# Patient Record
Sex: FEMALE | Race: BLACK OR AFRICAN AMERICAN | NOT HISPANIC OR LATINO
[De-identification: names, ages, dates, MRNs, and addresses within clinical notes are randomized per-mention and may not be internally consistent; named-entity substitution may affect disease eponyms.]

---

## 2017-02-03 ENCOUNTER — MESSAGE (OUTPATIENT)
Age: 67
End: 2017-02-03

## 2017-05-25 ENCOUNTER — OUTPATIENT (OUTPATIENT)
Dept: OUTPATIENT SERVICES | Facility: HOSPITAL | Age: 67
LOS: 1 days | End: 2017-05-25
Payer: COMMERCIAL

## 2017-05-25 VITALS
HEART RATE: 75 BPM | TEMPERATURE: 97 F | HEIGHT: 64 IN | WEIGHT: 227.96 LBS | OXYGEN SATURATION: 97 % | RESPIRATION RATE: 18 BRPM | SYSTOLIC BLOOD PRESSURE: 153 MMHG | DIASTOLIC BLOOD PRESSURE: 85 MMHG

## 2017-05-25 DIAGNOSIS — Z01.810 ENCOUNTER FOR PREPROCEDURAL CARDIOVASCULAR EXAMINATION: ICD-10-CM

## 2017-05-25 DIAGNOSIS — H26.40 UNSPECIFIED SECONDARY CATARACT: Chronic | ICD-10-CM

## 2017-05-25 LAB
ANION GAP SERPL CALC-SCNC: 14 MMOL/L — SIGNIFICANT CHANGE UP (ref 5–17)
APTT BLD: 28.3 SEC — SIGNIFICANT CHANGE UP (ref 27.5–37.4)
BASOPHILS # BLD AUTO: 0 K/UL — SIGNIFICANT CHANGE UP (ref 0–0.2)
BASOPHILS NFR BLD AUTO: 0.2 % — SIGNIFICANT CHANGE UP (ref 0–2)
BUN SERPL-MCNC: 19 MG/DL — SIGNIFICANT CHANGE UP (ref 8–20)
CALCIUM SERPL-MCNC: 9.4 MG/DL — SIGNIFICANT CHANGE UP (ref 8.6–10.2)
CHLORIDE SERPL-SCNC: 102 MMOL/L — SIGNIFICANT CHANGE UP (ref 98–107)
CO2 SERPL-SCNC: 22 MMOL/L — SIGNIFICANT CHANGE UP (ref 22–29)
CREAT SERPL-MCNC: 1.06 MG/DL — SIGNIFICANT CHANGE UP (ref 0.5–1.3)
EOSINOPHIL # BLD AUTO: 0.3 K/UL — SIGNIFICANT CHANGE UP (ref 0–0.5)
EOSINOPHIL NFR BLD AUTO: 5.3 % — SIGNIFICANT CHANGE UP (ref 0–6)
GLUCOSE SERPL-MCNC: 103 MG/DL — SIGNIFICANT CHANGE UP (ref 70–115)
HCT VFR BLD CALC: 35.2 % — LOW (ref 37–47)
HCV AB S/CO SERPL IA: 0.08 S/CO — SIGNIFICANT CHANGE UP
HCV AB SERPL-IMP: SIGNIFICANT CHANGE UP
HGB BLD-MCNC: 11.1 G/DL — LOW (ref 12–16)
INR BLD: 0.92 RATIO — SIGNIFICANT CHANGE UP (ref 0.88–1.16)
LYMPHOCYTES # BLD AUTO: 1.7 K/UL — SIGNIFICANT CHANGE UP (ref 1–4.8)
LYMPHOCYTES # BLD AUTO: 31.4 % — SIGNIFICANT CHANGE UP (ref 20–55)
MCHC RBC-ENTMCNC: 26.6 PG — LOW (ref 27–31)
MCHC RBC-ENTMCNC: 31.5 G/DL — LOW (ref 32–36)
MCV RBC AUTO: 84.2 FL — SIGNIFICANT CHANGE UP (ref 81–99)
MONOCYTES # BLD AUTO: 0.3 K/UL — SIGNIFICANT CHANGE UP (ref 0–0.8)
MONOCYTES NFR BLD AUTO: 4.9 % — SIGNIFICANT CHANGE UP (ref 3–10)
NEUTROPHILS # BLD AUTO: 3.2 K/UL — SIGNIFICANT CHANGE UP (ref 1.8–8)
NEUTROPHILS NFR BLD AUTO: 58 % — SIGNIFICANT CHANGE UP (ref 37–73)
PLATELET # BLD AUTO: 196 K/UL — SIGNIFICANT CHANGE UP (ref 150–400)
POTASSIUM SERPL-MCNC: 4.3 MMOL/L — SIGNIFICANT CHANGE UP (ref 3.5–5.3)
POTASSIUM SERPL-SCNC: 4.3 MMOL/L — SIGNIFICANT CHANGE UP (ref 3.5–5.3)
PROTHROM AB SERPL-ACNC: 10.1 SEC — SIGNIFICANT CHANGE UP (ref 9.8–12.7)
RBC # BLD: 4.18 M/UL — LOW (ref 4.4–5.2)
RBC # FLD: 15.1 % — SIGNIFICANT CHANGE UP (ref 11–15.6)
SODIUM SERPL-SCNC: 138 MMOL/L — SIGNIFICANT CHANGE UP (ref 135–145)
WBC # BLD: 5.5 K/UL — SIGNIFICANT CHANGE UP (ref 4.8–10.8)
WBC # FLD AUTO: 5.5 K/UL — SIGNIFICANT CHANGE UP (ref 4.8–10.8)

## 2017-05-25 PROCEDURE — 80048 BASIC METABOLIC PNL TOTAL CA: CPT

## 2017-05-25 PROCEDURE — 85610 PROTHROMBIN TIME: CPT

## 2017-05-25 PROCEDURE — G0463: CPT

## 2017-05-25 PROCEDURE — 86803 HEPATITIS C AB TEST: CPT

## 2017-05-25 PROCEDURE — 93005 ELECTROCARDIOGRAM TRACING: CPT

## 2017-05-25 PROCEDURE — 85730 THROMBOPLASTIN TIME PARTIAL: CPT

## 2017-05-25 PROCEDURE — 85027 COMPLETE CBC AUTOMATED: CPT

## 2017-05-25 PROCEDURE — 93010 ELECTROCARDIOGRAM REPORT: CPT

## 2017-05-25 RX ORDER — FLUTICASONE PROPIONATE 220 MCG
1 AEROSOL WITH ADAPTER (GRAM) INHALATION
Qty: 0 | Refills: 0 | COMMUNITY

## 2017-05-25 RX ORDER — RANITIDINE HYDROCHLORIDE 150 MG/1
1 TABLET, FILM COATED ORAL
Qty: 0 | Refills: 0 | COMMUNITY

## 2017-05-25 NOTE — H&P PST ADULT - HISTORY OF PRESENT ILLNESS
65 y/o female with hx GERD (follows up with GI) with c/o indigestion for years with c/o intermittent midsternal chest discomfort that is "hard to describe," non-radiating, wakes her up from sleep, unrelated to activity, lasts a few seconds and relieves on own CCS 3. She denies any SOB, palpitations, dizziness or syncope. She had  a NST in 2009 and more recently 2015 that was borderline abnormal showing no ischemia on EKG, with small mild, reversible defect of the basal ant, ant-sep, mid ant wall c/w ischemia and breast attenuation infarct (change from previous study) - low risk. Pt had a LHC back in 2010 that showed no CAD. She is currently on minimal antianginal therapy with metoprolol.

## 2017-05-25 NOTE — H&P PST ADULT - PMH
Arthritis    Chest pain    DJD (degenerative joint disease), lumbar    DM (diabetes mellitus)    Fatty liver    GERD (gastroesophageal reflux disease)    Hiatal hernia    HTN (hypertension)    BONNY (obstructive sleep apnea) Anemia    Arthritis    Chest pain    DJD (degenerative joint disease), lumbar    DM (diabetes mellitus)    Fatty liver    GERD (gastroesophageal reflux disease)    Hiatal hernia    HTN (hypertension)    BONNY (obstructive sleep apnea)

## 2017-05-25 NOTE — H&P PST ADULT - FAMILY HISTORY
Sibling  Still living? Yes, Estimated age: 61-70  Family history of diabetes mellitus (DM), Age at diagnosis: Age Unknown  Family history of hypertension, Age at diagnosis: Age Unknown

## 2017-05-26 ENCOUNTER — TRANSCRIPTION ENCOUNTER (OUTPATIENT)
Age: 67
End: 2017-05-26

## 2017-05-26 ENCOUNTER — INPATIENT (INPATIENT)
Facility: HOSPITAL | Age: 67
LOS: 0 days | Discharge: HOME | DRG: 247 | End: 2017-05-27
Attending: INTERNAL MEDICINE | Admitting: INTERNAL MEDICINE
Payer: COMMERCIAL

## 2017-05-26 VITALS
OXYGEN SATURATION: 100 % | HEART RATE: 89 BPM | DIASTOLIC BLOOD PRESSURE: 76 MMHG | RESPIRATION RATE: 20 BRPM | SYSTOLIC BLOOD PRESSURE: 157 MMHG

## 2017-05-26 DIAGNOSIS — H26.40 UNSPECIFIED SECONDARY CATARACT: Chronic | ICD-10-CM

## 2017-05-26 DIAGNOSIS — Z01.810 ENCOUNTER FOR PREPROCEDURAL CARDIOVASCULAR EXAMINATION: ICD-10-CM

## 2017-05-26 DIAGNOSIS — R94.39 ABNORMAL RESULT OF OTHER CARDIOVASCULAR FUNCTION STUDY: ICD-10-CM

## 2017-05-26 PROCEDURE — 93010 ELECTROCARDIOGRAM REPORT: CPT

## 2017-05-26 PROCEDURE — 93458 L HRT ARTERY/VENTRICLE ANGIO: CPT | Mod: 26,59

## 2017-05-26 PROCEDURE — 92928 PRQ TCAT PLMT NTRAC ST 1 LES: CPT | Mod: LD

## 2017-05-26 RX ORDER — CLOPIDOGREL BISULFATE 75 MG/1
75 TABLET, FILM COATED ORAL DAILY
Qty: 0 | Refills: 0 | Status: DISCONTINUED | OUTPATIENT
Start: 2017-05-27 | End: 2017-05-27

## 2017-05-26 RX ORDER — DEXTROSE 50 % IN WATER 50 %
12.5 SYRINGE (ML) INTRAVENOUS ONCE
Qty: 0 | Refills: 0 | Status: DISCONTINUED | OUTPATIENT
Start: 2017-05-26 | End: 2017-05-27

## 2017-05-26 RX ORDER — DEXTROSE 50 % IN WATER 50 %
25 SYRINGE (ML) INTRAVENOUS ONCE
Qty: 0 | Refills: 0 | Status: DISCONTINUED | OUTPATIENT
Start: 2017-05-26 | End: 2017-05-27

## 2017-05-26 RX ORDER — ALPRAZOLAM 0.25 MG
0.25 TABLET ORAL THREE TIMES A DAY
Qty: 0 | Refills: 0 | Status: DISCONTINUED | OUTPATIENT
Start: 2017-05-26 | End: 2017-05-27

## 2017-05-26 RX ORDER — FLUTICASONE PROPIONATE 220 MCG
1 AEROSOL WITH ADAPTER (GRAM) INHALATION DAILY
Qty: 0 | Refills: 0 | Status: DISCONTINUED | OUTPATIENT
Start: 2017-05-26 | End: 2017-05-27

## 2017-05-26 RX ORDER — METFORMIN HYDROCHLORIDE 850 MG/1
1 TABLET ORAL
Qty: 0 | Refills: 0 | COMMUNITY

## 2017-05-26 RX ORDER — INSULIN GLARGINE 100 [IU]/ML
30 INJECTION, SOLUTION SUBCUTANEOUS AT BEDTIME
Qty: 0 | Refills: 0 | Status: DISCONTINUED | OUTPATIENT
Start: 2017-05-26 | End: 2017-05-27

## 2017-05-26 RX ORDER — DEXTROSE 50 % IN WATER 50 %
1 SYRINGE (ML) INTRAVENOUS ONCE
Qty: 0 | Refills: 0 | Status: DISCONTINUED | OUTPATIENT
Start: 2017-05-26 | End: 2017-05-27

## 2017-05-26 RX ORDER — GLUCAGON INJECTION, SOLUTION 0.5 MG/.1ML
1 INJECTION, SOLUTION SUBCUTANEOUS ONCE
Qty: 0 | Refills: 0 | Status: DISCONTINUED | OUTPATIENT
Start: 2017-05-26 | End: 2017-05-27

## 2017-05-26 RX ORDER — ASPIRIN/CALCIUM CARB/MAGNESIUM 324 MG
325 TABLET ORAL ONCE
Qty: 0 | Refills: 0 | Status: COMPLETED | OUTPATIENT
Start: 2017-05-26 | End: 2017-05-26

## 2017-05-26 RX ORDER — ASPIRIN/CALCIUM CARB/MAGNESIUM 324 MG
81 TABLET ORAL DAILY
Qty: 0 | Refills: 0 | Status: DISCONTINUED | OUTPATIENT
Start: 2017-05-26 | End: 2017-05-27

## 2017-05-26 RX ORDER — INSULIN LISPRO 100/ML
VIAL (ML) SUBCUTANEOUS
Qty: 0 | Refills: 0 | Status: DISCONTINUED | OUTPATIENT
Start: 2017-05-26 | End: 2017-05-27

## 2017-05-26 RX ORDER — FAMOTIDINE 10 MG/ML
20 INJECTION INTRAVENOUS
Qty: 0 | Refills: 0 | Status: DISCONTINUED | OUTPATIENT
Start: 2017-05-26 | End: 2017-05-27

## 2017-05-26 RX ORDER — SODIUM CHLORIDE 9 MG/ML
1000 INJECTION, SOLUTION INTRAVENOUS
Qty: 0 | Refills: 0 | Status: DISCONTINUED | OUTPATIENT
Start: 2017-05-26 | End: 2017-05-27

## 2017-05-26 RX ORDER — ATORVASTATIN CALCIUM 80 MG/1
20 TABLET, FILM COATED ORAL AT BEDTIME
Qty: 0 | Refills: 0 | Status: DISCONTINUED | OUTPATIENT
Start: 2017-05-26 | End: 2017-05-27

## 2017-05-26 RX ORDER — ZOLPIDEM TARTRATE 10 MG/1
5 TABLET ORAL AT BEDTIME
Qty: 0 | Refills: 0 | Status: DISCONTINUED | OUTPATIENT
Start: 2017-05-26 | End: 2017-05-27

## 2017-05-26 RX ORDER — LISINOPRIL 2.5 MG/1
20 TABLET ORAL DAILY
Qty: 0 | Refills: 0 | Status: DISCONTINUED | OUTPATIENT
Start: 2017-05-26 | End: 2017-05-27

## 2017-05-26 RX ORDER — CLOPIDOGREL BISULFATE 75 MG/1
600 TABLET, FILM COATED ORAL ONCE
Qty: 0 | Refills: 0 | Status: COMPLETED | OUTPATIENT
Start: 2017-05-26 | End: 2017-05-26

## 2017-05-26 RX ORDER — ACETAMINOPHEN 500 MG
650 TABLET ORAL EVERY 6 HOURS
Qty: 0 | Refills: 0 | Status: DISCONTINUED | OUTPATIENT
Start: 2017-05-26 | End: 2017-05-27

## 2017-05-26 RX ADMIN — Medication 650 MILLIGRAM(S): at 17:06

## 2017-05-26 RX ADMIN — INSULIN GLARGINE 30 UNIT(S): 100 INJECTION, SOLUTION SUBCUTANEOUS at 21:40

## 2017-05-26 RX ADMIN — CLOPIDOGREL BISULFATE 600 MILLIGRAM(S): 75 TABLET, FILM COATED ORAL at 21:40

## 2017-05-26 RX ADMIN — Medication 325 MILLIGRAM(S): at 10:35

## 2017-05-26 RX ADMIN — ATORVASTATIN CALCIUM 20 MILLIGRAM(S): 80 TABLET, FILM COATED ORAL at 21:40

## 2017-05-26 RX ADMIN — Medication 650 MILLIGRAM(S): at 17:05

## 2017-05-26 NOTE — DISCHARGE NOTE ADULT - INSTRUCTIONS
Restricted use with no heavy lifting of affected arm for 48 hours.  No submerging the arm in water for 48 hours.  You may start showering today.  Call your doctor for any bleeding, swelling, loss of sensation in the hand or fingers, or fingers turning blue.  If heavy bleeding or large lumps form, hold pressure at the spot and come to the Emergency Room.  Remove bandaid in 24 hours

## 2017-05-26 NOTE — DISCHARGE NOTE ADULT - CARE PLAN
Principal Discharge DX:	Coronary artery disease involving native coronary artery of native heart with unstable angina pector  Goal:	ADL without CP  Instructions for follow-up, activity and diet:	Restricted use with no heavy lifting of affected arm for 48 hours.  No submerging the arm in water for 48 hours.  You may start showering today.  Call your doctor for any bleeding, swelling, loss of sensation in the hand or fingers, or fingers turning blue.  If heavy bleeding or large lumps form, hold pressure at the spot and come to the Emergency Room. Principal Discharge DX:	Coronary artery disease involving native coronary artery of native heart with unstable angina pector  Goal:	ADL without CP  Instructions for follow-up, activity and diet:	Restricted use with no heavy lifting of affected arm for 48 hours.  No submerging the arm in water for 48 hours.  You may start showering today.  Call your doctor for any bleeding, swelling, loss of sensation in the hand or fingers, or fingers turning blue.  If heavy bleeding or large lumps form, hold pressure at the spot and come to the Emergency Room.  Instructions for follow-up, activity and diet:	Choose lean meats and poultry without skin and prepare them without added saturated and trans fat.  Eat fish at least twice a week. Recent research shows that eating oily fish containing omega-3 fatty acids (for example, salmon, trout and herring) may help lower your risk of death from coronary artery disease.  Select fat-free, 1 percent fat and low-fat dairy products.  Cut back on foods containing partially hydrogenated vegetable oils to reduce trans fat in your diet.   To lower cholesterol, reduce saturated fat to no more than 5 to 6 percent of total calories. For someone eating 2,000 calories a day, that’s about 13 grams of saturated fat.  Cut back on beverages and foods with added sugars.  Choose and prepare foods with little or no salt. To lower blood pressure, aim to eat no more than 2,400 milligrams of sodium per day. Reducing daily intake to 1,500 mg is desirable because it can lower blood pressure even further.  If you drink alcohol, drink in moderation. That means one drink per day if you’re a woman and two drinks  per day if you’re a man.  Follow the American Heart Association recommendations when you eat out, and keep an eye on your portion sizes.  Choose ADA foods low salt, low fat  Instructions for follow-up, activity and diet:	HgBA1C 6.7 today  Instructions for follow-up, activity and diet:	You are allergic to omeprazole. Continue on ranitidine

## 2017-05-26 NOTE — PROGRESS NOTE ADULT - SUBJECTIVE AND OBJECTIVE BOX
Subjective:  66y  Female who had a left heart catheterization which showed:       LM: Normal       LAD: 70% mid stenosis treated with a 3.0 X 15 Resolute FARRAH       LCX: Normal       RCA: Normal    PAST MEDICAL & SURGICAL HISTORY:  Anemia  Arthritis  Fatty liver  DJD (degenerative joint disease), lumbar  GERD (gastroesophageal reflux disease)  Hiatal hernia  BONNY (obstructive sleep apnea)  Chest pain  DM (diabetes mellitus)  HTN (hypertension)  After cataract, bilateral    FAMILY HISTORY:  Family history of hypertension (Sibling)  Family history of diabetes mellitus (DM) (Sibling)    MEDICATIONS  (STANDING):  clopidogrel Tablet 600milliGRAM(s) Oral once  aspirin  chewable 81milliGRAM(s) Oral daily  atorvastatin 20milliGRAM(s) Oral at bedtime  insulin lispro (HumaLOG) corrective regimen sliding scale  SubCutaneous three times a day before meals  dextrose 5%. 1000milliLiter(s) IV Continuous <Continuous>  dextrose 50% Injectable 12.5Gram(s) IV Push once  dextrose 50% Injectable 25Gram(s) IV Push once  dextrose 50% Injectable 25Gram(s) IV Push once  insulin glargine Injectable (LANTUS) 30Unit(s) SubCutaneous at bedtime  lisinopril 20milliGRAM(s) Oral daily  fluticasone    44 MICROgram(s) HFA Inhaler 1Puff(s) Inhalation daily  famotidine    Tablet 20milliGRAM(s) Oral two times a day    MEDICATIONS  (PRN):  dextrose Gel 1Dose(s) Oral once PRN Blood Glucose LESS THAN 70 milliGRAM(s)/deciliter  glucagon  Injectable 1milliGRAM(s) IntraMuscular once PRN Glucose LESS THAN 70 milligrams/deciliter      HPI: 65 y/o female with hx GERD (follows up with GI) with c/o indigestion for years with c/o intermittent midsternal chest discomfort that is "hard to describe," non-radiating, wakes her up from sleep, unrelated to activity, lasts a few seconds and relieves on own CCS 3. She denies any SOB, palpitations, dizziness or syncope. She had  a NST in  and more recently  that was borderline abnormal showing no ischemia on EKG, with small mild, reversible defect of the basal ant, ant-sep, mid ant wall c/w ischemia and breast attenuation infarct (change from previous study) - low risk. Pt had a LHC back in  that showed no CAD. She is currently on minimal antianginal therapy with metoprolol.       General: No fatigue, no fevers/chills  Respiratory: No dyspnea, no cough, no wheeze  CV: No chest pain, no palpitations  Abd: No nausea  Neuro: No headache, no dizziness  codeine (Nausea)  omeprazole (Nausea)  Vicodin (Nausea)      Objective:  Vital Signs  HR: 71  BP: 129/58  RR: 18  SpO2: 99%    CM: SR  Neuro: A&OX3, CN 2-12 intact  HEENT: NC, AT  Lungs: CTA B/L  CV: S1, S2, no murmur, RRR  Abd: Soft  Extremity: Right radial band: no bleeding, fingers warm with good cap refil  EK.1   5.5   )-----------( 196      ( 25 May 2017 10:25 )             35.2         138  |  102  |  19.0  ----------------------------<  103  4.3   |  22.0  |  1.06    Ca    9.4      25 May 2017 10:25      PT/INR - ( 25 May 2017 10:25 )   PT: 10.1 sec;   INR: 0.92 ratio         PTT - ( 25 May 2017 10:25 )  PTT:28.3 sec

## 2017-05-26 NOTE — DISCHARGE NOTE ADULT - HOSPITAL COURSE
S/P [facility] RRA with FARRAH x1mLAD  OOB ambulating w/o compliant        REVIEW OF SYSTEMS:  Denies SOB, CP, NV, HA, dizziness, palpitations, site pain    PHYSICAL EXAM: A&Ox3 NAD Skin warm and dry  NEURO: Speech intact +gag +swallow Tongue midline CROCKETT  NECK: No JVD, trachea midline. Eupneic  HEART: RRR S1S2 no g/m Tele SR/ST when ambulating (pt did not have BB x2days)No Venticular ectopy.  ECG NSR [id number]  PULMONARY:  CTA kobe  ABDOMEN: Soft nontender X4 +BS Vdg/eating  EXTREMITIES: Rt Radial site: Rt radial pulse + w/pulse ox on right index finger SaO2>95% RUE w/oneurovascular deficit. Capillary refill <3 sec  A-s/p [facility] RRA FARRAH x1mLAD (centricity pending)  Labs reviewed BETHANY Afebrile  P-RRA site care reviewed w/pt  Pt teaching re diet, exercise, compliance and stringent daily ASA/Plavix, BS goal, weight loss

## 2017-05-26 NOTE — DISCHARGE NOTE ADULT - PLAN OF CARE
ADL without CP Restricted use with no heavy lifting of affected arm for 48 hours.  No submerging the arm in water for 48 hours.  You may start showering today.  Call your doctor for any bleeding, swelling, loss of sensation in the hand or fingers, or fingers turning blue.  If heavy bleeding or large lumps form, hold pressure at the spot and come to the Emergency Room. Choose lean meats and poultry without skin and prepare them without added saturated and trans fat.  Eat fish at least twice a week. Recent research shows that eating oily fish containing omega-3 fatty acids (for example, salmon, trout and herring) may help lower your risk of death from coronary artery disease.  Select fat-free, 1 percent fat and low-fat dairy products.  Cut back on foods containing partially hydrogenated vegetable oils to reduce trans fat in your diet.   To lower cholesterol, reduce saturated fat to no more than 5 to 6 percent of total calories. For someone eating 2,000 calories a day, that’s about 13 grams of saturated fat.  Cut back on beverages and foods with added sugars.  Choose and prepare foods with little or no salt. To lower blood pressure, aim to eat no more than 2,400 milligrams of sodium per day. Reducing daily intake to 1,500 mg is desirable because it can lower blood pressure even further.  If you drink alcohol, drink in moderation. That means one drink per day if you’re a woman and two drinks  per day if you’re a man.  Follow the American Heart Association recommendations when you eat out, and keep an eye on your portion sizes.  Choose ADA foods low salt, low fat HgBA1C 6.7 today You are allergic to omeprazole. Continue on ranitidine

## 2017-05-26 NOTE — DISCHARGE NOTE ADULT - PATIENT PORTAL LINK FT
“You can access the Follow[facility] Patient Portal, offered by [facility], by registering with the following website: [email or web address]”

## 2017-05-26 NOTE — PROGRESS NOTE ADULT - ASSESSMENT
66y Female  Procedure: Left heart catheterization and PCI with FARRAH of the mLAD  Pre-op diagnosis: Stable angina with abnormal stress test  Post-op diagnosis: CAD of native LAD of native heart with stable angina    1. Remove right band at: 2:00PM    2. No heavy lifting  with right arm    3. Admit to: [address] [place]    4. Follow up as an outpatient with Dr. Roa    5. DAPT: Patient was loaded with Brilinta in the CCL, will load with Plavix 600mg tonight at 10:00PM and start Plavix 75mg daily and Aspirin 81mg daily in the AM.    6. Statin: Lipitor 20mg Q HS    7. CBC, BMP, Mg, EKG in AM    8. Continue current medications

## 2017-05-26 NOTE — DISCHARGE NOTE ADULT - REASON FOR ADMISSION
................................................................................................................................................................................................................................    Abnormal stress test

## 2017-05-26 NOTE — DISCHARGE NOTE ADULT - MEDICATION SUMMARY - MEDICATIONS TO TAKE
I will START or STAY ON the medications listed below when I get home from the hospital:    aspirin 81 mg oral tablet, chewable  -- 1 tab(s) by mouth once a day  -- Indication: For Cad    Tylenol 8 HR Arthritis Pain 650 mg oral tablet, extended release  -- 2 tab(s) by mouth every 8 hours, As Needed  -- Indication: For arthritis    benazepril 20 mg oral tablet  -- 1 tab(s) by mouth once a day  -- Indication: For HTN    Lantus 100 units/mL subcutaneous solution  -- 30 unit(s) subcutaneous once a day (at bedtime)  -- Indication: For DM    Byetta Prefilled Pen 5 mcg/0.02 mL subcutaneous solution  -- 10 microgram(s) subcutaneous 2 times a day  -- Indication: For DM    metFORMIN 1000 mg oral tablet  -- 1 tab(s) by mouth once a day. RESTART ON MAY 29, 2017  -- resume 5/30/2017  -- Indication: For DM    atorvastatin 20 mg oral tablet  -- 1 tab(s) by mouth once a day (at bedtime)  -- Indication: For HLD    clopidogrel 75 mg oral tablet  -- 1 tab(s) by mouth once a day  -- Indication: For CAD    metoprolol succinate 50 mg oral tablet, extended release  -- 1 tab(s) by mouth once a day  -- Indication: For HTN    raNITIdine 150 mg oral tablet  -- 1 tab(s) by mouth once a day (at bedtime)  -- Indication: For GERD    Combigan 0.2%-0.5% ophthalmic solution  -- 1 drop(s) to each affected eye every 12 hours  -- Indication: For eye    fluticasone 50 mcg inhalation powder  -- 1 puff(s) inhaled once a day  -- Indication: For asthma    folic acid 1 mg oral tablet  -- 1 tab(s) by mouth once a day  -- Indication: For ada    Vitamin D3 1000 intl units oral tablet  -- 1 tab(s) by mouth once a day  -- Indication: For ada

## 2017-05-27 VITALS
RESPIRATION RATE: 15 BRPM | TEMPERATURE: 98 F | OXYGEN SATURATION: 100 % | HEART RATE: 95 BPM | SYSTOLIC BLOOD PRESSURE: 146 MMHG | DIASTOLIC BLOOD PRESSURE: 66 MMHG

## 2017-05-27 LAB
ANION GAP SERPL CALC-SCNC: 13 MMOL/L — SIGNIFICANT CHANGE UP (ref 5–17)
BASOPHILS # BLD AUTO: 0 K/UL — SIGNIFICANT CHANGE UP (ref 0–0.2)
BASOPHILS NFR BLD AUTO: 0.3 % — SIGNIFICANT CHANGE UP (ref 0–2)
BUN SERPL-MCNC: 19 MG/DL — SIGNIFICANT CHANGE UP (ref 8–20)
CALCIUM SERPL-MCNC: 8.8 MG/DL — SIGNIFICANT CHANGE UP (ref 8.6–10.2)
CHLORIDE SERPL-SCNC: 104 MMOL/L — SIGNIFICANT CHANGE UP (ref 98–107)
CHOLEST SERPL-MCNC: 133 MG/DL — SIGNIFICANT CHANGE UP (ref 110–199)
CO2 SERPL-SCNC: 23 MMOL/L — SIGNIFICANT CHANGE UP (ref 22–29)
CREAT SERPL-MCNC: 1.05 MG/DL — SIGNIFICANT CHANGE UP (ref 0.5–1.3)
EOSINOPHIL # BLD AUTO: 0.4 K/UL — SIGNIFICANT CHANGE UP (ref 0–0.5)
EOSINOPHIL NFR BLD AUTO: 6.8 % — HIGH (ref 0–6)
GLUCOSE SERPL-MCNC: 150 MG/DL — HIGH (ref 70–115)
HBA1C BLD-MCNC: 6.7 % — HIGH (ref 4–5.6)
HCT VFR BLD CALC: 32.2 % — LOW (ref 37–47)
HDLC SERPL-MCNC: 55 MG/DL — LOW
HGB BLD-MCNC: 10.2 G/DL — LOW (ref 12–16)
LIPID PNL WITH DIRECT LDL SERPL: 57 MG/DL — SIGNIFICANT CHANGE UP
LYMPHOCYTES # BLD AUTO: 1.4 K/UL — SIGNIFICANT CHANGE UP (ref 1–4.8)
LYMPHOCYTES # BLD AUTO: 23 % — SIGNIFICANT CHANGE UP (ref 20–55)
MAGNESIUM SERPL-MCNC: 2.2 MG/DL — SIGNIFICANT CHANGE UP (ref 1.6–2.6)
MCHC RBC-ENTMCNC: 26.4 PG — LOW (ref 27–31)
MCHC RBC-ENTMCNC: 31.7 G/DL — LOW (ref 32–36)
MCV RBC AUTO: 83.2 FL — SIGNIFICANT CHANGE UP (ref 81–99)
MONOCYTES # BLD AUTO: 0.5 K/UL — SIGNIFICANT CHANGE UP (ref 0–0.8)
MONOCYTES NFR BLD AUTO: 7.7 % — SIGNIFICANT CHANGE UP (ref 3–10)
NEUTROPHILS # BLD AUTO: 3.8 K/UL — SIGNIFICANT CHANGE UP (ref 1.8–8)
NEUTROPHILS NFR BLD AUTO: 61.9 % — SIGNIFICANT CHANGE UP (ref 37–73)
PLATELET # BLD AUTO: 200 K/UL — SIGNIFICANT CHANGE UP (ref 150–400)
POTASSIUM SERPL-MCNC: 4 MMOL/L — SIGNIFICANT CHANGE UP (ref 3.5–5.3)
POTASSIUM SERPL-SCNC: 4 MMOL/L — SIGNIFICANT CHANGE UP (ref 3.5–5.3)
RBC # BLD: 3.87 M/UL — LOW (ref 4.4–5.2)
RBC # FLD: 15.1 % — SIGNIFICANT CHANGE UP (ref 11–15.6)
SODIUM SERPL-SCNC: 140 MMOL/L — SIGNIFICANT CHANGE UP (ref 135–145)
TOTAL CHOLESTEROL/HDL RATIO MEASUREMENT: 2 RATIO — LOW (ref 3.3–7.1)
TRIGL SERPL-MCNC: 106 MG/DL — SIGNIFICANT CHANGE UP (ref 10–200)
WBC # BLD: 6.2 K/UL — SIGNIFICANT CHANGE UP (ref 4.8–10.8)
WBC # FLD AUTO: 6.2 K/UL — SIGNIFICANT CHANGE UP (ref 4.8–10.8)

## 2017-05-27 PROCEDURE — C1725: CPT

## 2017-05-27 PROCEDURE — 93005 ELECTROCARDIOGRAM TRACING: CPT

## 2017-05-27 PROCEDURE — C1769: CPT

## 2017-05-27 PROCEDURE — C1874: CPT

## 2017-05-27 PROCEDURE — 83735 ASSAY OF MAGNESIUM: CPT

## 2017-05-27 PROCEDURE — C1887: CPT

## 2017-05-27 PROCEDURE — 85027 COMPLETE CBC AUTOMATED: CPT

## 2017-05-27 PROCEDURE — 93458 L HRT ARTERY/VENTRICLE ANGIO: CPT | Mod: 59

## 2017-05-27 PROCEDURE — C1894: CPT

## 2017-05-27 PROCEDURE — C9600: CPT | Mod: LD

## 2017-05-27 PROCEDURE — 80048 BASIC METABOLIC PNL TOTAL CA: CPT

## 2017-05-27 PROCEDURE — 93010 ELECTROCARDIOGRAM REPORT: CPT

## 2017-05-27 PROCEDURE — 80061 LIPID PANEL: CPT

## 2017-05-27 PROCEDURE — 83036 HEMOGLOBIN GLYCOSYLATED A1C: CPT

## 2017-05-27 RX ORDER — IBUPROFEN 200 MG
1 TABLET ORAL
Qty: 0 | Refills: 0 | COMMUNITY

## 2017-05-27 RX ORDER — METOPROLOL TARTRATE 50 MG
1 TABLET ORAL
Qty: 90 | Refills: 0
Start: 2017-05-27 | End: 2017-08-25

## 2017-05-27 RX ORDER — METOPROLOL TARTRATE 50 MG
50 TABLET ORAL DAILY
Qty: 0 | Refills: 0 | Status: DISCONTINUED | OUTPATIENT
Start: 2017-05-27 | End: 2017-05-27

## 2017-05-27 RX ORDER — CLOPIDOGREL BISULFATE 75 MG/1
1 TABLET, FILM COATED ORAL
Qty: 90 | Refills: 3
Start: 2017-05-27 | End: 2018-05-21

## 2017-05-27 RX ORDER — ATORVASTATIN CALCIUM 80 MG/1
1 TABLET, FILM COATED ORAL
Qty: 90 | Refills: 0
Start: 2017-05-27 | End: 2017-08-25

## 2017-05-27 RX ORDER — CELECOXIB 200 MG/1
1 CAPSULE ORAL
Qty: 0 | Refills: 0 | COMMUNITY

## 2017-05-27 RX ORDER — ASPIRIN/CALCIUM CARB/MAGNESIUM 324 MG
1 TABLET ORAL
Qty: 90 | Refills: 3
Start: 2017-05-27 | End: 2018-05-21

## 2017-05-27 RX ADMIN — Medication 81 MILLIGRAM(S): at 09:59

## 2017-05-27 RX ADMIN — FAMOTIDINE 20 MILLIGRAM(S): 10 INJECTION INTRAVENOUS at 06:23

## 2017-05-27 RX ADMIN — CLOPIDOGREL BISULFATE 75 MILLIGRAM(S): 75 TABLET, FILM COATED ORAL at 09:59

## 2017-05-27 RX ADMIN — LISINOPRIL 20 MILLIGRAM(S): 2.5 TABLET ORAL at 06:23

## 2017-05-27 RX ADMIN — Medication 50 MILLIGRAM(S): at 09:59

## 2017-05-29 RX ORDER — METFORMIN HYDROCHLORIDE 850 MG/1
1 TABLET ORAL
Qty: 0 | Refills: 0 | DISCHARGE
Start: 2017-05-29

## 2017-05-29 RX ORDER — METFORMIN HYDROCHLORIDE 850 MG/1
1 TABLET ORAL
Qty: 0 | Refills: 0 | COMMUNITY
Start: 2017-05-29

## 2017-12-22 ENCOUNTER — APPOINTMENT (OUTPATIENT)
Dept: CARDIOLOGY | Facility: CLINIC | Age: 67
End: 2017-12-22
Payer: MEDICARE

## 2017-12-22 PROCEDURE — 93798 PHYS/QHP OP CAR RHAB W/ECG: CPT

## 2018-01-18 ENCOUNTER — APPOINTMENT (OUTPATIENT)
Dept: CARDIOLOGY | Facility: CLINIC | Age: 68
End: 2018-01-18

## 2018-01-22 ENCOUNTER — APPOINTMENT (OUTPATIENT)
Dept: CARDIOLOGY | Facility: CLINIC | Age: 68
End: 2018-01-22
Payer: MEDICARE

## 2018-01-22 PROCEDURE — ZZZZZ: CPT

## 2018-01-22 PROCEDURE — 93798 PHYS/QHP OP CAR RHAB W/ECG: CPT

## 2018-01-24 ENCOUNTER — APPOINTMENT (OUTPATIENT)
Dept: CARDIOLOGY | Facility: CLINIC | Age: 68
End: 2018-01-24
Payer: MEDICARE

## 2018-01-24 PROCEDURE — 93798 PHYS/QHP OP CAR RHAB W/ECG: CPT

## 2018-01-29 ENCOUNTER — APPOINTMENT (OUTPATIENT)
Dept: CARDIOLOGY | Facility: CLINIC | Age: 68
End: 2018-01-29

## 2018-01-31 ENCOUNTER — APPOINTMENT (OUTPATIENT)
Dept: CARDIOLOGY | Facility: CLINIC | Age: 68
End: 2018-01-31

## 2018-02-26 ENCOUNTER — RECORD ABSTRACTING (OUTPATIENT)
Age: 68
End: 2018-02-26

## 2018-02-26 DIAGNOSIS — K44.9 DIAPHRAGMATIC HERNIA W/OUT OBSTRUCTION OR GANGRENE: ICD-10-CM

## 2018-02-26 RX ORDER — ASPIRIN 81 MG
81 TABLET, DELAYED RELEASE (ENTERIC COATED) ORAL DAILY
Refills: 0 | Status: ACTIVE | COMMUNITY

## 2018-02-26 RX ORDER — ATORVASTATIN CALCIUM 10 MG/1
10 TABLET, FILM COATED ORAL
Qty: 90 | Refills: 3 | Status: ACTIVE | COMMUNITY

## 2018-03-09 ENCOUNTER — RECORD ABSTRACTING (OUTPATIENT)
Age: 68
End: 2018-03-09

## 2018-03-09 DIAGNOSIS — R07.9 CHEST PAIN, UNSPECIFIED: ICD-10-CM

## 2018-03-17 ENCOUNTER — APPOINTMENT (OUTPATIENT)
Dept: CARDIOLOGY | Facility: CLINIC | Age: 68
End: 2018-03-17

## 2018-05-17 ENCOUNTER — FORM ENCOUNTER (OUTPATIENT)
Age: 68
End: 2018-05-17

## 2018-05-29 ENCOUNTER — RECORD ABSTRACTING (OUTPATIENT)
Age: 68
End: 2018-05-29

## 2018-05-29 RX ORDER — CHROMIUM 200 MCG
TABLET ORAL DAILY
Refills: 0 | Status: ACTIVE | COMMUNITY

## 2018-05-29 RX ORDER — METRONIDAZOLE 500 MG/1
500 TABLET ORAL
Qty: 30 | Refills: 0 | Status: COMPLETED | COMMUNITY
Start: 2018-02-18

## 2018-05-29 RX ORDER — CEFUROXIME AXETIL 250 MG/1
250 TABLET ORAL
Qty: 20 | Refills: 0 | Status: COMPLETED | COMMUNITY
Start: 2018-02-18

## 2018-05-31 ENCOUNTER — APPOINTMENT (OUTPATIENT)
Dept: CARDIOLOGY | Facility: CLINIC | Age: 68
End: 2018-05-31
Payer: MEDICARE

## 2018-05-31 VITALS
SYSTOLIC BLOOD PRESSURE: 139 MMHG | BODY MASS INDEX: 41.66 KG/M2 | RESPIRATION RATE: 16 BRPM | HEIGHT: 64 IN | WEIGHT: 244 LBS | DIASTOLIC BLOOD PRESSURE: 90 MMHG | HEART RATE: 93 BPM

## 2018-05-31 PROCEDURE — 93000 ELECTROCARDIOGRAM COMPLETE: CPT

## 2018-05-31 PROCEDURE — 99214 OFFICE O/P EST MOD 30 MIN: CPT

## 2018-06-12 PROBLEM — M54.9 BACK PAIN: Status: ACTIVE | Noted: 2018-06-12

## 2018-06-14 ENCOUNTER — APPOINTMENT (OUTPATIENT)
Dept: ORTHOPEDIC SURGERY | Facility: CLINIC | Age: 68
End: 2018-06-14
Payer: MEDICARE

## 2018-06-14 VITALS
DIASTOLIC BLOOD PRESSURE: 84 MMHG | BODY MASS INDEX: 41.66 KG/M2 | WEIGHT: 244 LBS | SYSTOLIC BLOOD PRESSURE: 152 MMHG | HEIGHT: 64 IN | HEART RATE: 80 BPM

## 2018-06-14 DIAGNOSIS — M25.551 PAIN IN RIGHT HIP: ICD-10-CM

## 2018-06-14 DIAGNOSIS — M54.9 DORSALGIA, UNSPECIFIED: ICD-10-CM

## 2018-06-14 DIAGNOSIS — M51.36 OTHER INTERVERTEBRAL DISC DEGENERATION, LUMBAR REGION: ICD-10-CM

## 2018-06-14 PROCEDURE — 72100 X-RAY EXAM L-S SPINE 2/3 VWS: CPT

## 2018-06-14 PROCEDURE — 73502 X-RAY EXAM HIP UNI 2-3 VIEWS: CPT | Mod: RT

## 2018-06-14 PROCEDURE — 99205 OFFICE O/P NEW HI 60 MIN: CPT

## 2018-07-09 ENCOUNTER — APPOINTMENT (OUTPATIENT)
Dept: CARDIOLOGY | Facility: CLINIC | Age: 68
End: 2018-07-09
Payer: MEDICARE

## 2018-07-09 VITALS
RESPIRATION RATE: 16 BRPM | HEART RATE: 73 BPM | HEIGHT: 64 IN | BODY MASS INDEX: 41.48 KG/M2 | DIASTOLIC BLOOD PRESSURE: 78 MMHG | WEIGHT: 243 LBS | SYSTOLIC BLOOD PRESSURE: 120 MMHG

## 2018-07-09 PROCEDURE — 99215 OFFICE O/P EST HI 40 MIN: CPT

## 2018-07-09 PROCEDURE — 93000 ELECTROCARDIOGRAM COMPLETE: CPT

## 2018-07-09 RX ORDER — CLOPIDOGREL BISULFATE 75 MG/1
75 TABLET, FILM COATED ORAL DAILY
Qty: 90 | Refills: 3 | Status: ACTIVE | COMMUNITY

## 2018-07-09 RX ORDER — CYCLOBENZAPRINE HYDROCHLORIDE 10 MG/1
10 TABLET, FILM COATED ORAL
Qty: 30 | Refills: 0 | Status: DISCONTINUED | COMMUNITY
Start: 2018-05-08 | End: 2018-07-09

## 2018-08-01 ENCOUNTER — OUTPATIENT (OUTPATIENT)
Dept: OUTPATIENT SERVICES | Facility: HOSPITAL | Age: 68
LOS: 1 days | End: 2018-08-01
Payer: MEDICARE

## 2018-08-01 DIAGNOSIS — H26.40 UNSPECIFIED SECONDARY CATARACT: Chronic | ICD-10-CM

## 2018-08-01 PROCEDURE — G9001: CPT

## 2018-08-02 ENCOUNTER — EMERGENCY (EMERGENCY)
Facility: HOSPITAL | Age: 68
LOS: 1 days | Discharge: HOME | End: 2018-08-02
Attending: EMERGENCY MEDICINE
Payer: COMMERCIAL

## 2018-08-02 VITALS
TEMPERATURE: 98 F | HEART RATE: 72 BPM | WEIGHT: 229.94 LBS | DIASTOLIC BLOOD PRESSURE: 85 MMHG | HEIGHT: 64 IN | SYSTOLIC BLOOD PRESSURE: 157 MMHG | RESPIRATION RATE: 18 BRPM | OXYGEN SATURATION: 98 %

## 2018-08-02 VITALS
HEART RATE: 65 BPM | DIASTOLIC BLOOD PRESSURE: 85 MMHG | RESPIRATION RATE: 16 BRPM | TEMPERATURE: 98 F | OXYGEN SATURATION: 97 % | SYSTOLIC BLOOD PRESSURE: 143 MMHG

## 2018-08-02 DIAGNOSIS — H26.40 UNSPECIFIED SECONDARY CATARACT: Chronic | ICD-10-CM

## 2018-08-02 PROBLEM — M47.816 SPONDYLOSIS WITHOUT MYELOPATHY OR RADICULOPATHY, LUMBAR REGION: Chronic | Status: ACTIVE | Noted: 2017-05-25

## 2018-08-02 PROBLEM — R07.9 CHEST PAIN, UNSPECIFIED: Chronic | Status: ACTIVE | Noted: 2017-05-25

## 2018-08-02 PROBLEM — M19.90 UNSPECIFIED OSTEOARTHRITIS, UNSPECIFIED SITE: Chronic | Status: ACTIVE | Noted: 2017-05-25

## 2018-08-02 PROBLEM — K44.9 DIAPHRAGMATIC HERNIA WITHOUT OBSTRUCTION OR GANGRENE: Chronic | Status: ACTIVE | Noted: 2017-05-25

## 2018-08-02 PROBLEM — I10 ESSENTIAL (PRIMARY) HYPERTENSION: Chronic | Status: ACTIVE | Noted: 2017-05-25

## 2018-08-02 PROBLEM — D64.9 ANEMIA, UNSPECIFIED: Chronic | Status: ACTIVE | Noted: 2017-05-25

## 2018-08-02 PROBLEM — K21.9 GASTRO-ESOPHAGEAL REFLUX DISEASE WITHOUT ESOPHAGITIS: Chronic | Status: ACTIVE | Noted: 2017-05-25

## 2018-08-02 PROBLEM — K76.0 FATTY (CHANGE OF) LIVER, NOT ELSEWHERE CLASSIFIED: Chronic | Status: ACTIVE | Noted: 2017-05-25

## 2018-08-02 PROBLEM — E11.9 TYPE 2 DIABETES MELLITUS WITHOUT COMPLICATIONS: Chronic | Status: ACTIVE | Noted: 2017-05-25

## 2018-08-02 PROBLEM — G47.33 OBSTRUCTIVE SLEEP APNEA (ADULT) (PEDIATRIC): Chronic | Status: ACTIVE | Noted: 2017-05-25

## 2018-08-02 LAB
ALBUMIN SERPL ELPH-MCNC: 3.8 G/DL — SIGNIFICANT CHANGE UP (ref 3.3–5.2)
ALP SERPL-CCNC: 87 U/L — SIGNIFICANT CHANGE UP (ref 40–120)
ALT FLD-CCNC: 8 U/L — SIGNIFICANT CHANGE UP
ANION GAP SERPL CALC-SCNC: 10 MMOL/L — SIGNIFICANT CHANGE UP (ref 5–17)
APTT BLD: 27.8 SEC — SIGNIFICANT CHANGE UP (ref 27.5–37.4)
AST SERPL-CCNC: 13 U/L — SIGNIFICANT CHANGE UP
BILIRUB SERPL-MCNC: 0.4 MG/DL — SIGNIFICANT CHANGE UP (ref 0.4–2)
BUN SERPL-MCNC: 17 MG/DL — SIGNIFICANT CHANGE UP (ref 8–20)
CALCIUM SERPL-MCNC: 9 MG/DL — SIGNIFICANT CHANGE UP (ref 8.6–10.2)
CHLORIDE SERPL-SCNC: 109 MMOL/L — HIGH (ref 98–107)
CO2 SERPL-SCNC: 22 MMOL/L — SIGNIFICANT CHANGE UP (ref 22–29)
CREAT SERPL-MCNC: 1.3 MG/DL — SIGNIFICANT CHANGE UP (ref 0.5–1.3)
GLUCOSE SERPL-MCNC: 129 MG/DL — HIGH (ref 70–115)
HCT VFR BLD CALC: 33.8 % — LOW (ref 37–47)
HGB BLD-MCNC: 10.4 G/DL — LOW (ref 12–16)
INR BLD: 1 RATIO — SIGNIFICANT CHANGE UP (ref 0.88–1.16)
LIDOCAIN IGE QN: 39 U/L — SIGNIFICANT CHANGE UP (ref 22–51)
MCHC RBC-ENTMCNC: 25.6 PG — LOW (ref 27–31)
MCHC RBC-ENTMCNC: 30.8 G/DL — LOW (ref 32–36)
MCV RBC AUTO: 83 FL — SIGNIFICANT CHANGE UP (ref 81–99)
PLATELET # BLD AUTO: 207 K/UL — SIGNIFICANT CHANGE UP (ref 150–400)
POTASSIUM SERPL-MCNC: 4.7 MMOL/L — SIGNIFICANT CHANGE UP (ref 3.5–5.3)
POTASSIUM SERPL-SCNC: 4.7 MMOL/L — SIGNIFICANT CHANGE UP (ref 3.5–5.3)
PROT SERPL-MCNC: 7.2 G/DL — SIGNIFICANT CHANGE UP (ref 6.6–8.7)
PROTHROM AB SERPL-ACNC: 11 SEC — SIGNIFICANT CHANGE UP (ref 9.8–12.7)
RBC # BLD: 4.07 M/UL — LOW (ref 4.4–5.2)
RBC # FLD: 16.1 % — HIGH (ref 11–15.6)
SODIUM SERPL-SCNC: 141 MMOL/L — SIGNIFICANT CHANGE UP (ref 135–145)
TROPONIN T SERPL-MCNC: <0.01 NG/ML — SIGNIFICANT CHANGE UP (ref 0–0.06)
WBC # BLD: 5.3 K/UL — SIGNIFICANT CHANGE UP (ref 4.8–10.8)
WBC # FLD AUTO: 5.3 K/UL — SIGNIFICANT CHANGE UP (ref 4.8–10.8)

## 2018-08-02 PROCEDURE — 85610 PROTHROMBIN TIME: CPT

## 2018-08-02 PROCEDURE — 85730 THROMBOPLASTIN TIME PARTIAL: CPT

## 2018-08-02 PROCEDURE — 80053 COMPREHEN METABOLIC PANEL: CPT

## 2018-08-02 PROCEDURE — 83690 ASSAY OF LIPASE: CPT

## 2018-08-02 PROCEDURE — 36415 COLL VENOUS BLD VENIPUNCTURE: CPT

## 2018-08-02 PROCEDURE — 99284 EMERGENCY DEPT VISIT MOD MDM: CPT | Mod: 25

## 2018-08-02 PROCEDURE — 71045 X-RAY EXAM CHEST 1 VIEW: CPT | Mod: 26

## 2018-08-02 PROCEDURE — 85027 COMPLETE CBC AUTOMATED: CPT

## 2018-08-02 PROCEDURE — 71045 X-RAY EXAM CHEST 1 VIEW: CPT

## 2018-08-02 PROCEDURE — 74176 CT ABD & PELVIS W/O CONTRAST: CPT

## 2018-08-02 PROCEDURE — 74176 CT ABD & PELVIS W/O CONTRAST: CPT | Mod: 26

## 2018-08-02 PROCEDURE — 84484 ASSAY OF TROPONIN QUANT: CPT

## 2018-08-02 RX ORDER — CHOLECALCIFEROL (VITAMIN D3) 125 MCG
1 CAPSULE ORAL
Qty: 0 | Refills: 0 | COMMUNITY

## 2018-08-02 RX ORDER — SODIUM CHLORIDE 9 MG/ML
1000 INJECTION INTRAMUSCULAR; INTRAVENOUS; SUBCUTANEOUS ONCE
Qty: 0 | Refills: 0 | Status: COMPLETED | OUTPATIENT
Start: 2018-08-02 | End: 2018-08-02

## 2018-08-02 RX ORDER — EXENATIDE 250 UG/ML
10 INJECTION SUBCUTANEOUS
Qty: 0 | Refills: 0 | COMMUNITY

## 2018-08-02 RX ORDER — RANITIDINE HYDROCHLORIDE 150 MG/1
1 TABLET, FILM COATED ORAL
Qty: 0 | Refills: 0 | COMMUNITY

## 2018-08-02 RX ADMIN — SODIUM CHLORIDE 1000 MILLILITER(S): 9 INJECTION INTRAMUSCULAR; INTRAVENOUS; SUBCUTANEOUS at 10:50

## 2018-08-02 NOTE — ED ADULT TRIAGE NOTE - CHIEF COMPLAINT QUOTE
patient complaining of abdomen pain x 3 days on the left side - denies nausea vomitting or diahrrea  patient denied allergies upon arrival

## 2018-08-02 NOTE — ED ADULT NURSE NOTE - NSIMPLEMENTINTERV_GEN_ALL_ED
Implemented All Universal Safety Interventions:  [place] to call system. Call bell, personal items and telephone within reach. Instruct patient to call for assistance. Room bathroom lighting operational. Non-slip footwear when patient is off stretcher. Physically safe environment: no spills, clutter or unnecessary equipment. Stretcher in lowest position, wheels locked, appropriate side rails in place.

## 2018-08-02 NOTE — ED PROVIDER NOTE - OBJECTIVE STATEMENT
69 y/o F pt with hx of DM, BONNY, DJD, GERD, hiatal hernia, fatty liver, HTN, anemia presents to ED c/o LLQ abdominal pain x 3 days. Pain is describes as shooting pain radiating to left side. When pain started she was sitting. She took Kaopectate during onset of symptoms with no relief. Pt saw GI last week for endoscopy, she is awaiting results. Pt was at [facility] a few months ago for similar pain but was undiagnosed. Last BM was this morning, normal. Pt denies nausea, vomiting, diarrhea, changes in BM, fever, chills.

## 2018-08-02 NOTE — ED PROVIDER NOTE - CARE PLAN
Principal Discharge DX:	Abdominal pain  Secondary Diagnosis:	Anemia  Secondary Diagnosis:	Pulmonary nodule

## 2018-08-02 NOTE — ED ADULT NURSE REASSESSMENT NOTE - NS ED NURSE REASSESS COMMENT FT1
initially refused oral and iv contrast for ct, spoke with dr beckman, now agreeable to drink oral contast in prep for ct, given to patient to drink

## 2018-08-02 NOTE — ED PROVIDER NOTE - PROGRESS NOTE DETAILS
all pts results reviewed and aware pulm nodule needs follow up, repeat CT in 6 months to exclude cancer and she will bring CT results to her PMD.  Also pt is anemic, she will bring results to her PMD and GI doctor for further evaluation of anemia. Abdominal pain, patient has appt with GI later in one week for results of upper endoscopy that she had last week. Pt will seek further evaluation of abdominal pain with her PMD and GI doctors.

## 2018-08-02 NOTE — ED PROVIDER NOTE - PMH
Anemia    Arthritis    Chest pain    DJD (degenerative joint disease), lumbar    DM (diabetes mellitus)    Fatty liver    GERD (gastroesophageal reflux disease)    Hiatal hernia    HTN (hypertension)    BONNY (obstructive sleep apnea)

## 2018-08-03 DIAGNOSIS — Z71.89 OTHER SPECIFIED COUNSELING: ICD-10-CM

## 2018-09-04 ENCOUNTER — APPOINTMENT (OUTPATIENT)
Dept: CARDIOLOGY | Facility: CLINIC | Age: 68
End: 2018-09-04

## 2018-09-26 ENCOUNTER — APPOINTMENT (OUTPATIENT)
Dept: PULMONOLOGY | Facility: CLINIC | Age: 68
End: 2018-09-26
Payer: MEDICARE

## 2018-09-26 VITALS
RESPIRATION RATE: 16 BRPM | SYSTOLIC BLOOD PRESSURE: 118 MMHG | BODY MASS INDEX: 41.54 KG/M2 | DIASTOLIC BLOOD PRESSURE: 80 MMHG | HEART RATE: 79 BPM | OXYGEN SATURATION: 96 % | WEIGHT: 242 LBS

## 2018-09-26 DIAGNOSIS — R91.1 SOLITARY PULMONARY NODULE: ICD-10-CM

## 2018-09-26 DIAGNOSIS — R93.8 ABNORMAL FINDINGS ON DIAGNOSTIC IMAGING OF OTHER SPECIFIED BODY STRUCTURES: ICD-10-CM

## 2018-09-26 PROCEDURE — 99214 OFFICE O/P EST MOD 30 MIN: CPT

## 2018-10-30 ENCOUNTER — APPOINTMENT (OUTPATIENT)
Dept: CARDIOLOGY | Facility: CLINIC | Age: 68
End: 2018-10-30
Payer: MEDICARE

## 2018-10-30 VITALS
RESPIRATION RATE: 16 BRPM | SYSTOLIC BLOOD PRESSURE: 124 MMHG | HEART RATE: 73 BPM | BODY MASS INDEX: 40.63 KG/M2 | DIASTOLIC BLOOD PRESSURE: 76 MMHG | WEIGHT: 238 LBS | HEIGHT: 64 IN

## 2018-10-30 PROCEDURE — 93000 ELECTROCARDIOGRAM COMPLETE: CPT

## 2018-10-30 PROCEDURE — 99214 OFFICE O/P EST MOD 30 MIN: CPT

## 2018-12-12 ENCOUNTER — APPOINTMENT (OUTPATIENT)
Dept: CARDIOLOGY | Facility: CLINIC | Age: 68
End: 2018-12-12

## 2018-12-13 ENCOUNTER — APPOINTMENT (OUTPATIENT)
Dept: CARDIOLOGY | Facility: CLINIC | Age: 68
End: 2018-12-13

## 2019-02-14 ENCOUNTER — APPOINTMENT (OUTPATIENT)
Dept: CARDIOLOGY | Facility: CLINIC | Age: 69
End: 2019-02-14

## 2019-04-15 ENCOUNTER — FORM ENCOUNTER (OUTPATIENT)
Age: 69
End: 2019-04-15

## 2019-04-22 ENCOUNTER — EMERGENCY (EMERGENCY)
Facility: HOSPITAL | Age: 69
LOS: 1 days | Discharge: HOME | End: 2019-04-22
Attending: EMERGENCY MEDICINE
Payer: COMMERCIAL

## 2019-04-22 VITALS — HEIGHT: 65 IN | WEIGHT: 259.93 LBS

## 2019-04-22 DIAGNOSIS — H26.40 UNSPECIFIED SECONDARY CATARACT: Chronic | ICD-10-CM

## 2019-04-22 LAB
ALBUMIN SERPL ELPH-MCNC: 3.3 G/DL — SIGNIFICANT CHANGE UP (ref 3.3–5.2)
ALBUMIN SERPL ELPH-MCNC: 3.4 G/DL — SIGNIFICANT CHANGE UP (ref 3.3–5.2)
ALP SERPL-CCNC: 79 U/L — SIGNIFICANT CHANGE UP (ref 40–120)
ALP SERPL-CCNC: 87 U/L — SIGNIFICANT CHANGE UP (ref 40–120)
ALT FLD-CCNC: 10 U/L — SIGNIFICANT CHANGE UP
ALT FLD-CCNC: 7 U/L — SIGNIFICANT CHANGE UP
ANION GAP SERPL CALC-SCNC: 12 MMOL/L — SIGNIFICANT CHANGE UP (ref 5–17)
ANION GAP SERPL CALC-SCNC: 15 MMOL/L — SIGNIFICANT CHANGE UP (ref 5–17)
APPEARANCE UR: CLEAR — SIGNIFICANT CHANGE UP
AST SERPL-CCNC: 16 U/L — SIGNIFICANT CHANGE UP
AST SERPL-CCNC: 17 U/L — SIGNIFICANT CHANGE UP
BACTERIA # UR AUTO: ABNORMAL
BASE EXCESS BLDV CALC-SCNC: -8.4 MMOL/L — LOW (ref -2–2)
BASOPHILS # BLD AUTO: 0 K/UL — SIGNIFICANT CHANGE UP (ref 0–0.2)
BASOPHILS NFR BLD AUTO: 0.2 % — SIGNIFICANT CHANGE UP (ref 0–2)
BILIRUB SERPL-MCNC: 0.4 MG/DL — SIGNIFICANT CHANGE UP (ref 0.4–2)
BILIRUB SERPL-MCNC: 0.5 MG/DL — SIGNIFICANT CHANGE UP (ref 0.4–2)
BILIRUB UR-MCNC: NEGATIVE — SIGNIFICANT CHANGE UP
BUN SERPL-MCNC: 24 MG/DL — HIGH (ref 8–20)
BUN SERPL-MCNC: 25 MG/DL — HIGH (ref 8–20)
CA-I SERPL-SCNC: 1 MMOL/L — LOW (ref 1.15–1.33)
CALCIUM SERPL-MCNC: 8.3 MG/DL — LOW (ref 8.6–10.2)
CALCIUM SERPL-MCNC: 8.4 MG/DL — LOW (ref 8.6–10.2)
CHLORIDE BLDV-SCNC: 114 MMOL/L — HIGH (ref 98–107)
CHLORIDE SERPL-SCNC: 112 MMOL/L — HIGH (ref 98–107)
CHLORIDE SERPL-SCNC: 114 MMOL/L — HIGH (ref 98–107)
CO2 SERPL-SCNC: 16 MMOL/L — LOW (ref 22–29)
CO2 SERPL-SCNC: 16 MMOL/L — LOW (ref 22–29)
COLOR SPEC: YELLOW — SIGNIFICANT CHANGE UP
CREAT SERPL-MCNC: 1.87 MG/DL — HIGH (ref 0.5–1.3)
CREAT SERPL-MCNC: 1.9 MG/DL — HIGH (ref 0.5–1.3)
DIFF PNL FLD: ABNORMAL
EOSINOPHIL # BLD AUTO: 0.2 K/UL — SIGNIFICANT CHANGE UP (ref 0–0.5)
EOSINOPHIL NFR BLD AUTO: 4.6 % — SIGNIFICANT CHANGE UP (ref 0–6)
EPI CELLS # UR: SIGNIFICANT CHANGE UP
GAS PNL BLDV: 140 MMOL/L — SIGNIFICANT CHANGE UP (ref 135–145)
GAS PNL BLDV: SIGNIFICANT CHANGE UP
GAS PNL BLDV: SIGNIFICANT CHANGE UP
GLUCOSE BLDV-MCNC: 265 MG/DL — HIGH (ref 70–99)
GLUCOSE SERPL-MCNC: 134 MG/DL — HIGH (ref 70–115)
GLUCOSE SERPL-MCNC: 171 MG/DL — HIGH (ref 70–115)
GLUCOSE UR QL: NEGATIVE MG/DL — SIGNIFICANT CHANGE UP
HCO3 BLDV-SCNC: 18 MMOL/L — LOW (ref 20–26)
HCT VFR BLD CALC: 42.9 % — SIGNIFICANT CHANGE UP (ref 37–47)
HCT VFR BLDA CALC: 43 — SIGNIFICANT CHANGE UP (ref 39–50)
HGB BLD CALC-MCNC: 14 G/DL — SIGNIFICANT CHANGE UP (ref 11.5–15.5)
HGB BLD-MCNC: 13.4 G/DL — SIGNIFICANT CHANGE UP (ref 12–16)
KETONES UR-MCNC: NEGATIVE — SIGNIFICANT CHANGE UP
LACTATE BLDV-MCNC: 2 MMOL/L — SIGNIFICANT CHANGE UP (ref 0.5–2)
LACTATE BLDV-MCNC: 3.8 MMOL/L — HIGH (ref 0.5–2)
LEUKOCYTE ESTERASE UR-ACNC: NEGATIVE — SIGNIFICANT CHANGE UP
LIDOCAIN IGE QN: 59 U/L — HIGH (ref 22–51)
LYMPHOCYTES # BLD AUTO: 2 K/UL — SIGNIFICANT CHANGE UP (ref 1–4.8)
LYMPHOCYTES # BLD AUTO: 48.5 % — SIGNIFICANT CHANGE UP (ref 20–55)
MCHC RBC-ENTMCNC: 26 PG — LOW (ref 27–31)
MCHC RBC-ENTMCNC: 31.2 G/DL — LOW (ref 32–36)
MCV RBC AUTO: 83.3 FL — SIGNIFICANT CHANGE UP (ref 81–99)
MONOCYTES # BLD AUTO: 0.1 K/UL — SIGNIFICANT CHANGE UP (ref 0–0.8)
MONOCYTES NFR BLD AUTO: 1.5 % — LOW (ref 3–10)
NEUTROPHILS # BLD AUTO: 1.8 K/UL — SIGNIFICANT CHANGE UP (ref 1.8–8)
NEUTROPHILS NFR BLD AUTO: 45 % — SIGNIFICANT CHANGE UP (ref 37–73)
NITRITE UR-MCNC: NEGATIVE — SIGNIFICANT CHANGE UP
OTHER CELLS CSF MANUAL: 19 ML/DL — SIGNIFICANT CHANGE UP (ref 18–22)
PCO2 BLDV: 28 MMHG — LOW (ref 35–50)
PH BLDV: 7.35 — SIGNIFICANT CHANGE UP (ref 7.32–7.43)
PH UR: 6 — SIGNIFICANT CHANGE UP (ref 5–8)
PLATELET # BLD AUTO: 215 K/UL — SIGNIFICANT CHANGE UP (ref 150–400)
PO2 BLDV: 103 MMHG — HIGH (ref 25–45)
POTASSIUM BLDV-SCNC: 8 MMOL/L — CRITICAL HIGH (ref 3.4–4.5)
POTASSIUM SERPL-MCNC: 4.7 MMOL/L — SIGNIFICANT CHANGE UP (ref 3.5–5.3)
POTASSIUM SERPL-MCNC: 5.4 MMOL/L — HIGH (ref 3.5–5.3)
POTASSIUM SERPL-SCNC: 4.7 MMOL/L — SIGNIFICANT CHANGE UP (ref 3.5–5.3)
POTASSIUM SERPL-SCNC: 5.4 MMOL/L — HIGH (ref 3.5–5.3)
PROT SERPL-MCNC: 6.5 G/DL — LOW (ref 6.6–8.7)
PROT SERPL-MCNC: 6.7 G/DL — SIGNIFICANT CHANGE UP (ref 6.6–8.7)
PROT UR-MCNC: 30 MG/DL
RBC # BLD: 5.15 M/UL — SIGNIFICANT CHANGE UP (ref 4.4–5.2)
RBC # FLD: 16.1 % — HIGH (ref 11–15.6)
RBC CASTS # UR COMP ASSIST: SIGNIFICANT CHANGE UP /HPF (ref 0–4)
SAO2 % BLDV: 98 % — SIGNIFICANT CHANGE UP
SODIUM SERPL-SCNC: 142 MMOL/L — SIGNIFICANT CHANGE UP (ref 135–145)
SODIUM SERPL-SCNC: 143 MMOL/L — SIGNIFICANT CHANGE UP (ref 135–145)
SP GR SPEC: 1.01 — SIGNIFICANT CHANGE UP (ref 1.01–1.02)
TROPONIN T SERPL-MCNC: <0.01 NG/ML — SIGNIFICANT CHANGE UP (ref 0–0.06)
UROBILINOGEN FLD QL: NEGATIVE MG/DL — SIGNIFICANT CHANGE UP
WBC # BLD: 4.1 K/UL — LOW (ref 4.8–10.8)
WBC # FLD AUTO: 4.1 K/UL — LOW (ref 4.8–10.8)
WBC UR QL: SIGNIFICANT CHANGE UP

## 2019-04-22 PROCEDURE — 82435 ASSAY OF BLOOD CHLORIDE: CPT

## 2019-04-22 PROCEDURE — 71045 X-RAY EXAM CHEST 1 VIEW: CPT | Mod: 26

## 2019-04-22 PROCEDURE — 71045 X-RAY EXAM CHEST 1 VIEW: CPT

## 2019-04-22 PROCEDURE — 96365 THER/PROPH/DIAG IV INF INIT: CPT

## 2019-04-22 PROCEDURE — 36415 COLL VENOUS BLD VENIPUNCTURE: CPT

## 2019-04-22 PROCEDURE — 82947 ASSAY GLUCOSE BLOOD QUANT: CPT

## 2019-04-22 PROCEDURE — 93005 ELECTROCARDIOGRAM TRACING: CPT

## 2019-04-22 PROCEDURE — 74176 CT ABD & PELVIS W/O CONTRAST: CPT

## 2019-04-22 PROCEDURE — 83605 ASSAY OF LACTIC ACID: CPT

## 2019-04-22 PROCEDURE — 82330 ASSAY OF CALCIUM: CPT

## 2019-04-22 PROCEDURE — 85730 THROMBOPLASTIN TIME PARTIAL: CPT

## 2019-04-22 PROCEDURE — 99284 EMERGENCY DEPT VISIT MOD MDM: CPT | Mod: 25

## 2019-04-22 PROCEDURE — 70450 CT HEAD/BRAIN W/O DYE: CPT | Mod: 26

## 2019-04-22 PROCEDURE — 85610 PROTHROMBIN TIME: CPT

## 2019-04-22 PROCEDURE — 84484 ASSAY OF TROPONIN QUANT: CPT

## 2019-04-22 PROCEDURE — 82803 BLOOD GASES ANY COMBINATION: CPT

## 2019-04-22 PROCEDURE — 84132 ASSAY OF SERUM POTASSIUM: CPT

## 2019-04-22 PROCEDURE — 96375 TX/PRO/DX INJ NEW DRUG ADDON: CPT

## 2019-04-22 PROCEDURE — 93010 ELECTROCARDIOGRAM REPORT: CPT

## 2019-04-22 PROCEDURE — 85027 COMPLETE CBC AUTOMATED: CPT

## 2019-04-22 PROCEDURE — 85014 HEMATOCRIT: CPT

## 2019-04-22 PROCEDURE — 81001 URINALYSIS AUTO W/SCOPE: CPT

## 2019-04-22 PROCEDURE — 99285 EMERGENCY DEPT VISIT HI MDM: CPT

## 2019-04-22 PROCEDURE — 83690 ASSAY OF LIPASE: CPT

## 2019-04-22 PROCEDURE — 80053 COMPREHEN METABOLIC PANEL: CPT

## 2019-04-22 PROCEDURE — 70450 CT HEAD/BRAIN W/O DYE: CPT

## 2019-04-22 PROCEDURE — 82962 GLUCOSE BLOOD TEST: CPT

## 2019-04-22 PROCEDURE — 87040 BLOOD CULTURE FOR BACTERIA: CPT

## 2019-04-22 PROCEDURE — 84295 ASSAY OF SERUM SODIUM: CPT

## 2019-04-22 PROCEDURE — 74176 CT ABD & PELVIS W/O CONTRAST: CPT | Mod: 26

## 2019-04-22 RX ORDER — SODIUM CHLORIDE 9 MG/ML
1000 INJECTION INTRAMUSCULAR; INTRAVENOUS; SUBCUTANEOUS ONCE
Qty: 0 | Refills: 0 | Status: COMPLETED | OUTPATIENT
Start: 2019-04-22 | End: 2019-04-22

## 2019-04-22 RX ORDER — SODIUM CHLORIDE 9 MG/ML
3 INJECTION INTRAMUSCULAR; INTRAVENOUS; SUBCUTANEOUS ONCE
Qty: 0 | Refills: 0 | Status: COMPLETED | OUTPATIENT
Start: 2019-04-22 | End: 2019-04-22

## 2019-04-22 RX ORDER — ONDANSETRON 8 MG/1
4 TABLET, FILM COATED ORAL ONCE
Qty: 0 | Refills: 0 | Status: COMPLETED | OUTPATIENT
Start: 2019-04-22 | End: 2019-04-22

## 2019-04-22 RX ORDER — SODIUM CHLORIDE 9 MG/ML
2000 INJECTION INTRAMUSCULAR; INTRAVENOUS; SUBCUTANEOUS ONCE
Qty: 0 | Refills: 0 | Status: COMPLETED | OUTPATIENT
Start: 2019-04-22 | End: 2019-04-22

## 2019-04-22 RX ORDER — PIPERACILLIN AND TAZOBACTAM 4; .5 G/20ML; G/20ML
3.38 INJECTION, POWDER, LYOPHILIZED, FOR SOLUTION INTRAVENOUS ONCE
Qty: 0 | Refills: 0 | Status: COMPLETED | OUTPATIENT
Start: 2019-04-22 | End: 2019-04-22

## 2019-04-22 RX ADMIN — SODIUM CHLORIDE 2000 MILLILITER(S): 9 INJECTION INTRAMUSCULAR; INTRAVENOUS; SUBCUTANEOUS at 15:34

## 2019-04-22 RX ADMIN — SODIUM CHLORIDE 2000 MILLILITER(S): 9 INJECTION INTRAMUSCULAR; INTRAVENOUS; SUBCUTANEOUS at 14:34

## 2019-04-22 RX ADMIN — ONDANSETRON 4 MILLIGRAM(S): 8 TABLET, FILM COATED ORAL at 14:44

## 2019-04-22 RX ADMIN — PIPERACILLIN AND TAZOBACTAM 3.38 GRAM(S): 4; .5 INJECTION, POWDER, LYOPHILIZED, FOR SOLUTION INTRAVENOUS at 15:14

## 2019-04-22 RX ADMIN — SODIUM CHLORIDE 1000 MILLILITER(S): 9 INJECTION INTRAMUSCULAR; INTRAVENOUS; SUBCUTANEOUS at 15:33

## 2019-04-22 RX ADMIN — PIPERACILLIN AND TAZOBACTAM 200 GRAM(S): 4; .5 INJECTION, POWDER, LYOPHILIZED, FOR SOLUTION INTRAVENOUS at 14:44

## 2019-04-22 RX ADMIN — SODIUM CHLORIDE 3 MILLILITER(S): 9 INJECTION INTRAMUSCULAR; INTRAVENOUS; SUBCUTANEOUS at 14:44

## 2019-04-22 RX ADMIN — SODIUM CHLORIDE 1000 MILLILITER(S): 9 INJECTION INTRAMUSCULAR; INTRAVENOUS; SUBCUTANEOUS at 14:33

## 2019-04-22 NOTE — ED PROVIDER NOTE - OBJECTIVE STATEMENT
69 yo F hx of HTN and DM p/w epigastric abdominal pain, diaphoresis, weak started today. No chest pain. no sob, +nausea, no vomiting. 67 yo F hx of HTN and DM p/w epigastric abdominal pain, diaphoresis, feeling weak started today. No chest pain. no sob, +nausea, + vomiting + diarrhea that started today. no fever.

## 2019-04-22 NOTE — ED PROVIDER NOTE - PHYSICAL EXAMINATION
VITAL SIGNS: I have reviewed nursing notes and confirm.  CONSTITUTIONAL: Well-developed; well-nourished; (+) mild painful distress.   SKIN: (+) diaphoresis  no acute rash.  HEAD: Normocephalic; atraumatic.  EYES: PERRL, EOM intact; conjunctiva and sclera clear.  ENT: No nasal discharge; airway clear. Throat clear.  NECK: Supple; non tender.    CARD: S1, S2 normal; no murmurs, gallops, or rubs. Regular rate and rhythm.  RESP: No wheezes,  no rales or rhonchi.   ABD:  soft; non-distended; (+)  epigastic tenderness   EXT: Normal ROM. No clubbing, cyanosis or edema.  NEURO: Alert, oriented. Grossly unremarkable. No focal deficits. no facial droop, moves all extremities,    PSYCH: Cooperative, appropriate.

## 2019-04-22 NOTE — ED PROVIDER NOTE - CLINICAL SUMMARY MEDICAL DECISION MAKING FREE TEXT BOX
69 yo F p/w epigastic pain, nausea, vomiting, diarrhea started today. patient was hypotensive that improved after 3 L IVF. Lactate resolving. creatine elevated with low bicarb, no prior creatine level for comparison, symptoms likely secondary to dehydration. ekg wnl. cxr clear.

## 2019-04-22 NOTE — ED ADULT NURSE NOTE - CHIEF COMPLAINT QUOTE
Pt started with abdominal pain after waking up this morning. Pt then states that her eyes were getting "fuzzy" and she became dizzy. As per EMS, patient had multiple syncopal episodes, became diaphoretic and vomited multiple times.

## 2019-04-22 NOTE — ED PROVIDER NOTE - NS ED ROS FT
Review of Systems  •	CONSTITUTIONAL - no  fever, (+)  diaphoresis, no weight change  •	SKIN - no rash  •	HEMATOLOGIC - no bleeding, no bruising  •	EYES - no eye pain, no blurred vision  •	ENT - no change in hearing, no pain  •	RESPIRATORY - no shortness of breath, no cough  •	CARDIAC - no chest pain, no palpitations  •	GI -(+)  abd pain, (+) nausea, no vomiting, (+)  diarrhea, no constipation, no bleeding  •	GENITO-URINARY - no discharge, no dysuria; no hematuria,   •	ENDO - no polydypsia, no polyurea, no heat/no cold intolerance  •	MUSCULOSKELETAL - no joint pain, no swelling, no redness  •	NEUROLOGIC - no weakness, no headache, no anesthesia, no paresthesias  •	PSYCH - no anxiety, non suicidal, non homicidal, no hallucination, no depression

## 2019-04-22 NOTE — ED ADULT NURSE NOTE - OBJECTIVE STATEMENT
Patient found laying in stretcher, awake alert, and oriented times 3, breathing unlabored.  Patient states abdominal pain, and diarrhea started this morning which increases on palpation mostly to left lower quadrant.  Patient had 5 witnessed syncopal episodes in ambulance in route to Ed lasting approx. 5 seconds.  Patient vomiting x2 in route as well.  Patient currently complaining of being lightheaded.  Patient states abdominal pain has decreased

## 2019-04-22 NOTE — ED ADULT NURSE NOTE - NSIMPLEMENTINTERV_GEN_ALL_ED
Implemented All Universal Safety Interventions:  [place] to call system. Call bell, personal items and telephone within reach. Instruct patient to call for assistance. Room bathroom lighting operational. Non-slip footwear when patient is off stretcher. Physically safe environment: no spills, clutter or unnecessary equipment. Stretcher in lowest position, wheels locked, appropriate side rails in place.

## 2019-04-22 NOTE — ED PROVIDER NOTE - PROGRESS NOTE DETAILS
patient feels better, Vital stable. /74, Hr 77. Patient tolerated PO. waiting for CT abdomen result and repeat BMP. elevated creatine likely secondary to dehydration. Pt feeling better. I went to discuss elevated creatinine with pt;  she states she has been told " along time ago" that she should never get iv contrast because of "something with her kidneys'.  I advised pt that she should f/u with her PCP and with a kidney doctor . She would like to go home but has no ride until the morning. Pt awake now;  no further symptoms. Feels well for discharge.

## 2019-04-23 VITALS
RESPIRATION RATE: 14 BRPM | TEMPERATURE: 98 F | SYSTOLIC BLOOD PRESSURE: 127 MMHG | DIASTOLIC BLOOD PRESSURE: 68 MMHG | HEART RATE: 82 BPM | OXYGEN SATURATION: 98 %

## 2019-04-23 RX ORDER — INSULIN GLARGINE 100 [IU]/ML
35 INJECTION, SOLUTION SUBCUTANEOUS
Qty: 0 | Refills: 0 | COMMUNITY

## 2019-04-23 NOTE — ED ADULT NURSE REASSESSMENT NOTE - NS ED NURSE REASSESS COMMENT FT1
pt resting in bed moving all extremities no acute distress noted.  offers no complaints pt remains on monitor. and

## 2019-04-23 NOTE — ED ADULT NURSE REASSESSMENT NOTE - NS ED NURSE REASSESS COMMENT FT1
pt resting comfortably in bed no acute distress noted moves all extremities equal bilaterally offers no complaints aox4 remains on monitor vitals wnl

## 2019-04-27 LAB
CULTURE RESULTS: SIGNIFICANT CHANGE UP
CULTURE RESULTS: SIGNIFICANT CHANGE UP
SPECIMEN SOURCE: SIGNIFICANT CHANGE UP
SPECIMEN SOURCE: SIGNIFICANT CHANGE UP

## 2019-05-13 ENCOUNTER — APPOINTMENT (OUTPATIENT)
Dept: CARDIOLOGY | Facility: CLINIC | Age: 69
End: 2019-05-13

## 2019-05-15 ENCOUNTER — FORM ENCOUNTER (OUTPATIENT)
Age: 69
End: 2019-05-15

## 2019-05-17 ENCOUNTER — FORM ENCOUNTER (OUTPATIENT)
Age: 69
End: 2019-05-17

## 2019-05-29 ENCOUNTER — APPOINTMENT (OUTPATIENT)
Dept: CARDIOLOGY | Facility: CLINIC | Age: 69
End: 2019-05-29
Payer: MEDICAID

## 2019-05-29 ENCOUNTER — NON-APPOINTMENT (OUTPATIENT)
Age: 69
End: 2019-05-29

## 2019-05-29 VITALS
HEIGHT: 64 IN | SYSTOLIC BLOOD PRESSURE: 130 MMHG | HEART RATE: 64 BPM | WEIGHT: 233 LBS | DIASTOLIC BLOOD PRESSURE: 87 MMHG | RESPIRATION RATE: 16 BRPM | BODY MASS INDEX: 39.78 KG/M2

## 2019-05-29 PROCEDURE — 93000 ELECTROCARDIOGRAM COMPLETE: CPT

## 2019-05-29 PROCEDURE — 99215 OFFICE O/P EST HI 40 MIN: CPT

## 2019-06-06 NOTE — ASSESSMENT
[FreeTextEntry1] : EKG 5/29/2019:  The EKG illustrates sinus rhythm, rate of 64, left atrial enlargement, nonspecific T wave abnormality, early R wave transition V1 to V2.  \par \par

## 2019-06-06 NOTE — HISTORY OF PRESENT ILLNESS
[FreeTextEntry1] : She continues to report having diffuse arthritis as well as severe left shoulder pain that limits her lateral abduction.  The patient reports non-compliance with previously advised Nuclear Stress testing due to her limitation of LUE movement and pain.\par \par Ms. Frances is currently following an Orthopedist regarding this chest/shoulder pain and arthritis history, she completed a chest xray and still needs to follow up with a soft tissue MRI.\par \par Patient is tolerating cardiac medications without negative side effects including Plavix 75 mg QD, ASA 81 mg QD, Lipitor 10 mg QHS, Benazepril 20 mg QD, Folic Acid, Metoprolol Succinate 100 mg QD.

## 2019-06-06 NOTE — REASON FOR VISIT
[FreeTextEntry1] : The patient presents back to the office today for cardiac reevaluation with known history of coronary artery disease (PCI/stent to proximal LAD in May 2017), (Repeat cardiac catheter September 2017 for chest pain showed "patent coronary stent"), ischemic heart disease, cardiomegaly, hypertension, hyperlipidemia, Insulin dependent diabetes, and GERD.  Ms. Frances continues to report occasional "twinges" of chest discomfort behind the left breast and left lateral chest that comes and goes but not particularly exertional related.  She denies  SOB, TY, PND, orthopnea, palpitations, presyncope, syncope, diaphoresis.

## 2019-06-06 NOTE — DISCUSSION/SUMMARY
[FreeTextEntry1] : 1).  Will discuss completing a Nuclear Stress test this next fall s/p the patients Orthopedist is able to fully evaluate and manage her left shoulder discomfort, it is necessary that she be able to fully abduct her LUE to complete the Nuclear Imaging.\par \par 2).  Patient will complete Transthoracic Echocardiogram and Carotid Doppler to assess cardiac function / cardiomegaly and carotid plaquing respectively.\par \par 3).  Patient is tolerating cardiac medications without negative side effects, continue with current cardiac medication regimen (refer above).\par \par 4).  Diet and lifestyle modification discussed including low fat and low carbohydrate weight reducing diet, she is to implement aerobic exercise 4 to 5 days per week. \par \par 5).  Follow up with PCP (Dr. Mayorga) regarding routine checkups and blood work, have copy faxed to our office. \par \par 6).  Recommend patient report any untoward symptoms. \par \par 7).  Follow up with our office in 4 to 5 months or PRN.

## 2019-06-06 NOTE — PHYSICAL EXAM
[General Appearance - In No Acute Distress] : no acute distress [Normal Appearance] : normal appearance [Normal Conjunctiva] : the conjunctiva exhibited no abnormalities [Normal Jugular Venous A Waves Present] : normal jugular venous A waves present [Normal Oral Mucosa] : normal oral mucosa [Normal Jugular Venous V Waves Present] : normal jugular venous V waves present [No Jugular Venous Lamb A Waves] : no jugular venous lamb A waves [Respiration, Rhythm And Depth] : normal respiratory rhythm and effort [] : no respiratory distress [Heart Rate And Rhythm] : heart rate and rhythm were normal [Auscultation Breath Sounds / Voice Sounds] : lungs were clear to auscultation bilaterally [Heart Sounds] : normal S1 and S2 [Arterial Pulses Normal] : the arterial pulses were normal [Bowel Sounds] : normal bowel sounds [Abdomen Soft] : soft [Abnormal Walk] : normal gait [Cyanosis, Localized] : no localized cyanosis [Nail Clubbing] : no clubbing of the fingernails [Skin Color & Pigmentation] : normal skin color and pigmentation [Skin Turgor] : normal skin turgor [Oriented To Time, Place, And Person] : oriented to person, place, and time [Impaired Insight] : insight and judgment were intact [Affect] : the affect was normal [FreeTextEntry1] : Grade I/VI systolic murmur

## 2019-06-21 ENCOUNTER — CLINICAL ADVICE (OUTPATIENT)
Age: 69
End: 2019-06-21

## 2019-06-21 DIAGNOSIS — R60.0 LOCALIZED EDEMA: ICD-10-CM

## 2019-07-12 ENCOUNTER — APPOINTMENT (OUTPATIENT)
Dept: CARDIOLOGY | Facility: CLINIC | Age: 69
End: 2019-07-12
Payer: MEDICARE

## 2019-07-12 PROCEDURE — 93925 LOWER EXTREMITY STUDY: CPT

## 2019-07-16 ENCOUNTER — APPOINTMENT (OUTPATIENT)
Dept: CARDIOLOGY | Facility: CLINIC | Age: 69
End: 2019-07-16
Payer: MEDICARE

## 2019-07-16 PROCEDURE — 93970 EXTREMITY STUDY: CPT

## 2019-07-30 ENCOUNTER — APPOINTMENT (OUTPATIENT)
Dept: CARDIOLOGY | Facility: CLINIC | Age: 69
End: 2019-07-30
Payer: MEDICARE

## 2019-07-30 ENCOUNTER — MED ADMIN CHARGE (OUTPATIENT)
Age: 69
End: 2019-07-30

## 2019-07-30 PROCEDURE — 93306 TTE W/DOPPLER COMPLETE: CPT

## 2019-07-30 RX ADMIN — PERFLUTREN MG/ML: 6.52 INJECTION, SUSPENSION INTRAVENOUS at 00:00

## 2019-08-05 RX ORDER — PERFLUTREN 6.52 MG/ML
6.52 INJECTION, SUSPENSION INTRAVENOUS
Qty: 1 | Refills: 0 | Status: COMPLETED | OUTPATIENT
Start: 2019-07-30

## 2019-08-07 ENCOUNTER — APPOINTMENT (OUTPATIENT)
Dept: CARDIOLOGY | Facility: CLINIC | Age: 69
End: 2019-08-07
Payer: MEDICARE

## 2019-08-07 PROCEDURE — 93880 EXTRACRANIAL BILAT STUDY: CPT

## 2019-08-12 ENCOUNTER — NON-APPOINTMENT (OUTPATIENT)
Age: 69
End: 2019-08-12

## 2019-08-12 ENCOUNTER — APPOINTMENT (OUTPATIENT)
Dept: CARDIOLOGY | Facility: CLINIC | Age: 69
End: 2019-08-12
Payer: MEDICARE

## 2019-08-12 VITALS
WEIGHT: 233 LBS | RESPIRATION RATE: 16 BRPM | BODY MASS INDEX: 39.78 KG/M2 | DIASTOLIC BLOOD PRESSURE: 73 MMHG | HEART RATE: 71 BPM | HEIGHT: 64 IN | SYSTOLIC BLOOD PRESSURE: 130 MMHG

## 2019-08-12 PROCEDURE — 99214 OFFICE O/P EST MOD 30 MIN: CPT

## 2019-08-12 PROCEDURE — 93000 ELECTROCARDIOGRAM COMPLETE: CPT

## 2019-08-12 NOTE — REASON FOR VISIT
[Follow-Up - Clinic] : a clinic follow-up of [FreeTextEntry1] : The patient is a 69-year-old woman who has a history of coronary artery disease and prior remote history for PCI/stent last dating back to May 2017 to the proximal LAD;\par \par She had a repeat cardiac catheterization in September 2017 for atypical chest pain and the stents were found to be patent;\par \par Unfortunately, she's been feeling well without chest pain, shortness of breath, palpitations or dizziness;

## 2019-08-12 NOTE — ASSESSMENT
[FreeTextEntry1] : EKG shows normal sinus rhythm at a rate of 71 with slight left axis deviation and otherwise no acute changes;\par \par Recent carotid duplex study demonstrating minimal to mild carotid plaquing stenosis bilaterally without any significant obstructive disease;\par \par Recent transthoracic echo study demonstrates mild concentric hypertrophy of the left ventricle with normal systolic function and a normal ejection fraction in the range of 60-65%. There was trace mitral insufficiency and tricuspid insufficiency;\par \par \par In summary the patient is a 69-year-old woman with a history of coronary disease and prior stent with otherwise stable cardiac pattern;\par \par Plan:\par \par There is no absolute cardiac contraindication for patient to undergo GI procedure;\par \par She may stop the Plavix 5-7 days prior to the procedure and restart when deemed safe\par \par If necessary may stop enteric-coated aspirin as well;\par \par Followup to this office within 3-4 months or p.r.n.;;

## 2019-08-12 NOTE — HISTORY OF PRESENT ILLNESS
[FreeTextEntry1] : She has tentatively scheduled for August 21, 2019 a colonoscopy with Dr. Schuler;\par \par Otherwise, she states she's been taking her medications regularly.;\par \par She recently had a carotid duplex study and echocardiogram and is here to discuss those results;

## 2019-08-12 NOTE — REVIEW OF SYSTEMS
[Joint Pain] : joint pain [Joint Stiffness] : joint stiffness [Shoulder Pain] : shoulder pain [Negative] : Heme/Lymph

## 2019-08-12 NOTE — PHYSICAL EXAM
[Eyelids - No Xanthelasma] : the eyelids demonstrated no xanthelasmas [Normal Conjunctiva] : the conjunctiva exhibited no abnormalities [Normal Oral Mucosa] : normal oral mucosa [No Oral Pallor] : no oral pallor [No Oral Cyanosis] : no oral cyanosis [Normal Jugular Venous A Waves Present] : normal jugular venous A waves present [Normal Jugular Venous V Waves Present] : normal jugular venous V waves present [No Jugular Venous Lamb A Waves] : no jugular venous lamb A waves [Respiration, Rhythm And Depth] : normal respiratory rhythm and effort [Exaggerated Use Of Accessory Muscles For Inspiration] : no accessory muscle use [Auscultation Breath Sounds / Voice Sounds] : lungs were clear to auscultation bilaterally [Abnormal Walk] : normal gait [Gait - Sufficient For Exercise Testing] : the gait was sufficient for exercise testing [FreeTextEntry1] : Obese, zero to trace ankle edema [Skin Color & Pigmentation] : normal skin color and pigmentation [] : no rash [No Skin Ulcers] : no skin ulcer [No Venous Stasis] : no venous stasis [Skin Lesions] : no skin lesions [Oriented To Time, Place, And Person] : oriented to person, place, and time [No Xanthoma] : no  xanthoma was observed [Mood] : the mood was normal [Affect] : the affect was normal [No Anxiety] : not feeling anxious

## 2019-09-01 ENCOUNTER — OUTPATIENT (OUTPATIENT)
Dept: OUTPATIENT SERVICES | Facility: HOSPITAL | Age: 69
LOS: 1 days | End: 2019-09-01

## 2019-09-01 DIAGNOSIS — H26.40 UNSPECIFIED SECONDARY CATARACT: Chronic | ICD-10-CM

## 2019-09-12 ENCOUNTER — INPATIENT (INPATIENT)
Facility: HOSPITAL | Age: 69
LOS: 0 days | Discharge: HOME | DRG: 872 | End: 2019-09-13
Attending: INTERNAL MEDICINE | Admitting: INTERNAL MEDICINE
Payer: COMMERCIAL

## 2019-09-12 VITALS
HEIGHT: 69 IN | SYSTOLIC BLOOD PRESSURE: 77 MMHG | OXYGEN SATURATION: 92 % | HEART RATE: 88 BPM | RESPIRATION RATE: 20 BRPM | WEIGHT: 293 LBS | DIASTOLIC BLOOD PRESSURE: 49 MMHG

## 2019-09-12 DIAGNOSIS — I95.9 HYPOTENSION, UNSPECIFIED: ICD-10-CM

## 2019-09-12 DIAGNOSIS — H26.40 UNSPECIFIED SECONDARY CATARACT: Chronic | ICD-10-CM

## 2019-09-12 LAB
ALBUMIN SERPL ELPH-MCNC: 4 G/DL — SIGNIFICANT CHANGE UP (ref 3.3–5.2)
ALP SERPL-CCNC: 106 U/L — SIGNIFICANT CHANGE UP (ref 40–120)
ALT FLD-CCNC: 14 U/L — SIGNIFICANT CHANGE UP
ANION GAP SERPL CALC-SCNC: 17 MMOL/L — SIGNIFICANT CHANGE UP (ref 5–17)
ANISOCYTOSIS BLD QL: SLIGHT — SIGNIFICANT CHANGE UP
APPEARANCE UR: CLEAR — SIGNIFICANT CHANGE UP
AST SERPL-CCNC: 17 U/L — SIGNIFICANT CHANGE UP
BACTERIA # UR AUTO: NEGATIVE — SIGNIFICANT CHANGE UP
BASOPHILS # BLD AUTO: 0 K/UL — SIGNIFICANT CHANGE UP (ref 0–0.2)
BASOPHILS NFR BLD AUTO: 0 % — SIGNIFICANT CHANGE UP (ref 0–2)
BILIRUB SERPL-MCNC: 0.4 MG/DL — SIGNIFICANT CHANGE UP (ref 0.4–2)
BILIRUB UR-MCNC: NEGATIVE — SIGNIFICANT CHANGE UP
BLD GP AB SCN SERPL QL: SIGNIFICANT CHANGE UP
BUN SERPL-MCNC: 16 MG/DL — SIGNIFICANT CHANGE UP (ref 8–20)
CALCIUM SERPL-MCNC: 8.8 MG/DL — SIGNIFICANT CHANGE UP (ref 8.6–10.2)
CHLORIDE SERPL-SCNC: 107 MMOL/L — SIGNIFICANT CHANGE UP (ref 98–107)
CO2 SERPL-SCNC: 16 MMOL/L — LOW (ref 22–29)
COLOR SPEC: YELLOW — SIGNIFICANT CHANGE UP
CREAT SERPL-MCNC: 1.71 MG/DL — HIGH (ref 0.5–1.3)
DACRYOCYTES BLD QL SMEAR: SLIGHT — SIGNIFICANT CHANGE UP
DIFF PNL FLD: ABNORMAL
ELLIPTOCYTES BLD QL SMEAR: SLIGHT — SIGNIFICANT CHANGE UP
EOSINOPHIL # BLD AUTO: 0 K/UL — SIGNIFICANT CHANGE UP (ref 0–0.5)
EOSINOPHIL NFR BLD AUTO: 0 % — SIGNIFICANT CHANGE UP (ref 0–6)
EPI CELLS # UR: SIGNIFICANT CHANGE UP
GIANT PLATELETS BLD QL SMEAR: PRESENT — SIGNIFICANT CHANGE UP
GLUCOSE BLDC GLUCOMTR-MCNC: 157 MG/DL — HIGH (ref 70–99)
GLUCOSE SERPL-MCNC: 211 MG/DL — HIGH (ref 70–115)
GLUCOSE UR QL: NEGATIVE MG/DL — SIGNIFICANT CHANGE UP
HCT VFR BLD CALC: 41.9 % — SIGNIFICANT CHANGE UP (ref 34.5–45)
HGB BLD-MCNC: 12.5 G/DL — SIGNIFICANT CHANGE UP (ref 11.5–15.5)
KETONES UR-MCNC: NEGATIVE — SIGNIFICANT CHANGE UP
LACTATE BLDV-MCNC: 2.7 MMOL/L — HIGH (ref 0.5–2)
LACTATE BLDV-MCNC: 4.4 MMOL/L — CRITICAL HIGH (ref 0.5–2)
LEUKOCYTE ESTERASE UR-ACNC: ABNORMAL
LIDOCAIN IGE QN: 54 U/L — HIGH (ref 22–51)
LYMPHOCYTES # BLD AUTO: 1.84 K/UL — SIGNIFICANT CHANGE UP (ref 1–3.3)
LYMPHOCYTES # BLD AUTO: 54.8 % — HIGH (ref 13–44)
MACROCYTES BLD QL: SLIGHT — SIGNIFICANT CHANGE UP
MANUAL SMEAR VERIFICATION: SIGNIFICANT CHANGE UP
MCHC RBC-ENTMCNC: 25.6 PG — LOW (ref 27–34)
MCHC RBC-ENTMCNC: 29.8 GM/DL — LOW (ref 32–36)
MCV RBC AUTO: 85.7 FL — SIGNIFICANT CHANGE UP (ref 80–100)
MONOCYTES # BLD AUTO: 0.15 K/UL — SIGNIFICANT CHANGE UP (ref 0–0.9)
MONOCYTES NFR BLD AUTO: 4.4 % — SIGNIFICANT CHANGE UP (ref 2–14)
NEUTROPHILS # BLD AUTO: 1.28 K/UL — LOW (ref 1.8–7.4)
NEUTROPHILS NFR BLD AUTO: 36.5 % — LOW (ref 43–77)
NEUTS BAND # BLD: 1.7 % — SIGNIFICANT CHANGE UP (ref 0–8)
NITRITE UR-MCNC: NEGATIVE — SIGNIFICANT CHANGE UP
NRBC # BLD: 1 /100 — HIGH (ref 0–0)
OVALOCYTES BLD QL SMEAR: SLIGHT — SIGNIFICANT CHANGE UP
PH UR: 7 — SIGNIFICANT CHANGE UP (ref 5–8)
PLAT MORPH BLD: NORMAL — SIGNIFICANT CHANGE UP
PLATELET # BLD AUTO: 250 K/UL — SIGNIFICANT CHANGE UP (ref 150–400)
POIKILOCYTOSIS BLD QL AUTO: SLIGHT — SIGNIFICANT CHANGE UP
POTASSIUM SERPL-MCNC: 4.2 MMOL/L — SIGNIFICANT CHANGE UP (ref 3.5–5.3)
POTASSIUM SERPL-SCNC: 4.2 MMOL/L — SIGNIFICANT CHANGE UP (ref 3.5–5.3)
PROT SERPL-MCNC: 7.4 G/DL — SIGNIFICANT CHANGE UP (ref 6.6–8.7)
PROT UR-MCNC: 30 MG/DL
RBC # BLD: 4.89 M/UL — SIGNIFICANT CHANGE UP (ref 3.8–5.2)
RBC # FLD: 15.7 % — HIGH (ref 10.3–14.5)
RBC BLD AUTO: ABNORMAL
RBC CASTS # UR COMP ASSIST: ABNORMAL /HPF (ref 0–4)
SCHISTOCYTES BLD QL AUTO: SLIGHT — SIGNIFICANT CHANGE UP
SODIUM SERPL-SCNC: 140 MMOL/L — SIGNIFICANT CHANGE UP (ref 135–145)
SP GR SPEC: 1 — LOW (ref 1.01–1.02)
TROPONIN T SERPL-MCNC: <0.01 NG/ML — SIGNIFICANT CHANGE UP (ref 0–0.06)
UROBILINOGEN FLD QL: NEGATIVE MG/DL — SIGNIFICANT CHANGE UP
VARIANT LYMPHS # BLD: 2.6 % — SIGNIFICANT CHANGE UP (ref 0–6)
WBC # BLD: 3.36 K/UL — LOW (ref 3.8–10.5)
WBC # FLD AUTO: 3.36 K/UL — LOW (ref 3.8–10.5)
WBC UR QL: SIGNIFICANT CHANGE UP

## 2019-09-12 PROCEDURE — 74174 CTA ABD&PLVS W/CONTRAST: CPT | Mod: 26

## 2019-09-12 PROCEDURE — 99291 CRITICAL CARE FIRST HOUR: CPT

## 2019-09-12 PROCEDURE — 99223 1ST HOSP IP/OBS HIGH 75: CPT

## 2019-09-12 PROCEDURE — 71275 CT ANGIOGRAPHY CHEST: CPT | Mod: 26

## 2019-09-12 PROCEDURE — 93010 ELECTROCARDIOGRAM REPORT: CPT

## 2019-09-12 RX ORDER — ONDANSETRON 8 MG/1
4 TABLET, FILM COATED ORAL ONCE
Refills: 0 | Status: COMPLETED | OUTPATIENT
Start: 2019-09-12 | End: 2019-09-12

## 2019-09-12 RX ORDER — FOLIC ACID 0.8 MG
1 TABLET ORAL
Qty: 0 | Refills: 0 | DISCHARGE

## 2019-09-12 RX ORDER — SODIUM CHLORIDE 9 MG/ML
1000 INJECTION, SOLUTION INTRAVENOUS
Refills: 0 | Status: DISCONTINUED | OUTPATIENT
Start: 2019-09-12 | End: 2019-09-13

## 2019-09-12 RX ORDER — DEXTROSE 50 % IN WATER 50 %
15 SYRINGE (ML) INTRAVENOUS ONCE
Refills: 0 | Status: DISCONTINUED | OUTPATIENT
Start: 2019-09-12 | End: 2019-09-13

## 2019-09-12 RX ORDER — ONDANSETRON 8 MG/1
4 TABLET, FILM COATED ORAL EVERY 6 HOURS
Refills: 0 | Status: DISCONTINUED | OUTPATIENT
Start: 2019-09-12 | End: 2019-09-13

## 2019-09-12 RX ORDER — BRIMONIDINE TARTRATE, TIMOLOL MALEATE 2; 5 MG/ML; MG/ML
1 SOLUTION/ DROPS OPHTHALMIC
Qty: 0 | Refills: 0 | DISCHARGE

## 2019-09-12 RX ORDER — PANTOPRAZOLE SODIUM 20 MG/1
40 TABLET, DELAYED RELEASE ORAL
Refills: 0 | Status: DISCONTINUED | OUTPATIENT
Start: 2019-09-12 | End: 2019-09-13

## 2019-09-12 RX ORDER — ACETAMINOPHEN 500 MG
650 TABLET ORAL ONCE
Refills: 0 | Status: COMPLETED | OUTPATIENT
Start: 2019-09-12 | End: 2019-09-12

## 2019-09-12 RX ORDER — ACETAMINOPHEN 500 MG
2 TABLET ORAL
Qty: 0 | Refills: 0 | DISCHARGE

## 2019-09-12 RX ORDER — METOPROLOL TARTRATE 50 MG
100 TABLET ORAL DAILY
Refills: 0 | Status: DISCONTINUED | OUTPATIENT
Start: 2019-09-12 | End: 2019-09-13

## 2019-09-12 RX ORDER — DEXTROSE 50 % IN WATER 50 %
25 SYRINGE (ML) INTRAVENOUS ONCE
Refills: 0 | Status: DISCONTINUED | OUTPATIENT
Start: 2019-09-12 | End: 2019-09-13

## 2019-09-12 RX ORDER — CLOPIDOGREL BISULFATE 75 MG/1
75 TABLET, FILM COATED ORAL DAILY
Refills: 0 | Status: DISCONTINUED | OUTPATIENT
Start: 2019-09-13 | End: 2019-09-13

## 2019-09-12 RX ORDER — INSULIN GLARGINE 100 [IU]/ML
10 INJECTION, SOLUTION SUBCUTANEOUS AT BEDTIME
Refills: 0 | Status: DISCONTINUED | OUTPATIENT
Start: 2019-09-12 | End: 2019-09-13

## 2019-09-12 RX ORDER — DORZOLAMIDE HYDROCHLORIDE 20 MG/ML
1 SOLUTION/ DROPS OPHTHALMIC THREE TIMES A DAY
Refills: 0 | Status: DISCONTINUED | OUTPATIENT
Start: 2019-09-12 | End: 2019-09-13

## 2019-09-12 RX ORDER — SODIUM CHLORIDE 9 MG/ML
2000 INJECTION INTRAMUSCULAR; INTRAVENOUS; SUBCUTANEOUS ONCE
Refills: 0 | Status: COMPLETED | OUTPATIENT
Start: 2019-09-12 | End: 2019-09-12

## 2019-09-12 RX ORDER — INSULIN LISPRO 100/ML
VIAL (ML) SUBCUTANEOUS
Refills: 0 | Status: DISCONTINUED | OUTPATIENT
Start: 2019-09-12 | End: 2019-09-13

## 2019-09-12 RX ORDER — GLUCAGON INJECTION, SOLUTION 0.5 MG/.1ML
1 INJECTION, SOLUTION SUBCUTANEOUS ONCE
Refills: 0 | Status: DISCONTINUED | OUTPATIENT
Start: 2019-09-12 | End: 2019-09-13

## 2019-09-12 RX ORDER — INSULIN GLARGINE 100 [IU]/ML
30 INJECTION, SOLUTION SUBCUTANEOUS
Qty: 0 | Refills: 0 | DISCHARGE

## 2019-09-12 RX ORDER — BENAZEPRIL HYDROCHLORIDE 40 MG/1
1 TABLET ORAL
Qty: 0 | Refills: 0 | DISCHARGE

## 2019-09-12 RX ORDER — ACETAMINOPHEN 500 MG
650 TABLET ORAL EVERY 6 HOURS
Refills: 0 | Status: DISCONTINUED | OUTPATIENT
Start: 2019-09-12 | End: 2019-09-13

## 2019-09-12 RX ORDER — ENOXAPARIN SODIUM 100 MG/ML
40 INJECTION SUBCUTANEOUS DAILY
Refills: 0 | Status: DISCONTINUED | OUTPATIENT
Start: 2019-09-12 | End: 2019-09-13

## 2019-09-12 RX ORDER — DEXTROSE 50 % IN WATER 50 %
12.5 SYRINGE (ML) INTRAVENOUS ONCE
Refills: 0 | Status: DISCONTINUED | OUTPATIENT
Start: 2019-09-12 | End: 2019-09-13

## 2019-09-12 RX ADMIN — INSULIN GLARGINE 10 UNIT(S): 100 INJECTION, SOLUTION SUBCUTANEOUS at 21:13

## 2019-09-12 RX ADMIN — SODIUM CHLORIDE 100 MILLILITER(S): 9 INJECTION, SOLUTION INTRAVENOUS at 20:32

## 2019-09-12 RX ADMIN — Medication 650 MILLIGRAM(S): at 16:52

## 2019-09-12 RX ADMIN — DORZOLAMIDE HYDROCHLORIDE 1 DROP(S): 20 SOLUTION/ DROPS OPHTHALMIC at 21:13

## 2019-09-12 RX ADMIN — SODIUM CHLORIDE 2000 MILLILITER(S): 9 INJECTION INTRAMUSCULAR; INTRAVENOUS; SUBCUTANEOUS at 16:53

## 2019-09-12 RX ADMIN — Medication 650 MILLIGRAM(S): at 17:38

## 2019-09-12 RX ADMIN — SODIUM CHLORIDE 2000 MILLILITER(S): 9 INJECTION INTRAMUSCULAR; INTRAVENOUS; SUBCUTANEOUS at 14:50

## 2019-09-12 NOTE — ED ADULT NURSE REASSESSMENT NOTE - NS ED NURSE REASSESS COMMENT FT1
Assumed patient care at 1454, report received from previous RN, charting as noted. Patient A&Ox4 complaining of abdominal pain, stated is pain she came in with, no change. Stated "It just hurts." Placed on cardiac monitor. Denies any chest pain, shortness of breath, nausea or dizziness. Saline lock in place, patent, negative s/s phlebitis or infiltration. Plan of care discussed, all questions answered. Placed in position of comfort, oriented to call bell system. Will monitor.

## 2019-09-12 NOTE — ED PROVIDER NOTE - CARE PLAN
Principal Discharge DX:	Hypotension  Secondary Diagnosis:	Dehydration  Secondary Diagnosis:	Enteritis

## 2019-09-12 NOTE — H&P ADULT - NSICDXFAMILYHX_GEN_ALL_CORE_FT
FAMILY HISTORY:  Sibling  Still living? Yes, Estimated age: 61-70  Family history of diabetes mellitus (DM), Age at diagnosis: Age Unknown  Family history of hypertension, Age at diagnosis: Age Unknown

## 2019-09-12 NOTE — ED ADULT TRIAGE NOTE - CHIEF COMPLAINT QUOTE
"I cant stop throwing up" c/o nausea, vomiting and low blood pressure. Per EMS pt BP was 80/40 PTA. Pt arrives vomiting light brown emesis, +burning CP, diaphoretic skin cool and clammy. Takes Rx metoprolol ASA and plavix daily. Taken to critical care with MD bedside

## 2019-09-12 NOTE — ED PROVIDER NOTE - CLINICAL SUMMARY MEDICAL DECISION MAKING FREE TEXT BOX
pt arrived hypotense, mentating, had very mild abd discomfort that improved shortly after arriving. ekg nsr without ischemic change. rapid fluids, labs, cta eval for dissection/aneurysm. reassess.

## 2019-09-12 NOTE — H&P ADULT - HISTORY OF PRESENT ILLNESS
68 yo F PMH of CAD (1 stent 2 years ago follows with Dr. Roa), HTN, BONNY and DM c/o 2 episodes of diarrhea today starting several hours ago. Pt vomited en route and was hypotensive ~70/40, had some mild dizziness. +burning CP, diaphoretic skin cool and clammy.   Denies f/c, cp or sob. Has some mild abd discomfort with diarrhea. Was usual state of health earlier in the day. Denies brbpr/ melena. Denies anything out of the norm    SH- denies habits  FH- no cardiac, Gi issues within family

## 2019-09-12 NOTE — ED PROVIDER NOTE - PHYSICAL EXAMINATION
Gen: No acute distress, non toxic, emesis on mouth/chest  HEENT: Mucous membranes moist, pink conjunctivae, EOMI  CV: RRR, nl s1/s2.  Resp: CTAB, normal rate and effort  GI: Abdomen soft, NT, ND. No rebound, no guarding, no pulsatile mass.   : No CVAT  Neuro: A&O x 3, moving all 4 extremities  MSK: No spine or joint tenderness to palpation  Skin: No rashes. intact and perfused.

## 2019-09-12 NOTE — H&P ADULT - ASSESSMENT
70 yo F PMH of CAD (1 stent 2 years ago follows with Dr. Roa), HTN, BONNY and DM    # diarrhea/ vomits/ imaging with enteritis= Gastroenteritis  IVF hydration  NPO overnight. reassess in AM  will not initiate antibiotic Rx yet  stool testing  PPI    # MUNA with met acidosis  sec to dehydration  IVF hydration    # Hypotension  as above    # Sepsis  Sec to all of above    # DM2  insulin in house    # HTN  hold antihypertensives as she was hypotensive on arrival    # CAD  cont DAPT agents  no evidence of bleed    # BONNY  no devices use at home  monitor     Lovenox

## 2019-09-12 NOTE — ED ADULT NURSE REASSESSMENT NOTE - NS ED NURSE REASSESS COMMENT FT1
Patient A&Ox4 complaining of headache, MD made aware. Pain medications administered as ordered. Patient denies any abdominal pain or nausea at this time. Will continue to monitor.

## 2019-09-12 NOTE — ED PROVIDER NOTE - PROGRESS NOTE DETAILS
Given the significant and immediate threats to this patient based on initial presentation, the benefits of emergency contrast-enhanced CT imaging without obtaining GFR/creatinine serum level results greatly outweigh the potential risk of harm due to contrast-induced nephropathy. Hirsch: pt with mesenteric edema and enteritis, bp has improved,lactate still 2.7, abd pain improved, vomiting improved. given severity of symtoms and hypotension and lactate 2.7 after 3L, admitted

## 2019-09-12 NOTE — H&P ADULT - NSICDXPASTMEDICALHX_GEN_ALL_CORE_FT
PAST MEDICAL HISTORY:  Anemia     Arthritis     Chest pain     DJD (degenerative joint disease), lumbar     DM (diabetes mellitus)     Fatty liver     GERD (gastroesophageal reflux disease)     Hiatal hernia     HTN (hypertension)     BONNY (obstructive sleep apnea)

## 2019-09-12 NOTE — ED PROVIDER NOTE - OBJECTIVE STATEMENT
68 y/o female hx cad (1 stent 2 years ago follows with Dr. Roa), dm, htn c/o 2 episodes of diarrhea today starting several hours ago with ?syncope. Pt vomited in route and was hypotense ~70/40, had some mild dizziness. Denies f/c, cp or sob. Has some mild abd discomfort with diarrhea. Was usual state of health earlier in the day. Denies brbpr/melena.     ROS: No fever/chills. No eye pain/changes in vision, No ear pain/sore throat/dysphagia, No chest pain/palpitations. No SOB/cough/. , no black/bloody bm. No dysuria/frequency/discharge, No headache. No Dizziness.    No rashes or breaks in skin. No numbness/tingling/weakness.

## 2019-09-12 NOTE — ED ADULT NURSE NOTE - OBJECTIVE STATEMENT
69 year old female a&ox3 brought to  for evaluation. pt. states her stomach started hurting at home, unsure of what time. pt. states she had an episode of diarrhea x1 at home. pt. states she vomited in the ambulance. pt. denies chest pain, sob. pt. now c/o lower abdominal pain. pt. denies fevers, chills at home.

## 2019-09-12 NOTE — H&P ADULT - NSHPPHYSICALEXAM_GEN_ALL_CORE
Vital Signs Last 24 Hrs  T(C): 37 (09-12-19 @ 13:15), Max: 37 (09-12-19 @ 13:15)  T(F): 98.6 (09-12-19 @ 13:15), Max: 98.6 (09-12-19 @ 13:15)  HR: 84 (09-12-19 @ 16:32) (82 - 91)  BP: 147/67 (09-12-19 @ 16:32) (77/49 - 147/67)  BP(mean): --  RR: 18 (09-12-19 @ 16:32) (18 - 20)  SpO2: 97% (09-12-19 @ 16:32) (92% - 97%)  Gen: No acute distress, non toxic, emesis on mouth/chest  HEENT: Mucous membranes moist, pink conjunctivae, EOMI  CV: RRR, nl s1/s2.  Resp: CTAB, normal rate and effort  GI: Abdomen soft, NT, ND. No rebound, no guarding, no pulsatile mass.   : No CVAT  Neuro: A&O x 3, moving all 4 extremities  MSK: No spine or joint tenderness to palpation  Skin: No rashes. intact and perfused.

## 2019-09-13 ENCOUNTER — TRANSCRIPTION ENCOUNTER (OUTPATIENT)
Age: 69
End: 2019-09-13

## 2019-09-13 ENCOUNTER — INBOUND DOCUMENT (OUTPATIENT)
Age: 69
End: 2019-09-13

## 2019-09-13 VITALS
HEART RATE: 97 BPM | RESPIRATION RATE: 20 BRPM | DIASTOLIC BLOOD PRESSURE: 77 MMHG | OXYGEN SATURATION: 96 % | SYSTOLIC BLOOD PRESSURE: 128 MMHG | TEMPERATURE: 99 F

## 2019-09-13 LAB
ABO RH CONFIRMATION: SIGNIFICANT CHANGE UP
ANION GAP SERPL CALC-SCNC: 11 MMOL/L — SIGNIFICANT CHANGE UP (ref 5–17)
BUN SERPL-MCNC: 17 MG/DL — SIGNIFICANT CHANGE UP (ref 8–20)
CALCIUM SERPL-MCNC: 8.3 MG/DL — LOW (ref 8.6–10.2)
CHLORIDE SERPL-SCNC: 112 MMOL/L — HIGH (ref 98–107)
CO2 SERPL-SCNC: 20 MMOL/L — LOW (ref 22–29)
CREAT SERPL-MCNC: 1.6 MG/DL — HIGH (ref 0.5–1.3)
GLUCOSE BLDC GLUCOMTR-MCNC: 124 MG/DL — HIGH (ref 70–99)
GLUCOSE BLDC GLUCOMTR-MCNC: 166 MG/DL — HIGH (ref 70–99)
GLUCOSE SERPL-MCNC: 146 MG/DL — HIGH (ref 70–115)
HBA1C BLD-MCNC: 7.4 % — HIGH (ref 4–5.6)
HCT VFR BLD CALC: 35.9 % — SIGNIFICANT CHANGE UP (ref 34.5–45)
HGB BLD-MCNC: 10.7 G/DL — LOW (ref 11.5–15.5)
LACTATE SERPL-SCNC: 1.4 MMOL/L — SIGNIFICANT CHANGE UP (ref 0.5–2)
MCHC RBC-ENTMCNC: 25.9 PG — LOW (ref 27–34)
MCHC RBC-ENTMCNC: 29.8 GM/DL — LOW (ref 32–36)
MCV RBC AUTO: 86.9 FL — SIGNIFICANT CHANGE UP (ref 80–100)
PLATELET # BLD AUTO: 213 K/UL — SIGNIFICANT CHANGE UP (ref 150–400)
POTASSIUM SERPL-MCNC: 5 MMOL/L — SIGNIFICANT CHANGE UP (ref 3.5–5.3)
POTASSIUM SERPL-SCNC: 5 MMOL/L — SIGNIFICANT CHANGE UP (ref 3.5–5.3)
RBC # BLD: 4.13 M/UL — SIGNIFICANT CHANGE UP (ref 3.8–5.2)
RBC # FLD: 15.8 % — HIGH (ref 10.3–14.5)
SODIUM SERPL-SCNC: 143 MMOL/L — SIGNIFICANT CHANGE UP (ref 135–145)
WBC # BLD: 7.63 K/UL — SIGNIFICANT CHANGE UP (ref 3.8–10.5)
WBC # FLD AUTO: 7.63 K/UL — SIGNIFICANT CHANGE UP (ref 3.8–10.5)

## 2019-09-13 PROCEDURE — 84484 ASSAY OF TROPONIN QUANT: CPT

## 2019-09-13 PROCEDURE — 85027 COMPLETE CBC AUTOMATED: CPT

## 2019-09-13 PROCEDURE — 81001 URINALYSIS AUTO W/SCOPE: CPT

## 2019-09-13 PROCEDURE — 86850 RBC ANTIBODY SCREEN: CPT

## 2019-09-13 PROCEDURE — G0378: CPT

## 2019-09-13 PROCEDURE — 74174 CTA ABD&PLVS W/CONTRAST: CPT

## 2019-09-13 PROCEDURE — 83690 ASSAY OF LIPASE: CPT

## 2019-09-13 PROCEDURE — 83036 HEMOGLOBIN GLYCOSYLATED A1C: CPT

## 2019-09-13 PROCEDURE — 93005 ELECTROCARDIOGRAM TRACING: CPT

## 2019-09-13 PROCEDURE — 99239 HOSP IP/OBS DSCHRG MGMT >30: CPT

## 2019-09-13 PROCEDURE — 82962 GLUCOSE BLOOD TEST: CPT

## 2019-09-13 PROCEDURE — 87040 BLOOD CULTURE FOR BACTERIA: CPT

## 2019-09-13 PROCEDURE — 36415 COLL VENOUS BLD VENIPUNCTURE: CPT

## 2019-09-13 PROCEDURE — 71275 CT ANGIOGRAPHY CHEST: CPT

## 2019-09-13 PROCEDURE — 86901 BLOOD TYPING SEROLOGIC RH(D): CPT

## 2019-09-13 PROCEDURE — 83605 ASSAY OF LACTIC ACID: CPT

## 2019-09-13 PROCEDURE — 99285 EMERGENCY DEPT VISIT HI MDM: CPT | Mod: 25

## 2019-09-13 PROCEDURE — 80053 COMPREHEN METABOLIC PANEL: CPT

## 2019-09-13 PROCEDURE — 80048 BASIC METABOLIC PNL TOTAL CA: CPT

## 2019-09-13 PROCEDURE — 96361 HYDRATE IV INFUSION ADD-ON: CPT

## 2019-09-13 PROCEDURE — 86900 BLOOD TYPING SEROLOGIC ABO: CPT

## 2019-09-13 PROCEDURE — 96360 HYDRATION IV INFUSION INIT: CPT | Mod: XU

## 2019-09-13 RX ORDER — RANITIDINE HYDROCHLORIDE 150 MG/1
1 TABLET, FILM COATED ORAL
Qty: 0 | Refills: 0 | DISCHARGE

## 2019-09-13 RX ORDER — SODIUM CHLORIDE 9 MG/ML
1000 INJECTION, SOLUTION INTRAVENOUS
Refills: 0 | Status: DISCONTINUED | OUTPATIENT
Start: 2019-09-13 | End: 2019-09-13

## 2019-09-13 RX ORDER — PANTOPRAZOLE SODIUM 20 MG/1
1 TABLET, DELAYED RELEASE ORAL
Qty: 30 | Refills: 0
Start: 2019-09-13 | End: 2019-10-12

## 2019-09-13 RX ADMIN — DORZOLAMIDE HYDROCHLORIDE 1 DROP(S): 20 SOLUTION/ DROPS OPHTHALMIC at 12:20

## 2019-09-13 RX ADMIN — CLOPIDOGREL BISULFATE 75 MILLIGRAM(S): 75 TABLET, FILM COATED ORAL at 12:20

## 2019-09-13 RX ADMIN — DORZOLAMIDE HYDROCHLORIDE 1 DROP(S): 20 SOLUTION/ DROPS OPHTHALMIC at 05:09

## 2019-09-13 NOTE — DISCHARGE NOTE PROVIDER - NSDCCPCAREPLAN_GEN_ALL_CORE_FT
PRINCIPAL DISCHARGE DIAGNOSIS  Diagnosis: Hypotension  Assessment and Plan of Treatment:       SECONDARY DISCHARGE DIAGNOSES  Diagnosis: Chronic kidney disease (CKD), stage III (moderate)  Assessment and Plan of Treatment:     Diagnosis: Enteritis  Assessment and Plan of Treatment:     Diagnosis: Dehydration  Assessment and Plan of Treatment:

## 2019-09-13 NOTE — DISCHARGE NOTE PROVIDER - CARE PROVIDER_API CALL
patsy BLANK,   Phone: (   )    -  Fax: (   )    -  Follow Up Time:     Glynn Roa)  Internal Medicine  [address] [place] [address]  Phone: [phone]  Fax: [phone]  Follow Up Time:     Jasvir Nunez)  Internal Medicine; Pulmonary Disease  [facility], [address] [place] [address]  Phone: [phone]  Fax: [phone]  Follow Up Time:

## 2019-09-13 NOTE — DISCHARGE NOTE NURSING/CASE MANAGEMENT/SOCIAL WORK - PATIENT PORTAL LINK FT
You can access the FollowMyHealth Patient Portal offered by [facility] by registering at the following website: [email or web address]. By joining [facility]’s FollowMyHealth portal, you will also be able to view your health information using other applications (apps) compatible with our system.

## 2019-09-13 NOTE — DISCHARGE NOTE PROVIDER - NSDCPNSUBOBJ_GEN_ALL_CORE
HEALTH ISSUES - PROBLEM Dx:        gastroenteritis        INTERVAL HPI/ OVERNIGHT EVENTS:    no BMs here so nos tool testing done    no n/v    tolerating clears this morning    explained plans to advance to full liquid today    and if tolerating discharge home today        REVIEW OF SYSTEMS:        fever- hypotension - n/v -        Vital Signs Last 24 Hrs    T(C): 36.4 (13 Sep 2019 09:00), Max: 37 (12 Sep 2019 13:15)    T(F): 97.5 (13 Sep 2019 09:00), Max: 98.6 (12 Sep 2019 13:15)    HR: 88 (13 Sep 2019 09:00) (80 - 93)    BP: 153/73 (13 Sep 2019 09:00) (77/49 - 153/73)    BP(mean): --    RR: 18 (13 Sep 2019 09:00) (18 - 20)    SpO2: 96% (13 Sep 2019 09:00) (92% - 97%)        PHYSICAL EXAM-    Gen: No acute distress, non toxic, emesis on mouth/chest    HEENT: Mucous membranes moist, pink conjunctivae, EOMI    CV: RRR, nl s1/s2.    Resp: CTAB, normal rate and effort    GI: Abdomen soft, NT, ND. No rebound, no guarding, no pulsatile mass.     : No CVAT    Neuro: A&O x 3, moving all 4 extremities    MSK: No spine or joint tenderness to palpation    Skin: No rashes. intact and perfused        MEDICATIONS  (STANDING):    clopidogrel Tablet 75 milliGRAM(s) Oral daily    dextrose 5%. 1000 milliLiter(s) (50 mL/Hr) IV Continuous <Continuous>    dextrose 50% Injectable 12.5 Gram(s) IV Push once    dextrose 50% Injectable 25 Gram(s) IV Push once    dextrose 50% Injectable 25 Gram(s) IV Push once    dorzolamide 2% Ophthalmic Solution 1 Drop(s) Both EYES three times a day    enoxaparin Injectable 40 milliGRAM(s) SubCutaneous daily    insulin glargine Injectable (LANTUS) 10 Unit(s) SubCutaneous at bedtime    insulin lispro (HumaLOG) corrective regimen sliding scale   SubCutaneous three times a day before meals    metoprolol succinate  milliGRAM(s) Oral daily    pantoprazole    Tablet 40 milliGRAM(s) Oral before breakfast    sodium chloride 0.9% 1000 milliLiter(s) (100 mL/Hr) IV Continuous <Continuous>        MEDICATIONS  (PRN):    acetaminophen   Tablet .. 650 milliGRAM(s) Oral every 6 hours PRN Temp greater or equal to 38C (100.4F), Mild Pain (1 - 3)    dextrose 40% Gel 15 Gram(s) Oral once PRN Blood Glucose LESS THAN 70 milliGRAM(s)/deciliter    glucagon  Injectable 1 milliGRAM(s) IntraMuscular once PRN Glucose LESS THAN 70 milligrams/deciliter    ondansetron Injectable 4 milliGRAM(s) IV Push every 6 hours PRN Nausea and/or Vomiting            LABS:                            10.7     7.63  )-----------( 213      ( 13 Sep 2019 07:31 )               35.9         09-13        143  |  112<H>  |  17.0    ----------------------------<  146<H>    5.0   |  20.0<L>  |  1.60<H>        Ca    8.3<L>      13 Sep 2019 07:31        TPro  7.4  /  Alb  4.0  /  TBili  0.4  /  DBili  x   /  AST  17  /  ALT  14  /  AlkPhos  106  09-12            Urinalysis Basic - ( 12 Sep 2019 14:53 )        Color: Yellow / Appearance: Clear / S.005 / pH: x    Gluc: x / Ketone: Negative  / Bili: Negative / Urobili: Negative mg/dL     Blood: x / Protein: 30 mg/dL / Nitrite: Negative     Leuk Esterase: Trace / RBC: 3-5 /HPF / WBC 3-5     Sq Epi: x / Non Sq Epi: Occasional / Bacteria: Negative                    Assessment and Plan    70 yo F PMH of CAD (1 stent 2 years ago follows with Dr. Roa), HTN, BONNY and DM        # diarrhea/ vomits/ imaging with enteritis= Gastroenteritis    IVF hydration    NPO overnight. PO started in AM    did not initiate antibiotic Rx . clinically not needed    stool testing not done as pt did not have     any BMs     PPI        # MUNA with met acidosis initially suspected. However she is at her baseline creatinine. so MUNA ruled out. it is CKD stg 3    IVF hydration        # Hypotension    as above        # Sepsis    Sec to all of above    resolved        # DM2    insulin in house        # HTN    hold antihypertensives as she was hypotensive on arrival        # CAD    cont DAPT agents    no evidence of bleed        # BONNY    no devices use at home    monitor         if tolerating full liq diet by after noon discharge home today

## 2019-09-13 NOTE — DISCHARGE NOTE PROVIDER - HOSPITAL COURSE
admitted with diarrhea/ emesis/ hypotension. now resolved and stable.    tolerating diet well    no antibiotics given    no fever/ leucocytosis    D/C home. plenty of oral liquids        Medically stable and agreeable with discharge and follow up plan. Patient was advised to return to ED if any symptoms occur or worsen.        time 39 mins        CT- No thoracic or abdominal aortic dissection.        Mild wall thickening involving the proximal duodenum with infiltration of     the adjacent fat, likely duodenitis, however correlation is also     recommended with pancreatic enzymes to exclude pancreatitis.        Distended stomach which may be secondary to the duodenal inflammation.        Hyperenhancement involving small bowel loops in the right hemiabdomen     with wall thickening versus underdistention involving small bowel loops     in the right and left hemiabdomen with mild mesenteric edema, likely an     enteritis.        Small amount of abdominal and pelvic ascites.        New indeterminate 5 mm right lower lobe pulmonary nodule; a follow-up     noncontrast chest CT is recommended in 3-6 months.

## 2019-09-13 NOTE — DISCHARGE NOTE PROVIDER - PROVIDER TOKENS
FREE:[LAST:[patsy PMD],PHONE:[(   )    -],FAX:[(   )    -]],PROVIDER:[TOKEN:[[id number]]],PROVIDER:[TOKEN:[[id number]]]

## 2019-09-13 NOTE — DISCHARGE NOTE PROVIDER - CARE PROVIDERS DIRECT ADDRESSES
,DirectAddress_Unknown,[email or web address]

## 2019-09-16 ENCOUNTER — INBOUND DOCUMENT (OUTPATIENT)
Age: 69
End: 2019-09-16

## 2019-09-16 DIAGNOSIS — Z71.89 OTHER SPECIFIED COUNSELING: ICD-10-CM

## 2019-10-02 ENCOUNTER — APPOINTMENT (OUTPATIENT)
Dept: CARDIOLOGY | Facility: CLINIC | Age: 69
End: 2019-10-02

## 2019-10-06 ENCOUNTER — EMERGENCY (EMERGENCY)
Facility: HOSPITAL | Age: 69
LOS: 1 days | Discharge: HOME | End: 2019-10-06
Attending: EMERGENCY MEDICINE
Payer: COMMERCIAL

## 2019-10-06 VITALS
HEART RATE: 85 BPM | DIASTOLIC BLOOD PRESSURE: 69 MMHG | RESPIRATION RATE: 20 BRPM | WEIGHT: 235.89 LBS | SYSTOLIC BLOOD PRESSURE: 119 MMHG | HEIGHT: 64 IN | OXYGEN SATURATION: 99 % | TEMPERATURE: 98 F

## 2019-10-06 DIAGNOSIS — H26.40 UNSPECIFIED SECONDARY CATARACT: Chronic | ICD-10-CM

## 2019-10-06 PROCEDURE — 82962 GLUCOSE BLOOD TEST: CPT

## 2019-10-06 PROCEDURE — 99282 EMERGENCY DEPT VISIT SF MDM: CPT

## 2019-10-06 PROCEDURE — 99283 EMERGENCY DEPT VISIT LOW MDM: CPT

## 2019-10-06 NOTE — ED STATDOCS - OBJECTIVE STATEMENT
69F h/o Dm, HTN, bilateral cataracts s/p repair 2 years ago and lasik surgery one year ago p/w grayness to her vision since she woke up yesterday, Unchanged. Painless. Went to [facility] today and sent to ED for optho. Has appt with optho tomorrow but needs help getting to appt. Denies fever, weakness, nubness, tingling, diplopia, discharge, FB sensation. Wears reading glasses. 69F h/o Dm, HTN, bilateral cataracts s/p repair 2 years ago and lasik surgery one year ago p/w grayness to her vision since she woke up yesterday, Unchanged. Painless. Went to [facility] today and sent to ED for optho. Has appt with optho tomorrow but needs help getting to appt. Denies fever, weakness, nubness, tingling, diplopia, discharge, FB sensation, photphobia. Wears glasses.

## 2019-10-06 NOTE — ED ADULT TRIAGE NOTE - CHIEF COMPLAINT QUOTE
sent from urgent care to see an opthalmologist  for vision changes since yesterday, everything looks gray

## 2019-10-06 NOTE — ED STATDOCS - PROGRESS NOTE DETAILS
Leon PEACOCK: DEMETRA consulted to get pt to appt tomorrow. Leon PEACOCK: pt's friend to take her to optho tomorrow. . Ready for dc.

## 2019-10-06 NOTE — ED STATDOCS - PHYSICAL EXAMINATION
Gen: Well appearing in NAD  Head: NC/AT, bilateral eyes uncorrected, R eye 100/20. L eye 50/20. EOMI. PERRL  Neck: trachea midline  Resp:  No distress  Ext: no deformities  Neuro:  A&O appears non focal  Skin:  Warm and dry as visualized  Psych:  Normal affect and mood

## 2019-10-06 NOTE — ED STATDOCS - PATIENT PORTAL LINK FT
You can access the FollowMyHealth Patient Portal offered by [facility] by registering at the following website: [email or web address]. By joining [facility]’s FollowMyHealth portal, you will also be able to view your health information using other applications (apps) compatible with our system.

## 2019-10-06 NOTE — CHART NOTE - NSCHARTNOTEFT_GEN_A_CORE
Social work note:  attempted to schedule transport to pt's ophthalmologist appointment tomorrow - 10:15am at [address] [facility], with MD Juan Tanner. This worker called [employer] to arrange transport and spoke to Maura. As per Maura, pt must call member services tomorrow morning and would not let this worker schedule transport. This worker will provide local taxi numbers and bus tickets as there is no other transport resource.

## 2019-10-06 NOTE — ED STATDOCS - ATTENDING CONTRIBUTION TO CARE
Leon: I performed a face to face bedside interview with patient regarding history of present illness, review of symptoms and past medical history. I completed an independent physical exam and ordered tests/medications as needed.  I have discussed patient's plan of care with advanced care provider. The advanced care provider assisted in  executing the discussed plan. I was available for any questions or issues that may have arose during the execution of the plan of care.

## 2019-10-10 ENCOUNTER — APPOINTMENT (OUTPATIENT)
Dept: CARDIOLOGY | Facility: CLINIC | Age: 69
End: 2019-10-10
Payer: MEDICARE

## 2019-10-10 VITALS
HEART RATE: 64 BPM | WEIGHT: 235 LBS | DIASTOLIC BLOOD PRESSURE: 76 MMHG | SYSTOLIC BLOOD PRESSURE: 140 MMHG | HEIGHT: 64 IN | RESPIRATION RATE: 12 BRPM | BODY MASS INDEX: 40.12 KG/M2

## 2019-10-10 DIAGNOSIS — E66.01 MORBID (SEVERE) OBESITY DUE TO EXCESS CALORIES: ICD-10-CM

## 2019-10-10 PROCEDURE — 93000 ELECTROCARDIOGRAM COMPLETE: CPT

## 2019-10-10 PROCEDURE — 99214 OFFICE O/P EST MOD 30 MIN: CPT

## 2019-10-10 NOTE — REASON FOR VISIT
[FreeTextEntry1] : The patient is a 69-year-old woman who has a history of coronary artery disease and prior remote history for PCI/stent last dating back to May 2017 to the proximal LAD.  She had a repeat cardiac catheterization in September 2017 for atypical chest pain and the stents were found to be patent;\par \par Mrs. Frances is here today status post hospital discharge from [facility] (9/12/2019) regarding an acute exacerbation of gastroenteritis with diarrhea and vomiting, pt became dehydrated and hypotensive (70/40).  She was stabilized with IV fluids and discharged on PO medications including all previously prescribed regimen.  She reports feeling overall well and without cardiac complaints.  The patient denies CP, SOB, TY, PND, orthopnea, palpitations, presyncope, syncope.

## 2019-10-10 NOTE — ASSESSMENT
[FreeTextEntry1] : EKG 10/10/2019:  The EKG illustrates normal sinus rhythm, rate of 64 bpm, possible left atrial enlargement, nonspecific T wave abnormality.  Essentially unchanged.\par \par In summary the patient is a 69-year-old woman with a history of coronary disease and prior stent with otherwise stable cardiac pattern;

## 2019-10-10 NOTE — HISTORY OF PRESENT ILLNESS
[FreeTextEntry1] : Patient is tolerating cardiac medications without negative side effects including Plavix 75 mg QD, ASA 81 mg QD, Lipitor 10 mg QHS, Benazepril 20 mg QD, Folic Acid, Metoprolol Succinate 100 mg QD. \par \par Recent carotid duplex study (August 2019) demonstrating minimal to mild carotid plaquing stenosis bilaterally without any significant obstructive disease;\par \par Recent transthoracic echo study (July 2019) demonstrates mild concentric hypertrophy of the left ventricle with normal systolic function and a normal ejection fraction in the range of 60-65%. There was trace mitral insufficiency and tricuspid insufficiency

## 2019-10-10 NOTE — DISCUSSION/SUMMARY
[FreeTextEntry1] : 1).  Patient appears clinically clinically stable from a cardiovascular perspective, she is to continue with all previously prescribed cardiac medications as directed.\par \par 2).  Counseled patient on diet and lifestyle modification including low fat and low carbohydrate weight reducing diet.  She is to implement an aerobic exercise regimen few days per week. \par \par 3).  No additional cardiac testing indicated at this time. \par \par 4).  Recommend patient report any untoward symptoms. \par \par 5).  Follow up with Dr. Roa on December 5th or PRN.

## 2019-10-10 NOTE — PHYSICAL EXAM
[General Appearance - In No Acute Distress] : no acute distress [Normal Appearance] : normal appearance [Normal Conjunctiva] : the conjunctiva exhibited no abnormalities [Normal Jugular Venous A Waves Present] : normal jugular venous A waves present [Normal Oral Mucosa] : normal oral mucosa [No Jugular Venous Lamb A Waves] : no jugular venous lamb A waves [Normal Jugular Venous V Waves Present] : normal jugular venous V waves present [Respiration, Rhythm And Depth] : normal respiratory rhythm and effort [] : no respiratory distress [Heart Rate And Rhythm] : heart rate and rhythm were normal [Auscultation Breath Sounds / Voice Sounds] : lungs were clear to auscultation bilaterally [Heart Sounds] : normal S1 and S2 [Arterial Pulses Normal] : the arterial pulses were normal [Bowel Sounds] : normal bowel sounds [Abdomen Soft] : soft [Nail Clubbing] : no clubbing of the fingernails [Abnormal Walk] : normal gait [Cyanosis, Localized] : no localized cyanosis [Skin Color & Pigmentation] : normal skin color and pigmentation [Skin Turgor] : normal skin turgor [Oriented To Time, Place, And Person] : oriented to person, place, and time [Impaired Insight] : insight and judgment were intact [Affect] : the affect was normal [FreeTextEntry1] : Grade I/VI systolic murmur

## 2019-12-08 ENCOUNTER — EMERGENCY (EMERGENCY)
Facility: HOSPITAL | Age: 69
LOS: 1 days | Discharge: HOME | End: 2019-12-08
Attending: EMERGENCY MEDICINE
Payer: COMMERCIAL

## 2019-12-08 VITALS
DIASTOLIC BLOOD PRESSURE: 73 MMHG | TEMPERATURE: 98 F | HEIGHT: 64 IN | SYSTOLIC BLOOD PRESSURE: 140 MMHG | OXYGEN SATURATION: 97 % | HEART RATE: 95 BPM | WEIGHT: 233.91 LBS | RESPIRATION RATE: 18 BRPM

## 2019-12-08 DIAGNOSIS — H26.40 UNSPECIFIED SECONDARY CATARACT: Chronic | ICD-10-CM

## 2019-12-08 LAB
ALBUMIN SERPL ELPH-MCNC: 3.9 G/DL — SIGNIFICANT CHANGE UP (ref 3.3–5.2)
ALP SERPL-CCNC: 101 U/L — SIGNIFICANT CHANGE UP (ref 40–120)
ALT FLD-CCNC: 8 U/L — SIGNIFICANT CHANGE UP
ANION GAP SERPL CALC-SCNC: 13 MMOL/L — SIGNIFICANT CHANGE UP (ref 5–17)
APPEARANCE UR: CLEAR — SIGNIFICANT CHANGE UP
APTT BLD: 24.5 SEC — LOW (ref 27.5–36.3)
AST SERPL-CCNC: 14 U/L — SIGNIFICANT CHANGE UP
BACTERIA # UR AUTO: ABNORMAL
BASOPHILS # BLD AUTO: 0.01 K/UL — SIGNIFICANT CHANGE UP (ref 0–0.2)
BASOPHILS NFR BLD AUTO: 0.1 % — SIGNIFICANT CHANGE UP (ref 0–2)
BILIRUB SERPL-MCNC: 0.2 MG/DL — LOW (ref 0.4–2)
BILIRUB UR-MCNC: NEGATIVE — SIGNIFICANT CHANGE UP
BUN SERPL-MCNC: 21 MG/DL — HIGH (ref 8–20)
CALCIUM SERPL-MCNC: 9 MG/DL — SIGNIFICANT CHANGE UP (ref 8.6–10.2)
CHLORIDE SERPL-SCNC: 109 MMOL/L — HIGH (ref 98–107)
CO2 SERPL-SCNC: 21 MMOL/L — LOW (ref 22–29)
COLOR SPEC: YELLOW — SIGNIFICANT CHANGE UP
CREAT SERPL-MCNC: 1.59 MG/DL — HIGH (ref 0.5–1.3)
DIFF PNL FLD: NEGATIVE — SIGNIFICANT CHANGE UP
EOSINOPHIL # BLD AUTO: 0.33 K/UL — SIGNIFICANT CHANGE UP (ref 0–0.5)
EOSINOPHIL NFR BLD AUTO: 4.7 % — SIGNIFICANT CHANGE UP (ref 0–6)
EPI CELLS # UR: SIGNIFICANT CHANGE UP
GLUCOSE SERPL-MCNC: 222 MG/DL — HIGH (ref 70–115)
GLUCOSE UR QL: NEGATIVE MG/DL — SIGNIFICANT CHANGE UP
HCT VFR BLD CALC: 33.5 % — LOW (ref 34.5–45)
HGB BLD-MCNC: 10 G/DL — LOW (ref 11.5–15.5)
IMM GRANULOCYTES NFR BLD AUTO: 0.1 % — SIGNIFICANT CHANGE UP (ref 0–1.5)
INR BLD: 1 RATIO — SIGNIFICANT CHANGE UP (ref 0.88–1.16)
KETONES UR-MCNC: NEGATIVE — SIGNIFICANT CHANGE UP
LEUKOCYTE ESTERASE UR-ACNC: NEGATIVE — SIGNIFICANT CHANGE UP
LIDOCAIN IGE QN: 49 U/L — SIGNIFICANT CHANGE UP (ref 22–51)
LYMPHOCYTES # BLD AUTO: 2.11 K/UL — SIGNIFICANT CHANGE UP (ref 1–3.3)
LYMPHOCYTES # BLD AUTO: 30.4 % — SIGNIFICANT CHANGE UP (ref 13–44)
MCHC RBC-ENTMCNC: 25.7 PG — LOW (ref 27–34)
MCHC RBC-ENTMCNC: 29.9 GM/DL — LOW (ref 32–36)
MCV RBC AUTO: 86.1 FL — SIGNIFICANT CHANGE UP (ref 80–100)
MONOCYTES # BLD AUTO: 0.5 K/UL — SIGNIFICANT CHANGE UP (ref 0–0.9)
MONOCYTES NFR BLD AUTO: 7.2 % — SIGNIFICANT CHANGE UP (ref 2–14)
NEUTROPHILS # BLD AUTO: 3.99 K/UL — SIGNIFICANT CHANGE UP (ref 1.8–7.4)
NEUTROPHILS NFR BLD AUTO: 57.5 % — SIGNIFICANT CHANGE UP (ref 43–77)
NITRITE UR-MCNC: NEGATIVE — SIGNIFICANT CHANGE UP
PH UR: 6 — SIGNIFICANT CHANGE UP (ref 5–8)
PLATELET # BLD AUTO: 197 K/UL — SIGNIFICANT CHANGE UP (ref 150–400)
POTASSIUM SERPL-MCNC: 4.7 MMOL/L — SIGNIFICANT CHANGE UP (ref 3.5–5.3)
POTASSIUM SERPL-SCNC: 4.7 MMOL/L — SIGNIFICANT CHANGE UP (ref 3.5–5.3)
PROT SERPL-MCNC: 7.2 G/DL — SIGNIFICANT CHANGE UP (ref 6.6–8.7)
PROT UR-MCNC: 30 MG/DL
PROTHROM AB SERPL-ACNC: 11.5 SEC — SIGNIFICANT CHANGE UP (ref 10–12.9)
RBC # BLD: 3.89 M/UL — SIGNIFICANT CHANGE UP (ref 3.8–5.2)
RBC # FLD: 15.7 % — HIGH (ref 10.3–14.5)
RBC CASTS # UR COMP ASSIST: SIGNIFICANT CHANGE UP /HPF (ref 0–4)
SODIUM SERPL-SCNC: 143 MMOL/L — SIGNIFICANT CHANGE UP (ref 135–145)
SP GR SPEC: 1.01 — SIGNIFICANT CHANGE UP (ref 1.01–1.02)
UROBILINOGEN FLD QL: NEGATIVE MG/DL — SIGNIFICANT CHANGE UP
WBC # BLD: 6.95 K/UL — SIGNIFICANT CHANGE UP (ref 3.8–10.5)
WBC # FLD AUTO: 6.95 K/UL — SIGNIFICANT CHANGE UP (ref 3.8–10.5)
WBC UR QL: SIGNIFICANT CHANGE UP

## 2019-12-08 PROCEDURE — 99284 EMERGENCY DEPT VISIT MOD MDM: CPT

## 2019-12-08 RX ORDER — SODIUM CHLORIDE 9 MG/ML
3 INJECTION INTRAMUSCULAR; INTRAVENOUS; SUBCUTANEOUS ONCE
Refills: 0 | Status: COMPLETED | OUTPATIENT
Start: 2019-12-08 | End: 2019-12-08

## 2019-12-08 NOTE — ED ADULT NURSE NOTE - OBJECTIVE STATEMENT
pt received A+Ox4, in no apparent distress. pt breathing even and unlabored. CROCKETT well x4. patient states that she has abdominal pain left lower radiating to her back without N/V. pt denies urinary s/s. abd soft, nondistended. pt denies CP, denies SOB. pt educated on POC, verbalized understanding. pt safety measures maintained.

## 2019-12-08 NOTE — ED PROVIDER NOTE - OBJECTIVE STATEMENT
68 y/o F pt with significant PMHx of DM(metformin), HLD, anemia, arthritis, DJD, GERD, HTN, and s/p stent placement presents to the ED c/o constant abdominal pain that started yesterday. She reports associated constipation. Denies fever, nausea, diarrhea, CP, chills, ETOH use, and urinary symptoms.

## 2019-12-08 NOTE — ED ADULT NURSE NOTE - NSIMPLEMENTINTERV_GEN_ALL_ED
Implemented All Universal Safety Interventions:  [place] to call system. Call bell, personal items and telephone within reach. Instruct patient to call for assistance. Room bathroom lighting operational. Non-slip footwear when patient is off stretcher. Physically safe environment: no spills, clutter or unnecessary equipment. Stretcher in lowest position, wheels locked, appropriate side rails in place.

## 2019-12-08 NOTE — ED ADULT NURSE NOTE - CAS DISCH TRANSFER METHOD
Progress Notes by Oumou Neal DO at 02/28/18 04:14 PM     Author:  Oumou Nela DO Service:  (none) Author Type:  Physician     Filed:  02/28/18 05:24 PM Encounter Date:  2/28/2018 Status:  Signed     :  Oumou Neal DO (Physician)            CC:   Chief Complaint     Patient presents with     â¢ Sinus Pressure      Pt c/o sinus pressure, headache, facial pain and left ear pain x 2 days   â¢ Headache    â¢ Ear Problem         SUBJECTIVE:    Raul Rhoades is a 46year old male who presents to Immediate Care with[BH1.1T] respiratory[BH1.1M] symptoms which have been present for[BH1.1T] 2 days[BH1.1M]. Symptoms include:[BH1.1T] cough described as congested, ear ache on the left, facial pain, facial pressure, nasal congestion and nasal discharge which is green and yellow[BH1.1M]. [BH1.1T] He has had congestion/cough 1 week prior which was mild. [BH1.1M] Denies:[BH1.1T] abdominal pain, appetite loss, body aches, diarrhea, ear ache, nausea, shortness of breath, vomiting. Slight wheezing.[BH1.1M]     Improving factors:[BH1.1T] None[BH1.1M]. Worsening factors:[BH1.1T] None[BH1.1M]        ROS otherwise negative    Patient Active Problem List    Diagnosis    â¢ Hyperlipidemia   â¢ Depression   â¢ Idiopathic angioedema   â¢ Immunologic deficiency syndrome   â¢ Asthma   â¢ Allergic rhinitis   â¢ IFG (impaired fasting glucose)     Current outpatient prescriptions prior to encounter       Medication  Sig Dispense Refill   â¢ lisinopril (PRINIVIL,ZESTRIL) 10 MG tablet TAKE 1 TABLET BY MOUTH ONE TIME A DAY 90 Tab 3   â¢ montelukast (SINGULAIR) 10 MG tablet TAKE 1 TABLET BY MOUTH ONE TIME A DAY 90 Tab 3   â¢ omeprazole (PRILOSEC) 40 MG Cap TAKE 1 CAPSULE BY MOUTH ONE TIME A DAY 90 Cap 3   â¢ venlafaxine (EFFEXOR-XR) 150 MG 24 hr Cap TAKE 1 CAPSULE BY MOUTH ONE TIME A DAY WITH BREAKFAST 90 Cap 0   â¢ atorvastatin (LIPITOR) 10 MG tablet Take 1 Tab by mouth daily.  30 Tab 11     SH:[BH1.1T] Denies tobacco use/smoking.[BH1.1M]  Allergies: Penicillins    OBJECTIVE:    Filed Vitals:     02/28/18 1606   BP: 120/80   Pulse: 100   Resp: 20   Temp: 98.3 Â°F (36.8 Â°C)   TempSrc: Tympanic   SpO2: 97%   PainSc:   6 (0-10 Scale)   PainLoc: head      General appearance:[BH1.1T] Alert, well hydrated, well nourished in no apparent distress[BH1.1M]  Ear Exam:[BH1.1T] TM's intact without erythema, bulging, retraction, or fluid level - external auditory canal clear[BH1.1M]  Conjunctiva:[BH1.1T] Clear without injection or discharge[BH1.1M]; lids[BH1.1T] normal[BH1.1M]  Nose:[BH1.1T] Mucosa moist, erythematous and edematous with purulent drainage and sinus tenderness[BH1.1M]  Throat:[BH1.1T] pink and moist without edema, erythema or exudates[BH1.1M]  Neck:[BH1.1T] supple without cervical lymphadenopathy. No meningismus.[BH1.1M]  Heart:[BH1.1T] regular rate and rhythm without S3, S4 or murmur[BH1.1M]  Lungs:[BH1.1T] coarse throughout, wheeze scattered, rhonchi  scattered[BH1.1M];[BH1.1T] normal respiratory effort    Skin: smooth without rash or edema, capillary refill is normal and turgur is normal[BH1.1M]    ASSESSMENT/PLAN:[BH1.1T]    BRONCHITIS (acute - possibly bacterial) - Discussed treatment/medication options and risks and benefits and encouraged to take course of antibiotics as ordered. May use tylenol as directed prn pain or fever. Instructed to call with any questions regarding medication usage. Advised to return to Immediate Care or follow-up with primary care provider for reevaluation if symptoms worsen or are not improving in 5-7 days. SINUSITIS (acute) - Advised to use antibiotics as directed. May use tylenol as directed for pain or fever. Nasal saline may help break up mucous and speed recovery.  . If sinus symptoms are worsening or not improving after 5-7 days of antibiotic therapy follow-up with your primary care provider or return to Immediate Care for reevaluation.[BH1.1M]    Diagnosis and prognosis, treatment and side-effects were explained and all questions were answered satisfactorily and there were no further questions upon discharge.     Electronically Signed by:    Oly Brown DO , 2/28/2018[BH1.1T]       Revision History        User Key Date/Time User Provider Type Action    > BH1.1 02/28/18 05:24 PM Oly Brown DO Physician Sign    M - Manual, T - Template Private car

## 2019-12-08 NOTE — ED ADULT NURSE NOTE - NS ED NOTE ABUSE RESPONSE YN
no loss of consciousness, no gait abnormality, no headache, no sensory deficits, and + weakness. Yes

## 2019-12-09 PROCEDURE — 74176 CT ABD & PELVIS W/O CONTRAST: CPT

## 2019-12-09 PROCEDURE — 36415 COLL VENOUS BLD VENIPUNCTURE: CPT

## 2019-12-09 PROCEDURE — 85027 COMPLETE CBC AUTOMATED: CPT

## 2019-12-09 PROCEDURE — 99284 EMERGENCY DEPT VISIT MOD MDM: CPT | Mod: 25

## 2019-12-09 PROCEDURE — 85610 PROTHROMBIN TIME: CPT

## 2019-12-09 PROCEDURE — 74176 CT ABD & PELVIS W/O CONTRAST: CPT | Mod: 26

## 2019-12-09 PROCEDURE — 83690 ASSAY OF LIPASE: CPT

## 2019-12-09 PROCEDURE — 80053 COMPREHEN METABOLIC PANEL: CPT

## 2019-12-09 PROCEDURE — 85730 THROMBOPLASTIN TIME PARTIAL: CPT

## 2019-12-09 PROCEDURE — 81001 URINALYSIS AUTO W/SCOPE: CPT

## 2019-12-09 RX ORDER — FAMOTIDINE 10 MG/ML
40 INJECTION INTRAVENOUS DAILY
Refills: 0 | Status: DISCONTINUED | OUTPATIENT
Start: 2019-12-09 | End: 2019-12-18

## 2019-12-09 RX ADMIN — FAMOTIDINE 40 MILLIGRAM(S): 10 INJECTION INTRAVENOUS at 04:21

## 2019-12-09 RX ADMIN — Medication 1 TABLET(S): at 04:21

## 2020-03-09 ENCOUNTER — APPOINTMENT (OUTPATIENT)
Dept: OTOLARYNGOLOGY | Facility: CLINIC | Age: 70
End: 2020-03-09
Payer: MEDICARE

## 2020-03-09 VITALS
HEART RATE: 65 BPM | DIASTOLIC BLOOD PRESSURE: 77 MMHG | BODY MASS INDEX: 39.61 KG/M2 | WEIGHT: 232 LBS | HEIGHT: 64 IN | SYSTOLIC BLOOD PRESSURE: 152 MMHG

## 2020-03-09 DIAGNOSIS — H92.01 OTALGIA, RIGHT EAR: ICD-10-CM

## 2020-03-09 DIAGNOSIS — J30.9 ALLERGIC RHINITIS, UNSPECIFIED: ICD-10-CM

## 2020-03-09 DIAGNOSIS — H90.3 SENSORINEURAL HEARING LOSS, BILATERAL: ICD-10-CM

## 2020-03-09 DIAGNOSIS — H69.83 OTHER SPECIFIED DISORDERS OF EUSTACHIAN TUBE, BILATERAL: ICD-10-CM

## 2020-03-09 PROCEDURE — 99204 OFFICE O/P NEW MOD 45 MIN: CPT | Mod: 25

## 2020-03-09 PROCEDURE — 92567 TYMPANOMETRY: CPT

## 2020-03-09 PROCEDURE — 92557 COMPREHENSIVE HEARING TEST: CPT

## 2020-03-09 NOTE — HISTORY OF PRESENT ILLNESS
[de-identified] : co rt ear plugging intermittent past 2 years\par can clear momentarily w swallowing\par pos allergy claritin daily no help for ear\par neg pmh re ears

## 2020-03-09 NOTE — REVIEW OF SYSTEMS
[Sneezing] : sneezing [Post Nasal Drip] : post nasal drip [Problem Snoring] : problem snoring [Hoarseness] : hoarseness [Eye Pain] : eye pain [Chest Pain] : chest pain [Palpitations] : palpitations [Wheezing] : wheezing [Negative] : Heme/Lymph [Patient Intake Form Reviewed] : Patient intake form was reviewed [FreeTextEntry1] : headache  joint aches  itching  watery eyes  reflux   muscle aches

## 2020-03-09 NOTE — ASSESSMENT
[FreeTextEntry1] : exam unremarkable\par allergic rhinitis\par au sn loss mild to moderate c tymp ad\par change to levocitirizien 5 q hs\par azealtine spray\par consider ad vent tube w 65% success rate

## 2020-05-21 NOTE — PATIENT PROFILE ADULT. - COMFORT LEVEL, ACCEPTABLE
Patient here today for nurse blood pressure check.  Last dose of BP medication taken:  5/21/2020 at 8:00am    BP Readings from Last 1 Encounters:   05/21/20 0925 130/86     Pulse Readings from Last 1 Encounters:   05/07/20 84       Last Clinician Visit for this condition: 5/7/2020  Per that visit, plan of care: BP check  Next office visit is scheduled for: Visit date not found    Current BP Medications are: Lisinopril  Last refilled: 3/30/2020    Please review and advise if there are any changes to current plan of care.  Next Nurse BP visit scheduled: No   
1

## 2020-06-17 ENCOUNTER — APPOINTMENT (OUTPATIENT)
Dept: CARDIOLOGY | Facility: CLINIC | Age: 70
End: 2020-06-17
Payer: MEDICARE

## 2020-06-17 VITALS
RESPIRATION RATE: 14 BRPM | HEIGHT: 64 IN | WEIGHT: 232 LBS | HEART RATE: 57 BPM | BODY MASS INDEX: 39.61 KG/M2 | SYSTOLIC BLOOD PRESSURE: 130 MMHG | DIASTOLIC BLOOD PRESSURE: 81 MMHG

## 2020-06-17 PROCEDURE — 93000 ELECTROCARDIOGRAM COMPLETE: CPT

## 2020-06-17 PROCEDURE — 99214 OFFICE O/P EST MOD 30 MIN: CPT

## 2020-06-17 NOTE — ASSESSMENT
[FreeTextEntry1] : EKG today shows sinus bradycardia at a rate of 57. There is some nonspecific ST-T wave changes noted and T-wave flattening;\par \par In summary the patient is a 70-year-old woman who has had a known history for coronary disease and remote history for PCI/stent proximally 3 years ago;\par \par She has a history of abnormal EKG and has been complaining of some recurrent left precordial atypical chest discomfort;\par \par Plan:\par \par Patient recommended to continue her current medical regimen including dual antiplatelet drug\par \par Recommend arranging pharmacologic nuclear stress test sometime in the near future to rule out any significant progressive ischemia\par \par Patient to inform us between now and cardiac testing any significant change in symptoms;\par \par Ongoing stay safe guidelines for coronavirus pandemic discussed;;;

## 2020-06-17 NOTE — HISTORY OF PRESENT ILLNESS
[FreeTextEntry1] : She remains obese but does not seem to have put on additional weight over the past few months;\par \par She states she's been trying to follow safety guidelines during the coronavirus pandemic;\par \par Patient reports that she's been taking her medications regularly as well as her diabetes medication;

## 2020-06-17 NOTE — PHYSICAL EXAM
[Normal Conjunctiva] : the conjunctiva exhibited no abnormalities [Eyelids - No Xanthelasma] : the eyelids demonstrated no xanthelasmas [Normal Oral Mucosa] : normal oral mucosa [No Oral Pallor] : no oral pallor [No Oral Cyanosis] : no oral cyanosis [Normal Jugular Venous A Waves Present] : normal jugular venous A waves present [Normal Jugular Venous V Waves Present] : normal jugular venous V waves present [No Jugular Venous Lamb A Waves] : no jugular venous lamb A waves [Respiration, Rhythm And Depth] : normal respiratory rhythm and effort [Exaggerated Use Of Accessory Muscles For Inspiration] : no accessory muscle use [Auscultation Breath Sounds / Voice Sounds] : lungs were clear to auscultation bilaterally [Edema] : no peripheral edema present [Abnormal Walk] : normal gait [Gait - Sufficient For Exercise Testing] : the gait was sufficient for exercise testing [FreeTextEntry1] : Obese, zero to trace ankle edema [Skin Color & Pigmentation] : normal skin color and pigmentation [] : no rash [No Venous Stasis] : no venous stasis [Skin Lesions] : no skin lesions [No Skin Ulcers] : no skin ulcer [No Xanthoma] : no  xanthoma was observed [Oriented To Time, Place, And Person] : oriented to person, place, and time [Affect] : the affect was normal [Mood] : the mood was normal [No Anxiety] : not feeling anxious

## 2020-06-17 NOTE — REASON FOR VISIT
[Follow-Up - Clinic] : a clinic follow-up of [FreeTextEntry1] : The patient is a 70-year-old  woman with a long-standing history of PCP, coronary artery disease and remote history for PCI/stent dating back to 2017;  later that year because of a chest pain syndrome she underwent a repeat cardiac catheter September 2017 which showed patent stents and medical therapy was recommended;\par \par More recently patient states that she has had a series of medical problems including chronic diarrheal syndrome last formal and other types of gastroenteritis as well as viral-like syndrome earlier this year but not necessarily diagnosed as Covid 19 at the time;\par \par The patient states she has since improved from many of the symptoms but does get some nondescript left chest discomfort that if he appears to be a heaviness or pressure behind the left breast;\par \par It is not particularly exertional related but can come and goes frequently at rest;

## 2020-07-09 ENCOUNTER — APPOINTMENT (OUTPATIENT)
Dept: CARDIOLOGY | Facility: CLINIC | Age: 70
End: 2020-07-09

## 2020-08-10 ENCOUNTER — APPOINTMENT (OUTPATIENT)
Dept: CARDIOLOGY | Facility: CLINIC | Age: 70
End: 2020-08-10
Payer: MEDICARE

## 2020-08-10 ENCOUNTER — MED ADMIN CHARGE (OUTPATIENT)
Age: 70
End: 2020-08-10

## 2020-08-10 PROCEDURE — 78452 HT MUSCLE IMAGE SPECT MULT: CPT

## 2020-08-10 PROCEDURE — A9500: CPT

## 2020-08-10 PROCEDURE — 93015 CV STRESS TEST SUPVJ I&R: CPT

## 2020-08-10 RX ORDER — REGADENOSON 0.08 MG/ML
0.4 INJECTION, SOLUTION INTRAVENOUS
Qty: 4 | Refills: 0 | Status: COMPLETED | OUTPATIENT
Start: 2020-08-10

## 2020-08-10 RX ORDER — AMINOPHYLLINE 25 MG/ML
25 INJECTION, SOLUTION INTRAVENOUS
Qty: 0 | Refills: 0 | Status: COMPLETED | OUTPATIENT
Start: 2020-08-10

## 2020-08-10 RX ADMIN — REGADENOSON 0 MG/5ML: 0.08 INJECTION, SOLUTION INTRAVENOUS at 00:00

## 2020-08-10 RX ADMIN — REGADENOSON 5 MG/5ML: 0.08 INJECTION, SOLUTION INTRAVENOUS at 00:00

## 2020-08-10 RX ADMIN — AMINOPHYLLINE 3 MG/ML: 25 INJECTION, SOLUTION INTRAVENOUS at 00:00

## 2020-08-10 RX ADMIN — AMINOPHYLLINE 0 MG/ML: 25 INJECTION, SOLUTION INTRAVENOUS at 00:00

## 2020-08-11 ENCOUNTER — APPOINTMENT (OUTPATIENT)
Dept: CARDIOLOGY | Facility: CLINIC | Age: 70
End: 2020-08-11
Payer: MEDICARE

## 2020-08-11 PROCEDURE — ZZZZZ: CPT

## 2020-08-11 RX ORDER — AMINOPHYLLINE 25 MG/ML
25 INJECTION, SOLUTION INTRAVENOUS
Qty: 0 | Refills: 0 | Status: COMPLETED | OUTPATIENT
Start: 2020-08-10

## 2020-08-11 RX ORDER — REGADENOSON 0.08 MG/ML
0.4 INJECTION, SOLUTION INTRAVENOUS
Qty: 4 | Refills: 0 | Status: COMPLETED | OUTPATIENT
Start: 2020-08-10

## 2020-08-28 ENCOUNTER — APPOINTMENT (OUTPATIENT)
Dept: DISASTER EMERGENCY | Facility: CLINIC | Age: 70
End: 2020-08-28

## 2020-08-28 NOTE — H&P PST ADULT - ATTENDING COMMENTS
69 y/o AA female , presenting with atypical chest and abnormal stress test  showing ischemia in the anterior wall   for LHc/coronary angiogram.

## 2020-08-28 NOTE — H&P PST ADULT - HISTORY OF PRESENT ILLNESS
Narrative: This is a 69 yo AA female With PMHx of PCP, CAD sp PCI/stenting 2017, CM, carotid bruit, claudication, GERD, HTN, HLD, DM, CKD stage 1, DDD, and morbid obesity with BMI of 40-45 She had a Cardiac catheterization with PCI 5/26/17 followed by another catheterization in September 2017 for complaints of CP.  The catheterization in September of 2017 revealed patent stents and medical management was the recommendation. Most recently Albertina has c/o nondescript left chest discomfort that manifests as heaviness or pressure behind the left breast.  She was sent for a pharmacologic stress test on 8/11/2020 which revealed a moderate sized, mild to moderate intensity, completely reversible defect of the entire anterior/anterolateral wall consistent with ischemia and breast attenuation artifact.     ASA  Mallampati  GFR  Creat  Bleeding Risk  COVID19 -     Symptoms: Chest Pain       Angina (Class): 3        Heart Failure: No           Assessment of LVEF:       EF: 68%       Assessed by: NST        Date: 8/11/2020    Prior Cardiac Interventions:       PCI's:   < from: Cardiac Cath Lab - Adult (05.26.17 @ 10:51) >  CORONARY VESSELS: The coronary circulation is right dominant.  LAD:   --  Mid LAD: There was a 70 % stenosis.  CX:   --  Circumflex: Normal.  RCA:   --  RCA: Normal.  COMPLICATIONS: No complications occurred during the cath labvisit.  DIAGNOSTIC IMPRESSIONS: Severe mid LAD lesion - PCI performed with 1 FARRAH.  Normal LV function.  DIAGNOSTIC RECOMMENDATIONS: Aspirin and Plavix.  INTERVENTIONAL IMPRESSIONS: Severe mid LAD lesion - PCI performed with 1  FARRAH.  Normal LV function.  INTERVENTIONAL RECOMMENDATIONS: Aspirin and Plavix.  Prepared and signed by  Kenji Valle MD    < end of copied text >        Noninvasive Testing:   Stress Test: Date: 8/11/2020       Protocol: Regadenoson Sestamibi Stress Test       Duration of Exercise: NA       Symptoms: SOB       EKG Changes: No significant ST changes       DTS: NA       Myocardial Imaging: mild to moderate intensity, completely reversible defect of the entire anterior/anterolateral wall consistent with ischemia and breast attenuation artifact. EF 68%       Risk Assessment: moderate    Echo: NA    Antianginal Therapies:        Beta Blockers:  Metoprolol  mg        Calcium Channel Blockers:        Long Acting Nitrates:        Ranexa:     Associated Risk Factors:        Cerebrovascular Disease: N/A       Chronic Lung Disease: N/A       Peripheral Arterial Disease: N/A       Chronic Kidney Disease (if yes, what is GFR): YES       Uncontrolled Diabetes (if yes, what is HgbA1C or FBS): YES       Poorly Controlled Hypertension (if yes, what is SBP): N/A       Morbid Obesity (if yes, what is BMI): YES BMI 40-45       History of Recent Ventricular Arrhythmia: N/A       Inability to Ambulate Safely: N/A       Need for Therapeutic Anticoagulation: N/A       Antiplatelet or Contrast Allergy: N/A Narrative: This is a 69 yo AA female With PMH of PCP, CAD sp PCI/stenting 2017, CM, carotid bruit, claudication, GERD, HTN, HLD, DM, CKD stage 1, DDD, and morbid obesity with BMI of 40-45 She had a Cardiac catheterization with PCI 5/26/17 followed by another catheterization in September 2017 for complaints of CP.  The catheterization in September of 2017 revealed patent stents and medical management was the recommendation. Most recently Albertina has c/o nondescript left chest discomfort that manifests as heaviness or pressure behind the left breast.  She was sent for a pharmacologic stress test on 8/11/2020 which revealed a moderate sized, mild to moderate intensity, completely reversible defect of the entire anterior/anterolateral wall consistent with ischemia and breast attenuation artifact.   Pt reports chest pain at rest and with exertion mid sternal  with some episodes of radiation to left arm, last episode was 8/30   Denies palpitations, syncope , weight gain, black or blood in stool , fever, chills,     ASA 2  Mallampati 2  GFR 44   Creat 1.4   Bleeding Risk 3.4 %   COVID19  negative   Symptoms: Chest Pain       Angina (Class): 3    Heart Failure: No  Assessment of LVEF:       EF: 68%       Assessed by: NST        Date: 8/11/2020    Cardiac Cath Lab - Adult (05.26.17   CORONARY VESSELS: The coronary circulation is right dominant.  LAD:   --  Mid LAD: There was a 70 % stenosis.  CX:   --  Circumflex: Normal.  RCA:   --  RCA: Normal.  COMPLICATIONS: No complications occurred during the cath labvisit.  DIAGNOSTIC IMPRESSIONS: Severe mid LAD lesion - PCI performed with 1 FARRAH.  DIAGNOSTIC RECOMMENDATIONS: Aspirin and Plavix.  INTERVENTIONAL IMPRESSIONS: Severe mid LAD lesion - PCI performed with 1DES.  Normal LV function. INTERVENTIONAL RECOMMENDATIONS: Aspirin and Plavix.    Noninvasive Testing:   Stress Test: Date: 8/11/2020       Protocol: Regadenoson Sestamibi Stress Test       Duration of Exercise: NA       Symptoms: SOB       EKG Changes: No significant ST changes       Myocardial Imaging: mild to moderate intensity, completely reversible defect of the entire anterior/anterolateral wall consistent with ischemia and breast attenuation artifact. EF 68%       Risk Assessment: moderate    Echo: NA    Antianginal Therapies:        Beta Blockers:  Metoprolol  mg        Associated Risk Factors:        Chronic Kidney Disease (if yes, what is GFR): YES       Uncontrolled Diabetes (if yes, what is HgbA1C or FBS): YES       Poorly Controlled Hypertension (if yes, what is SBP): N/A       Morbid Obesity (if yes, what is BMI): YES BMI 40-45

## 2020-08-29 LAB — SARS-COV-2 N GENE NPH QL NAA+PROBE: NOT DETECTED

## 2020-08-31 ENCOUNTER — TRANSCRIPTION ENCOUNTER (OUTPATIENT)
Age: 70
End: 2020-08-31

## 2020-08-31 ENCOUNTER — OUTPATIENT (OUTPATIENT)
Dept: OUTPATIENT SERVICES | Facility: HOSPITAL | Age: 70
LOS: 1 days | Discharge: HOME | End: 2020-08-31
Payer: MEDICARE

## 2020-08-31 VITALS
HEART RATE: 59 BPM | DIASTOLIC BLOOD PRESSURE: 70 MMHG | RESPIRATION RATE: 16 BRPM | OXYGEN SATURATION: 96 % | SYSTOLIC BLOOD PRESSURE: 141 MMHG

## 2020-08-31 VITALS
DIASTOLIC BLOOD PRESSURE: 63 MMHG | RESPIRATION RATE: 19 BRPM | HEART RATE: 63 BPM | SYSTOLIC BLOOD PRESSURE: 136 MMHG | TEMPERATURE: 98 F | OXYGEN SATURATION: 98 %

## 2020-08-31 DIAGNOSIS — H26.40 UNSPECIFIED SECONDARY CATARACT: Chronic | ICD-10-CM

## 2020-08-31 DIAGNOSIS — R94.31 ABNORMAL ELECTROCARDIOGRAM [ECG] [EKG]: ICD-10-CM

## 2020-08-31 LAB
ALBUMIN SERPL ELPH-MCNC: 4.1 G/DL — SIGNIFICANT CHANGE UP (ref 3.3–5.2)
ALP SERPL-CCNC: 96 U/L — SIGNIFICANT CHANGE UP (ref 40–120)
ALT FLD-CCNC: 8 U/L — SIGNIFICANT CHANGE UP
ANION GAP SERPL CALC-SCNC: 11 MMOL/L — SIGNIFICANT CHANGE UP (ref 5–17)
APTT BLD: 25.2 SEC — LOW (ref 27.5–35.5)
AST SERPL-CCNC: 14 U/L — SIGNIFICANT CHANGE UP
BASOPHILS # BLD AUTO: 0.02 K/UL — SIGNIFICANT CHANGE UP (ref 0–0.2)
BASOPHILS NFR BLD AUTO: 0.3 % — SIGNIFICANT CHANGE UP (ref 0–2)
BILIRUB SERPL-MCNC: 0.4 MG/DL — SIGNIFICANT CHANGE UP (ref 0.4–2)
BUN SERPL-MCNC: 16 MG/DL — SIGNIFICANT CHANGE UP (ref 8–20)
CALCIUM SERPL-MCNC: 9.3 MG/DL — SIGNIFICANT CHANGE UP (ref 8.6–10.2)
CHLORIDE SERPL-SCNC: 109 MMOL/L — HIGH (ref 98–107)
CO2 SERPL-SCNC: 22 MMOL/L — SIGNIFICANT CHANGE UP (ref 22–29)
CREAT SERPL-MCNC: 1.43 MG/DL — HIGH (ref 0.5–1.3)
EOSINOPHIL # BLD AUTO: 0.38 K/UL — SIGNIFICANT CHANGE UP (ref 0–0.5)
EOSINOPHIL NFR BLD AUTO: 5.7 % — SIGNIFICANT CHANGE UP (ref 0–6)
GLUCOSE SERPL-MCNC: 136 MG/DL — HIGH (ref 70–99)
HCT VFR BLD CALC: 34.9 % — SIGNIFICANT CHANGE UP (ref 34.5–45)
HGB BLD-MCNC: 10.9 G/DL — LOW (ref 11.5–15.5)
IMM GRANULOCYTES NFR BLD AUTO: 0.3 % — SIGNIFICANT CHANGE UP (ref 0–1.5)
INR BLD: 1.02 RATIO — SIGNIFICANT CHANGE UP (ref 0.88–1.16)
LYMPHOCYTES # BLD AUTO: 2 K/UL — SIGNIFICANT CHANGE UP (ref 1–3.3)
LYMPHOCYTES # BLD AUTO: 30.2 % — SIGNIFICANT CHANGE UP (ref 13–44)
MCHC RBC-ENTMCNC: 27 PG — SIGNIFICANT CHANGE UP (ref 27–34)
MCHC RBC-ENTMCNC: 31.2 GM/DL — LOW (ref 32–36)
MCV RBC AUTO: 86.6 FL — SIGNIFICANT CHANGE UP (ref 80–100)
MONOCYTES # BLD AUTO: 0.46 K/UL — SIGNIFICANT CHANGE UP (ref 0–0.9)
MONOCYTES NFR BLD AUTO: 6.9 % — SIGNIFICANT CHANGE UP (ref 2–14)
NEUTROPHILS # BLD AUTO: 3.75 K/UL — SIGNIFICANT CHANGE UP (ref 1.8–7.4)
NEUTROPHILS NFR BLD AUTO: 56.6 % — SIGNIFICANT CHANGE UP (ref 43–77)
PLATELET # BLD AUTO: 214 K/UL — SIGNIFICANT CHANGE UP (ref 150–400)
POTASSIUM SERPL-MCNC: 4.3 MMOL/L — SIGNIFICANT CHANGE UP (ref 3.5–5.3)
POTASSIUM SERPL-SCNC: 4.3 MMOL/L — SIGNIFICANT CHANGE UP (ref 3.5–5.3)
PROT SERPL-MCNC: 7.3 G/DL — SIGNIFICANT CHANGE UP (ref 6.6–8.7)
PROTHROM AB SERPL-ACNC: 11.8 SEC — SIGNIFICANT CHANGE UP (ref 10.6–13.6)
RBC # BLD: 4.03 M/UL — SIGNIFICANT CHANGE UP (ref 3.8–5.2)
RBC # FLD: 14.8 % — HIGH (ref 10.3–14.5)
SODIUM SERPL-SCNC: 142 MMOL/L — SIGNIFICANT CHANGE UP (ref 135–145)
WBC # BLD: 6.63 K/UL — SIGNIFICANT CHANGE UP (ref 3.8–10.5)
WBC # FLD AUTO: 6.63 K/UL — SIGNIFICANT CHANGE UP (ref 3.8–10.5)

## 2020-08-31 PROCEDURE — 99152 MOD SED SAME PHYS/QHP 5/>YRS: CPT

## 2020-08-31 PROCEDURE — 99153 MOD SED SAME PHYS/QHP EA: CPT

## 2020-08-31 PROCEDURE — 93010 ELECTROCARDIOGRAM REPORT: CPT | Mod: 59

## 2020-08-31 PROCEDURE — 93458 L HRT ARTERY/VENTRICLE ANGIO: CPT

## 2020-08-31 PROCEDURE — C1769: CPT

## 2020-08-31 PROCEDURE — 36415 COLL VENOUS BLD VENIPUNCTURE: CPT

## 2020-08-31 PROCEDURE — 80053 COMPREHEN METABOLIC PANEL: CPT

## 2020-08-31 PROCEDURE — 93571 IV DOP VEL&/PRESS C FLO 1ST: CPT | Mod: LD

## 2020-08-31 PROCEDURE — 93005 ELECTROCARDIOGRAM TRACING: CPT

## 2020-08-31 PROCEDURE — 93571 IV DOP VEL&/PRESS C FLO 1ST: CPT | Mod: 26,LD

## 2020-08-31 PROCEDURE — 85730 THROMBOPLASTIN TIME PARTIAL: CPT

## 2020-08-31 PROCEDURE — 93458 L HRT ARTERY/VENTRICLE ANGIO: CPT | Mod: 26

## 2020-08-31 PROCEDURE — 85610 PROTHROMBIN TIME: CPT

## 2020-08-31 PROCEDURE — C1887: CPT

## 2020-08-31 PROCEDURE — 85027 COMPLETE CBC AUTOMATED: CPT

## 2020-08-31 RX ORDER — INSULIN GLARGINE 100 [IU]/ML
35 INJECTION, SOLUTION SUBCUTANEOUS
Qty: 0 | Refills: 0 | DISCHARGE

## 2020-08-31 RX ORDER — METFORMIN HYDROCHLORIDE 850 MG/1
2 TABLET ORAL
Qty: 0 | Refills: 0 | DISCHARGE

## 2020-08-31 RX ORDER — SODIUM CHLORIDE 9 MG/ML
500 INJECTION INTRAMUSCULAR; INTRAVENOUS; SUBCUTANEOUS ONCE
Refills: 0 | Status: COMPLETED | OUTPATIENT
Start: 2020-08-31 | End: 2020-08-31

## 2020-08-31 RX ORDER — LORATADINE 10 MG/1
1 TABLET ORAL
Qty: 0 | Refills: 0 | DISCHARGE

## 2020-08-31 RX ADMIN — SODIUM CHLORIDE 500 MILLILITER(S): 9 INJECTION INTRAMUSCULAR; INTRAVENOUS; SUBCUTANEOUS at 11:05

## 2020-08-31 NOTE — DISCHARGE NOTE PROVIDER - HOSPITAL COURSE
Narrative: This is a 69 yo AA female With PMH of PCP, CAD sp PCI/stenting 2017, CM, carotid bruit, claudication, GERD, HTN, HLD, DM, CKD stage 1, DDD, and morbid obesity with BMI of 40-45 She had a Cardiac catheterization with PCI 5/26/17 followed by another catheterization in September 2017 for complaints of CP.  The catheterization in September of 2017 revealed patent stents and medical management was the recommendation. Most recently Albertina has c/o nondescript left chest discomfort that manifests as heaviness or pressure behind the left breast.  She was sent for a pharmacologic stress test on 8/11/2020 which revealed a moderate sized, mild to moderate     s/p cardiac cath     now s/p LHC revealing non obstructive CAD    proximal LAD 50 % with negative IFR 0.99 with patent LAD stent     RCA - non- obstructive disease - mild     LCX - non- obstructive disease - mild     Normal LV function                 Plan:    vital signs, labs, diet and activity per post procedure orders    ambulate ad tiera post bedrest    -iv hydration    -encourage PO fluids    _ continue current medications     - RESTART Meformin on 9/3 am     _ discharge home today     -plan of care discussed with patient,     -post procedure and d/c instructions reviewed    -follow up with MD in 1-2 weeks- Follow up with  Dr Roa     -Discussed therapeutic lifestyle changes to reduce risk factors such as following a cardiac diet, weight loss, maintaining a healthy weight, exercise, smoking cessation, medication compliance, and regular follow-up  with MD to know your numbers (BP, cholesterol, weight, and glucose)

## 2020-08-31 NOTE — DISCHARGE NOTE NURSING/CASE MANAGEMENT/SOCIAL WORK - PATIENT PORTAL LINK FT
You can access the FollowMyHealth Patient Portal offered by [facility] by registering at the following website: [email or web address]. By joining [facility]’s FollowMyHealth portal, you will also be able to view your health information using other applications (apps) compatible with our system.

## 2020-08-31 NOTE — DISCHARGE NOTE PROVIDER - CARE PROVIDER_API CALL
Glynn Roa  INTERNAL MEDICINE  [address] [place] [address]  Phone: [phone]  Fax: [phone]  Follow Up Time:

## 2020-08-31 NOTE — DISCHARGE NOTE PROVIDER - NSDCCPCAREPLAN_GEN_ALL_CORE_FT
PRINCIPAL DISCHARGE DIAGNOSIS  Diagnosis: S/P cardiac catheterization  Assessment and Plan of Treatment: Stents are open   Non obstructiv disease   Restart Meformin on 9/3 am

## 2020-08-31 NOTE — DISCHARGE NOTE PROVIDER - NSDCFUSCHEDAPPT_GEN_ALL_CORE_FT
JUANY AGUILAR ; 09/23/2020 ; [facility] [place]

## 2020-08-31 NOTE — PROGRESS NOTE ADULT - SUBJECTIVE AND OBJECTIVE BOX
s/p LHC revealing non obstructive CAD tolerated well     Patient feels well.  Denies chest pain, shortness of breath, dizziness or palpitations at this time    Right radial hemo band in place.  Procedure site CDI, no bleeding, no hematoma, able to move all digits with capillary refill < 3 seconds, fingers warm    Medications during cath :   Versed 2 mg   Fentanyl 75 mcg   Nitroglycerin 200mcg   Heparin 8000 units   Omnipaque 75 cc         HPI:  Narrative: This is a 71 yo AA female With PMH of PCP, CAD sp PCI/stenting 2017, CM, carotid bruit, claudication, GERD, HTN, HLD, DM, CKD stage 1, DDD, and morbid obesity with BMI of 40-45 She had a Cardiac catheterization with PCI 5/26/17 followed by another catheterization in September 2017 for complaints of CP.  The catheterization in September of 2017 revealed patent stents and medical management was the recommendation. Most recently Albertina has c/o nondescript left chest discomfort that manifests as heaviness or pressure behind the left breast.  She was sent for a pharmacologic stress test on 8/11/2020 which revealed a moderate sized, mild to moderate intensity, completely reversible defect of the entire anterior/anterolateral wall consistent with ischemia and breast attenuation artifact.   Pt reports chest pain at rest and with exertion mid sternal  with some episodes of radiation to left arm, last episode was 8/30   Denies palpitations, syncope , weight gain, black or blood in stool , fever, chills,     ASA 2  Mallampati 2  GFR 44   Creat 1.4   Bleeding Risk 3.4 %   COVID19  negative   Symptoms: Chest Pain       Angina (Class): 3    Heart Failure: No  Assessment of LVEF:       EF: 68%       Assessed by: NST        Date: 8/11/2020    Cardiac Cath Lab - Adult (05.26.17   CORONARY VESSELS: The coronary circulation is right dominant.  LAD:   --  Mid LAD: There was a 70 % stenosis.  CX:   --  Circumflex: Normal.  RCA:   --  RCA: Normal.  COMPLICATIONS: No complications occurred during the cath labvisit.  DIAGNOSTIC IMPRESSIONS: Severe mid LAD lesion - PCI performed with 1 FARRAH.  DIAGNOSTIC RECOMMENDATIONS: Aspirin and Plavix.  INTERVENTIONAL IMPRESSIONS: Severe mid LAD lesion - PCI performed with 1DES.  Normal LV function. INTERVENTIONAL RECOMMENDATIONS: Aspirin and Plavix.    Noninvasive Testing:   Stress Test: Date: 8/11/2020       Protocol: Regadenoson Sestamibi Stress Test       Duration of Exercise: NA       Symptoms: SOB       EKG Changes: No significant ST changes       Myocardial Imaging: mild to moderate intensity, completely reversible defect of the entire anterior/anterolateral wall consistent with ischemia and breast attenuation artifact. EF 68%       Risk Assessment: moderate    Echo: NA    Antianginal Therapies:        Beta Blockers:  Metoprolol  mg        Associated Risk Factors:        Chronic Kidney Disease (if yes, what is GFR): YES       Uncontrolled Diabetes (if yes, what is HgbA1C or FBS): YES       Poorly Controlled Hypertension (if yes, what is SBP): N/A       Morbid Obesity (if yes, what is BMI): YES BMI 40-45 (28 Aug 2020 20:14)    now s/p LHC revealing non obstructive CAD  proximal LAD 50 % with negative IFR 0.99 with patent LAD stent   RCA - non- obstructive disease - mild   LCX - non- obstructive disease - mild   Normal LV function         Plan:  vital signs, labs, diet and activity per post procedure orders  ambulate ad tiera post bedrest  -iv hydration  -encourage PO fluids  _ continue current medications   _ discharge home today   -plan of care discussed with patient, family and MD   -post procedure and d/c instructions reviewed  -follow up with MD in 1-2 weeks- Follow up with  Dr Roa   -Discussed therapeutic lifestyle changes to reduce risk factors such as following a cardiac diet, weight loss, maintaining a healthy weight, exercise, smoking cessation, medication compliance, and regular follow-up  with MD to know your numbers (BP, cholesterol, weight, and glucose)

## 2020-08-31 NOTE — DISCHARGE NOTE PROVIDER - NSDCMRMEDTOKEN_GEN_ALL_CORE_FT
aspirin 81 mg oral tablet: 1 tab(s) orally once a day  atorvastatin 10 mg oral tablet: 1 tab(s) orally once a day  benazepril 20 mg oral tablet: 1 tab(s) orally once a day  clopidogrel 75 mg oral tablet: 1 tab(s) orally once a day  Combigan 0.2%-0.5% ophthalmic solution: 1 drop(s) in the right eye once a day (at bedtime)  dorzolamide 2% ophthalmic solution: 1 drop(s) in each eye 3 times a day  fluticasone 50 mcg inhalation powder: 1 puff(s) inhaled once a day  folic acid 1 mg oral tablet: 1 tab(s) orally once a day  Lumigan 0.01% ophthalmic solution: 1 drop(s) in each eye once a day (in the evening)  metFORMIN 500 mg oral tablet: 2 tab(s) orally 2 times a day  RESTART IN 2 Days on 9/3 am   metoprolol succinate 100 mg oral tablet, extended release: 1 tab(s) orally once a day  omeprazole 40 mg oral delayed release capsule: 1 cap(s) orally once a day  Tresiba: 35 unit(s) subcutaneous once a day (at bedtime)

## 2020-09-03 DIAGNOSIS — R94.39 ABNORMAL RESULT OF OTHER CARDIOVASCULAR FUNCTION STUDY: ICD-10-CM

## 2020-09-10 RX ORDER — KIT FOR THE PREPARATION OF TECHNETIUM TC99M SESTAMIBI 1 MG/5ML
INJECTION, POWDER, LYOPHILIZED, FOR SOLUTION PARENTERAL
Refills: 0 | Status: COMPLETED | OUTPATIENT
Start: 2020-09-10

## 2020-09-10 RX ADMIN — KIT FOR THE PREPARATION OF TECHNETIUM TC99M SESTAMIBI 0: 1 INJECTION, POWDER, LYOPHILIZED, FOR SOLUTION PARENTERAL at 00:00

## 2020-09-23 ENCOUNTER — NON-APPOINTMENT (OUTPATIENT)
Age: 70
End: 2020-09-23

## 2020-09-23 ENCOUNTER — APPOINTMENT (OUTPATIENT)
Dept: CARDIOLOGY | Facility: CLINIC | Age: 70
End: 2020-09-23
Payer: MEDICARE

## 2020-09-23 VITALS
BODY MASS INDEX: 39.27 KG/M2 | HEART RATE: 66 BPM | WEIGHT: 230 LBS | DIASTOLIC BLOOD PRESSURE: 68 MMHG | HEIGHT: 64 IN | SYSTOLIC BLOOD PRESSURE: 130 MMHG | RESPIRATION RATE: 14 BRPM

## 2020-09-23 DIAGNOSIS — I73.9 PERIPHERAL VASCULAR DISEASE, UNSPECIFIED: ICD-10-CM

## 2020-09-23 PROCEDURE — 93000 ELECTROCARDIOGRAM COMPLETE: CPT

## 2020-09-23 PROCEDURE — 99214 OFFICE O/P EST MOD 30 MIN: CPT

## 2020-09-23 NOTE — REASON FOR VISIT
[Follow-Up - Clinic] : a clinic follow-up of [FreeTextEntry1] : The patient is a 70-year-old  woman with known history for ischemic heart disease and prior PCI/stent dating back to 2017.\par \par She's had a chronic somewhat atypical chest pain syndrome over the past many months which seemed to take a worsening characteristic and she underwent a nuclear stress test recently in the office;\par \par The nuclear stress test suggested anterior wall ischemia and or soft tissue breast attenuation artifact. A more definitive cardiac catheterization was then recommended;\par \par This study demonstrated patency of her coronary stents-with only subcritical lesions and medical therapy was recommended (8/31/20);\par

## 2020-09-23 NOTE — HISTORY OF PRESENT ILLNESS
[FreeTextEntry1] : She presents back to the office today for general cardiac checkup and to discuss the cardiac results. Patient reports that she is still been getting some atypical chest pain described as a sharp stabbing-like feeling in the mid chest left and right parasternal border is usually worse with certain movements when getting into bed or leaning forward;\par \par She has additional GI complaints of GERD and excessive belching at times and is also currently seeing gastroenterology;\par \par Patient does have a history of diffuse arthritides;

## 2020-09-23 NOTE — ASSESSMENT
[FreeTextEntry1] : EKG demonstrated normal sinus rhythm at a rate of 66 with some slight leftward axis deviation and borderline nonspecific T-wave changes-unchanged\par \par In summary the patient is a 70-year-old black female with a chest pain syndrome and known history for CAD and prior stents which appeared to be patent on most recent cardiac catheterization from 8/31/20.\par \par Plan:\par \par Patient reassured. 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 May take extra enteric coated aspirin 81 mg p.r.n. for chest wall pain;\par \par Recommend consulting rheumatologist for diffuse arthritides;\par \par Followup to our office within 3-4 months or p.r.n.;;

## 2020-09-23 NOTE — PHYSICAL EXAM
[Normal Conjunctiva] : the conjunctiva exhibited no abnormalities [Eyelids - No Xanthelasma] : the eyelids demonstrated no xanthelasmas [Normal Oral Mucosa] : normal oral mucosa [No Oral Pallor] : no oral pallor [No Oral Cyanosis] : no oral cyanosis [Normal Jugular Venous A Waves Present] : normal jugular venous A waves present [Normal Jugular Venous V Waves Present] : normal jugular venous V waves present [No Jugular Venous Lamb A Waves] : no jugular venous lamb A waves [Respiration, Rhythm And Depth] : normal respiratory rhythm and effort [Exaggerated Use Of Accessory Muscles For Inspiration] : no accessory muscle use [Auscultation Breath Sounds / Voice Sounds] : lungs were clear to auscultation bilaterally [Edema] : no peripheral edema present [Veins - Varicosity Changes] : no varicosital changes were noted in the lower extremities [Abnormal Walk] : normal gait [Gait - Sufficient For Exercise Testing] : the gait was sufficient for exercise testing [FreeTextEntry1] : Obese, zero to trace ankle edema [Skin Color & Pigmentation] : normal skin color and pigmentation [] : no rash [No Venous Stasis] : no venous stasis [Skin Lesions] : no skin lesions [No Skin Ulcers] : no skin ulcer [No Xanthoma] : no  xanthoma was observed [Oriented To Time, Place, And Person] : oriented to person, place, and time [Affect] : the affect was normal [Mood] : the mood was normal [No Anxiety] : not feeling anxious

## 2020-12-27 ENCOUNTER — EMERGENCY (EMERGENCY)
Facility: HOSPITAL | Age: 70
LOS: 1 days | Discharge: HOME | End: 2020-12-27
Attending: EMERGENCY MEDICINE
Payer: MEDICARE

## 2020-12-27 VITALS
TEMPERATURE: 98 F | WEIGHT: 233.03 LBS | HEIGHT: 64 IN | OXYGEN SATURATION: 99 % | RESPIRATION RATE: 16 BRPM | SYSTOLIC BLOOD PRESSURE: 176 MMHG | DIASTOLIC BLOOD PRESSURE: 86 MMHG | HEART RATE: 88 BPM

## 2020-12-27 VITALS
RESPIRATION RATE: 14 BRPM | HEART RATE: 72 BPM | SYSTOLIC BLOOD PRESSURE: 151 MMHG | DIASTOLIC BLOOD PRESSURE: 80 MMHG | TEMPERATURE: 98 F | OXYGEN SATURATION: 99 %

## 2020-12-27 DIAGNOSIS — H26.40 UNSPECIFIED SECONDARY CATARACT: Chronic | ICD-10-CM

## 2020-12-27 LAB
ALBUMIN SERPL ELPH-MCNC: 3.7 G/DL — SIGNIFICANT CHANGE UP (ref 3.3–5.2)
ALP SERPL-CCNC: 106 U/L — SIGNIFICANT CHANGE UP (ref 40–120)
ALT FLD-CCNC: 9 U/L — SIGNIFICANT CHANGE UP
ANION GAP SERPL CALC-SCNC: 10 MMOL/L — SIGNIFICANT CHANGE UP (ref 5–17)
APPEARANCE UR: CLEAR — SIGNIFICANT CHANGE UP
AST SERPL-CCNC: 18 U/L — SIGNIFICANT CHANGE UP
BACTERIA # UR AUTO: NEGATIVE — SIGNIFICANT CHANGE UP
BASOPHILS # BLD AUTO: 0.02 K/UL — SIGNIFICANT CHANGE UP (ref 0–0.2)
BASOPHILS NFR BLD AUTO: 0.3 % — SIGNIFICANT CHANGE UP (ref 0–2)
BILIRUB SERPL-MCNC: 0.2 MG/DL — LOW (ref 0.4–2)
BILIRUB UR-MCNC: NEGATIVE — SIGNIFICANT CHANGE UP
BUN SERPL-MCNC: 20 MG/DL — SIGNIFICANT CHANGE UP (ref 8–20)
CALCIUM SERPL-MCNC: 9.1 MG/DL — SIGNIFICANT CHANGE UP (ref 8.6–10.2)
CHLORIDE SERPL-SCNC: 111 MMOL/L — HIGH (ref 98–107)
CO2 SERPL-SCNC: 20 MMOL/L — LOW (ref 22–29)
COLOR SPEC: YELLOW — SIGNIFICANT CHANGE UP
CREAT SERPL-MCNC: 1.37 MG/DL — HIGH (ref 0.5–1.3)
DIFF PNL FLD: NEGATIVE — SIGNIFICANT CHANGE UP
EOSINOPHIL # BLD AUTO: 0.4 K/UL — SIGNIFICANT CHANGE UP (ref 0–0.5)
EOSINOPHIL NFR BLD AUTO: 6.2 % — HIGH (ref 0–6)
EPI CELLS # UR: SIGNIFICANT CHANGE UP
GLUCOSE SERPL-MCNC: 106 MG/DL — HIGH (ref 70–99)
GLUCOSE UR QL: NEGATIVE MG/DL — SIGNIFICANT CHANGE UP
HCT VFR BLD CALC: 34.1 % — LOW (ref 34.5–45)
HGB BLD-MCNC: 10.6 G/DL — LOW (ref 11.5–15.5)
IMM GRANULOCYTES NFR BLD AUTO: 0.3 % — SIGNIFICANT CHANGE UP (ref 0–1.5)
KETONES UR-MCNC: NEGATIVE — SIGNIFICANT CHANGE UP
LEUKOCYTE ESTERASE UR-ACNC: NEGATIVE — SIGNIFICANT CHANGE UP
LIDOCAIN IGE QN: 55 U/L — HIGH (ref 22–51)
LYMPHOCYTES # BLD AUTO: 1.76 K/UL — SIGNIFICANT CHANGE UP (ref 1–3.3)
LYMPHOCYTES # BLD AUTO: 27.3 % — SIGNIFICANT CHANGE UP (ref 13–44)
MCHC RBC-ENTMCNC: 26.6 PG — LOW (ref 27–34)
MCHC RBC-ENTMCNC: 31.1 GM/DL — LOW (ref 32–36)
MCV RBC AUTO: 85.5 FL — SIGNIFICANT CHANGE UP (ref 80–100)
MONOCYTES # BLD AUTO: 0.43 K/UL — SIGNIFICANT CHANGE UP (ref 0–0.9)
MONOCYTES NFR BLD AUTO: 6.7 % — SIGNIFICANT CHANGE UP (ref 2–14)
NEUTROPHILS # BLD AUTO: 3.81 K/UL — SIGNIFICANT CHANGE UP (ref 1.8–7.4)
NEUTROPHILS NFR BLD AUTO: 59.2 % — SIGNIFICANT CHANGE UP (ref 43–77)
NITRITE UR-MCNC: NEGATIVE — SIGNIFICANT CHANGE UP
PH UR: 6 — SIGNIFICANT CHANGE UP (ref 5–8)
PLATELET # BLD AUTO: 198 K/UL — SIGNIFICANT CHANGE UP (ref 150–400)
POTASSIUM SERPL-MCNC: 5.4 MMOL/L — HIGH (ref 3.5–5.3)
POTASSIUM SERPL-SCNC: 5.4 MMOL/L — HIGH (ref 3.5–5.3)
PROT SERPL-MCNC: 7.2 G/DL — SIGNIFICANT CHANGE UP (ref 6.6–8.7)
PROT UR-MCNC: 30 MG/DL
RBC # BLD: 3.99 M/UL — SIGNIFICANT CHANGE UP (ref 3.8–5.2)
RBC # FLD: 15.9 % — HIGH (ref 10.3–14.5)
RBC CASTS # UR COMP ASSIST: NEGATIVE /HPF — SIGNIFICANT CHANGE UP (ref 0–4)
SODIUM SERPL-SCNC: 141 MMOL/L — SIGNIFICANT CHANGE UP (ref 135–145)
SP GR SPEC: 1.01 — SIGNIFICANT CHANGE UP (ref 1.01–1.02)
TROPONIN T SERPL-MCNC: <0.01 NG/ML — SIGNIFICANT CHANGE UP (ref 0–0.06)
UROBILINOGEN FLD QL: NEGATIVE MG/DL — SIGNIFICANT CHANGE UP
WBC # BLD: 6.44 K/UL — SIGNIFICANT CHANGE UP (ref 3.8–10.5)
WBC # FLD AUTO: 6.44 K/UL — SIGNIFICANT CHANGE UP (ref 3.8–10.5)
WBC UR QL: SIGNIFICANT CHANGE UP

## 2020-12-27 PROCEDURE — 96374 THER/PROPH/DIAG INJ IV PUSH: CPT

## 2020-12-27 PROCEDURE — 36415 COLL VENOUS BLD VENIPUNCTURE: CPT

## 2020-12-27 PROCEDURE — 83690 ASSAY OF LIPASE: CPT

## 2020-12-27 PROCEDURE — 71046 X-RAY EXAM CHEST 2 VIEWS: CPT | Mod: 26

## 2020-12-27 PROCEDURE — 80053 COMPREHEN METABOLIC PANEL: CPT

## 2020-12-27 PROCEDURE — 71046 X-RAY EXAM CHEST 2 VIEWS: CPT

## 2020-12-27 PROCEDURE — 99284 EMERGENCY DEPT VISIT MOD MDM: CPT

## 2020-12-27 PROCEDURE — 81001 URINALYSIS AUTO W/SCOPE: CPT

## 2020-12-27 PROCEDURE — 99284 EMERGENCY DEPT VISIT MOD MDM: CPT | Mod: 25

## 2020-12-27 PROCEDURE — 85025 COMPLETE CBC W/AUTO DIFF WBC: CPT

## 2020-12-27 PROCEDURE — 84484 ASSAY OF TROPONIN QUANT: CPT

## 2020-12-27 RX ORDER — CYCLOBENZAPRINE HYDROCHLORIDE 10 MG/1
10 TABLET, FILM COATED ORAL ONCE
Refills: 0 | Status: COMPLETED | OUTPATIENT
Start: 2020-12-27 | End: 2020-12-27

## 2020-12-27 RX ORDER — CYCLOBENZAPRINE HYDROCHLORIDE 10 MG/1
1 TABLET, FILM COATED ORAL
Qty: 21 | Refills: 0
Start: 2020-12-27 | End: 2021-01-02

## 2020-12-27 RX ORDER — KETOROLAC TROMETHAMINE 30 MG/ML
15 SYRINGE (ML) INJECTION ONCE
Refills: 0 | Status: DISCONTINUED | OUTPATIENT
Start: 2020-12-27 | End: 2020-12-27

## 2020-12-27 RX ADMIN — Medication 15 MILLIGRAM(S): at 13:29

## 2020-12-27 RX ADMIN — CYCLOBENZAPRINE HYDROCHLORIDE 10 MILLIGRAM(S): 10 TABLET, FILM COATED ORAL at 13:29

## 2020-12-27 NOTE — ED PROVIDER NOTE - OBJECTIVE STATEMENT
70 year old male with PMH CAD s/p stenting in 2017 with cath 8/2020 with patent stents and non-obstructive 50% LAD lesion, PAD with claudication, HTN, HLD, CKD and morbid obesity who presents with r sided chest and abd pain. Pt states that her pain has been present for 2-3 months. it is intermittent, and related to her positioning. For example, she states that if she sleeps a certain way, the pain is worse. It is not related to deep inspiration or exertion. It is a soreness that starts behind her R breast and radiates to her R flank. No associated SOB, diaphoresis, lightheadedness, nausea, vomiting, diarrhea, melena, dysuria, hematuria. No pain with eating and the pain feels different than her anginal pain in the past.

## 2020-12-27 NOTE — ED PROVIDER NOTE - CLINICAL SUMMARY MEDICAL DECISION MAKING FREE TEXT BOX
Pt abd is completely soft and non-tender. no pain with eating and no other GI Sx or nausea or diarrhea. No post-prandial pain. No urinary Sx, no cva tenderness, and no uti or hematuria to suggest renal colic. CXR clear,, no cough, no hypoxia. Most importantly, the pt has had a negative cath in August of this year. her ekg is non-ischemic, trop neg. The chronicity of the pain, non-exertional nature, and description is all not consistent with cardiac etiology. Likely msk. Stable for dc

## 2020-12-27 NOTE — ED ADULT NURSE NOTE - NSIMPLEMENTINTERV_GEN_ALL_ED
Implemented All Universal Safety Interventions:  [place] to call system. Call bell, personal items and telephone within reach. Instruct patient to call for assistance. Room bathroom lighting operational. Non-slip footwear when patient is off stretcher. Physically safe environment: no spills, clutter or unnecessary equipment. Stretcher in lowest position, wheels locked, appropriate side rails in place.

## 2020-12-27 NOTE — ED ADULT TRIAGE NOTE - CHIEF COMPLAINT QUOTE
Pt with right upper chest pain for 1 month. Has seen PMD and had negative CXR. Had a clean cath here about 2 months ago. Pt also reports pain into right side of abdomen that also started 1 month ago.

## 2020-12-27 NOTE — ED PROVIDER NOTE - PATIENT PORTAL LINK FT
You can access the FollowMyHealth Patient Portal offered by [facility] by registering at the following website: [email or web address]. By joining [facility]’s FollowMyHealth portal, you will also be able to view your health information using other applications (apps) compatible with our system.

## 2020-12-27 NOTE — ED ADULT NURSE NOTE - OBJECTIVE STATEMENT
Patient A&Ox4 complaining of chest pain radiating to right breast & right lower abdomen & back for the past 2-3 months. Stated had CXR done & "didn't find anything". Stated pain is "just there". Describes as sharp. Denies any shortness of breath or dizziness. Stated has been taking Tylenol with no relief.

## 2021-01-13 ENCOUNTER — LABORATORY RESULT (OUTPATIENT)
Age: 71
End: 2021-01-13

## 2021-01-13 ENCOUNTER — APPOINTMENT (OUTPATIENT)
Dept: RHEUMATOLOGY | Facility: CLINIC | Age: 71
End: 2021-01-13
Payer: MEDICARE

## 2021-01-13 VITALS
SYSTOLIC BLOOD PRESSURE: 100 MMHG | HEART RATE: 79 BPM | RESPIRATION RATE: 17 BRPM | DIASTOLIC BLOOD PRESSURE: 70 MMHG | TEMPERATURE: 97.2 F | WEIGHT: 232 LBS | OXYGEN SATURATION: 97 % | BODY MASS INDEX: 39.61 KG/M2 | HEIGHT: 64 IN

## 2021-01-13 DIAGNOSIS — Z78.0 ASYMPTOMATIC MENOPAUSAL STATE: ICD-10-CM

## 2021-01-13 DIAGNOSIS — Z86.69 PERSONAL HISTORY OF OTHER DISEASES OF THE NERVOUS SYSTEM AND SENSE ORGANS: ICD-10-CM

## 2021-01-13 DIAGNOSIS — Z86.39 PERSONAL HISTORY OF OTHER ENDOCRINE, NUTRITIONAL AND METABOLIC DISEASE: ICD-10-CM

## 2021-01-13 DIAGNOSIS — M54.5 LOW BACK PAIN: ICD-10-CM

## 2021-01-13 DIAGNOSIS — E11.9 TYPE 2 DIABETES MELLITUS W/OUT COMPLICATIONS: ICD-10-CM

## 2021-01-13 DIAGNOSIS — M79.7 FIBROMYALGIA: ICD-10-CM

## 2021-01-13 DIAGNOSIS — Z86.79 PERSONAL HISTORY OF OTHER DISEASES OF THE CIRCULATORY SYSTEM: ICD-10-CM

## 2021-01-13 DIAGNOSIS — M25.50 PAIN IN UNSPECIFIED JOINT: ICD-10-CM

## 2021-01-13 DIAGNOSIS — H04.123 DRY EYE SYNDROME OF BILATERAL LACRIMAL GLANDS: ICD-10-CM

## 2021-01-13 DIAGNOSIS — M19.90 UNSPECIFIED OSTEOARTHRITIS, UNSPECIFIED SITE: ICD-10-CM

## 2021-01-13 DIAGNOSIS — Z95.5 PRESENCE OF CORONARY ANGIOPLASTY IMPLANT AND GRAFT: ICD-10-CM

## 2021-01-13 DIAGNOSIS — Z83.3 FAMILY HISTORY OF DIABETES MELLITUS: ICD-10-CM

## 2021-01-13 DIAGNOSIS — G89.29 LOW BACK PAIN: ICD-10-CM

## 2021-01-13 DIAGNOSIS — Z78.9 OTHER SPECIFIED HEALTH STATUS: ICD-10-CM

## 2021-01-13 DIAGNOSIS — Z60.2 PROBLEMS RELATED TO LIVING ALONE: ICD-10-CM

## 2021-01-13 PROCEDURE — 99072 ADDL SUPL MATRL&STAF TM PHE: CPT

## 2021-01-13 PROCEDURE — 99205 OFFICE O/P NEW HI 60 MIN: CPT | Mod: 25

## 2021-01-13 PROCEDURE — 36415 COLL VENOUS BLD VENIPUNCTURE: CPT

## 2021-01-13 RX ORDER — CLOPIDOGREL 75 MG/1
75 TABLET, FILM COATED ORAL
Refills: 0 | Status: DISCONTINUED | COMMUNITY
End: 2021-01-13

## 2021-01-13 RX ORDER — FLUTICASONE PROPIONATE 50 UG/1
50 SPRAY, METERED NASAL DAILY
Refills: 0 | Status: DISCONTINUED | COMMUNITY
End: 2021-01-13

## 2021-01-13 RX ORDER — METOPROLOL SUCCINATE 25 MG/1
25 TABLET, EXTENDED RELEASE ORAL
Refills: 0 | Status: DISCONTINUED | COMMUNITY
End: 2021-01-13

## 2021-01-13 RX ORDER — PANTOPRAZOLE SODIUM 40 MG/1
40 GRANULE, DELAYED RELEASE ORAL
Refills: 0 | Status: DISCONTINUED | COMMUNITY
End: 2021-01-13

## 2021-01-13 SDOH — SOCIAL STABILITY - SOCIAL INSECURITY: PROBLEMS RELATED TO LIVING ALONE: Z60.2

## 2021-01-16 LAB
ACE BLD-CCNC: <5 U/L
ALBUMIN SERPL ELPH-MCNC: 4 G/DL
ALP BLD-CCNC: 109 U/L
ALT SERPL-CCNC: 10 U/L
ANA SER IF-ACNC: NEGATIVE
ANION GAP SERPL CALC-SCNC: 14 MMOL/L
APPEARANCE: CLEAR
AST SERPL-CCNC: 10 U/L
B BURGDOR IGG+IGM SER QL IB: NORMAL
BACTERIA: NEGATIVE
BASOPHILS # BLD AUTO: 0.02 K/UL
BASOPHILS NFR BLD AUTO: 0.4 %
BILIRUB SERPL-MCNC: 0.2 MG/DL
BILIRUBIN URINE: NEGATIVE
BLOOD URINE: NEGATIVE
BUN SERPL-MCNC: 19 MG/DL
CALCIUM SERPL-MCNC: 9.4 MG/DL
CCP AB SER IA-ACNC: <8 UNITS
CHLORIDE SERPL-SCNC: 108 MMOL/L
CK SERPL-CCNC: 91 U/L
CO2 SERPL-SCNC: 20 MMOL/L
COLOR: NORMAL
CREAT SERPL-MCNC: 1.49 MG/DL
CRP SERPL-MCNC: 0.67 MG/DL
DEPRECATED KAPPA LC FREE/LAMBDA SER: 1.59 RATIO
DSDNA AB SER-ACNC: <12 IU/ML
ENA SS-A AB SER IA-ACNC: <0.2 AL
ENA SS-B AB SER IA-ACNC: <0.2 AL
ENDOMYSIUM IGA SER QL: NEGATIVE
ENDOMYSIUM IGA TITR SER: NORMAL
EOSINOPHIL # BLD AUTO: 0.37 K/UL
EOSINOPHIL NFR BLD AUTO: 6.6 %
ERYTHROCYTE [SEDIMENTATION RATE] IN BLOOD BY WESTERGREN METHOD: 69 MM/HR
GLUCOSE QUALITATIVE U: NEGATIVE
GLUCOSE SERPL-MCNC: 116 MG/DL
HAV IGM SER QL: NONREACTIVE
HBV CORE IGM SER QL: NONREACTIVE
HBV SURFACE AG SER QL: NONREACTIVE
HCT VFR BLD CALC: 36.9 %
HCV AB SER QL: NONREACTIVE
HCV S/CO RATIO: 0.05 S/CO
HGB BLD-MCNC: 10.8 G/DL
HYALINE CASTS: 0 /LPF
IGA SER QL IEP: 223 MG/DL
IGG SER QL IEP: 1135 MG/DL
IGG SUBSET TOTAL IGG: 1202 MG/DL
IGG1 SER-MCNC: 761 MG/DL
IGG2 SER-MCNC: 233 MG/DL
IGG3 SER-MCNC: 67 MG/DL
IGG4 SER-MCNC: 33 MG/DL
IGM SER QL IEP: 37 MG/DL
IMM GRANULOCYTES NFR BLD AUTO: 0.4 %
KAPPA LC CSF-MCNC: 2.6 MG/DL
KAPPA LC SERPL-MCNC: 4.14 MG/DL
KETONES URINE: NEGATIVE
LDH SERPL-CCNC: 177 U/L
LEUKOCYTE ESTERASE URINE: NEGATIVE
LYMPHOCYTES # BLD AUTO: 1.52 K/UL
LYMPHOCYTES NFR BLD AUTO: 27 %
MAGNESIUM SERPL-MCNC: 2 MG/DL
MAN DIFF?: NORMAL
MCHC RBC-ENTMCNC: 26.2 PG
MCHC RBC-ENTMCNC: 29.3 GM/DL
MCV RBC AUTO: 89.3 FL
MICROSCOPIC-UA: NORMAL
MONOCYTES # BLD AUTO: 0.42 K/UL
MONOCYTES NFR BLD AUTO: 7.4 %
NEUTROPHILS # BLD AUTO: 3.29 K/UL
NEUTROPHILS NFR BLD AUTO: 58.2 %
NITRITE URINE: NEGATIVE
PH URINE: 5.5
PHOSPHATE SERPL-MCNC: 3.3 MG/DL
PLATELET # BLD AUTO: 202 K/UL
POTASSIUM SERPL-SCNC: 5.1 MMOL/L
PROT SERPL-MCNC: 6.9 G/DL
PROTEIN URINE: NORMAL
RBC # BLD: 4.13 M/UL
RBC # FLD: 16.3 %
RED BLOOD CELLS URINE: 1 /HPF
RF+CCP IGG SER-IMP: NEGATIVE
RHEUMATOID FACT SER QL: <10 IU/ML
SODIUM SERPL-SCNC: 142 MMOL/L
SPECIFIC GRAVITY URINE: 1.02
SQUAMOUS EPITHELIAL CELLS: 0 /HPF
THYROGLOB AB SERPL-ACNC: <20 IU/ML
THYROPEROXIDASE AB SERPL IA-ACNC: <10 IU/ML
THYROPEROXIDASE AB SERPL IA-ACNC: <10 IU/ML
TSH SERPL-ACNC: 0.76 UIU/ML
URATE SERPL-MCNC: 6.2 MG/DL
UROBILINOGEN URINE: NORMAL
WBC # FLD AUTO: 5.64 K/UL
WHITE BLOOD CELLS URINE: 0 /HPF

## 2021-01-18 PROBLEM — Z86.39 HISTORY OF HYPERLIPIDEMIA: Status: RESOLVED | Noted: 2021-01-18 | Resolved: 2021-01-18

## 2021-01-18 PROBLEM — Z86.69 HISTORY OF GLAUCOMA: Status: RESOLVED | Noted: 2021-01-18 | Resolved: 2021-01-18

## 2021-01-18 PROBLEM — Z86.69 HISTORY OF PERIPHERAL NEUROPATHY: Status: RESOLVED | Noted: 2021-01-18 | Resolved: 2021-01-18

## 2021-01-18 PROBLEM — Z78.9 DOES NOT USE ILLICIT DRUGS: Status: ACTIVE | Noted: 2021-01-18

## 2021-01-18 PROBLEM — Z83.3 FAMILY HISTORY OF DIABETES MELLITUS: Status: ACTIVE | Noted: 2020-03-09

## 2021-01-18 PROBLEM — Z86.79 HISTORY OF HYPERTENSION: Status: RESOLVED | Noted: 2021-01-18 | Resolved: 2021-01-18

## 2021-01-18 PROBLEM — M54.5 CHRONIC BILATERAL LOW BACK PAIN WITHOUT SCIATICA: Status: ACTIVE | Noted: 2021-01-18

## 2021-01-18 PROBLEM — Z95.5 PRESENCE OF STENT IN LAD CORONARY ARTERY: Status: RESOLVED | Noted: 2018-02-26 | Resolved: 2021-01-18

## 2021-01-18 PROBLEM — Z60.2 LIVES ALONE: Status: ACTIVE | Noted: 2021-01-18

## 2021-01-18 PROBLEM — Z86.39 HISTORY OF DIABETES MELLITUS: Status: RESOLVED | Noted: 2021-01-18 | Resolved: 2021-01-18

## 2021-01-18 PROBLEM — Z78.0 HISTORY OF MENOPAUSE: Status: RESOLVED | Noted: 2021-01-18 | Resolved: 2021-01-18

## 2021-01-18 LAB
GLIADIN IGA SER QL: 5.4 UNITS
GLIADIN IGG SER QL: <5 UNITS
GLIADIN PEPTIDE IGA SER-ACNC: NEGATIVE
GLIADIN PEPTIDE IGG SER-ACNC: NEGATIVE
M PROTEIN SPEC IFE-MCNC: NORMAL

## 2021-01-18 RX ORDER — CHOLECALCIFEROL (VITAMIN D3) 125 MCG
TABLET ORAL
Refills: 0 | Status: ACTIVE | COMMUNITY

## 2021-01-18 RX ORDER — CYCLOBENZAPRINE HYDROCHLORIDE 10 MG/1
10 TABLET, FILM COATED ORAL 3 TIMES DAILY
Qty: 270 | Refills: 0 | Status: DISCONTINUED | COMMUNITY
End: 2021-01-18

## 2021-01-18 RX ORDER — INSULIN DEGLUDEC INJECTION 100 U/ML
100 INJECTION, SOLUTION SUBCUTANEOUS
Refills: 0 | Status: ACTIVE | COMMUNITY

## 2021-01-25 LAB — HLA-B27 RELATED AG QL: NEGATIVE

## 2021-01-27 ENCOUNTER — APPOINTMENT (OUTPATIENT)
Dept: CARDIOLOGY | Facility: CLINIC | Age: 71
End: 2021-01-27

## 2021-03-29 NOTE — DISCHARGE NOTE PROVIDER - NSDCQMAMI_CARD_ALL_CORE
Spoke with Lubna  To have her verify if the orders are all complete    She will check once she gets back to office and if anything is missing she will let me know     Notified her that Dr. Jayce Perez  Completed the notes and signed the orders    No

## 2021-04-07 NOTE — ED ADULT TRIAGE NOTE - CADM TRG TX PRIOR TO ARRIVAL
Telephone Encounter by Alejandra Goldberg RN at 07/21/17 11:21 AM     Author:  Alejandra Goldberg RN Service:  (none) Author Type:  Registered Nurse     Filed:  07/21/17 11:21 AM Encounter Date:  7/21/2017 Status:  Signed     :  Alejandra Goldberg RN (Registered Nurse)       From: Toyin Rodriguez  To: Jacquelin Delaney DO  Sent: 7/21/2017  9:15 AM CDT  Subject: Lab Tests  Test Related Questions    my concern is that it has consistently increased since 2014 and 2015 at   104 to 105 in 2016 and now gone up to 108 in 2017.   what can be the cause   of this increase       Revision History        Date/Time User Provider Type Action    > 07/21/17 11:21 AM Alejandra Goldberg RN Registered Nurse Sign    Attribution information within the note text is not available.             Telephone Encounter by Alejandra Goldberg RN at 07/21/17 11:22 AM     Author:  Alejandra Goldberg RN Service:  (none) Author Type:  Registered Nurse     Filed:  07/21/17 11:23 AM Encounter Date:  7/21/2017 Status:  Signed     :  Alejandra Glodberg RN (Registered Nurse)            To [DC1.1T] Rhett[DC1.1M] Please see last My Chart message for review and response[DC1.1T]  Please see yesterdays Chloride  question[DC1.1M]        Revision History        User Key Date/Time User Provider Type Action    > DC1.1 07/21/17 11:23 AM Alejandra Goldberg, RN Registered Nurse Sign    M - Manual, T - Template             none MED

## 2021-04-22 ENCOUNTER — APPOINTMENT (OUTPATIENT)
Dept: CARDIOLOGY | Facility: CLINIC | Age: 71
End: 2021-04-22

## 2021-04-28 ENCOUNTER — APPOINTMENT (OUTPATIENT)
Dept: RHEUMATOLOGY | Facility: CLINIC | Age: 71
End: 2021-04-28

## 2021-04-30 ENCOUNTER — APPOINTMENT (OUTPATIENT)
Dept: CARDIOLOGY | Facility: CLINIC | Age: 71
End: 2021-04-30
Payer: MEDICARE

## 2021-04-30 ENCOUNTER — NON-APPOINTMENT (OUTPATIENT)
Age: 71
End: 2021-04-30

## 2021-04-30 VITALS
HEART RATE: 72 BPM | RESPIRATION RATE: 16 BRPM | BODY MASS INDEX: 38.76 KG/M2 | HEIGHT: 64 IN | WEIGHT: 227 LBS | SYSTOLIC BLOOD PRESSURE: 122 MMHG | DIASTOLIC BLOOD PRESSURE: 70 MMHG | TEMPERATURE: 97.5 F

## 2021-04-30 PROCEDURE — 99072 ADDL SUPL MATRL&STAF TM PHE: CPT

## 2021-04-30 PROCEDURE — 99215 OFFICE O/P EST HI 40 MIN: CPT

## 2021-04-30 PROCEDURE — 93000 ELECTROCARDIOGRAM COMPLETE: CPT

## 2021-04-30 RX ORDER — DULOXETINE HYDROCHLORIDE 30 MG/1
30 CAPSULE, DELAYED RELEASE PELLETS ORAL
Qty: 90 | Refills: 0 | Status: DISCONTINUED | COMMUNITY
Start: 2021-01-13 | End: 2021-04-30

## 2021-05-01 NOTE — HISTORY OF PRESENT ILLNESS
[FreeTextEntry1] : Tolerating current medical regimen without difficulty; \par \par She's had this chronic somewhat atypical chest pain syndrome over the past few years which seemed to take a worsening characteristic and she underwent a nuclear stress test in the office last August;\par \par The nuclear stress test suggested anterior wall ischemia and or soft tissue breast attenuation artifact. A more definitive cardiac catheterization was then recommended;\par \par This study demonstrated patency of her coronary stents-with only subcritical lesions and medical therapy was recommended (8/31/20);\par \par She is followed closely by her GI specialist and she also has history of diffuse arthritides of which she sees Rheumatology regularly;

## 2021-05-01 NOTE — REASON FOR VISIT
[FreeTextEntry1] : JUANY AGUILAR is being seen for a clinic follow-up of. \par \par The patient is a 70-year-old  woman with known history for ischemic heart disease and prior PCI/stent dating back to 2017, history of atypical chest pain syndrome and chronic GERD.\par \par She reports feeling overall well and without acute cardiac complaints.  Patient does report sometimes experiencing the atypical chest pain but has improved considerably since last office visit.  These symptoms are mostly nonexertional and associated with heartburn, belching and occasionally making certain movements.\par \par Patient denies associated symptoms such as substernal chest pressure, SOB, TY, diaphoresis, palpitations, presyncope, syncope, edema.\par \par

## 2021-05-01 NOTE — DISCUSSION/SUMMARY
[FreeTextEntry1] : 1).  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 May take extra enteric coated aspirin 81 mg p.r.n. for chest wall pain;\par \par 2).  Patient will complete a Transthoracic Echocardiogram and Carotid Doppler prior to follow up office visit to assess overall cardiac function and valvulopathy as well as carotid plaquing stenosis respectively.\par \par 3).  Follow up with PCP (Dr. Ivy) and Rheumatology (Dr. Kleiner) regarding routine checkups and blood work.  Forward all testing/lab work to our office. \par \par 4).  Patient is tentatively scheduled to have a glaucoma drainage implant into her eye on May 12th with Dr. Tanner, but she apparently does not need cardiac clearance from our office.  \par \par If clearance will be needed, based upon Mrs. Albertina Rodriguez otherwise stable cardiac pattern, there is no absolute cardiac contraindication for her to undergo the proposed glaucoma drain implant.\par \par She may hold the Plavix and Aspirin five to seven days prior to the procedure and restart thereafter if you deem it safe.\par \par 5).  Recommend patient report any untoward symptoms. \par \par 6).  Followup with Dr. Roa within 3-4 months or p.r.n.

## 2021-05-01 NOTE — PHYSICAL EXAM
[No Acute Distress] : no acute distress [Obese] : obese [Normal Conjunctiva] : normal conjunctiva [Normal Venous Pressure] : normal venous pressure [Carotid Bruit] : carotid bruit [Normal S1, S2] : normal S1, S2 [No Rub] : no rub [No Gallop] : no gallop [Murmur] : murmur [Clear Lung Fields] : clear lung fields [Good Air Entry] : good air entry [No Respiratory Distress] : no respiratory distress  [Soft] : abdomen soft [Non Tender] : non-tender [No Masses/organomegaly] : no masses/organomegaly [Normal Gait] : normal gait [No Edema] : no edema [No Cyanosis] : no cyanosis [No Clubbing] : no clubbing [No Varicosities] : no varicosities [No Rash] : no rash [No Skin Lesions] : no skin lesions [Moves all extremities] : moves all extremities [No Focal Deficits] : no focal deficits [Normal Speech] : normal speech [Alert and Oriented] : alert and oriented [Normal memory] : normal memory [de-identified] : heard on left [de-identified] : Grade I/VI systolic murmur [de-identified] : Chest wall shows exquisite point tenderness to palpation.

## 2021-05-01 NOTE — ASSESSMENT
[FreeTextEntry1] : EKG 4/30/2021:  The EKG illustrastes sinus rhythm, rate of 73 bpm, left atrial enlargement pattern, nonspecific T wave abnormality.  Essentially unchanged.\par \par In summary the patient is a 70-year-old black female with a chest pain syndrome and known history for CAD and prior stents which appeared to be patent on most recent cardiac catheterization from 8/31/20.

## 2021-05-08 ENCOUNTER — RX RENEWAL (OUTPATIENT)
Age: 71
End: 2021-05-08

## 2021-06-08 ENCOUNTER — APPOINTMENT (OUTPATIENT)
Dept: ENDOCRINOLOGY | Facility: CLINIC | Age: 71
End: 2021-06-08

## 2021-06-23 ENCOUNTER — EMERGENCY (EMERGENCY)
Facility: HOSPITAL | Age: 71
LOS: 1 days | Discharge: HOME | End: 2021-06-23
Attending: EMERGENCY MEDICINE
Payer: MEDICARE

## 2021-06-23 VITALS
WEIGHT: 227.3 LBS | RESPIRATION RATE: 22 BRPM | TEMPERATURE: 98 F | HEART RATE: 115 BPM | SYSTOLIC BLOOD PRESSURE: 172 MMHG | DIASTOLIC BLOOD PRESSURE: 90 MMHG | OXYGEN SATURATION: 96 % | HEIGHT: 64 IN

## 2021-06-23 DIAGNOSIS — H26.40 UNSPECIFIED SECONDARY CATARACT: Chronic | ICD-10-CM

## 2021-06-23 LAB
ALBUMIN SERPL ELPH-MCNC: 3.9 G/DL — SIGNIFICANT CHANGE UP (ref 3.3–5.2)
ALP SERPL-CCNC: 103 U/L — SIGNIFICANT CHANGE UP (ref 40–120)
ALT FLD-CCNC: 8 U/L — SIGNIFICANT CHANGE UP
ANION GAP SERPL CALC-SCNC: 10 MMOL/L — SIGNIFICANT CHANGE UP (ref 5–17)
APPEARANCE UR: CLEAR — SIGNIFICANT CHANGE UP
APTT BLD: 26.9 SEC — LOW (ref 27.5–35.5)
AST SERPL-CCNC: 16 U/L — SIGNIFICANT CHANGE UP
BASOPHILS # BLD AUTO: 0.01 K/UL — SIGNIFICANT CHANGE UP (ref 0–0.2)
BASOPHILS NFR BLD AUTO: 0.2 % — SIGNIFICANT CHANGE UP (ref 0–2)
BILIRUB SERPL-MCNC: 0.2 MG/DL — LOW (ref 0.4–2)
BILIRUB UR-MCNC: NEGATIVE — SIGNIFICANT CHANGE UP
BUN SERPL-MCNC: 22.9 MG/DL — HIGH (ref 8–20)
CALCIUM SERPL-MCNC: 9 MG/DL — SIGNIFICANT CHANGE UP (ref 8.6–10.2)
CHLORIDE SERPL-SCNC: 110 MMOL/L — HIGH (ref 98–107)
CO2 SERPL-SCNC: 22 MMOL/L — SIGNIFICANT CHANGE UP (ref 22–29)
COLOR SPEC: YELLOW — SIGNIFICANT CHANGE UP
CREAT SERPL-MCNC: 1.46 MG/DL — HIGH (ref 0.5–1.3)
D DIMER BLD IA.RAPID-MCNC: 354 NG/ML DDU — HIGH
DIFF PNL FLD: NEGATIVE — SIGNIFICANT CHANGE UP
EOSINOPHIL # BLD AUTO: 0.18 K/UL — SIGNIFICANT CHANGE UP (ref 0–0.5)
EOSINOPHIL NFR BLD AUTO: 3.2 % — SIGNIFICANT CHANGE UP (ref 0–6)
GLUCOSE SERPL-MCNC: 100 MG/DL — HIGH (ref 70–99)
GLUCOSE UR QL: NEGATIVE MG/DL — SIGNIFICANT CHANGE UP
HCT VFR BLD CALC: 33 % — LOW (ref 34.5–45)
HGB BLD-MCNC: 10.2 G/DL — LOW (ref 11.5–15.5)
IMM GRANULOCYTES NFR BLD AUTO: 0.4 % — SIGNIFICANT CHANGE UP (ref 0–1.5)
INR BLD: 0.97 RATIO — SIGNIFICANT CHANGE UP (ref 0.88–1.16)
KETONES UR-MCNC: NEGATIVE — SIGNIFICANT CHANGE UP
LEUKOCYTE ESTERASE UR-ACNC: NEGATIVE — SIGNIFICANT CHANGE UP
LYMPHOCYTES # BLD AUTO: 1.5 K/UL — SIGNIFICANT CHANGE UP (ref 1–3.3)
LYMPHOCYTES # BLD AUTO: 26.6 % — SIGNIFICANT CHANGE UP (ref 13–44)
MAGNESIUM SERPL-MCNC: 1.9 MG/DL — SIGNIFICANT CHANGE UP (ref 1.6–2.6)
MCHC RBC-ENTMCNC: 26.4 PG — LOW (ref 27–34)
MCHC RBC-ENTMCNC: 30.9 GM/DL — LOW (ref 32–36)
MCV RBC AUTO: 85.3 FL — SIGNIFICANT CHANGE UP (ref 80–100)
MONOCYTES # BLD AUTO: 0.36 K/UL — SIGNIFICANT CHANGE UP (ref 0–0.9)
MONOCYTES NFR BLD AUTO: 6.4 % — SIGNIFICANT CHANGE UP (ref 2–14)
NEUTROPHILS # BLD AUTO: 3.56 K/UL — SIGNIFICANT CHANGE UP (ref 1.8–7.4)
NEUTROPHILS NFR BLD AUTO: 63.2 % — SIGNIFICANT CHANGE UP (ref 43–77)
NITRITE UR-MCNC: NEGATIVE — SIGNIFICANT CHANGE UP
NT-PROBNP SERPL-SCNC: 98 PG/ML — SIGNIFICANT CHANGE UP (ref 0–300)
PH UR: 6 — SIGNIFICANT CHANGE UP (ref 5–8)
PLATELET # BLD AUTO: 204 K/UL — SIGNIFICANT CHANGE UP (ref 150–400)
POTASSIUM SERPL-MCNC: 4.6 MMOL/L — SIGNIFICANT CHANGE UP (ref 3.5–5.3)
POTASSIUM SERPL-SCNC: 4.6 MMOL/L — SIGNIFICANT CHANGE UP (ref 3.5–5.3)
PROT SERPL-MCNC: 7 G/DL — SIGNIFICANT CHANGE UP (ref 6.6–8.7)
PROT UR-MCNC: 30 MG/DL
PROTHROM AB SERPL-ACNC: 11.3 SEC — SIGNIFICANT CHANGE UP (ref 10.6–13.6)
RBC # BLD: 3.87 M/UL — SIGNIFICANT CHANGE UP (ref 3.8–5.2)
RBC # FLD: 16.2 % — HIGH (ref 10.3–14.5)
SODIUM SERPL-SCNC: 142 MMOL/L — SIGNIFICANT CHANGE UP (ref 135–145)
SP GR SPEC: 1.01 — SIGNIFICANT CHANGE UP (ref 1.01–1.02)
T3 SERPL-MCNC: 78 NG/DL — LOW (ref 80–200)
T4 AB SER-ACNC: 5.1 UG/DL — SIGNIFICANT CHANGE UP (ref 4.5–12)
TROPONIN T SERPL-MCNC: <0.01 NG/ML — SIGNIFICANT CHANGE UP (ref 0–0.06)
TSH SERPL-MCNC: 0.69 UIU/ML — SIGNIFICANT CHANGE UP (ref 0.27–4.2)
UROBILINOGEN FLD QL: NEGATIVE MG/DL — SIGNIFICANT CHANGE UP
WBC # BLD: 5.63 K/UL — SIGNIFICANT CHANGE UP (ref 3.8–10.5)
WBC # FLD AUTO: 5.63 K/UL — SIGNIFICANT CHANGE UP (ref 3.8–10.5)

## 2021-06-23 PROCEDURE — 99222 1ST HOSP IP/OBS MODERATE 55: CPT

## 2021-06-23 PROCEDURE — 71045 X-RAY EXAM CHEST 1 VIEW: CPT | Mod: 26

## 2021-06-23 PROCEDURE — 99212 OFFICE O/P EST SF 10 MIN: CPT

## 2021-06-23 PROCEDURE — 99285 EMERGENCY DEPT VISIT HI MDM: CPT

## 2021-06-23 PROCEDURE — 93010 ELECTROCARDIOGRAM REPORT: CPT

## 2021-06-23 RX ORDER — SODIUM CHLORIDE 9 MG/ML
1000 INJECTION INTRAMUSCULAR; INTRAVENOUS; SUBCUTANEOUS ONCE
Refills: 0 | Status: COMPLETED | OUTPATIENT
Start: 2021-06-23 | End: 2021-06-23

## 2021-06-23 RX ADMIN — SODIUM CHLORIDE 1000 MILLILITER(S): 9 INJECTION INTRAMUSCULAR; INTRAVENOUS; SUBCUTANEOUS at 17:39

## 2021-06-23 NOTE — ED ADULT NURSE NOTE - OBJECTIVE STATEMENT
Late Note  71 F, nad, alert and oriented x 3 c/o palpitations onset when waking up this morning, states "I woke up with a bad pain in my left hip that went down my leg and I couldn't get up, my leg started to cramp and then it went to my stomach". Pt denies CP, denies recent illness, denies recent travel, denies SOB, noted to be tachy at 102-103 at time of assessment and now NSR in 80's. Fall precautions in place, will monitor.

## 2021-06-23 NOTE — CONSULT NOTE ADULT - ASSESSMENT
Ruthie Frances is a 71 year old woman with past medical history of Coronary artery disease (s/p PCI in 2017 with most recent cardiac cath in 8/2020 with patent LAD stent and non-obstructive CAD) and history of atypical chest pain, Hypertension, Diabetes mellitus & GERD who presents with palpitations. ssessment: Assessment:  mulu Frances is a 71 year old woman with past medical history of Coronary artery disease (s/p PCI in 2017 with most recent cardiac cath in 8/2020 with patent LAD stent and non-obstructive CAD) and history of atypical chest pain, Hypertension, Diabetes mellitus & GERD who presents with left hip pain, abdominal pain and palpitations. At this time her palpitations are likely compensatory response due to pain. ECG consistent with sinus tachycardia and telemetry with sinus rhythm. No signs of acute coronary syndrome, given lack of symptoms as prior PCI, unremarkable ECG and negative troponin. No signs of CHF. D-Dimer elevated, so agree for evaluation for pulmonary embolism.     Recommendations:  [] Abdominal pain/elevated D-Dimer: Agree with imaging to evaluate for pulmonary embolism also abdominal pathology   [] Palpitations/sinus tachycardia: Likely compensatory response, will continue to monitor on telemetry. Will check echo   [] CAD: Symptoms stable, continue home: Aspirin 81 mg daily, Plavix 75 mg daily, Atorvastatin 10 mg bedtime, Metoprolol succinate 100 mg daily    [] Hypertension: BP was elevated on presentation likely from pain, resume home Benazepril 20 mg daily, HCTZ 25 mg daily    Thank you for the consult. We will continue to follow along.    Eliane Saunders MD  Cardiology

## 2021-06-23 NOTE — ED PROVIDER NOTE - OBJECTIVE STATEMENT
70 year old male with PMH CAD s/p stenting in 2017 with cath 8/2020 with patent stents and non-obstructive 50% LAD lesion, PAD with claudication, HTN, HLD, CKD and morbid obesity; now p/w 70 year old male with PMH CAD s/p stenting in 2017 with cath 8/2020 with patent stents and non-obstructive 50% LAD lesion, PAD with claudication, HTN, HLD, CKD and morbid obesity; now p/w palpitations and chest pain/abd pain. patient reports awakening with left leg pain, and cramping, sensation, which traveled up to abdomen, in band-like sensation, associated with palpitations. denies sob/. reports lightheadedness with this sensation, which intensified with ambulation. denies f/c/s. denies n/v. reports mild diaphoresis with chest tightness and palpitations. denies back pain.  PMH: CAD, PAD, HTN, HLD  SOCIAL: No tobacco/illicit substance use/socialEtOH

## 2021-06-23 NOTE — ED PROVIDER NOTE - PHYSICAL EXAMINATION
General:     NAD, well-nourished, well-appearing  Head:     NC/AT, EOMI, oral mucosa moist  Neck:     trachea midline  Lungs:     CTA b/l, no w/r/r  CVS:     S1S2, RRR, no m/g/r  Abd:     +BS, TTP @ epigastrium, no rebound or guarding, s/nd, no organomegaly  Ext:    2+ radial and pedal pulses, no c/c/e  Neuro: AAOx3, no sensory/motor deficits

## 2021-06-23 NOTE — ED ADULT NURSE NOTE - NSIMPLEMENTINTERV_GEN_ALL_ED
Implemented All Universal Safety Interventions:  [place] to call system. Call bell, personal items and telephone within reach. Instruct patient to call for assistance. Room bathroom lighting operational. Non-slip footwear when patient is off stretcher. Physically safe environment: no spills, clutter or unnecessary equipment. Stretcher in lowest position, wheels locked, appropriate side rails in place.

## 2021-06-23 NOTE — ED PROVIDER NOTE - NS ED ROS FT
Constitutional: (-) fever  (-)chills  (-)sweats  Eyes/ENT: (-)   Cardiovascular: (+) chest pain, (+) palpitations (-) edema   Respiratory: (-) cough, (-) shortness of breath   Gastrointestinal: (-)nausea  (-)vomiting, (-) diarrhea  (+) abdominal pain   :  (-)dysuria, (-)frequency, (-)urgency, (-)hematuria  Musculoskeletal: (-) neck pain, (-) back pain, (-) joint pain  Integumentary: (-) rash, (-) edema  Neurological: (-) headache, (-) altered mental status  (-)LOC

## 2021-06-23 NOTE — CONSULT NOTE ADULT - SUBJECTIVE AND OBJECTIVE BOX
JUANY FRANCES  [id number]      HPI:  Juany Francse is a 71 year old woman with past medical history of Coronary artery disease (s/p PCI in 2017 with most recent cardiac cath in 8/2020 with patent LAD stent and non-obstructive CAD) and history of atypical chest pain, Hypertension, Diabetes mellitus & GERD who presents with palpitations.       ALLERGIES:  codeine (Nausea)  omeprazole (Nausea)  Vicodin (Nausea)      PAST MEDICAL & SURGICAL HISTORY:  Coronary artery disease (s/p PCI in 2017 with most recent cardiac cath in 8/2020 with patent LAD stent and non-obstructive CAD)  Atypical chest pain  Diabetes mellitus  GERD   HTN (hypertension)  BONNY (obstructive sleep apnea)  Hiatal hernia  DJD (degenerative joint disease), lumbar    CURRENT MEDICATIONS:      SOCIAL HISTORY:      FAMILY HISTORY:  Family history of diabetes mellitus (DM) (Sibling)  Family history of hypertension (Sibling)        ROS:  All 10 systems reviewed and positives noted in HPI    OBJECTIVE:    VITAL SIGNS:  Vital Signs Last 24 Hrs  T(C): 36.8 (23 Jun 2021 16:01), Max: 36.9 (23 Jun 2021 12:48)  T(F): 98.2 (23 Jun 2021 16:01), Max: 98.5 (23 Jun 2021 12:48)  HR: 93 (23 Jun 2021 19:35) (89 - 115)  BP: 159/95 (23 Jun 2021 19:35) (136/71 - 172/90)  BP(mean): --  RR: 16 (23 Jun 2021 19:35) (16 - 22)  SpO2: 99% (23 Jun 2021 19:35) (96% - 99%)    PHYSICAL EXAM:  General: well appearing, no distress  HEENT: sclera anicteric  Neck: supple, no carotid bruits b/l  CVS: JVP ~ 7 cm H20, RRR, s1, s2, no murmurs/rubs/gallops  Chest: unlabored respirations, clear to auscultation b/l  Abdomen: non-distended  Extremities: no lower extremity edema b/l  Neuro: awake, alert & oriented x 3  Psych: normal affect      LABS:                        10.2   5.63  )-----------( 204      ( 23 Jun 2021 14:36 )             33.0     06-23    142  |  110<H>  |  22.9<H>  ----------------------------<  100<H>  4.6   |  22.0  |  1.46<H>    Ca    9.0      23 Jun 2021 14:36  Mg     1.9     06-23    TPro  7.0  /  Alb  3.9  /  TBili  0.2<L>  /  DBili  x   /  AST  16  /  ALT  8   /  AlkPhos  103  06-23    CARDIAC MARKERS ( 23 Jun 2021 14:36 )  x     / <0.01 ng/mL / x     / x     / x          PT/INR - ( 23 Jun 2021 14:36 )   PT: 11.3 sec;   INR: 0.97 ratio         PTT - ( 23 Jun 2021 14:36 )  PTT:26.9 sec      ECG (6/23/21): sinus tachycardia     Cardiac Cath (8/2020):  VENTRICLES: EF estimated was 55 %.  CORONARY VESSELS: The coronary circulation is right dominant.  LM:   --  LM: Normal.  LAD:   --  Proximal LAD: There was a 50 % stenosis. iFR 0.97 , negative for  ischemia  --  Mid LAD: Patent stent  CX:   --  Circumflex: Normal.  RCA:   --  RCA: The vessel was medium sized. Angiography showed mild  atherosclerosis with no flow limiting lesions.  COMPLICATIONS: No complications occurred during the cath lab visit.  DIAGNOSTIC IMPRESSIONS: Non obstructive CAD Proximal LAD 50% ( iFR 0.97  negative for ischemia)  Normal LV systolic function  DIAGNOSTIC RECOMMENDATIONS: continue medical therapy for CAD    Outpatient TTE (2019):  LVEF 60-65%, normal RV size and systolic function  No significant valvular disease  No pericardial effusion     Home Cardiac Meds:  Plavix 75 mg daily   Aspirin 81 mg daily  Atorvastatin 10 mg bedtime  Benazepril 20 mg daily  Metoprolol succinate 100 mg daily  HCTZ 25 mg daily             JUANY FRANCES  [id number]      HPI:  Juany Frances is a 71 year old woman with past medical history of Coronary artery disease (s/p PCI in 2017 with most recent cardiac cath in 8/2020 with patent LAD stent and non-obstructive CAD) and history of atypical chest pain, Hypertension, Diabetes mellitus & GERD who presents with left hip pain, abdominal pain and palpitations. The patient states that last night she woke up with severe left hip pain, she also noticed abdominal pain and her abdomen is tender when she presses on it. Denies nausea or vomiting. Denies angina. States that when she had her PCI in 2017 she had chest pain/breast pain but denies this now. Has some dyspnea now in ER. No leg edema. No syncope. Denies any new medications or stress at home. Denies history of blood clots or recent long travel.       ALLERGIES:  codeine (Nausea)  omeprazole (Nausea)  Vicodin (Nausea)      PAST MEDICAL & SURGICAL HISTORY:  Coronary artery disease (s/p PCI in 2017 with most recent cardiac cath in 8/2020 with patent LAD stent and non-obstructive CAD)  Atypical chest pain  Diabetes mellitus  GERD   HTN (hypertension)  BONNY (obstructive sleep apnea)  Hiatal hernia  DJD (degenerative joint disease), lumbar      FAMILY HISTORY:  Family history of diabetes mellitus (DM) (Sibling)  Family history of hypertension (Sibling)        ROS:  All 10 systems reviewed and positives noted in HPI    OBJECTIVE:    VITAL SIGNS:  Vital Signs Last 24 Hrs  T(C): 36.8 (23 Jun 2021 16:01), Max: 36.9 (23 Jun 2021 12:48)  T(F): 98.2 (23 Jun 2021 16:01), Max: 98.5 (23 Jun 2021 12:48)  HR: 93 (23 Jun 2021 19:35) (89 - 115)  BP: 159/95 (23 Jun 2021 19:35) (136/71 - 172/90)  BP(mean): --  RR: 16 (23 Jun 2021 19:35) (16 - 22)  SpO2: 99% (23 Jun 2021 19:35) (96% - 99%)    PHYSICAL EXAM:  General: elderly woman, no distress  HEENT: sclera anicteric  Neck: supple, no carotid bruits b/l  CVS: JVP ~ 7 cm H20, RRR, s1, s2, no murmurs  Chest: unlabored respirations, clear to auscultation b/l  Abdomen: non-distended, tender to palpation   Extremities: no lower extremity edema b/l  Neuro: awake, alert & oriented  Psych: normal affect      LABS:                        10.2   5.63  )-----------( 204      ( 23 Jun 2021 14:36 )             33.0     06-23    142  |  110<H>  |  22.9<H>  ----------------------------<  100<H>  4.6   |  22.0  |  1.46<H>    Ca    9.0      23 Jun 2021 14:36  Mg     1.9     06-23    TPro  7.0  /  Alb  3.9  /  TBili  0.2<L>  /  DBili  x   /  AST  16  /  ALT  8   /  AlkPhos  103  06-23    CARDIAC MARKERS ( 23 Jun 2021 14:36 )  x     / <0.01 ng/mL / x     / x     / x          PT/INR - ( 23 Jun 2021 14:36 )   PT: 11.3 sec;   INR: 0.97 ratio         PTT - ( 23 Jun 2021 14:36 )  PTT:26.9 sec      ECG (6/23/21): sinus tachycardia     Cardiac Cath (8/2020):  VENTRICLES: EF estimated was 55 %.  CORONARY VESSELS: The coronary circulation is right dominant.  LM:   --  LM: Normal.  LAD:   --  Proximal LAD: There was a 50 % stenosis. iFR 0.97 , negative for  ischemia  --  Mid LAD: Patent stent  CX:   --  Circumflex: Normal.  RCA:   --  RCA: The vessel was medium sized. Angiography showed mild  atherosclerosis with no flow limiting lesions.  COMPLICATIONS: No complications occurred during the cath lab visit.  DIAGNOSTIC IMPRESSIONS: Non obstructive CAD Proximal LAD 50% ( iFR 0.97  negative for ischemia)  Normal LV systolic function  DIAGNOSTIC RECOMMENDATIONS: continue medical therapy for CAD    Outpatient TTE (2019):  LVEF 60-65%, normal RV size and systolic function  No significant valvular disease  No pericardial effusion     Home Cardiac Meds:  Plavix 75 mg daily   Aspirin 81 mg daily  Atorvastatin 10 mg bedtime  Benazepril 20 mg daily  Metoprolol succinate 100 mg daily  HCTZ 25 mg daily             JUANY FRANCES  [id number]      HPI:  Juany Frances is a 71 year old woman with past medical history of Coronary artery disease (s/p PCI in 2017 with most recent cardiac cath in 8/2020 with patent LAD stent and non-obstructive CAD) and history of atypical chest pain, Hypertension, Diabetes mellitus & GERD who presents with left hip pain, abdominal pain and palpitations. The patient states that last night she woke up with severe left hip pain, she also noticed abdominal pain and her abdomen is tender when she presses on it. Denies nausea or vomiting. Denies angina. States that when she had her PCI in 2017 she had chest pain/breast pain but denies this now. Has some dyspnea now in ER. No leg edema. No syncope. Denies any new medications or stress at home. Denies history of blood clots or recent long travel.       ALLERGIES:  codeine (Nausea)  omeprazole (Nausea)  Vicodin (Nausea)      PAST MEDICAL & SURGICAL HISTORY:  Coronary artery disease (s/p PCI in 2017 with most recent cardiac cath in 8/2020 with patent LAD stent and non-obstructive CAD)  Atypical chest pain  Diabetes mellitus  GERD   HTN (hypertension)  BONNY (obstructive sleep apnea)  Hiatal hernia  DJD (degenerative joint disease), lumbar      FAMILY HISTORY:  Family history of diabetes mellitus (DM) (Sibling)  Family history of hypertension (Sibling)        ROS:  All 10 systems reviewed and positives noted in HPI    OBJECTIVE:    VITAL SIGNS:  Vital Signs Last 24 Hrs  T(C): 36.8 (23 Jun 2021 16:01), Max: 36.9 (23 Jun 2021 12:48)  T(F): 98.2 (23 Jun 2021 16:01), Max: 98.5 (23 Jun 2021 12:48)  HR: 93 (23 Jun 2021 19:35) (89 - 115)  BP: 159/95 (23 Jun 2021 19:35) (136/71 - 172/90)  BP(mean): --  RR: 16 (23 Jun 2021 19:35) (16 - 22)  SpO2: 99% (23 Jun 2021 19:35) (96% - 99%)    PHYSICAL EXAM:  General: elderly woman, no distress  HEENT: sclera anicteric  Neck: supple, no carotid bruits b/l  CVS: JVP ~ 7 cm H20, RRR, s1, s2, no murmurs  Chest: unlabored respirations, clear to auscultation b/l  Abdomen: non-distended, tender to palpation   Extremities: no lower extremity edema b/l  Neuro: awake, alert & oriented  Psych: normal affect      LABS:                        10.2   5.63  )-----------( 204      ( 23 Jun 2021 14:36 )             33.0     06-23    142  |  110<H>  |  22.9<H>  ----------------------------<  100<H>  4.6   |  22.0  |  1.46<H>    Ca    9.0      23 Jun 2021 14:36  Mg     1.9     06-23    TPro  7.0  /  Alb  3.9  /  TBili  0.2<L>  /  DBili  x   /  AST  16  /  ALT  8   /  AlkPhos  103  06-23    CARDIAC MARKERS ( 23 Jun 2021 14:36 )  x     / <0.01 ng/mL / x     / x     / x          PT/INR - ( 23 Jun 2021 14:36 )   PT: 11.3 sec;   INR: 0.97 ratio         PTT - ( 23 Jun 2021 14:36 )  PTT:26.9 sec      ECG (6/23/21): sinus tachycardia     CXR (6/23/21):  IMPRESSION: Negative chest.      Cardiac Cath (8/2020):  VENTRICLES: EF estimated was 55 %.  CORONARY VESSELS: The coronary circulation is right dominant.  LM:   --  LM: Normal.  LAD:   --  Proximal LAD: There was a 50 % stenosis. iFR 0.97 , negative for  ischemia  --  Mid LAD: Patent stent  CX:   --  Circumflex: Normal.  RCA:   --  RCA: The vessel was medium sized. Angiography showed mild  atherosclerosis with no flow limiting lesions.  COMPLICATIONS: No complications occurred during the cath lab visit.  DIAGNOSTIC IMPRESSIONS: Non obstructive CAD Proximal LAD 50% ( iFR 0.97  negative for ischemia)  Normal LV systolic function  DIAGNOSTIC RECOMMENDATIONS: continue medical therapy for CAD    Outpatient TTE (2019):  LVEF 60-65%, normal RV size and systolic function  No significant valvular disease  No pericardial effusion     Home Cardiac Meds:  Plavix 75 mg daily   Aspirin 81 mg daily  Atorvastatin 10 mg bedtime  Benazepril 20 mg daily  Metoprolol succinate 100 mg daily  HCTZ 25 mg daily

## 2021-06-24 LAB
GLUCOSE BLDC GLUCOMTR-MCNC: 123 MG/DL — HIGH (ref 70–99)
GLUCOSE BLDC GLUCOMTR-MCNC: 126 MG/DL — HIGH (ref 70–99)
GLUCOSE BLDC GLUCOMTR-MCNC: 169 MG/DL — HIGH (ref 70–99)
TROPONIN T SERPL-MCNC: <0.01 NG/ML — SIGNIFICANT CHANGE UP (ref 0–0.06)
TROPONIN T SERPL-MCNC: <0.01 NG/ML — SIGNIFICANT CHANGE UP (ref 0–0.06)

## 2021-06-24 PROCEDURE — G1004: CPT

## 2021-06-24 PROCEDURE — 93010 ELECTROCARDIOGRAM REPORT: CPT

## 2021-06-24 PROCEDURE — 99232 SBSQ HOSP IP/OBS MODERATE 35: CPT

## 2021-06-24 PROCEDURE — 74174 CTA ABD&PLVS W/CONTRAST: CPT | Mod: 26,MG

## 2021-06-24 PROCEDURE — 71275 CT ANGIOGRAPHY CHEST: CPT | Mod: 26

## 2021-06-24 PROCEDURE — 99220: CPT

## 2021-06-24 PROCEDURE — 93306 TTE W/DOPPLER COMPLETE: CPT | Mod: 26

## 2021-06-24 RX ORDER — FAMOTIDINE 10 MG/ML
20 INJECTION INTRAVENOUS ONCE
Refills: 0 | Status: COMPLETED | OUTPATIENT
Start: 2021-06-24 | End: 2021-06-24

## 2021-06-24 RX ORDER — DEXTROSE 50 % IN WATER 50 %
25 SYRINGE (ML) INTRAVENOUS ONCE
Refills: 0 | Status: DISCONTINUED | OUTPATIENT
Start: 2021-06-24 | End: 2021-06-28

## 2021-06-24 RX ORDER — DEXTROSE 50 % IN WATER 50 %
15 SYRINGE (ML) INTRAVENOUS ONCE
Refills: 0 | Status: DISCONTINUED | OUTPATIENT
Start: 2021-06-24 | End: 2021-06-28

## 2021-06-24 RX ORDER — INSULIN LISPRO 100/ML
VIAL (ML) SUBCUTANEOUS
Refills: 0 | Status: DISCONTINUED | OUTPATIENT
Start: 2021-06-24 | End: 2021-06-28

## 2021-06-24 RX ORDER — SODIUM CHLORIDE 9 MG/ML
1000 INJECTION, SOLUTION INTRAVENOUS
Refills: 0 | Status: DISCONTINUED | OUTPATIENT
Start: 2021-06-24 | End: 2021-06-28

## 2021-06-24 RX ORDER — DEXTROSE 50 % IN WATER 50 %
12.5 SYRINGE (ML) INTRAVENOUS ONCE
Refills: 0 | Status: DISCONTINUED | OUTPATIENT
Start: 2021-06-24 | End: 2021-06-28

## 2021-06-24 RX ORDER — PREDNISOLONE SODIUM PHOSPHATE 1 %
2 DROPS OPHTHALMIC (EYE) DAILY
Refills: 0 | Status: DISCONTINUED | OUTPATIENT
Start: 2021-06-24 | End: 2021-06-28

## 2021-06-24 RX ORDER — GLUCAGON INJECTION, SOLUTION 0.5 MG/.1ML
1 INJECTION, SOLUTION SUBCUTANEOUS ONCE
Refills: 0 | Status: DISCONTINUED | OUTPATIENT
Start: 2021-06-24 | End: 2021-06-28

## 2021-06-24 RX ORDER — CLOPIDOGREL BISULFATE 75 MG/1
75 TABLET, FILM COATED ORAL DAILY
Refills: 0 | Status: DISCONTINUED | OUTPATIENT
Start: 2021-06-24 | End: 2021-06-28

## 2021-06-24 RX ORDER — ATORVASTATIN CALCIUM 80 MG/1
10 TABLET, FILM COATED ORAL AT BEDTIME
Refills: 0 | Status: DISCONTINUED | OUTPATIENT
Start: 2021-06-24 | End: 2021-06-28

## 2021-06-24 RX ORDER — METOPROLOL TARTRATE 50 MG
100 TABLET ORAL DAILY
Refills: 0 | Status: DISCONTINUED | OUTPATIENT
Start: 2021-06-24 | End: 2021-06-28

## 2021-06-24 RX ORDER — HYDROCHLOROTHIAZIDE 25 MG
25 TABLET ORAL DAILY
Refills: 0 | Status: DISCONTINUED | OUTPATIENT
Start: 2021-06-24 | End: 2021-06-28

## 2021-06-24 RX ORDER — LISINOPRIL 2.5 MG/1
20 TABLET ORAL DAILY
Refills: 0 | Status: DISCONTINUED | OUTPATIENT
Start: 2021-06-24 | End: 2021-06-28

## 2021-06-24 RX ORDER — ASPIRIN/CALCIUM CARB/MAGNESIUM 324 MG
81 TABLET ORAL DAILY
Refills: 0 | Status: DISCONTINUED | OUTPATIENT
Start: 2021-06-24 | End: 2021-06-28

## 2021-06-24 RX ADMIN — ATORVASTATIN CALCIUM 10 MILLIGRAM(S): 80 TABLET, FILM COATED ORAL at 22:22

## 2021-06-24 RX ADMIN — CLOPIDOGREL BISULFATE 75 MILLIGRAM(S): 75 TABLET, FILM COATED ORAL at 13:48

## 2021-06-24 RX ADMIN — Medication 100 MILLIGRAM(S): at 05:51

## 2021-06-24 RX ADMIN — FAMOTIDINE 20 MILLIGRAM(S): 10 INJECTION INTRAVENOUS at 04:52

## 2021-06-24 RX ADMIN — LISINOPRIL 20 MILLIGRAM(S): 2.5 TABLET ORAL at 05:55

## 2021-06-24 RX ADMIN — FAMOTIDINE 20 MILLIGRAM(S): 10 INJECTION INTRAVENOUS at 14:49

## 2021-06-24 RX ADMIN — Medication 81 MILLIGRAM(S): at 13:48

## 2021-06-24 NOTE — PROGRESS NOTE ADULT - ASSESSMENT
Assessment:  Albertina Frances is a 71 year old woman with past medical history of Coronary artery disease (s/p PCI in 2017 with most recent cardiac cath in 8/2020 with patent LAD stent and non-obstructive CAD) and history of atypical chest pain, Hypertension, Diabetes mellitus & GERD who presents with left hip pain, abdominal pain and palpitations. At this time her palpitations are likely compensatory response due to pain. ECG consistent with sinus tachycardia and telemetry with sinus rhythm. No signs of acute coronary syndrome, given lack of symptoms as prior PCI, unremarkable ECG and negative troponins x3. No signs of CHF. D-Dimer elevated, so agree for evaluation for pulmonary embolism.     Echo images reviewed and patient with normal LV and RV systolic function, no significant valvular disease.    Recommendations:  [] Abdominal/epigastric pain: CTPA negative for pulmonary embolism. Negative urinalysis. Given persistent abdominal pain, would consider GI evaluation, unclear if symptoms all due to GERD/dyspepsia. Repeat labs today.   [] Dyspnea on exertion: May be related to elevated BP, however patient had 50% stenosis of proximal LAD in 8/2020 cath, so will plan for nuclear stress test tomorrow to re-evaluate this lesion, please keep NPO after MN tonight.  [] CAD: Continue home: Aspirin 81 mg daily, Plavix 75 mg daily, Atorvastatin 10 mg bedtime, Metoprolol succinate 100 mg daily    [] Hypertension: BP suboptimal, patient did not receive home HCTZ 25 mg daily, please ensure patient receives this dose. Goal SBP < 140, may need to increase Lisinopril to 30 mg daily.     Thank you for the consult. We will continue to follow along.    Discussed with primary team.     Eliane Saunders MD  Cardiology  Assessment:  Albertina Frances is a 71 year old woman with past medical history of Coronary artery disease (s/p PCI in 2017 with most recent cardiac cath in 8/2020 with patent LAD stent and non-obstructive CAD) and history of atypical chest pain, Hypertension, Diabetes mellitus & GERD who presents with left hip pain, abdominal pain and palpitations. At this time her palpitations are likely compensatory response due to pain. ECG consistent with sinus tachycardia and telemetry with sinus rhythm. No signs of acute coronary syndrome, given lack of symptoms as prior PCI, unremarkable ECG and negative troponins x3. No signs of CHF. D-Dimer elevated, so agree for evaluation for pulmonary embolism.     Echo images reviewed and patient with normal LV and RV systolic function, no significant valvular disease.    Recommendations:  [] Abdominal/epigastric pain: CTPA negative for pulmonary embolism, no aortic dissection. Negative urinalysis. Given persistent abdominal pain, would consider GI evaluation, unclear if symptoms all due to GERD/dyspepsia. Repeat labs today.   [] Dyspnea on exertion: May be related to elevated BP, however patient had 50% stenosis of proximal LAD in 8/2020 cath, so will plan for nuclear stress test tomorrow to re-evaluate this lesion, please keep NPO after MN tonight.  [] CAD: Continue home: Aspirin 81 mg daily, Plavix 75 mg daily, Atorvastatin 10 mg bedtime, Metoprolol succinate 100 mg daily    [] Hypertension: BP suboptimal, patient did not receive home HCTZ 25 mg daily, please ensure patient receives this dose. Goal SBP < 140, may need to increase Lisinopril to 30 mg daily.     Thank you for the consult. We will continue to follow along.    Discussed with primary team.     Eliane Saunders MD  Cardiology

## 2021-06-24 NOTE — ED ADULT NURSE REASSESSMENT NOTE - ED NEURO NIH ASSESS
Please print [employer] - PLEASE PRINT out of the Med Info Forms encounter date. Complete, sign and date 10/22/2019. Once form is completed and signed, please send back to Medical Release via interoffice mail or email back to: [email or web address]  Thank you, Medical Release. baseline

## 2021-06-24 NOTE — ED CDU PROVIDER INITIAL DAY NOTE - ATTENDING CONTRIBUTION TO CARE
Leon: I performed a face to face bedside interview with patient regarding history of present illness, review of symptoms and past medical history. I completed an independent physical exam.  I have discussed patient's plan of care with advanced care provider.   I agree with note as stated above including HISTORY OF PRESENT ILLNESS, HIV, PAST MEDICAL/SURGICAL/FAMILY/SOCIAL HISTORY, ALLERGIES AND HOME MEDICATIONS, REVIEW OF SYSTEMS, PHYSICAL EXAM, MEDICAL DECISION MAKING and any PROGRESS NOTES during the time I functioned as the attending physician for this patient  unless otherwise noted. My brief assessment is as follows: Patient with epigastric/CP, CTA chest/abd/pelvis negative, trop neg x 2, seen by cardiology. Plan for TTE, serial troponins, reassess.

## 2021-06-24 NOTE — ED ADULT NURSE REASSESSMENT NOTE - GENERAL PATIENT STATE
comfortable appearance/cooperative/improvement verbalized/smiling/interactive
comfortable appearance/cooperative

## 2021-06-24 NOTE — ED CDU PROVIDER INITIAL DAY NOTE - PROGRESS NOTE DETAILS
Case discussed with Dr. Saunders. Pt to remain in CDU overnight and have stress test performed in AM.

## 2021-06-24 NOTE — ED CDU PROVIDER INITIAL DAY NOTE - OBJECTIVE STATEMENT
70 year old male with PMH CAD s/p stenting in 2017 with cath 8/2020 with patent stents and non-obstructive 50% LAD lesion, PAD with claudication, HTN, HLD, CKD and morbid obesity; now p/w palpitations and chest pain/abd pain. patient reports awakening with left leg pain, and cramping, sensation, which traveled up to abdomen, in band-like sensation, associated with palpitations. denies sob/. reports lightheadedness with this sensation, which intensified with ambulation. denies f/c/s. denies n/v. reports mild diaphoresis with chest tightness and palpitations. denies back pain.  PMH: CAD, PAD, HTN, HLD  SOCIAL: No tobacco/illicit substance use/socialEtOH

## 2021-06-24 NOTE — PROGRESS NOTE ADULT - SUBJECTIVE AND OBJECTIVE BOX
JUANY AGUILAR  [id number]      Chief Complaint: Palpitations/Abdominal pain     Interval History: The patient reports some dyspnea on exertion to the bathroom. Denies angina. Reports persistent abdominal pain, also epigastric discomfort/burning.     Tele: sinus rhythm       Current meds:   aluminum hydroxide/magnesium hydroxide/simethicone Suspension 30 milliLiter(s) Oral once  aspirin  chewable 81 milliGRAM(s) Oral daily  atorvastatin 10 milliGRAM(s) Oral at bedtime  clopidogrel Tablet 75 milliGRAM(s) Oral daily  dextrose 40% Gel 15 Gram(s) Oral once  dextrose 5%. 1000 milliLiter(s) IV Continuous <Continuous>  dextrose 5%. 1000 milliLiter(s) IV Continuous <Continuous>  dextrose 50% Injectable 25 Gram(s) IV Push once  dextrose 50% Injectable 12.5 Gram(s) IV Push once  dextrose 50% Injectable 25 Gram(s) IV Push once  glucagon  Injectable 1 milliGRAM(s) IntraMuscular once  hydrochlorothiazide 25 milliGRAM(s) Oral daily  insulin lispro (ADMELOG) corrective regimen sliding scale   SubCutaneous three times a day before meals  lisinopril 20 milliGRAM(s) Oral daily  metoprolol succinate  milliGRAM(s) Oral daily      Objective:     Vital Signs:   T(C): 36.9 (06-24-21 @ 15:18), Max: 36.9 (06-24-21 @ 15:18)  HR: 60 (06-24-21 @ 15:18) (60 - 94)  BP: 143/69 (06-24-21 @ 15:18) (136/71 - 159/95)  RR: 15 (06-24-21 @ 15:18) (15 - 20)  SpO2: 98% (06-24-21 @ 15:18) (97% - 99%)  Wt(kg): --    PHYSICAL EXAM:  General: elderly woman, no distress  HEENT: sclera anicteric  Neck: supple  CVS: JVP ~ 7 cm H20, RRR, s1, s2, no murmurs  Chest: unlabored respirations, clear to auscultation b/l  Abdomen: obese, tender to palpation   Extremities: no lower extremity edema b/l  Neuro: awake, alert & oriented  Psych: normal affect      Labs:   23 Jun 2021 14:36    142    |  110    |  22.9   ----------------------------<  100    4.6     |  22.0   |  1.46     Ca    9.0        23 Jun 2021 14:36  Mg     1.9       23 Jun 2021 14:36    TPro  7.0    /  Alb  3.9    /  TBili  0.2    /  DBili  x      /  AST  16     /  ALT  8      /  AlkPhos  103    23 Jun 2021 14:36                          10.2   5.63  )-----------( 204      ( 23 Jun 2021 14:36 )             33.0     PT/INR - ( 23 Jun 2021 14:36 )   PT: 11.3 sec;   INR: 0.97 ratio         PTT - ( 23 Jun 2021 14:36 )  PTT:26.9 sec  CARDIAC MARKERS ( 24 Jun 2021 03:39 )  x     / <0.01 ng/mL / x     / x     / x      CARDIAC MARKERS ( 23 Jun 2021 23:58 )  x     / <0.01 ng/mL / x     / x     / x      CARDIAC MARKERS ( 23 Jun 2021 14:36 )  x     / <0.01 ng/mL / x     / x     / x              ECG (6/23/21): sinus tachycardia     CXR (6/23/21):  IMPRESSION: Negative chest.    CT Angio Chest/Abdomen (6/24/21):  LUNGS AND LARGE AIRWAYS: Patent central airways. Linear type lingular atelectasis  No aortic aneurysm or dissection.    TTE (6/24/21):   1. Endocardial visualization was enhanced with intravenous echo contrast.   2. Mildly enlarged left atrium.   3. There is mild concentric left ventricular hypertrophy.   4. Left ventricular ejection fraction, by visual estimation, is 60 to 65%.   5. Grade I diastolic dysfunction.   6. The right atrium is normal in size.   7. The right ventricular size is normal. RV systolic function is low normal.   8. No significant valvular abnormality.   9. There is no evidence of pericardial effusion.    Cardiac Cath (8/2020):  VENTRICLES: EF estimated was 55 %.  CORONARY VESSELS: The coronary circulation is right dominant.  LM:   --  LM: Normal.  LAD:   --  Proximal LAD: There was a 50 % stenosis. iFR 0.97 , negative for  ischemia  --  Mid LAD: Patent stent  CX:   --  Circumflex: Normal.  RCA:   --  RCA: The vessel was medium sized. Angiography showed mild  atherosclerosis with no flow limiting lesions.  COMPLICATIONS: No complications occurred during the cath lab visit.  DIAGNOSTIC IMPRESSIONS: Non obstructive CAD Proximal LAD 50% ( iFR 0.97  negative for ischemia)  Normal LV systolic function  DIAGNOSTIC RECOMMENDATIONS: continue medical therapy for CAD    Outpatient TTE (2019):  LVEF 60-65%, normal RV size and systolic function  No significant valvular disease  No pericardial effusion     Home Cardiac Meds:  Plavix 75 mg daily   Aspirin 81 mg daily  Atorvastatin 10 mg bedtime  Benazepril 20 mg daily  Metoprolol succinate 100 mg daily  HCTZ 25 mg daily

## 2021-06-24 NOTE — ED ADULT NURSE REASSESSMENT NOTE - COMFORT CARE
ambulated to bathroom/meal provided/plan of care explained/po fluids offered/repositioned/wait time explained
darkened lights/plan of care explained/side rails up/wait time explained/warm blanket provided

## 2021-06-25 VITALS
DIASTOLIC BLOOD PRESSURE: 83 MMHG | RESPIRATION RATE: 18 BRPM | SYSTOLIC BLOOD PRESSURE: 161 MMHG | OXYGEN SATURATION: 97 % | TEMPERATURE: 97 F | HEART RATE: 77 BPM

## 2021-06-25 LAB — GLUCOSE BLDC GLUCOMTR-MCNC: 155 MG/DL — HIGH (ref 70–99)

## 2021-06-25 PROCEDURE — 96376 TX/PRO/DX INJ SAME DRUG ADON: CPT

## 2021-06-25 PROCEDURE — 80053 COMPREHEN METABOLIC PANEL: CPT

## 2021-06-25 PROCEDURE — 83735 ASSAY OF MAGNESIUM: CPT

## 2021-06-25 PROCEDURE — 74174 CTA ABD&PLVS W/CONTRAST: CPT

## 2021-06-25 PROCEDURE — 83880 ASSAY OF NATRIURETIC PEPTIDE: CPT

## 2021-06-25 PROCEDURE — 78452 HT MUSCLE IMAGE SPECT MULT: CPT | Mod: 26

## 2021-06-25 PROCEDURE — 36415 COLL VENOUS BLD VENIPUNCTURE: CPT

## 2021-06-25 PROCEDURE — 71045 X-RAY EXAM CHEST 1 VIEW: CPT

## 2021-06-25 PROCEDURE — 93016 CV STRESS TEST SUPVJ ONLY: CPT

## 2021-06-25 PROCEDURE — 71275 CT ANGIOGRAPHY CHEST: CPT

## 2021-06-25 PROCEDURE — 93018 CV STRESS TEST I&R ONLY: CPT

## 2021-06-25 PROCEDURE — G0378: CPT

## 2021-06-25 PROCEDURE — 84484 ASSAY OF TROPONIN QUANT: CPT

## 2021-06-25 PROCEDURE — 93306 TTE W/DOPPLER COMPLETE: CPT

## 2021-06-25 PROCEDURE — 84480 ASSAY TRIIODOTHYRONINE (T3): CPT

## 2021-06-25 PROCEDURE — 96374 THER/PROPH/DIAG INJ IV PUSH: CPT

## 2021-06-25 PROCEDURE — 82962 GLUCOSE BLOOD TEST: CPT

## 2021-06-25 PROCEDURE — 81001 URINALYSIS AUTO W/SCOPE: CPT

## 2021-06-25 PROCEDURE — 85730 THROMBOPLASTIN TIME PARTIAL: CPT

## 2021-06-25 PROCEDURE — 85610 PROTHROMBIN TIME: CPT

## 2021-06-25 PROCEDURE — 99284 EMERGENCY DEPT VISIT MOD MDM: CPT | Mod: 25

## 2021-06-25 PROCEDURE — 84443 ASSAY THYROID STIM HORMONE: CPT

## 2021-06-25 PROCEDURE — 78452 HT MUSCLE IMAGE SPECT MULT: CPT

## 2021-06-25 PROCEDURE — 93005 ELECTROCARDIOGRAM TRACING: CPT

## 2021-06-25 PROCEDURE — 93017 CV STRESS TEST TRACING ONLY: CPT

## 2021-06-25 PROCEDURE — 84436 ASSAY OF TOTAL THYROXINE: CPT

## 2021-06-25 PROCEDURE — A9500: CPT

## 2021-06-25 PROCEDURE — 85379 FIBRIN DEGRADATION QUANT: CPT

## 2021-06-25 PROCEDURE — 99217: CPT

## 2021-06-25 PROCEDURE — 85025 COMPLETE CBC W/AUTO DIFF WBC: CPT

## 2021-06-25 RX ADMIN — LISINOPRIL 20 MILLIGRAM(S): 2.5 TABLET ORAL at 05:41

## 2021-06-25 RX ADMIN — Medication 100 MILLIGRAM(S): at 05:40

## 2021-06-25 NOTE — ED CDU PROVIDER DISPOSITION NOTE - ATTENDING CONTRIBUTION TO CARE
I agree with the PA's note and was available for any issues/concerns. I was directly involved in patient care. My brief overall assessment is as follows: chest/epigastric pain, neg trops/echo/stress test. symptoms with improvement, to f/u cards, return precautions.

## 2021-06-25 NOTE — ED CDU PROVIDER DISPOSITION NOTE - CARE PROVIDER_API CALL
Michael Roa)  Cardiovascular Disease; Internal Medicine  [address] [place] [address], [facility] [place] [address]  Phone: [phone]  Fax: [phone]  Follow Up Time:     Heladio Thakur)  Gastroenterology; Internal Medicine  [address] [facility], [address]  [place] [address]  Phone: [phone]  Fax: [phone]  Follow Up Time:

## 2021-06-25 NOTE — ED CDU PROVIDER SUBSEQUENT DAY NOTE - ATTENDING CONTRIBUTION TO CARE
I agree with the PA's note and was available for any issues/concerns. I was directly involved in patient care. My brief overall assessment is as follows: no acute events, pending stress test.

## 2021-06-25 NOTE — PROGRESS NOTE ADULT - SUBJECTIVE AND OBJECTIVE BOX
JUANY AGUILAR  [id number]      Chief Complaint:  Epigastric pain/CAD    Interval History:  Patient feeling better.  No SOB/TY today.  Still with intermittent epigastric discomfort and c/o hip pain.  Denies palps.    Tele:  No events      aluminum hydroxide/magnesium hydroxide/simethicone Suspension 30 milliLiter(s) Oral once  aspirin  chewable 81 milliGRAM(s) Oral daily  atorvastatin 10 milliGRAM(s) Oral at bedtime  clopidogrel Tablet 75 milliGRAM(s) Oral daily  dextrose 40% Gel 15 Gram(s) Oral once  dextrose 5%. 1000 milliLiter(s) IV Continuous <Continuous>  dextrose 5%. 1000 milliLiter(s) IV Continuous <Continuous>  dextrose 50% Injectable 25 Gram(s) IV Push once  dextrose 50% Injectable 12.5 Gram(s) IV Push once  dextrose 50% Injectable 25 Gram(s) IV Push once  glucagon  Injectable 1 milliGRAM(s) IntraMuscular once  hydrochlorothiazide 25 milliGRAM(s) Oral daily  insulin lispro (ADMELOG) corrective regimen sliding scale   SubCutaneous three times a day before meals  lisinopril 20 milliGRAM(s) Oral daily  metoprolol succinate  milliGRAM(s) Oral daily  prednisoLONE acetate 1% Suspension 2 Drop(s) Right EYE daily          Physical Exam:  T(C): 36.3 (06-25-21 @ 05:44), Max: 36.9 (06-24-21 @ 15:18)  HR: 77 (06-25-21 @ 05:44) (60 - 77)  BP: 161/83 (06-25-21 @ 05:44) (127/77 - 168/79)  RR: 18 (06-25-21 @ 05:44) (15 - 19)  SpO2: 97% (06-25-21 @ 05:44) (96% - 98%)  Wt(kg): --  General: Comfortable in NAD  Neck: No JVD  CVS: nl s1s2, no s3  Pulm: CTA b/l  Abd: soft, non-tender  Ext: No c/c/e  Neuro A&O x3  Psych: Normal affect      Labs:   23 Jun 2021 14:36    142    |  110    |  22.9   ----------------------------<  100    4.6     |  22.0   |  1.46     Ca    9.0        23 Jun 2021 14:36  Mg     1.9       23 Jun 2021 14:36    TPro  7.0    /  Alb  3.9    /  TBili  0.2    /  DBili  x      /  AST  16     /  ALT  8      /  AlkPhos  103    23 Jun 2021 14:36                          10.2   5.63  )-----------( 204      ( 23 Jun 2021 14:36 )             33.0     PT/INR - ( 23 Jun 2021 14:36 )   PT: 11.3 sec;   INR: 0.97 ratio         PTT - ( 23 Jun 2021 14:36 )  PTT:26.9 sec  CARDIAC MARKERS ( 24 Jun 2021 03:39 )  x     / <0.01 ng/mL / x     / x     / x      CARDIAC MARKERS ( 23 Jun 2021 23:58 )  x     / <0.01 ng/mL / x     / x     / x      CARDIAC MARKERS ( 23 Jun 2021 14:36 )  x     / <0.01 ng/mL / x     / x     / x          CT Angio Chest/Abdomen (6/24/21):  LUNGS AND LARGE AIRWAYS: Patent central airways. Linear type lingular atelectasis  No aortic aneurysm or dissection.    TTE (6/24/21):   1. Endocardial visualization was enhanced with intravenous echo contrast.   2. Mildly enlarged left atrium.   3. There is mild concentric left ventricular hypertrophy.   4. Left ventricular ejection fraction, by visual estimation, is 60 to 65%.   5. Grade I diastolic dysfunction.   6. The right atrium is normal in size.   7. The right ventricular size is normal. RV systolic function is low normal.   8. No significant valvular abnormality.   9. There is no evidence of pericardial effusion.    Cardiac Cath (8/2020):  VENTRICLES: EF estimated was 55 %.  CORONARY VESSELS: The coronary circulation is right dominant.  LM:   --  LM: Normal.  LAD:   --  Proximal LAD: There was a 50 % stenosis. iFR 0.97 , negative for  ischemia  --  Mid LAD: Patent stent  CX:   --  Circumflex: Normal.  RCA:   --  RCA: The vessel was medium sized. Angiography showed mild  atherosclerosis with no flow limiting lesions.  COMPLICATIONS: No complications occurred during the cath lab visit.  DIAGNOSTIC IMPRESSIONS: Non obstructive CAD Proximal LAD 50% ( iFR 0.97  negative for ischemia)  Normal LV systolic function  DIAGNOSTIC RECOMMENDATIONS: continue medical therapy for CAD    Outpatient TTE (2019):  LVEF 60-65%, normal RV size and systolic function  No significant valvular disease  No pericardial effusion       Nuclear Stress Test:  * No evidence of ischemia or infarct in the setting of artifact as noted.  * There is a small, mild to moderate defect in anteroapical, apical walls that is partially reversible consistent with artifact from differential breast  positioning.   * Perfusion defects are in part the result of breastattenuation.  * Normal LV size.  * Normal LV function.  Calculated LVEF 69%.      Assessment:  71 year old woman with past medical history of Coronary artery disease (s/p PCI in 2017 with most recent cardiac cath in 8/2020 with patent LAD stent and non-obstructive CAD) and history of atypical chest pain, Hypertension, Diabetes mellitus & GERD who presents with left hip pain, abdominal pain and palpitations. At this time her palpitations are likely compensatory response due to pain. ECG consistent with sinus tachycardia and telemetry with sinus rhythm. No signs of acute coronary syndrome. No signs of CHF. D-Dimer elevated, so agree for evaluation for pulmonary embolism.   Echo with normal LV and RV systolic function, no significant valvular disease.  -Stress test with breast artifact but no significant ischemia.  Patient feeling better.      Plan:  1. CV stable for d/c.  2. OP GI w/u.  3. Continue current CV meds at current doses.  4. No additional CV w/u now.  5. OP f/u with Dr. Roa within 2 weeks.

## 2021-06-25 NOTE — ED CDU PROVIDER DISPOSITION NOTE - CLINICAL COURSE
70 year old female with PMH CAD, HTN, HLD, presented to ED with episode epigastric / chest pain / palpitations. Pt evaluated by primary cardiologist ( Nicky / Katy ) who requested TTE and NST. No emergent findings appreciated on imaging and pt cleared from cardiac perspective. Pt advised to follow up with Dr. Roa in the office in 2 weeks and follow up with Dr. Thakur if abdominal pain persists.

## 2021-06-25 NOTE — ED ADULT NURSE REASSESSMENT NOTE - NS ED NURSE REASSESS COMMENT FT1
Assumed pt care @ this time. Report received from day [facility]. Pt @ Ct @ this time. Will re-assess.
Pt back from CT. Ptplaced on CM in NSR w/. Respirations even & unlabored. NAD present. Pt resting comfortably in stretcher. Pt given call bell and instructed on how to properly use system. Pt instructed to use for further assistance. Awaiting CT results. Pt made aware of plan of care and verbalized understanding.
assumed pt care at 0730. Pt A&Ox 4 sitting in bedside chair. Pt reports resolution of palpitations but dyspnea on exertion after walking to bathroom. O2 sat WNL on room air. NSR on cardiac monitor. Pt awaiting echo results at this time. Safety maintained.
pt recived from previous RN, pt in NAD, respirations even and unlabored, vss.
[facility] Observed awake  00b chair on rounds   Pt AxO4, VSS.  Pt denies chest pain/SOB at this time.  Cardio NSR on cardiac monitor.  Left AC #20 IV insertion site, flushing without difficulty .Complaint upper gastric pain rated 6/10 radiating side to side Support provided, safety maintained, will continue to monitor closely,  Safety measures taken, bed in low position, call bell within reach, side rails up x2.  Plan of care explained.  Pt verbalized understanding.  Will continue to monitor.
Assumed care of the patient at 1630. Verbal report received from [facility] ED. Patient transferred to observation unit CDU 7. Patient A&Ox4. No s/s of acute distress or pain. Denies CP/SOB or palpitations. NSR on CM. VSS. PIV patent. Patient pending NST in am. Patient made aware of NPO status after midnight for test in am. Patient in understanding of plan of care. Patient with no further questions for the RN. Resting in comfort. Call bell within reach and encouraged to use when assistance needed. Will continue to monitor.

## 2021-06-25 NOTE — ED CDU PROVIDER DISPOSITION NOTE - PATIENT PORTAL LINK FT
You can access the FollowMyHealth Patient Portal offered by [facility] by registering at the following website: [email or web address]. By joining [employer]’s FollowMyHealth portal, you will also be able to view your health information using other applications (apps) compatible with our system.

## 2021-06-25 NOTE — ED CDU PROVIDER DISPOSITION NOTE - NSFOLLOWUPINSTRUCTIONS_ED_ALL_ED_FT
Chest Pain    Chest pain can be caused by many different conditions which may or may not be dangerous. Causes include heartburn, lung infections, heart attack, blood clot in lungs, skin infections, strain or damage to muscle, cartilage, or bones, etc. In addition to a history and physical examination, an electrocardiogram (ECG) or other lab tests may have been performed to determine the cause of your chest pain. Follow up with your primary care provider or with a cardiologist as instructed.     SEEK IMMEDIATE MEDICAL CARE IF YOU HAVE ANY OF THE FOLLOWING SYMPTOMS: worsening chest pain, coughing up blood, unexplained back/neck/jaw pain, severe abdominal pain, dizziness or lightheadedness, fainting, shortness of breath, sweaty or clammy skin, vomiting, or racing heart beat. These symptoms may represent a serious problem that is an emergency. Do not wait to see if the symptoms will go away. Get medical help right away. Call 911 and do not drive yourself to the hospital.    Follow up with Dr. Roa in 2 weeks    Follow up with Dr. Thakur for evaluation of abdominal pain

## 2021-06-25 NOTE — ED CDU PROVIDER DISPOSITION NOTE - CARE PROVIDERS DIRECT ADDRESSES
,DirectAddress_Unknown,[email or web address]

## 2021-06-25 NOTE — ED CDU PROVIDER SUBSEQUENT DAY NOTE - HISTORY
Pt sitting comfortably in bed throughout night, requested steroid eye drops she states she uses daily in her right eye. otherwise without complaint and no acute events

## 2021-07-03 ENCOUNTER — EMERGENCY (EMERGENCY)
Facility: HOSPITAL | Age: 71
LOS: 1 days | Discharge: HOME | End: 2021-07-03
Attending: EMERGENCY MEDICINE
Payer: MEDICARE

## 2021-07-03 VITALS
RESPIRATION RATE: 20 BRPM | DIASTOLIC BLOOD PRESSURE: 76 MMHG | OXYGEN SATURATION: 97 % | TEMPERATURE: 99 F | HEIGHT: 64 IN | SYSTOLIC BLOOD PRESSURE: 144 MMHG | WEIGHT: 229.28 LBS | HEART RATE: 93 BPM

## 2021-07-03 DIAGNOSIS — H26.40 UNSPECIFIED SECONDARY CATARACT: Chronic | ICD-10-CM

## 2021-07-03 LAB
ALBUMIN SERPL ELPH-MCNC: 3.8 G/DL — SIGNIFICANT CHANGE UP (ref 3.3–5.2)
ALP SERPL-CCNC: 96 U/L — SIGNIFICANT CHANGE UP (ref 40–120)
ALT FLD-CCNC: 9 U/L — SIGNIFICANT CHANGE UP
ANION GAP SERPL CALC-SCNC: 10 MMOL/L — SIGNIFICANT CHANGE UP (ref 5–17)
AST SERPL-CCNC: 12 U/L — SIGNIFICANT CHANGE UP
BASOPHILS # BLD AUTO: 0.02 K/UL — SIGNIFICANT CHANGE UP (ref 0–0.2)
BASOPHILS NFR BLD AUTO: 0.3 % — SIGNIFICANT CHANGE UP (ref 0–2)
BILIRUB SERPL-MCNC: 0.3 MG/DL — LOW (ref 0.4–2)
BUN SERPL-MCNC: 17.9 MG/DL — SIGNIFICANT CHANGE UP (ref 8–20)
CALCIUM SERPL-MCNC: 8.7 MG/DL — SIGNIFICANT CHANGE UP (ref 8.6–10.2)
CHLORIDE SERPL-SCNC: 113 MMOL/L — HIGH (ref 98–107)
CO2 SERPL-SCNC: 19 MMOL/L — LOW (ref 22–29)
CREAT SERPL-MCNC: 1.45 MG/DL — HIGH (ref 0.5–1.3)
EOSINOPHIL # BLD AUTO: 0.25 K/UL — SIGNIFICANT CHANGE UP (ref 0–0.5)
EOSINOPHIL NFR BLD AUTO: 4 % — SIGNIFICANT CHANGE UP (ref 0–6)
GLUCOSE SERPL-MCNC: 97 MG/DL — SIGNIFICANT CHANGE UP (ref 70–99)
HCT VFR BLD CALC: 33.1 % — LOW (ref 34.5–45)
HGB BLD-MCNC: 10.2 G/DL — LOW (ref 11.5–15.5)
IMM GRANULOCYTES NFR BLD AUTO: 0.3 % — SIGNIFICANT CHANGE UP (ref 0–1.5)
LIDOCAIN IGE QN: 51 U/L — SIGNIFICANT CHANGE UP (ref 22–51)
LYMPHOCYTES # BLD AUTO: 1.71 K/UL — SIGNIFICANT CHANGE UP (ref 1–3.3)
LYMPHOCYTES # BLD AUTO: 27.3 % — SIGNIFICANT CHANGE UP (ref 13–44)
MAGNESIUM SERPL-MCNC: 1.9 MG/DL — SIGNIFICANT CHANGE UP (ref 1.8–2.6)
MCHC RBC-ENTMCNC: 26.2 PG — LOW (ref 27–34)
MCHC RBC-ENTMCNC: 30.8 GM/DL — LOW (ref 32–36)
MCV RBC AUTO: 85.1 FL — SIGNIFICANT CHANGE UP (ref 80–100)
MONOCYTES # BLD AUTO: 0.37 K/UL — SIGNIFICANT CHANGE UP (ref 0–0.9)
MONOCYTES NFR BLD AUTO: 5.9 % — SIGNIFICANT CHANGE UP (ref 2–14)
NEUTROPHILS # BLD AUTO: 3.9 K/UL — SIGNIFICANT CHANGE UP (ref 1.8–7.4)
NEUTROPHILS NFR BLD AUTO: 62.2 % — SIGNIFICANT CHANGE UP (ref 43–77)
PLATELET # BLD AUTO: 192 K/UL — SIGNIFICANT CHANGE UP (ref 150–400)
POTASSIUM SERPL-MCNC: 5.4 MMOL/L — HIGH (ref 3.5–5.3)
POTASSIUM SERPL-SCNC: 5.4 MMOL/L — HIGH (ref 3.5–5.3)
PROT SERPL-MCNC: 6.9 G/DL — SIGNIFICANT CHANGE UP (ref 6.6–8.7)
RBC # BLD: 3.89 M/UL — SIGNIFICANT CHANGE UP (ref 3.8–5.2)
RBC # FLD: 16.5 % — HIGH (ref 10.3–14.5)
SODIUM SERPL-SCNC: 142 MMOL/L — SIGNIFICANT CHANGE UP (ref 135–145)
T4 AB SER-ACNC: 5.5 UG/DL — SIGNIFICANT CHANGE UP (ref 4.5–12)
TROPONIN T SERPL-MCNC: <0.01 NG/ML — SIGNIFICANT CHANGE UP (ref 0–0.06)
TSH SERPL-MCNC: 0.99 UIU/ML — SIGNIFICANT CHANGE UP (ref 0.27–4.2)
WBC # BLD: 6.27 K/UL — SIGNIFICANT CHANGE UP (ref 3.8–10.5)
WBC # FLD AUTO: 6.27 K/UL — SIGNIFICANT CHANGE UP (ref 3.8–10.5)

## 2021-07-03 PROCEDURE — 96375 TX/PRO/DX INJ NEW DRUG ADDON: CPT

## 2021-07-03 PROCEDURE — 70450 CT HEAD/BRAIN W/O DYE: CPT | Mod: 26,MG

## 2021-07-03 PROCEDURE — G1004: CPT

## 2021-07-03 PROCEDURE — 83735 ASSAY OF MAGNESIUM: CPT

## 2021-07-03 PROCEDURE — 71046 X-RAY EXAM CHEST 2 VIEWS: CPT | Mod: 26

## 2021-07-03 PROCEDURE — 99284 EMERGENCY DEPT VISIT MOD MDM: CPT

## 2021-07-03 PROCEDURE — 84443 ASSAY THYROID STIM HORMONE: CPT

## 2021-07-03 PROCEDURE — 83690 ASSAY OF LIPASE: CPT

## 2021-07-03 PROCEDURE — 96374 THER/PROPH/DIAG INJ IV PUSH: CPT

## 2021-07-03 PROCEDURE — 80053 COMPREHEN METABOLIC PANEL: CPT

## 2021-07-03 PROCEDURE — 70450 CT HEAD/BRAIN W/O DYE: CPT

## 2021-07-03 PROCEDURE — 85025 COMPLETE CBC W/AUTO DIFF WBC: CPT

## 2021-07-03 PROCEDURE — 71046 X-RAY EXAM CHEST 2 VIEWS: CPT

## 2021-07-03 PROCEDURE — 93005 ELECTROCARDIOGRAM TRACING: CPT

## 2021-07-03 PROCEDURE — 36415 COLL VENOUS BLD VENIPUNCTURE: CPT

## 2021-07-03 PROCEDURE — 93010 ELECTROCARDIOGRAM REPORT: CPT

## 2021-07-03 PROCEDURE — 99284 EMERGENCY DEPT VISIT MOD MDM: CPT | Mod: 25

## 2021-07-03 PROCEDURE — 84436 ASSAY OF TOTAL THYROXINE: CPT

## 2021-07-03 PROCEDURE — 84484 ASSAY OF TROPONIN QUANT: CPT

## 2021-07-03 RX ORDER — METOCLOPRAMIDE HCL 10 MG
10 TABLET ORAL ONCE
Refills: 0 | Status: COMPLETED | OUTPATIENT
Start: 2021-07-03 | End: 2021-07-03

## 2021-07-03 RX ORDER — SODIUM CHLORIDE 9 MG/ML
1000 INJECTION, SOLUTION INTRAVENOUS ONCE
Refills: 0 | Status: COMPLETED | OUTPATIENT
Start: 2021-07-03 | End: 2021-07-03

## 2021-07-03 RX ORDER — DIAZEPAM 5 MG
2 TABLET ORAL ONCE
Refills: 0 | Status: DISCONTINUED | OUTPATIENT
Start: 2021-07-03 | End: 2021-07-03

## 2021-07-03 RX ORDER — KETOROLAC TROMETHAMINE 30 MG/ML
15 SYRINGE (ML) INJECTION ONCE
Refills: 0 | Status: DISCONTINUED | OUTPATIENT
Start: 2021-07-03 | End: 2021-07-03

## 2021-07-03 RX ADMIN — Medication 10 MILLIGRAM(S): at 17:57

## 2021-07-03 RX ADMIN — Medication 2 MILLIGRAM(S): at 17:56

## 2021-07-03 RX ADMIN — Medication 15 MILLIGRAM(S): at 17:56

## 2021-07-03 RX ADMIN — SODIUM CHLORIDE 1000 MILLILITER(S): 9 INJECTION, SOLUTION INTRAVENOUS at 17:56

## 2021-07-03 NOTE — ED ADULT NURSE NOTE - OBJECTIVE STATEMENT
Patient presents to ER C/O SOB, patient was admitted to [facility] Tuesday and stayed for 2 days, resp even/unlabored.  Pt also c/o HA. and states she feels her heart racing intermittently.

## 2021-07-03 NOTE — ED ADULT NURSE NOTE - CHIEF COMPLAINT QUOTE
Patient presents to ER C/O SOB, patient was admitted to [facility] Tuesday and stayed for 2 days, resp even/unlabored.

## 2021-07-03 NOTE — ED PROVIDER NOTE - CARE PLAN
Principal Discharge DX:	Palpitations  Secondary Diagnosis:	Acute nonintractable headache, unspecified headache type

## 2021-07-03 NOTE — ED PROVIDER NOTE - OBJECTIVE STATEMENT
Pt is a 71 y.o. F hx CAD (s/p PCI in 2017, cardiac cath 8/2020), HTN, DM, and GERD presenting with palpitations, headache, and shaking for two weeks. The pt states she has had these sx, recently discharged from the observation unit of the ED, and symptoms have persisted prompting her to seek medical attention. Pt states she has developed no new symptoms since discharge. Denies chest pain, has palpitations, denies shortness of breath. HA is described in a bandlike distribution and characterized as throbbing. Denies nausea, vomiting.

## 2021-07-03 NOTE — ED PROVIDER NOTE - PATIENT PORTAL LINK FT
You can access the FollowMyHealth Patient Portal offered by Health system by registering at the following website: [email or web address]. By joining [employer]’s FollowMyHealth portal, you will also be able to view your health information using other applications (apps) compatible with our system.

## 2021-07-03 NOTE — ED PROVIDER NOTE - ATTENDING CONTRIBUTION TO CARE
I, Devin Sim, performed a face to face bedside interview with this patient regarding history of present illness, review of symptoms and relevant past medical, social and family history.  I completed an independent physical examination. I have communicated the patient’s plan of care and disposition with the resident.  71 year old female with PMH CAD s/p PCI, HTN, glaucoma presents with palpitations and headache. Pt was recently here for episodes of palpitations. Placed on obs, no events on tele, and cleared by cardio. She states that she had another episode of her heart racing today and states that when she has it she feels very tremulous. In addition, she reports a L sided throbbing headache that has been present for 2 weeks. She denies any eye redness, pain or changes in her vision. She has only been taking tylenol.  Gen: NAD, well appearing  ENT: no temporal ttp b/l  CV: RRR  Pul: CTA b/l  Abd: Soft, non-distended, non-tender  Neuro: no focal deficits  Pt improved, neuro intact, HA resolved, no eye Sx, stable for dc

## 2021-07-03 NOTE — ED ADULT NURSE NOTE - SUICIDE SCREENING QUESTION 2
Final Anesthesia Post-op Assessment    Patient: Aliayh Alejandra  Procedure(s) Performed: GASTRECTOMY, SLEEVE, LAPAROSCOPIC  Anesthesia type: General    Vitals Value Taken Time   Temp 98 2/5/2020  1:23 PM   Pulse 98 2/5/2020  1:23 PM   Resp 20 2/5/2020  1:23 PM   SpO2 100 2/5/2020  1:23 PM   /97 2/5/2020  1:23 PM       Last 24 I/O:     Intake/Output Summary (Last 24 hours) at 2/5/2020 1323  Last data filed at 2/5/2020 1200  Gross per 24 hour   Intake 1150 ml   Output 25 ml   Net 1125 ml         Patient Location: bedside  Post-op Vital Signs:stable  Level of Consciousness: participates in exam, awake, alert, return to baseline, follows commands, answers questions appropriately and oriented  Respiratory Status: spontaneous ventilation, unassisted and room air  Cardiovascular stable and hypertensive  Hydration: euvolemic  Pain Management: adequately managed and adequately controlled  Nausea: Moderate  Airway Patency:patent  Post-op Assessment: awake, alert, appropriately conversant, or baseline, no complications, patient tolerated procedure well with no complications and evidence of recall  Comments: The patient is in satisfactory condition at this time.  She denies sore throat, hoarse voice, and recall. No major anesthetic complications.  The patient was very satisfied with the anesthetic care.      
No

## 2021-07-03 NOTE — ED ADULT TRIAGE NOTE - CHIEF COMPLAINT QUOTE
Patient presents to ER C/O SOB, patient was admitted to [facility] Tuesday and stayed for 2 days, resp even/unlabored.

## 2021-07-03 NOTE — ED PROVIDER NOTE - NS ED ROS FT
Constitutional: no fever, no sweats, and no chills.  Eyes: no pain, no redness, and no visual changes.  ENMT: no ear pain and no hearing problems, no nasal congestion/drainage, no dysphagia, and no throat pain, no neck pain, no stiffness  CV: no chest pain, no edema.  Resp: no cough  GI: no abdominal pain, no bloating, no constipation, no diarrhea, no nausea and no vomiting.  : no dysuria, no hematuria  MSK: no msk pain, no weakness  Skin: no lesions, and no rashes.  Neuro: no LOC, no headache, no sensory deficits, and no weakness.

## 2021-07-14 ENCOUNTER — APPOINTMENT (OUTPATIENT)
Dept: CARDIOLOGY | Facility: CLINIC | Age: 71
End: 2021-07-14
Payer: MEDICARE

## 2021-07-14 ENCOUNTER — NON-APPOINTMENT (OUTPATIENT)
Age: 71
End: 2021-07-14

## 2021-07-14 VITALS
WEIGHT: 234 LBS | HEART RATE: 83 BPM | RESPIRATION RATE: 16 BRPM | DIASTOLIC BLOOD PRESSURE: 80 MMHG | SYSTOLIC BLOOD PRESSURE: 132 MMHG | BODY MASS INDEX: 39.95 KG/M2 | HEIGHT: 64 IN

## 2021-07-14 PROCEDURE — 99214 OFFICE O/P EST MOD 30 MIN: CPT

## 2021-07-14 PROCEDURE — 93000 ELECTROCARDIOGRAM COMPLETE: CPT

## 2021-07-14 RX ORDER — MELOXICAM 15 MG/1
15 TABLET ORAL
Qty: 90 | Refills: 0 | Status: DISCONTINUED | COMMUNITY
Start: 2021-01-13 | End: 2021-07-14

## 2021-07-14 RX ORDER — LEVOCETIRIZINE DIHYDROCHLORIDE 5 MG/1
5 TABLET ORAL DAILY
Qty: 30 | Refills: 4 | Status: DISCONTINUED | COMMUNITY
Start: 2020-03-09 | End: 2021-07-14

## 2021-07-14 NOTE — REASON FOR VISIT
[FreeTextEntry1] : JUANY AGUILAR is being seen for a clinic follow-up of. \par \par The patient is a 71-year-old  woman with known history for ischemic heart disease and prior PCI/stent dating back to 2017, history of atypical chest pain syndrome and chronic GERD.\par \par She reports feeling overall well and without acute cardiac complaints. Patient does report sometimes experiencing the atypical chest pain/epigastric pain. These symptoms are mostly nonexertional and associated with heartburn and belching.\par \par Patient denies associated symptoms such as substernal chest pressure, SOB, TY, diaphoresis, palpitations, presyncope, syncope, edema.

## 2021-07-14 NOTE — DISCUSSION/SUMMARY
[FreeTextEntry1] : 1).  Based upon Mrs. Albertina Frances's otherwise stable cardiac pattern, there is no absolute cardiac contraindication for her to undergo the proposed endoscopy procedure.\par \par She may hold Aspirin and Plavix five to seven days prior to procedure and restart thereafter if you deem it safe.\par \par 2).  Will follow up with Dr. Roa in 3 months or PRN.  Repeat carotid doppler study just prior to follow up.\par \par If I may be of additional assistance, please do not hesitate to call.

## 2021-07-14 NOTE — ASSESSMENT
[FreeTextEntry1] : EKG 7/14/2021:  The EKG illustrates sinus rhythm, rate of 83 bpm, nonspecific T wave abnormality, left atrial enlargement pattern.  Essentially unchanged.\par \par CTA of chest (6/24/2021):  No aortic aneurysm or dissection. Heart size is normal. No pericardial effusion;\par \par CTA of abdomen (6/24/2021):  No aortic aneurysm or dissection;\par \par Transthoracic Echocardiogram (6/24/2021):  Mildly enlarged left atrium with mild concentric LVH. Normal LV systolic function with an LVEF of 60 to 65%. Garde I diastolic dysfunction. No significant valvular abnormality. No evidence of pericardial effusion;\par \par Pharmacologic Nuclear Stress test (6/25/2021):  There were no diagnostic changes and no ischemic changes seen on EKG. There was no evidence of ischemia or infarct on the myocardial perfusion images-- with preserved LV function with an LVEF of 69%;\par \par

## 2021-07-14 NOTE — PHYSICAL EXAM
[No Acute Distress] : no acute distress [Obese] : obese [Normal Conjunctiva] : normal conjunctiva [Normal Venous Pressure] : normal venous pressure [Carotid Bruit] : carotid bruit [Normal S1, S2] : normal S1, S2 [No Rub] : no rub [No Gallop] : no gallop [Murmur] : murmur [Clear Lung Fields] : clear lung fields [Good Air Entry] : good air entry [No Respiratory Distress] : no respiratory distress  [Soft] : abdomen soft [Non Tender] : non-tender [No Masses/organomegaly] : no masses/organomegaly [Normal Gait] : normal gait [No Edema] : no edema [No Cyanosis] : no cyanosis [No Clubbing] : no clubbing [No Varicosities] : no varicosities [No Rash] : no rash [No Skin Lesions] : no skin lesions [Moves all extremities] : moves all extremities [No Focal Deficits] : no focal deficits [Normal Speech] : normal speech [Alert and Oriented] : alert and oriented [Normal memory] : normal memory [de-identified] : heard on left [de-identified] : Grade I/VI systolic murmur

## 2021-07-14 NOTE — HISTORY OF PRESENT ILLNESS
[FreeTextEntry1] : Patient also went to ER at [facility] couple weeks ago with this chest / epigastric discomfort;  \par \par During hospital course, Troponins were negative, nuclear stress test had no evidence of ischemia with preserved LV function with an LVEF 69%, Echocardiogram also showed normal LV with LVEF of 60 to 65% and only mild LVH with no pericardial effusion;\par \par Patient is now here today for cardiac clearance regarding an endoscopy procedure scheduled for July 21st with Dr. Schuler;\par \par She had a Carotid Doppler scheduled for that day but will reschedule;\par \par Tolerating current medical regimen without difficulty including DAPT;

## 2021-07-21 ENCOUNTER — APPOINTMENT (OUTPATIENT)
Dept: CARDIOLOGY | Facility: CLINIC | Age: 71
End: 2021-07-21

## 2021-10-05 ENCOUNTER — APPOINTMENT (OUTPATIENT)
Dept: CARDIOLOGY | Facility: CLINIC | Age: 71
End: 2021-10-05
Payer: MEDICARE

## 2021-10-05 PROCEDURE — 93306 TTE W/DOPPLER COMPLETE: CPT

## 2021-10-05 PROCEDURE — 93880 EXTRACRANIAL BILAT STUDY: CPT

## 2021-10-12 ENCOUNTER — EMERGENCY (EMERGENCY)
Facility: HOSPITAL | Age: 71
LOS: 1 days | Discharge: HOME | End: 2021-10-12
Attending: STUDENT IN AN ORGANIZED HEALTH CARE EDUCATION/TRAINING PROGRAM
Payer: MEDICARE

## 2021-10-12 VITALS
OXYGEN SATURATION: 98 % | DIASTOLIC BLOOD PRESSURE: 69 MMHG | TEMPERATURE: 98 F | WEIGHT: 233.91 LBS | SYSTOLIC BLOOD PRESSURE: 112 MMHG | HEART RATE: 100 BPM | HEIGHT: 64 IN | RESPIRATION RATE: 18 BRPM

## 2021-10-12 VITALS
OXYGEN SATURATION: 98 % | DIASTOLIC BLOOD PRESSURE: 84 MMHG | TEMPERATURE: 98 F | SYSTOLIC BLOOD PRESSURE: 161 MMHG | RESPIRATION RATE: 19 BRPM | HEART RATE: 90 BPM

## 2021-10-12 DIAGNOSIS — H26.40 UNSPECIFIED SECONDARY CATARACT: Chronic | ICD-10-CM

## 2021-10-12 LAB
ALBUMIN SERPL ELPH-MCNC: 3.4 G/DL — SIGNIFICANT CHANGE UP (ref 3.3–5.2)
ALP SERPL-CCNC: 106 U/L — SIGNIFICANT CHANGE UP (ref 40–120)
ALT FLD-CCNC: 8 U/L — SIGNIFICANT CHANGE UP
ANION GAP SERPL CALC-SCNC: 11 MMOL/L — SIGNIFICANT CHANGE UP (ref 5–17)
AST SERPL-CCNC: 17 U/L — SIGNIFICANT CHANGE UP
BASOPHILS # BLD AUTO: 0.01 K/UL — SIGNIFICANT CHANGE UP (ref 0–0.2)
BASOPHILS NFR BLD AUTO: 0.1 % — SIGNIFICANT CHANGE UP (ref 0–2)
BILIRUB SERPL-MCNC: 0.2 MG/DL — LOW (ref 0.4–2)
BUN SERPL-MCNC: 20.6 MG/DL — HIGH (ref 8–20)
CALCIUM SERPL-MCNC: 8.5 MG/DL — LOW (ref 8.6–10.2)
CHLORIDE SERPL-SCNC: 106 MMOL/L — SIGNIFICANT CHANGE UP (ref 98–107)
CO2 SERPL-SCNC: 17 MMOL/L — LOW (ref 22–29)
CREAT SERPL-MCNC: 1.57 MG/DL — HIGH (ref 0.5–1.3)
EOSINOPHIL # BLD AUTO: 0.31 K/UL — SIGNIFICANT CHANGE UP (ref 0–0.5)
EOSINOPHIL NFR BLD AUTO: 2.7 % — SIGNIFICANT CHANGE UP (ref 0–6)
GLUCOSE SERPL-MCNC: 199 MG/DL — HIGH (ref 70–99)
HCT VFR BLD CALC: 38.3 % — SIGNIFICANT CHANGE UP (ref 34.5–45)
HGB BLD-MCNC: 11.9 G/DL — SIGNIFICANT CHANGE UP (ref 11.5–15.5)
IMM GRANULOCYTES NFR BLD AUTO: 0.5 % — SIGNIFICANT CHANGE UP (ref 0–1.5)
LIDOCAIN IGE QN: 46 U/L — SIGNIFICANT CHANGE UP (ref 22–51)
LYMPHOCYTES # BLD AUTO: 1.81 K/UL — SIGNIFICANT CHANGE UP (ref 1–3.3)
LYMPHOCYTES # BLD AUTO: 15.8 % — SIGNIFICANT CHANGE UP (ref 13–44)
MAGNESIUM SERPL-MCNC: 1.8 MG/DL — SIGNIFICANT CHANGE UP (ref 1.6–2.6)
MCHC RBC-ENTMCNC: 26.9 PG — LOW (ref 27–34)
MCHC RBC-ENTMCNC: 31.1 GM/DL — LOW (ref 32–36)
MCV RBC AUTO: 86.7 FL — SIGNIFICANT CHANGE UP (ref 80–100)
MONOCYTES # BLD AUTO: 0.54 K/UL — SIGNIFICANT CHANGE UP (ref 0–0.9)
MONOCYTES NFR BLD AUTO: 4.7 % — SIGNIFICANT CHANGE UP (ref 2–14)
NEUTROPHILS # BLD AUTO: 8.74 K/UL — HIGH (ref 1.8–7.4)
NEUTROPHILS NFR BLD AUTO: 76.2 % — SIGNIFICANT CHANGE UP (ref 43–77)
PLATELET # BLD AUTO: 237 K/UL — SIGNIFICANT CHANGE UP (ref 150–400)
POTASSIUM SERPL-MCNC: 5 MMOL/L — SIGNIFICANT CHANGE UP (ref 3.5–5.3)
POTASSIUM SERPL-SCNC: 5 MMOL/L — SIGNIFICANT CHANGE UP (ref 3.5–5.3)
PROT SERPL-MCNC: 6.6 G/DL — SIGNIFICANT CHANGE UP (ref 6.6–8.7)
RBC # BLD: 4.42 M/UL — SIGNIFICANT CHANGE UP (ref 3.8–5.2)
RBC # FLD: 14.9 % — HIGH (ref 10.3–14.5)
SODIUM SERPL-SCNC: 134 MMOL/L — LOW (ref 135–145)
T4 AB SER-ACNC: 5.5 UG/DL — SIGNIFICANT CHANGE UP (ref 4.5–12)
TROPONIN T SERPL-MCNC: <0.01 NG/ML — SIGNIFICANT CHANGE UP (ref 0–0.06)
TSH SERPL-MCNC: 0.81 UIU/ML — SIGNIFICANT CHANGE UP (ref 0.27–4.2)
WBC # BLD: 11.47 K/UL — HIGH (ref 3.8–10.5)
WBC # FLD AUTO: 11.47 K/UL — HIGH (ref 3.8–10.5)

## 2021-10-12 PROCEDURE — 85025 COMPLETE CBC W/AUTO DIFF WBC: CPT

## 2021-10-12 PROCEDURE — 36415 COLL VENOUS BLD VENIPUNCTURE: CPT

## 2021-10-12 PROCEDURE — 99284 EMERGENCY DEPT VISIT MOD MDM: CPT | Mod: 25

## 2021-10-12 PROCEDURE — 83735 ASSAY OF MAGNESIUM: CPT

## 2021-10-12 PROCEDURE — 84436 ASSAY OF TOTAL THYROXINE: CPT

## 2021-10-12 PROCEDURE — 80053 COMPREHEN METABOLIC PANEL: CPT

## 2021-10-12 PROCEDURE — 71046 X-RAY EXAM CHEST 2 VIEWS: CPT | Mod: 26

## 2021-10-12 PROCEDURE — 99285 EMERGENCY DEPT VISIT HI MDM: CPT

## 2021-10-12 PROCEDURE — 84484 ASSAY OF TROPONIN QUANT: CPT

## 2021-10-12 PROCEDURE — 84443 ASSAY THYROID STIM HORMONE: CPT

## 2021-10-12 PROCEDURE — 83690 ASSAY OF LIPASE: CPT

## 2021-10-12 PROCEDURE — 93005 ELECTROCARDIOGRAM TRACING: CPT

## 2021-10-12 PROCEDURE — 71046 X-RAY EXAM CHEST 2 VIEWS: CPT

## 2021-10-12 PROCEDURE — 93010 ELECTROCARDIOGRAM REPORT: CPT

## 2021-10-12 NOTE — ED PROVIDER NOTE - OBJECTIVE STATEMENT
72yo female with pmh of CAD s/p stent on plavix and asa (took both today), HTN, HLD, DM presents for tingling and heat sensation. Pt states she was in the bathroom and then started to feel whole body tingling and then felt a heat sensation in her whole body along with palpitations. Pt states the episode resolved spontaneously. Pt states she was here for same a while ago and told everything was ok. Pt states she otherwise at normal state of health. Pt denies fevers/chills, ha, loc, focal neuro deficits, cp/sob, cough, abd pain/n/v/d, urinary symptoms, recent travel and sick contacts.

## 2021-10-12 NOTE — ED ADULT TRIAGE NOTE - CHIEF COMPLAINT QUOTE
pt states she had sudden onset body tingling all over, with anxiety. pt states she feels okay now just nervous

## 2021-10-12 NOTE — ED ADULT NURSE REASSESSMENT NOTE - NS ED NURSE REASSESS COMMENT FT1
Pt A&O x 4 comfortable, denies complaints at this time. Denies pain. Nonslip footwear. Bed locked and in lowest position. Frequent checks. Able to make needs known. Will continue to monitor.

## 2021-10-12 NOTE — ED PROVIDER NOTE - PROGRESS NOTE DETAILS
Patient received in sign-out from Dr. Arroyo; patient pending labs for episode of palpitations and tingling. Labs without significant changes.

## 2021-10-12 NOTE — ED PROVIDER NOTE - PATIENT PORTAL LINK FT
You can access the FollowMyHealth Patient Portal offered by [facility] by registering at the following website: [email or web address]. By joining [employer]’s FollowMyHealth portal, you will also be able to view your health information using other applications (apps) compatible with our system.

## 2021-10-12 NOTE — ED PROVIDER NOTE - CLINICAL SUMMARY MEDICAL DECISION MAKING FREE TEXT BOX
70yo female with pmh of CAD s/p stent on plavix and asa (took both today), HTN, HLD, DM presents for tingling and heat sensation 70yo female with pmh of CAD s/p stent on plavix and asa (took both today), HTN, HLD, DM presents for tingling and heat sensation. Labs, cxr, ekg

## 2021-10-12 NOTE — ED PROVIDER NOTE - NSFOLLOWUPINSTRUCTIONS_ED_ALL_ED_FT
1) Follow up with your doctor in 1-2 days  2) Return to the ER for worsening or concerning symptoms      Paresthesia    Paresthesia is an abnormal burning or prickling sensation. It is usually felt in the hands, arms, legs, or feet. However, it may occur in any part of the body. Usually, paresthesia is not painful. It may feel like:  •Tingling or numbness.      •Buzzing.      •Itching.    Paresthesia may occur without any clear cause, or it may be caused by:  •Breathing too quickly (hyperventilation).      •Pressure on a nerve.      •An underlying medical condition.      •Side effects of a medication.      •Nutritional deficiencies.      •Exposure to toxic chemicals.      Most people experience temporary (transient) paresthesia at some time in their lives. For some people, it may be long-lasting (chronic) because of an underlying medical condition. If you have paresthesia that lasts a long time, you may need to be evaluated by your health care provider.      Follow these instructions at home:      Alcohol use    • Do not drink alcohol if:  •Your health care provider tells you not to drink.      •You are pregnant, may be pregnant, or are planning to become pregnant.      •If you drink alcohol:•Limit how much you use to:  •0–1 drink a day for women.      •0–2 drinks a day for men.        •Be aware of how much alcohol is in your drink. In the [place] one drink equals one 12 oz bottle of beer (355 mL), one 5 oz glass of wine (148 mL), or one 1½ oz glass of hard liquor (44 mL).          Nutrition    •Eat a healthy diet. This includes:  •Eating foods that are high in fiber, such as fresh fruits and vegetables, whole grains, and beans.      •Limiting foods that are high in fat and processed sugars, such as fried or sweet foods.        General instructions     •Take over-the-counter and prescription medicines only as told by your health care provider.      • Do not use any products that contain nicotine or tobacco, such as cigarettes and e-cigarettes. These can keep blood from reaching damaged nerves. If you need help quitting, ask your health care provider.      •If you have diabetes, work closely with your health care provider to keep your blood sugar under control.    •If you have numbness in your feet:  •Check every day for signs of injury or infection. Watch for redness, warmth, and swelling.      •Wear padded socks and comfortable shoes. These help protect your feet.        •Keep all follow-up visits as told by your health care provider. This is important.        Contact a health care provider if you:    •Have paresthesia that gets worse or does not go away.      •Have a burning or prickling feeling that gets worse when you walk.      •Have pain, cramps, or dizziness.      •Develop a rash.        Get help right away if you:    •Feel weak.      •Have trouble walking or moving.      •Have problems with speech, understanding, or vision.      •Feel confused.      •Cannot control your bladder or bowel movements.      •Have numbness after an injury.      •Develop new weakness in an arm or leg.      •Faint.        Summary    •Paresthesia is an abnormal burning or prickling sensation that is usually felt in the hands, arms, legs, or feet. It may also occur in other parts of the body.      •Paresthesia may occur without any clear cause, or it may be caused by breathing too quickly (hyperventilation), pressure on a nerve, an underlying medical condition, side effects of a medication, nutritional deficiencies, or exposure to toxic chemicals.      •If you have paresthesia that lasts a long time, you may need to be evaluated by your health care provider.      This information is not intended to replace advice given to you by your health care provider. Make sure you discuss any questions you have with your health care provider.      Heart Palpitations    WHAT YOU NEED TO KNOW:    Heart palpitations are feelings that your heart races, jumps, throbs, or flutters. You may feel extra beats, no beats for a short time, or skipped beats. You may have these feelings in your chest, throat, or neck. They may happen when you are sitting, standing, or lying. Heart palpitations may be frightening, but are usually not caused by a serious problem.     DISCHARGE INSTRUCTIONS:    Call 911 or have someone else call for any of the following:   •You have any of the following signs of a heart attack: ?Squeezing, pressure, or pain in your chest      ?You may also have any of the following: ?Discomfort or pain in your back, neck, jaw, stomach, or arm      ?Shortness of breath      ?Nausea or vomiting      ?Lightheadedness or a sudden cold sweat        •You have any of the following signs of a stroke: ?Numbness or drooping on one side of your face       ?Weakness in an arm or leg      ?Confusion or difficulty speaking      ?Dizziness, a severe headache, or vision loss      •You faint or lose consciousness.       Return to the emergency department if:   •Your palpitations happen more often or get more intense.           Contact your healthcare provider if:   •You have new or worsening swelling in your feet or ankles.      •You have questions or concerns about your condition or care.      Follow up with your healthcare provider as directed: You may need to follow up with a cardiologist. You may need tests to check for heart problems that cause palpitations. Write down your questions so you remember to ask them during your visits.     Keep a record: Write down when your palpitations start and stop, what you were doing when they started, and your symptoms. Keep track of what you ate or drank within a few hours of your palpitations. Include anything that seemed to help your symptoms, such as lying down or holding your breath. This record will help you and your healthcare provider learn what triggers your palpitations. Bring this record with you to your follow up visits.    Help prevent heart palpitations:   •Manage stress and anxiety. Find ways to relax such as listening to music, meditating, or doing yoga. Exercise can also help decrease stress and anxiety. Talk to someone you trust about your stress or anxiety. You can also talk to a therapist.       •Get plenty of sleep every night. Ask your healthcare provider how much sleep you need each night.       •Do not drink caffeine or alcohol. Caffeine and alcohol can make your palpitations worse. Caffeine is found in soda, coffee, tea, chocolate, and drinks that increase your energy.       •Do not smoke. Nicotine and other chemicals in cigarettes and cigars may damage your heart and blood vessels. Ask your healthcare provider for information if you currently smoke and need help to quit. E-cigarettes or smokeless tobacco still contain nicotine. Talk to your healthcare provider before you use these products.       •Do not use illegal drugs. Talk to your healthcare provider if you use illegal drugs and want help to quit.

## 2021-10-12 NOTE — ED PROVIDER NOTE - PHYSICAL EXAMINATION
Const: Awake, alert and oriented. In no acute distress. Well appearing.  HEENT: NC/AT. Moist mucous membranes.  Eyes: No scleral icterus. EOMI.  Neck:. Soft and supple. Full ROM without pain.  Cardiac: Regular rate and regular rhythm. +S1/S2. Peripheral pulses 2+ and symmetric. No LE edema.  Resp: Speaking in full sentences. No evidence of respiratory distress. No wheezes, rales or rhonchi.  Abd: Soft, non-tender, non-distended. Normal bowel sounds in all 4 quadrants. No guarding or rebound.  Back: Spine midline and non-tender. No CVAT.  Skin: No rashes, abrasions or lacerations.  Lymph: No cervical lymphadenopathy.  Neuro: A&Ox3, moving all extremities symmetrically, follows commands, motor kobe upper and lower ext 5/5, sensory symm and intact CN 2-12 grossly intact, no ataxia, no nystagmus, no dysmetria, no ddk, symm kobe, no pronator drift

## 2021-10-12 NOTE — ED PROVIDER NOTE - CARE PLAN
1 Principal Discharge DX:	Palpitations   Principal Discharge DX:	Palpitations  Secondary Diagnosis:	Tingling

## 2021-10-14 ENCOUNTER — APPOINTMENT (OUTPATIENT)
Dept: CARDIOLOGY | Facility: CLINIC | Age: 71
End: 2021-10-14
Payer: MEDICARE

## 2021-10-14 ENCOUNTER — NON-APPOINTMENT (OUTPATIENT)
Age: 71
End: 2021-10-14

## 2021-10-14 VITALS
SYSTOLIC BLOOD PRESSURE: 129 MMHG | HEIGHT: 64 IN | HEART RATE: 82 BPM | WEIGHT: 234 LBS | BODY MASS INDEX: 39.95 KG/M2 | RESPIRATION RATE: 16 BRPM | DIASTOLIC BLOOD PRESSURE: 70 MMHG

## 2021-10-14 PROCEDURE — 93000 ELECTROCARDIOGRAM COMPLETE: CPT

## 2021-10-14 PROCEDURE — 99214 OFFICE O/P EST MOD 30 MIN: CPT

## 2021-10-14 RX ORDER — HYDROCHLOROTHIAZIDE 25 MG/1
25 TABLET ORAL
Refills: 0 | Status: DISCONTINUED | COMMUNITY
End: 2021-10-14

## 2021-10-14 NOTE — PHYSICAL EXAM
[Well Developed] : well developed [Well Nourished] : well nourished [No Acute Distress] : no acute distress [Obese] : obese [Normal Conjunctiva] : normal conjunctiva [Normal Venous Pressure] : normal venous pressure [No Carotid Bruit] : no carotid bruit [Normal S1, S2] : normal S1, S2 [No Rub] : no rub [No Gallop] : no gallop [Clear Lung Fields] : clear lung fields [Good Air Entry] : good air entry [No Respiratory Distress] : no respiratory distress  [Soft] : abdomen soft [Non Tender] : non-tender [No Masses/organomegaly] : no masses/organomegaly [Normal Bowel Sounds] : normal bowel sounds [Normal Gait] : normal gait [No Edema] : no edema [No Cyanosis] : no cyanosis [No Clubbing] : no clubbing [No Varicosities] : no varicosities [No Rash] : no rash [No Skin Lesions] : no skin lesions [Moves all extremities] : moves all extremities [No Focal Deficits] : no focal deficits [Normal Speech] : normal speech [Alert and Oriented] : alert and oriented [Normal memory] : normal memory [de-identified] : regular rhythm, grade 1/6 systolic murmur [de-identified] : obesity

## 2021-10-14 NOTE — HISTORY OF PRESENT ILLNESS
[FreeTextEntry1] : Nuclear stress test performed earlier in the spring in the hospital demonstrated preserved LV systolic function EF 69%. Negative for ischemia on myocardial perfusion images with pharmacologic protocol;\par \par

## 2021-10-14 NOTE — ASSESSMENT
[FreeTextEntry1] : EKG demonstrating normal sinus rhythm at a rate of 80; left atrial abnormality pattern withnonspecific T-wave changes;\par \par In summary this 71-year-old black female with history of obesity, chronic low back pain no paresthesias into the left leg who was had a history for CAD and prior coronary stents with stable cardiac pattern;\par \par Plan:\par \par Patient may be facing pain management injections in the near future;\par \par Based on her stable cardiac pattern, there would be no absolute cardiac contraindication for pain management treatments/injections;\par \par Okay to continue current medical regimen;\par \par Followup in this office within 4 months or p.r.n.;\par \par No additional cardiac workup indicated at this time\par \par Patient counseled on the importance of trying to pursue a low-carb weight reducing diet;\par ;

## 2021-10-14 NOTE — REASON FOR VISIT
[Arrhythmia/ECG Abnorrmalities] : arrhythmia/ECG abnormalities [Structural Heart and Valve Disease] : structural heart and valve disease [Hyperlipidemia] : hyperlipidemia [Hypertension] : hypertension [Coronary Artery Disease] : coronary artery disease [FreeTextEntry3] : F. ALI [FreeTextEntry1] : The patient is a 71-year-old  woman with a known history for morbid obesity,  ischemic heart disease, prior PCI/stent dating back to 2017. She presents back to the office today for general cardiac checkup;\par \par Patient apparently had a recent hospitalization 2 days ago after experiencing some burning and numbness like feeling coming down from her low back to her left thigh into the leg;\par \par After cardiac testing it was felt that she had probably some nerve impingement of the lower spine and was recommended for neurology and possible pain management intervention;\par \par She is otherwise been cardiac stable without significant chest pain, shortness of breath, palpitations or dizziness;

## 2021-10-14 NOTE — REVIEW OF SYSTEMS
[Weight Gain (___ Lbs)] : [unfilled] ~Ulb weight gain [Joint Pain] : joint pain [Joint Stiffness] : joint stiffness [Thigh Pain] : pain in the thigh [Lower Back Pain] : lower back pain [Numbness (Hypoesthesia)] : numbness [Tingling (Paresthesia)] : tingling [Negative] : Heme/Lymph

## 2021-12-01 NOTE — ED ADULT TRIAGE NOTE - MEANS OF ARRIVAL
"Chief Complaint  COPD    Mena Recinos presents to [facility]  History of Present Illness  Presents today for follow-up on COPD. Patient is requesting a new pulmonologist. Patient also states that he cannot afford Yupelri and perforomist. He was previously on trilogy Ellipta but was switched to the other medication. Patient has intermittent shortness of breath, shortness of breath with exertion. He has been a heavy smoker of 2 packs/day. Slowly decreasing to 1-1.5 pack/day.    Objective   Vital Signs:   /62   Pulse 93   Temp 97.2 °F (36.2 °C)   Ht 167.6 cm (66\")   Wt 95.4 kg (210 lb 6.4 oz)   SpO2 94%   BMI 33.96 kg/m²     Physical Exam  Vitals reviewed.   Constitutional:       Appearance: Normal appearance. He is well-developed.   HENT:      Head: Normocephalic and atraumatic.      Right Ear: External ear normal.      Left Ear: External ear normal.      Mouth/Throat:      Pharynx: No oropharyngeal exudate.   Eyes:      Conjunctiva/sclera: Conjunctivae normal.      Pupils: Pupils are equal, round, and reactive to light.   Cardiovascular:      Rate and Rhythm: Normal rate and regular rhythm.      Heart sounds: No murmur heard.  No friction rub. No gallop.    Pulmonary:      Effort: Pulmonary effort is normal.      Breath sounds: Normal breath sounds. Decreased air movement present. No wheezing or rhonchi.   Abdominal:      General: Bowel sounds are normal. There is no distension.      Palpations: Abdomen is soft.      Tenderness: There is no abdominal tenderness.   Skin:     General: Skin is warm and dry.   Neurological:      Mental Status: He is alert and oriented to person, place, and time.        Result Review :                 Assessment and Plan    Diagnoses and all orders for this visit:    1. Chronic obstructive pulmonary disease, unspecified COPD type (HCC) (Primary)  -     Ambulatory Referral to Pulmonology  -     Fluticasone " Furoate-Vilanterol (Breo Ellipta) 100-25 MCG/INH inhaler; Inhale 1 puff Daily.  Dispense: 60 each; Refill: 2    Discontinue the yupelri per pharmacy once patient cannot afford.  Hold Pulmicort at this time while restarting Trelegy Ellipta      Follow Up   No follow-ups on file.  Patient was given instructions and counseling regarding his condition or for health maintenance advice. Please see specific information pulled into the AVS if appropriate.       Answers for HPI/ROS submitted by the patient on 11/29/2021  What is the primary reason for your visit?: Other  Please describe your symptoms.: Need medications  Have you had these symptoms before?: No  How long have you been having these symptoms?: Greater than 2 weeks  Please list any medications you are currently taking for this condition.: None at this time due to past  Will not work with [facility] pharmacy or me about medicines I can afford.  Please describe any probable cause for these symptoms. : Have no medicines for COPD.       ambulatory

## 2021-12-23 ENCOUNTER — NON-APPOINTMENT (OUTPATIENT)
Age: 71
End: 2021-12-23

## 2021-12-23 DIAGNOSIS — Z98.890 OTHER SPECIFIED POSTPROCEDURAL STATES: ICD-10-CM

## 2021-12-23 DIAGNOSIS — Z01.419 ENCOUNTER FOR GYNECOLOGICAL EXAMINATION (GENERAL) (ROUTINE) W/OUT ABNORMAL FINDINGS: ICD-10-CM

## 2021-12-23 DIAGNOSIS — Z92.89 PERSONAL HISTORY OF OTHER MEDICAL TREATMENT: ICD-10-CM

## 2022-01-04 ENCOUNTER — EMERGENCY (EMERGENCY)
Facility: HOSPITAL | Age: 72
LOS: 1 days | Discharge: HOME | End: 2022-01-04
Attending: EMERGENCY MEDICINE
Payer: MEDICARE

## 2022-01-04 VITALS
HEIGHT: 64 IN | SYSTOLIC BLOOD PRESSURE: 167 MMHG | TEMPERATURE: 98 F | DIASTOLIC BLOOD PRESSURE: 95 MMHG | RESPIRATION RATE: 17 BRPM | WEIGHT: 240.08 LBS | HEART RATE: 95 BPM | OXYGEN SATURATION: 99 %

## 2022-01-04 DIAGNOSIS — H26.40 UNSPECIFIED SECONDARY CATARACT: Chronic | ICD-10-CM

## 2022-01-04 PROCEDURE — 99284 EMERGENCY DEPT VISIT MOD MDM: CPT

## 2022-01-04 PROCEDURE — 99283 EMERGENCY DEPT VISIT LOW MDM: CPT | Mod: 25

## 2022-01-04 PROCEDURE — 96372 THER/PROPH/DIAG INJ SC/IM: CPT

## 2022-01-04 RX ORDER — CYCLOBENZAPRINE HYDROCHLORIDE 10 MG/1
10 TABLET, FILM COATED ORAL ONCE
Refills: 0 | Status: COMPLETED | OUTPATIENT
Start: 2022-01-04 | End: 2022-01-04

## 2022-01-04 RX ORDER — IBUPROFEN 200 MG
1 TABLET ORAL
Qty: 9 | Refills: 0
Start: 2022-01-04 | End: 2022-01-06

## 2022-01-04 RX ORDER — CYCLOBENZAPRINE HYDROCHLORIDE 10 MG/1
1 TABLET, FILM COATED ORAL
Qty: 15 | Refills: 0
Start: 2022-01-04 | End: 2022-01-08

## 2022-01-04 RX ORDER — KETOROLAC TROMETHAMINE 30 MG/ML
30 SYRINGE (ML) INJECTION ONCE
Refills: 0 | Status: DISCONTINUED | OUTPATIENT
Start: 2022-01-04 | End: 2022-01-04

## 2022-01-04 RX ORDER — LIDOCAINE 4 G/100G
1 CREAM TOPICAL ONCE
Refills: 0 | Status: COMPLETED | OUTPATIENT
Start: 2022-01-04 | End: 2022-01-04

## 2022-01-04 RX ORDER — LIDOCAINE 4 G/100G
1 CREAM TOPICAL
Qty: 5 | Refills: 0
Start: 2022-01-04 | End: 2022-01-08

## 2022-01-04 RX ADMIN — Medication 30 MILLIGRAM(S): at 21:16

## 2022-01-04 RX ADMIN — CYCLOBENZAPRINE HYDROCHLORIDE 10 MILLIGRAM(S): 10 TABLET, FILM COATED ORAL at 21:13

## 2022-01-04 RX ADMIN — LIDOCAINE 1 PATCH: 4 CREAM TOPICAL at 21:16

## 2022-01-04 RX ADMIN — Medication 40 MILLIGRAM(S): at 21:13

## 2022-01-04 NOTE — ED STATDOCS - NSFOLLOWUPINSTRUCTIONS_ED_ALL_ED_FT
Acute Back Pain, Adult    Acute back pain is sudden and usually short-lived. It is often caused by an injury to the muscles and tissues in the back. The injury may result from:  •A muscle or ligament getting overstretched or torn (strained). Ligaments are tissues that connect bones to each other. Lifting something improperly can cause a back strain.      •Wear and tear (degeneration) of the spinal disks. Spinal disks are circular tissue that provide cushioning between the bones of the spine (vertebrae).      •Twisting motions, such as while playing sports or doing yard work.      •A hit to the back.      •Arthritis.      You may have a physical exam, lab tests, and imaging tests to find the cause of your pain. Acute back pain usually goes away with rest and home care.      Follow these instructions at home:    Managing pain, stiffness, and swelling     •Treatment may include medicines for pain and inflammation that are taken by mouth or applied to the skin, prescription pain medicine, or muscle relaxants. Take over-the-counter and prescription medicines only as told by your health care provider.    •Your health care provider may recommend applying ice during the first 24–48 hours after your pain starts. To do this:  •Put ice in a plastic bag.      •Place a towel between your skin and the bag.      •Leave the ice on for 20 minutes, 2–3 times a day.      •If directed, apply heat to the affected area as often as told by your health care provider. Use the heat source that your health care provider recommends, such as a moist heat pack or a heating pad.  •Place a towel between your skin and the heat source.      •Leave the heat on for 20–30 minutes.      •Remove the heat if your skin turns bright red. This is especially important if you are unable to feel pain, heat, or cold. You have a greater risk of getting burned.          Activity      • Do not stay in bed. Staying in bed for more than 1–2 days can delay your recovery.    •Sit up and stand up straight. Avoid leaning forward when you sit or hunching over when you stand.  •If you work at a desk, sit close to it so you do not need to lean over. Keep your chin tucked in. Keep your neck drawn back, and keep your elbows bent at a 90-degree angle (right angle).      •Sit high and close to the steering wheel when you drive. Add lower back (lumbar) support to your car seat, if needed.        •Take short walks on even surfaces as soon as you are able. Try to increase the length of time you walk each day.      • Do not sit, drive, or  one place for more than 30 minutes at a time. Sitting or standing for long periods of time can put stress on your back.      • Do not drive or use heavy machinery while taking prescription pain medicine.    •Use proper lifting techniques. When you bend and lift, use positions that put less stress on your back:  •Bend your knees.      •Keep the load close to your body.      •Avoid twisting.        •Exercise regularly as told by your health care provider. Exercising helps your back heal faster and helps prevent back injuries by keeping muscles strong and flexible.      •Work with a physical therapist to make a safe exercise program, as recommended by your health care provider. Do any exercises as told by your physical therapist.      Lifestyle     •Maintain a healthy weight. Extra weight puts stress on your back and makes it difficult to have good posture.      •Avoid activities or situations that make you feel anxious or stressed. Stress and anxiety increase muscle tension and can make back pain worse. Learn ways to manage anxiety and stress, such as through exercise.      General instructions     •Sleep on a firm mattress in a comfortable position. Try lying on your side with your knees slightly bent. If you lie on your back, put a pillow under your knees.    •Follow your treatment plan as told by your health care provider. This may include:  •Cognitive or behavioral therapy.      •Acupuncture or massage therapy.      •Meditation or yoga.          Contact a health care provider if:    •You have pain that is not relieved with rest or medicine.      •You have increasing pain going down into your legs or buttocks.      •Your pain does not improve after 2 weeks.      •You have pain at night.      •You lose weight without trying.      •You have a fever or chills.        Get help right away if:    •You develop new bowel or bladder control problems.      •You have unusual weakness or numbness in your arms or legs.      •You develop nausea or vomiting.      •You develop abdominal pain.      •You feel faint.        Summary    •Acute back pain is sudden and usually short-lived.      •Use proper lifting techniques. When you bend and lift, use positions that put less stress on your back.      •Take over-the-counter and prescription medicines and apply heat or ice as directed by your health care provider.      This information is not intended to replace advice given to you by your health care provider. Make sure you discuss any questions you have with your health care provider.

## 2022-01-04 NOTE — ED STATDOCS - PHYSICAL EXAMINATION
Const: Awake, alert and oriented. In no acute distress. Well appearing.  HEENT: NC/AT. Moist mucous membranes.  Eyes: No scleral icterus. EOMI.  Neck:. Soft and supple. Full ROM without pain.  Cardiac: Regular rate and regular rhythm. +S1/S2. No murmurs. Peripheral pulses 2+ and symmetric. No LE edema.  Resp: Speaking in full sentences. No evidence of respiratory distress. No wheezes, rales or rhonchi.  Abd: Soft, + right flank and lower abdominal TTP, non-distended. Normal bowel sounds in all 4 quadrants. No guarding or rebound.  Back: Spine midline and non-tender. No CVAT. + TTP right lumbar paraspinal and QL.  MSK: Pelvis stable, no TTP of either greater trochanter, full ROM bilateral hips/knees.  Skin: No rashes, abrasions or lacerations.  Neuro: Awake, alert & oriented x 3. Moves all extremities symmetrically. Symmetrical sensation b/l LE

## 2022-01-04 NOTE — ED ADULT NURSE REASSESSMENT NOTE - NS ED NURSE REASSESS COMMENT FT1
pt states that she has been having lower right back pain x1 day. Pt is alert and oriented. Pt denies sob, chest pain, nausea, vomiting, and dizziness. Pt resp are even and unlabored, skin color maye for race. Pt updated on plan of care.

## 2022-01-04 NOTE — ED STATDOCS - ATTENDING CONTRIBUTION TO CARE
I, Kerri Ferrera, performed the initial face to face bedside interview with this patient regarding history of present illness, review of symptoms and relevant past medical, social and family history.  I completed an independent physical examination.  I was the initial provider who evaluated this patient. I have signed out the follow up of any pending tests (i.e. labs, radiological studies) to the ACP.  I have communicated the patient’s plan of care and disposition with the ACP.

## 2022-01-04 NOTE — ED STATDOCS - CLINICAL SUMMARY MEDICAL DECISION MAKING FREE TEXT BOX
72 y/o F with DJD presents with right sided back pain radiating around to the right side since she stood up quickly from the sink yesterday, no fevers, no bladder/bowel dysfunction, no numbness/weakness.  No midline bony tenderness. Pain control, UA, Rx sent to pharmacy, follow up with spine.

## 2022-01-04 NOTE — ED STATDOCS - OBJECTIVE STATEMENT
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 She has had similar pain in the past. She took Tylenol arthritis and one muscle relaxer last night. The pain is worse when she tries to move around. She denies bladder/bowel incontinence, saddle anesthesia, fevers. She denies allergies to medications.

## 2022-01-04 NOTE — ED STATDOCS - ADDITIONAL NOTES AND INSTRUCTIONS:
PT was evaluated At [facility] ED and was found to have a condition that warranted time of to rest and heal from WORK/SCHOOL.   Derek Multani PA-C

## 2022-01-04 NOTE — ED STATDOCS - CARE PROVIDER_API CALL
Angelo Virgen (DO)  Orthopaedic Surgery  [address]  [place] [address]  Phone: [phone]  Fax: [phone]  Follow Up Time: Urgent

## 2022-01-04 NOTE — ED STATDOCS - PROGRESS NOTE DETAILS
Derek Blankenship: Pt seen by intake physician and HPI/ROS/PE/plan reviewed Confirmed and adjusted were appropriate.    PE: GEN: Awake, alert,  NAD,  EYES: PERRL CARDIAC: Reg rate and rhythm, S1,S2, RRR  RESP: No distress noted. Lungs CTA bilaterally no wheeze, ronchi, rales. ABD: soft,  non-tender, no guarding. . NEURO: A&O x 3, CN II-Xii GI, MAEx 4,  5/5/ motors, = sensation, no pronator drift or ataxia. MS: no midline ttp. strength intact, KALYAN, no palpable bony abnormalities. SkIN: no acute changes, no rash   PLAN: PT with stable VS, no acute distress, non toxic appearing, tolerating PO in the ED, Pt neuro vasc itnact, no acute findings on exam, no midline tenders, no red flags for back pain,  Pt with likely MS cause of symptoms pt to be dc home with supportive care, followup to PCP, [facility] educated about when to return to the ED if needed. PT verbalizes that he understands all instructions and results. Pt infomred that ED is open and availible 24/7 365 days a yr, encouraged to return to the ED if they have any change in condition, or feel the need for revaluation.

## 2022-01-04 NOTE — ED STATDOCS - PATIENT PORTAL LINK FT
You can access the FollowMyHealth Patient Portal offered by [facility] by registering at the following website: [email or web address]. By joining [email or web address] [email or web address] portal, you will also be able to view your health information using other applications (apps) compatible with our system.

## 2022-02-28 ENCOUNTER — APPOINTMENT (OUTPATIENT)
Dept: CARDIOLOGY | Facility: CLINIC | Age: 72
End: 2022-02-28
Payer: MEDICARE

## 2022-02-28 ENCOUNTER — NON-APPOINTMENT (OUTPATIENT)
Age: 72
End: 2022-02-28

## 2022-02-28 VITALS
SYSTOLIC BLOOD PRESSURE: 122 MMHG | HEART RATE: 70 BPM | BODY MASS INDEX: 39.44 KG/M2 | WEIGHT: 231 LBS | RESPIRATION RATE: 16 BRPM | HEIGHT: 64 IN | DIASTOLIC BLOOD PRESSURE: 70 MMHG

## 2022-02-28 PROCEDURE — 93000 ELECTROCARDIOGRAM COMPLETE: CPT

## 2022-02-28 PROCEDURE — 99214 OFFICE O/P EST MOD 30 MIN: CPT

## 2022-02-28 RX ORDER — FLUTICASONE PROPIONATE 50 MCG
50 SPRAY, SUSPENSION NASAL
Refills: 0 | Status: DISCONTINUED | COMMUNITY
End: 2022-02-28

## 2022-02-28 RX ORDER — INSULIN GLARGINE 100 [IU]/ML
100 INJECTION, SOLUTION SUBCUTANEOUS
Refills: 0 | Status: DISCONTINUED | COMMUNITY
End: 2022-02-28

## 2022-02-28 NOTE — ASSESSMENT
[FreeTextEntry1] : EKG demonstrated normal sinus rhythm at a rate of 70, nonspecific ST and T wave changes in leads one and aVL and T-wave flattening. Essentially unchanged;\par \par In summary, this 71-year-old woman has a history for CAD and remote history for PCI/stent. She has had a stable cardiac pattern and normal myocardial perfusion stress test this past summer;\par She's had occasional atypical upper chest discomfort which seems more musculoskeletal or GI in nature;\par \par \par \par Plan:\par \par Recommend patient followup with GI in the near future to assess for her atypical chest symptoms;\par \par Patient to followup with primary care and discussing more sounded nutritional weight loss plan which could certainly help much of her additional symptoms;\par \par ; No additional cardiac workup indicated at this time\par \par ; Patient encouraged to continue current medical range\par \par Followup with primary care in the near future also recommend;\par \par

## 2022-02-28 NOTE — REASON FOR VISIT
[Arrhythmia/ECG Abnorrmalities] : arrhythmia/ECG abnormalities [Structural Heart and Valve Disease] : structural heart and valve disease [Hyperlipidemia] : hyperlipidemia [Hypertension] : hypertension [Coronary Artery Disease] : coronary artery disease [FreeTextEntry3] : F.  ALI [FreeTextEntry1] : The patient is a very pleasant 71-year-old  woman with long-standing history of obesity, ischemic heart disease and prior PCI/stent (2017), who presents back to the office today for general cardiac checkup;\par \par Patient has had other somatic complaints such as right lower back discomfort radiating to the right leg, and some indigestion-like upper chest discomfort at times usually at rest;\par \par She denies any exertional chest pain, PND or orthopnea, significant palpitations, dizziness or syncope;

## 2022-02-28 NOTE — HISTORY OF PRESENT ILLNESS
[FreeTextEntry1] : Her last nuclear stress test was performed at [facility] in June 2021 with pharmacologic protocol and was negative for ischemia on the myocardial perfusion images demonstrated hyperdynamic LV systolic function;\par \par Recent carotid duplex study showed normal flow bilaterally with minimal calcified plaquing on the Right side and normal left ICA;

## 2022-02-28 NOTE — REVIEW OF SYSTEMS
[Weight Loss (___ Lbs)] : [unfilled] ~Ulb weight loss [Chest Discomfort] : chest discomfort [Heartburn] : heartburn [Joint Pain] : joint pain [Joint Stiffness] : joint stiffness [Lower Back Pain] : lower back pain [Negative] : Heme/Lymph

## 2022-03-14 ENCOUNTER — APPOINTMENT (OUTPATIENT)
Dept: OBGYN | Facility: CLINIC | Age: 72
End: 2022-03-14
Payer: MEDICARE

## 2022-03-14 VITALS
DIASTOLIC BLOOD PRESSURE: 74 MMHG | HEIGHT: 64 IN | BODY MASS INDEX: 39.09 KG/M2 | WEIGHT: 229 LBS | SYSTOLIC BLOOD PRESSURE: 140 MMHG

## 2022-03-14 DIAGNOSIS — Z12.39 ENCOUNTER FOR OTHER SCREENING FOR MALIGNANT NEOPLASM OF BREAST: ICD-10-CM

## 2022-03-14 DIAGNOSIS — N90.89 OTHER SPECIFIED NONINFLAMMATORY DISORDERS OF VULVA AND PERINEUM: ICD-10-CM

## 2022-03-14 PROCEDURE — 99213 OFFICE O/P EST LOW 20 MIN: CPT | Mod: 25

## 2022-03-14 PROCEDURE — G0101: CPT

## 2022-03-14 NOTE — PLAN
[FreeTextEntry1] : Postmenopausal female status post annual exam with vulva lesion on the left labia minora HSV culture done we will continue with bacitracin in the meantime until culture results are obtained she does complain of vaginal dryness we recommend Replens moisturizer, breast self-examination taught, vitamin D3 weightbearing exercise weight loss discussed and patient given name for nutritionist at [facility].  She started on medication per PMD for weight loss.  Patient will have transvaginal sonogram secondary to limited bimanual examination due to obesity.  Patient will have colonoscopy per GI doctor she has scheduled appointment for next month she will return in 12 months.

## 2022-03-14 NOTE — PHYSICAL EXAM
[Chaperone Present] : A chaperone was present in the examining room during all aspects of the physical examination [FreeTextEntry1] : Rhoda [Appropriately responsive] : appropriately responsive [Alert] : alert [No Acute Distress] : no acute distress [No Lymphadenopathy] : no lymphadenopathy [Regular Rate Rhythm] : regular rate rhythm [No Murmurs] : no murmurs [Clear to Auscultation B/L] : clear to auscultation bilaterally [Soft] : soft [Non-tender] : non-tender [Non-distended] : non-distended [No HSM] : No HSM [No Lesions] : no lesions [No Mass] : no mass [Oriented x3] : oriented x3 [FreeTextEntry6] : Pendulous, symmetrical [FreeTextEntry7] : Obese, BMI 39.5 [Examination Of The Breasts] : a normal appearance [No Masses] : no breast masses were palpable [Labia Majora] : normal [Labia Minora] : normal on the right [Normal] : normal [Uterine Adnexae] : normal [Declined] : Patient declined rectal exam [FreeTextEntry2] : Two 1 mm tender lesions left inner labia minora HSV culture done [FreeTextEntry8] : Limited  secondary to body habitus

## 2022-03-14 NOTE — HISTORY OF PRESENT ILLNESS
[Patient reported mammogram was normal] : Patient reported mammogram was normal [Patient reported PAP Smear was normal] : Patient reported PAP Smear was normal [Patient reported bone density results were normal] : Patient reported bone density results were normal [Patient reported colonoscopy was normal] : Patient reported colonoscopy was normal [HIV test declined] : HIV test declined [Syphilis test declined] : Syphilis test declined [Gonorrhea test declined] : Gonorrhea test declined [Chlamydia test declined] : Chlamydia test declined [Trichomonas test declined] : Trichomonas test declined [HPV test declined] : HPV test declined [Hepatitis B test declined] : Hepatitis B test declined [Hepatitis C test declined] : Hepatitis C test declined [postmenopausal] : postmenopausal [Y] : Patient is sexually active [N] : Patient denies prior pregnancies [Post-Menopause, No Sxs] : post-menopausal, currently without symptoms [Yes] : Patient has concerns regarding sex [Currently Active] : currently active [Men] : men [Vaginal] : vaginal [No] : No [Patient refuses STI testing] : Patient refuses STI testing [TextBox_4] : PT HERE FOR ANNUAL EXAM  [Mammogramdate] : 08/2021 [PapSmeardate] : 04/16/19 [BoneDensityDate] : 05/18/19 [ColonoscopyDate] : 2016 [LMPDate] : 1995 [TextBox_6] : 1995 [TextBox_9] : 12 [FreeTextEntry1] : VAGINAL DRYNESS

## 2022-03-16 LAB — HPV HIGH+LOW RISK DNA PNL CVX: NOT DETECTED

## 2022-03-21 LAB — CYTOLOGY CVX/VAG DOC THIN PREP: NORMAL

## 2022-04-08 ENCOUNTER — APPOINTMENT (OUTPATIENT)
Dept: OBGYN | Facility: CLINIC | Age: 72
End: 2022-04-08
Payer: MEDICARE

## 2022-04-08 PROCEDURE — 93976 VASCULAR STUDY: CPT | Mod: 59

## 2022-04-08 PROCEDURE — 76830 TRANSVAGINAL US NON-OB: CPT

## 2022-04-08 PROCEDURE — 76857 US EXAM PELVIC LIMITED: CPT

## 2022-07-05 ENCOUNTER — APPOINTMENT (OUTPATIENT)
Dept: CARDIOLOGY | Facility: CLINIC | Age: 72
End: 2022-07-05

## 2022-07-23 ENCOUNTER — EMERGENCY (EMERGENCY)
Facility: HOSPITAL | Age: 72
LOS: 1 days | Discharge: HOME | End: 2022-07-23
Attending: EMERGENCY MEDICINE
Payer: MEDICARE

## 2022-07-23 VITALS
OXYGEN SATURATION: 99 % | SYSTOLIC BLOOD PRESSURE: 152 MMHG | HEART RATE: 68 BPM | DIASTOLIC BLOOD PRESSURE: 82 MMHG | RESPIRATION RATE: 19 BRPM | TEMPERATURE: 99 F

## 2022-07-23 VITALS
OXYGEN SATURATION: 99 % | TEMPERATURE: 99 F | SYSTOLIC BLOOD PRESSURE: 166 MMHG | RESPIRATION RATE: 22 BRPM | HEIGHT: 64 IN | WEIGHT: 179.9 LBS | HEART RATE: 71 BPM | DIASTOLIC BLOOD PRESSURE: 89 MMHG

## 2022-07-23 DIAGNOSIS — H26.40 UNSPECIFIED SECONDARY CATARACT: Chronic | ICD-10-CM

## 2022-07-23 LAB
ALBUMIN SERPL ELPH-MCNC: 3.8 G/DL — SIGNIFICANT CHANGE UP (ref 3.3–5.2)
ALP SERPL-CCNC: 105 U/L — SIGNIFICANT CHANGE UP (ref 40–120)
ALT FLD-CCNC: 11 U/L — SIGNIFICANT CHANGE UP
ANION GAP SERPL CALC-SCNC: 10 MMOL/L — SIGNIFICANT CHANGE UP (ref 5–17)
APTT BLD: 27 SEC — LOW (ref 27.5–35.5)
AST SERPL-CCNC: 15 U/L — SIGNIFICANT CHANGE UP
BASOPHILS # BLD AUTO: 0.02 K/UL — SIGNIFICANT CHANGE UP (ref 0–0.2)
BASOPHILS NFR BLD AUTO: 0.3 % — SIGNIFICANT CHANGE UP (ref 0–2)
BILIRUB SERPL-MCNC: 0.2 MG/DL — LOW (ref 0.4–2)
BUN SERPL-MCNC: 25.6 MG/DL — HIGH (ref 8–20)
CALCIUM SERPL-MCNC: 8.9 MG/DL — SIGNIFICANT CHANGE UP (ref 8.6–10.2)
CHLORIDE SERPL-SCNC: 113 MMOL/L — HIGH (ref 98–107)
CO2 SERPL-SCNC: 19 MMOL/L — LOW (ref 22–29)
CREAT SERPL-MCNC: 1.74 MG/DL — HIGH (ref 0.5–1.3)
EGFR: 31 ML/MIN/1.73M2 — LOW
EOSINOPHIL # BLD AUTO: 0.53 K/UL — HIGH (ref 0–0.5)
EOSINOPHIL NFR BLD AUTO: 8.9 % — HIGH (ref 0–6)
GLUCOSE SERPL-MCNC: 115 MG/DL — HIGH (ref 70–99)
HCT VFR BLD CALC: 32.3 % — LOW (ref 34.5–45)
HGB BLD-MCNC: 9.8 G/DL — LOW (ref 11.5–15.5)
IMM GRANULOCYTES NFR BLD AUTO: 0.3 % — SIGNIFICANT CHANGE UP (ref 0–1.5)
INR BLD: 0.93 RATIO — SIGNIFICANT CHANGE UP (ref 0.88–1.16)
LYMPHOCYTES # BLD AUTO: 1.61 K/UL — SIGNIFICANT CHANGE UP (ref 1–3.3)
LYMPHOCYTES # BLD AUTO: 27 % — SIGNIFICANT CHANGE UP (ref 13–44)
MCHC RBC-ENTMCNC: 26.8 PG — LOW (ref 27–34)
MCHC RBC-ENTMCNC: 30.3 GM/DL — LOW (ref 32–36)
MCV RBC AUTO: 88.5 FL — SIGNIFICANT CHANGE UP (ref 80–100)
MONOCYTES # BLD AUTO: 0.42 K/UL — SIGNIFICANT CHANGE UP (ref 0–0.9)
MONOCYTES NFR BLD AUTO: 7 % — SIGNIFICANT CHANGE UP (ref 2–14)
NEUTROPHILS # BLD AUTO: 3.37 K/UL — SIGNIFICANT CHANGE UP (ref 1.8–7.4)
NEUTROPHILS NFR BLD AUTO: 56.5 % — SIGNIFICANT CHANGE UP (ref 43–77)
PLATELET # BLD AUTO: 200 K/UL — SIGNIFICANT CHANGE UP (ref 150–400)
POTASSIUM SERPL-MCNC: 5.5 MMOL/L — HIGH (ref 3.5–5.3)
POTASSIUM SERPL-SCNC: 5.5 MMOL/L — HIGH (ref 3.5–5.3)
PROT SERPL-MCNC: 6.7 G/DL — SIGNIFICANT CHANGE UP (ref 6.6–8.7)
PROTHROM AB SERPL-ACNC: 10.8 SEC — SIGNIFICANT CHANGE UP (ref 10.5–13.4)
RBC # BLD: 3.65 M/UL — LOW (ref 3.8–5.2)
RBC # FLD: 14.9 % — HIGH (ref 10.3–14.5)
SODIUM SERPL-SCNC: 142 MMOL/L — SIGNIFICANT CHANGE UP (ref 135–145)
TROPONIN T SERPL-MCNC: <0.01 NG/ML — SIGNIFICANT CHANGE UP (ref 0–0.06)
TROPONIN T SERPL-MCNC: <0.01 NG/ML — SIGNIFICANT CHANGE UP (ref 0–0.06)
WBC # BLD: 5.97 K/UL — SIGNIFICANT CHANGE UP (ref 3.8–10.5)
WBC # FLD AUTO: 5.97 K/UL — SIGNIFICANT CHANGE UP (ref 3.8–10.5)

## 2022-07-23 PROCEDURE — 85610 PROTHROMBIN TIME: CPT

## 2022-07-23 PROCEDURE — 93005 ELECTROCARDIOGRAM TRACING: CPT

## 2022-07-23 PROCEDURE — 36415 COLL VENOUS BLD VENIPUNCTURE: CPT

## 2022-07-23 PROCEDURE — 70450 CT HEAD/BRAIN W/O DYE: CPT | Mod: MG

## 2022-07-23 PROCEDURE — 71045 X-RAY EXAM CHEST 1 VIEW: CPT

## 2022-07-23 PROCEDURE — 93010 ELECTROCARDIOGRAM REPORT: CPT

## 2022-07-23 PROCEDURE — 70450 CT HEAD/BRAIN W/O DYE: CPT | Mod: 26,MG

## 2022-07-23 PROCEDURE — 99285 EMERGENCY DEPT VISIT HI MDM: CPT | Mod: 25

## 2022-07-23 PROCEDURE — 85025 COMPLETE CBC W/AUTO DIFF WBC: CPT

## 2022-07-23 PROCEDURE — 71045 X-RAY EXAM CHEST 1 VIEW: CPT | Mod: 26

## 2022-07-23 PROCEDURE — 84484 ASSAY OF TROPONIN QUANT: CPT

## 2022-07-23 PROCEDURE — 80053 COMPREHEN METABOLIC PANEL: CPT

## 2022-07-23 PROCEDURE — 99285 EMERGENCY DEPT VISIT HI MDM: CPT

## 2022-07-23 PROCEDURE — G1004: CPT

## 2022-07-23 PROCEDURE — 99222 1ST HOSP IP/OBS MODERATE 55: CPT

## 2022-07-23 PROCEDURE — 85730 THROMBOPLASTIN TIME PARTIAL: CPT

## 2022-07-23 NOTE — ED PROVIDER NOTE - PHYSICAL EXAMINATION
Gen: NAD, AOx3  Head: NCAT  HEENT: PERRL, EOMI, oral mucosa moist, normal conjunctiva, oropharynx clear without exudate or erythema  Lung: CTAB, no respiratory distress, no wheezing, rales, rhonchi  CV: normal s1/s2, rrr, no murmurs, Normal perfusion, pulses 2+ throughout  Abd: soft, NTND  MSK: No edema, no visible deformities, full range of motion in all 4 extremities  Neuro: CN II-XII grossly intact, No focal neurologic deficits, no nuchal rigidity  Skin: No rash   Psych: normal affect

## 2022-07-23 NOTE — ED ADULT NURSE NOTE - OBJECTIVE STATEMENT
patient says she has headache since yesterday on the left side of her head and now, chest pain as claimed

## 2022-07-23 NOTE — ED PROVIDER NOTE - CLINICAL SUMMARY MEDICAL DECISION MAKING FREE TEXT BOX
Pt with headache- now resolved. CT head eval for intracranial pathology/mass- doubt ICH, doubt temporal arteritis (no tenderness over temporal artery no jaw claudication or visual changes). Chest pain- EKG no ischemia, check labs, cxr, and reassess. Brandee Barajas DO

## 2022-07-23 NOTE — ED PROVIDER NOTE - PATIENT PORTAL LINK FT
You can access the FollowMyHealth Patient Portal offered by [facility] by registering at the following website: [email or web address]. By joining [employer]’s FollowMyHealth portal, you will also be able to view your health information using other applications (apps) compatible with our system.

## 2022-07-23 NOTE — CONSULT NOTE ADULT - SUBJECTIVE AND OBJECTIVE BOX
CHIEF COMPLAINT: headaches and chest pain    HPI:72 F with hx of CAD with prior stents, CKD, HTN, who comes to the ED c/o few days of nonspecific symptoms including headaches, left flank pain, chest pain (non exertional, constant, nonradiated). Denies dyspnea of exertion, no palpitations, no leg edema, no syncopal episodes. Cardiology consulted for evaluation of chest pain.     PAST MEDICAL & SURGICAL HISTORY:  HTN (hypertension)      DM (diabetes mellitus)      Chest pain      BONNY (obstructive sleep apnea)      Hiatal hernia      GERD (gastroesophageal reflux disease)      DJD (degenerative joint disease), lumbar      Fatty liver      Arthritis      Anemia      After cataract, bilateral          Allergies    codeine (Nausea)  omeprazole (Nausea)  Vicodin (Nausea)    Intolerances        MEDICATIONS  (STANDING):    MEDICATIONS  (PRN):      FAMILY HISTORY:  Family history of diabetes mellitus (DM) (Sibling)    Family history of hypertension (Sibling)        ***No family history of premature coronary artery disease or sudden cardiac death    SOCIAL HISTORY:  Smoking-  Alcohol-  Illicit Drug use-    REVIEW OF SYSTEMS:  Constitutional: [ ] fever, [ ]weight loss,  [ ]fatigue  Eyes: [ ] visual changes  Respiratory: [ ]shortness of breath;  [ ] cough, [ ]wheezing, [ ]chills, [ ]hemoptysis  Cardiovascular: [x ] chest pain, [ ]palpitations, [ ]dizziness,  [ ]leg swelling [ ]syncope  Gastrointestinal: [x ] abdominal pain, [ ]nausea, [ ]vomiting,  [ ]diarrhea   Genitourinary: [ ] dysuria, [ ] hematuria  Neurologic: [ x] headaches [ ] tremors  [ ] weakness [ ] lightheadedness  Skin: [ ] itching, [ ]burning, [ ] rashes  Endocrine: [ ] heat or cold intolerance  Musculoskeletal: [ ] joint pain or swelling; [ ] muscle, back, or extremity pain  Psychiatric: [ ] depression, [ ]anxiety, [ ]mood swings, or [ ]difficulty sleeping  Hematologic: [ ] easy bruising, [ ] bleeding gums     [ x] All others negative	  [ ] Unable to obtain    Vital Signs Last 24 Hrs  T(C): 37.2 (23 Jul 2022 09:09), Max: 37.2 (23 Jul 2022 09:09)  T(F): 98.9 (23 Jul 2022 09:09), Max: 98.9 (23 Jul 2022 09:09)  HR: 71 (23 Jul 2022 09:09) (71 - 71)  BP: 166/89 (23 Jul 2022 09:09) (166/89 - 166/89)  BP(mean): --  RR: 22 (23 Jul 2022 09:09) (22 - 22)  SpO2: 99% (23 Jul 2022 09:09) (99% - 99%)    Parameters below as of 23 Jul 2022 09:09  Patient On (Oxygen Delivery Method): room air      I&O's Summary      PHYSICAL EXAM:  General: No acute distress  HEENT: EOMI  Neck:  No JVD  Lungs: Clear to auscultation bilaterally; No rales or wheezing  Heart: Regular rate and rhythm; No murmurs, rubs, or gallops  Abdomen: soft, non tender, non distended   Extremities: warm, no edema   Nervous system:  Alert & Oriented X3  Psychiatric: Normal affect  Skin: No rashes or lesions    LABS:  07-23    142  |  113<H>  |  25.6<H>  ----------------------------<  115<H>  5.5<H>   |  19.0<L>  |  1.74<H>    Ca    8.9      23 Jul 2022 10:20    TPro  6.7  /  Alb  3.8  /  TBili  0.2<L>  /  DBili  x   /  AST  15  /  ALT  11  /  AlkPhos  105  07-23    Creatinine Trend: 1.74<--                        9.8    5.97  )-----------( 200      ( 23 Jul 2022 10:20 )             32.3     PT/INR - ( 23 Jul 2022 10:20 )   PT: 10.8 sec;   INR: 0.93 ratio         PTT - ( 23 Jul 2022 10:20 )  PTT:27.0 sec    Lipid Panel:   Cardiac Enzymes: CARDIAC MARKERS ( 23 Jul 2022 10:20 )  x     / <0.01 ng/mL / x     / x     / x                RADIOLOGY:    ECG: NSR    TELEMETRY: Unremarkable    ECHO:    STRESS TEST:    CATHETERIZATION:

## 2022-07-23 NOTE — CONSULT NOTE ADULT - ASSESSMENT
72 F with hx of CAD with prior stents, CKD, HTN, who comes to the ED c/o few days of nonspecific symptoms including headaches, left flank pain, chest pain (non exertional, constant, nonradiated). Denies dyspnea of exertion, no palpitations, no leg edema, no syncopal episodes. Cardiology consulted for evaluation of chest pain.     Active issues   noncardiac chest pain     Plan   cardiac eval so far is unremarkable   no additional cardiac testing is indicate at this time  from cardiac perspective patient can be discharge  please call us back with questions or concerns.

## 2022-07-23 NOTE — ED PROVIDER NOTE - ATTENDING CONTRIBUTION TO CARE
HPI/ROS/PE/MDM BY ME.       I performed a history and physical exam of the patient and discussed their management with the resident. I reviewed the resident's note and agree with the documented findings and plan of care. My medical decision making and observations are found above.

## 2022-07-23 NOTE — ED PROVIDER NOTE - NSICDXPASTMEDICALHX_GEN_ALL_CORE_FT
PAST MEDICAL HISTORY:  Anemia     Arthritis     Chest pain     DJD (degenerative joint disease), lumbar     DM (diabetes mellitus)     Fatty liver     GERD (gastroesophageal reflux disease)     Hiatal hernia     HTN (hypertension)     BONNY (obstructive sleep apnea)      Consent (Lip)/Introductory Paragraph: The rationale for Mohs was explained to the patient and consent was obtained. The risks, benefits and alternatives to therapy were discussed in detail. Specifically, the risks of lip deformity, changes in the oral aperture, infection, scarring, bleeding, prolonged wound healing, incomplete removal, allergy to anesthesia, nerve injury and recurrence were addressed. Prior to the procedure, the treatment site was clearly identified and confirmed by the patient. All components of Universal Protocol/PAUSE Rule completed.

## 2022-07-23 NOTE — ED PROVIDER NOTE - OBJECTIVE STATEMENT
73yo female with PMH DM, DJD, arthritis, DM, HTN, presenting with multiple complaints. Patient states that 4 days ago- she started to have left-sided intermittent headaches, cannot elaborate on quality but denies pressure-like, sharp, pounding, or thunderclap quality. No exacerbating or relieving symptoms. No visual changes, jaw claudication, fevers, chills. patient states that she has been taking Tylenol with some relief, does not typically get headaches. Today- she woke up and noticed she had "strong, deep" midsternal chest pain that does not radiate, now resolved. H/o Coronary Artery Disease s/p stent. States last stress/TTE within last year. No shortness of breath, diaphoresis.

## 2022-07-23 NOTE — ED ADULT TRIAGE NOTE - CHIEF COMPLAINT QUOTE
I have a headache since yesterday in the left side of my head. this morning I woke up with chest pains, points to right chest wall. Hx of stents

## 2022-08-05 ENCOUNTER — APPOINTMENT (OUTPATIENT)
Dept: OBGYN | Facility: CLINIC | Age: 72
End: 2022-08-05

## 2022-08-28 ENCOUNTER — EMERGENCY (EMERGENCY)
Facility: HOSPITAL | Age: 72
LOS: 1 days | Discharge: HOME | End: 2022-08-28
Attending: EMERGENCY MEDICINE
Payer: MEDICARE

## 2022-08-28 VITALS
HEART RATE: 77 BPM | HEIGHT: 64 IN | TEMPERATURE: 98 F | DIASTOLIC BLOOD PRESSURE: 77 MMHG | RESPIRATION RATE: 20 BRPM | WEIGHT: 233.91 LBS | OXYGEN SATURATION: 96 % | SYSTOLIC BLOOD PRESSURE: 147 MMHG

## 2022-08-28 DIAGNOSIS — H26.40 UNSPECIFIED SECONDARY CATARACT: Chronic | ICD-10-CM

## 2022-08-28 LAB
ALBUMIN SERPL ELPH-MCNC: 3.9 G/DL — SIGNIFICANT CHANGE UP (ref 3.3–5.2)
ALP SERPL-CCNC: 105 U/L — SIGNIFICANT CHANGE UP (ref 40–120)
ALT FLD-CCNC: 11 U/L — SIGNIFICANT CHANGE UP
ANION GAP SERPL CALC-SCNC: 12 MMOL/L — SIGNIFICANT CHANGE UP (ref 5–17)
APTT BLD: 30.1 SEC — SIGNIFICANT CHANGE UP (ref 27.5–35.5)
AST SERPL-CCNC: 20 U/L — SIGNIFICANT CHANGE UP
BASOPHILS # BLD AUTO: 0.02 K/UL — SIGNIFICANT CHANGE UP (ref 0–0.2)
BASOPHILS NFR BLD AUTO: 0.3 % — SIGNIFICANT CHANGE UP (ref 0–2)
BILIRUB SERPL-MCNC: 0.3 MG/DL — LOW (ref 0.4–2)
BUN SERPL-MCNC: 22.1 MG/DL — HIGH (ref 8–20)
CALCIUM SERPL-MCNC: 9 MG/DL — SIGNIFICANT CHANGE UP (ref 8.4–10.5)
CHLORIDE SERPL-SCNC: 111 MMOL/L — HIGH (ref 98–107)
CO2 SERPL-SCNC: 19 MMOL/L — LOW (ref 22–29)
CREAT SERPL-MCNC: 2.04 MG/DL — HIGH (ref 0.5–1.3)
EGFR: 26 ML/MIN/1.73M2 — LOW
EOSINOPHIL # BLD AUTO: 0.34 K/UL — SIGNIFICANT CHANGE UP (ref 0–0.5)
EOSINOPHIL NFR BLD AUTO: 5.2 % — SIGNIFICANT CHANGE UP (ref 0–6)
GLUCOSE SERPL-MCNC: 107 MG/DL — HIGH (ref 70–99)
HCT VFR BLD CALC: 32.5 % — LOW (ref 34.5–45)
HGB BLD-MCNC: 9.9 G/DL — LOW (ref 11.5–15.5)
IMM GRANULOCYTES NFR BLD AUTO: 0.3 % — SIGNIFICANT CHANGE UP (ref 0–1.5)
INR BLD: 0.99 RATIO — SIGNIFICANT CHANGE UP (ref 0.88–1.16)
LYMPHOCYTES # BLD AUTO: 1.94 K/UL — SIGNIFICANT CHANGE UP (ref 1–3.3)
LYMPHOCYTES # BLD AUTO: 29.7 % — SIGNIFICANT CHANGE UP (ref 13–44)
MCHC RBC-ENTMCNC: 27.3 PG — SIGNIFICANT CHANGE UP (ref 27–34)
MCHC RBC-ENTMCNC: 30.5 GM/DL — LOW (ref 32–36)
MCV RBC AUTO: 89.5 FL — SIGNIFICANT CHANGE UP (ref 80–100)
MONOCYTES # BLD AUTO: 0.39 K/UL — SIGNIFICANT CHANGE UP (ref 0–0.9)
MONOCYTES NFR BLD AUTO: 6 % — SIGNIFICANT CHANGE UP (ref 2–14)
NEUTROPHILS # BLD AUTO: 3.82 K/UL — SIGNIFICANT CHANGE UP (ref 1.8–7.4)
NEUTROPHILS NFR BLD AUTO: 58.5 % — SIGNIFICANT CHANGE UP (ref 43–77)
NT-PROBNP SERPL-SCNC: 337 PG/ML — HIGH (ref 0–300)
PLATELET # BLD AUTO: 205 K/UL — SIGNIFICANT CHANGE UP (ref 150–400)
POTASSIUM SERPL-MCNC: 5.4 MMOL/L — HIGH (ref 3.5–5.3)
POTASSIUM SERPL-SCNC: 5.4 MMOL/L — HIGH (ref 3.5–5.3)
PROT SERPL-MCNC: 7.1 G/DL — SIGNIFICANT CHANGE UP (ref 6.6–8.7)
PROTHROM AB SERPL-ACNC: 11.5 SEC — SIGNIFICANT CHANGE UP (ref 10.5–13.4)
RBC # BLD: 3.63 M/UL — LOW (ref 3.8–5.2)
RBC # FLD: 14.8 % — HIGH (ref 10.3–14.5)
SODIUM SERPL-SCNC: 142 MMOL/L — SIGNIFICANT CHANGE UP (ref 135–145)
TROPONIN T SERPL-MCNC: <0.01 NG/ML — SIGNIFICANT CHANGE UP (ref 0–0.06)
WBC # BLD: 6.53 K/UL — SIGNIFICANT CHANGE UP (ref 3.8–10.5)
WBC # FLD AUTO: 6.53 K/UL — SIGNIFICANT CHANGE UP (ref 3.8–10.5)

## 2022-08-28 PROCEDURE — 71046 X-RAY EXAM CHEST 2 VIEWS: CPT | Mod: 26

## 2022-08-28 PROCEDURE — 93010 ELECTROCARDIOGRAM REPORT: CPT | Mod: 76

## 2022-08-28 PROCEDURE — 99220: CPT

## 2022-08-28 PROCEDURE — 99223 1ST HOSP IP/OBS HIGH 75: CPT

## 2022-08-28 RX ORDER — METOPROLOL TARTRATE 50 MG
100 TABLET ORAL DAILY
Refills: 0 | Status: DISCONTINUED | OUTPATIENT
Start: 2022-08-28 | End: 2022-09-01

## 2022-08-28 RX ORDER — ASPIRIN/CALCIUM CARB/MAGNESIUM 324 MG
162 TABLET ORAL DAILY
Refills: 0 | Status: DISCONTINUED | OUTPATIENT
Start: 2022-08-28 | End: 2022-09-01

## 2022-08-28 RX ORDER — CLOPIDOGREL BISULFATE 75 MG/1
75 TABLET, FILM COATED ORAL DAILY
Refills: 0 | Status: DISCONTINUED | OUTPATIENT
Start: 2022-08-28 | End: 2022-09-01

## 2022-08-28 RX ORDER — INSULIN LISPRO 100/ML
VIAL (ML) SUBCUTANEOUS
Refills: 0 | Status: DISCONTINUED | OUTPATIENT
Start: 2022-08-28 | End: 2022-09-01

## 2022-08-28 RX ORDER — DEXTROSE 50 % IN WATER 50 %
25 SYRINGE (ML) INTRAVENOUS ONCE
Refills: 0 | Status: DISCONTINUED | OUTPATIENT
Start: 2022-08-28 | End: 2022-09-01

## 2022-08-28 RX ORDER — GLUCAGON INJECTION, SOLUTION 0.5 MG/.1ML
1 INJECTION, SOLUTION SUBCUTANEOUS ONCE
Refills: 0 | Status: DISCONTINUED | OUTPATIENT
Start: 2022-08-28 | End: 2022-09-01

## 2022-08-28 RX ORDER — LATANOPROST 0.05 MG/ML
1 SOLUTION/ DROPS OPHTHALMIC; TOPICAL AT BEDTIME
Refills: 0 | Status: DISCONTINUED | OUTPATIENT
Start: 2022-08-28 | End: 2022-09-01

## 2022-08-28 RX ORDER — ATORVASTATIN CALCIUM 80 MG/1
10 TABLET, FILM COATED ORAL AT BEDTIME
Refills: 0 | Status: DISCONTINUED | OUTPATIENT
Start: 2022-08-28 | End: 2022-09-01

## 2022-08-28 RX ORDER — DEXTROSE 50 % IN WATER 50 %
15 SYRINGE (ML) INTRAVENOUS ONCE
Refills: 0 | Status: DISCONTINUED | OUTPATIENT
Start: 2022-08-28 | End: 2022-09-01

## 2022-08-28 RX ORDER — DEXTROSE 50 % IN WATER 50 %
12.5 SYRINGE (ML) INTRAVENOUS ONCE
Refills: 0 | Status: DISCONTINUED | OUTPATIENT
Start: 2022-08-28 | End: 2022-09-01

## 2022-08-28 RX ORDER — METFORMIN HYDROCHLORIDE 850 MG/1
500 TABLET ORAL
Refills: 0 | Status: DISCONTINUED | OUTPATIENT
Start: 2022-08-28 | End: 2022-09-01

## 2022-08-28 RX ORDER — SODIUM CHLORIDE 9 MG/ML
1000 INJECTION, SOLUTION INTRAVENOUS
Refills: 0 | Status: DISCONTINUED | OUTPATIENT
Start: 2022-08-28 | End: 2022-09-01

## 2022-08-28 RX ORDER — LISINOPRIL 2.5 MG/1
20 TABLET ORAL DAILY
Refills: 0 | Status: DISCONTINUED | OUTPATIENT
Start: 2022-08-28 | End: 2022-09-01

## 2022-08-28 RX ADMIN — Medication 162 MILLIGRAM(S): at 13:03

## 2022-08-28 RX ADMIN — LATANOPROST 1 DROP(S): 0.05 SOLUTION/ DROPS OPHTHALMIC; TOPICAL at 23:47

## 2022-08-28 RX ADMIN — ATORVASTATIN CALCIUM 10 MILLIGRAM(S): 80 TABLET, FILM COATED ORAL at 23:47

## 2022-08-28 RX ADMIN — METFORMIN HYDROCHLORIDE 500 MILLIGRAM(S): 850 TABLET ORAL at 18:01

## 2022-08-28 NOTE — ED ADULT NURSE NOTE - OBJECTIVE STATEMENT
Pt presents to ed with cp starting today, no sob, but said it feels like it did when she had stent put in 2018

## 2022-08-28 NOTE — ED STATDOCS - PROGRESS NOTE DETAILS
at 10:15 this morning while [place] to [place] had sudden onset of right sided chest discomfort that was similar to left-sided discomfort that resulted in a stent in 2018.  Denies assoc nausea, sob, or diaphoresis.  no radiation.  Lasted 7 minutes and now with intermittent "heat-like" sensation.  Cardiologist: dr pope. patient cannot remember last echo or stress testing.   ECG:  SR at 78 with no ST/T changes.  Protocol orders entered and re-triaged to main ED with monitor.  Of note, patient denies abdominal pain.

## 2022-08-28 NOTE — ED PROVIDER NOTE - OBJECTIVE STATEMENT
73yo F hx of dm, htn, hld 1 stent pw right sided cp that started at 10:15am while driving in a car to [employer]. last for 7-8 minutes, non radiating no diaphoresis sob dizziness n/v. no symptoms at this time but gets intermittent warm sensation to the right side of the chest. pain was not pleuritic no le swelling no hx of dvt/pe. states that feels similar to when had her stent in 2018 just pain on the different side. sees dr. pope cardiology. Denies f/c/n/v/sob/palpitations/ cough/rash/headache/dizziness/abd.pain/d/c/dysuria/hematuria. no sick contacts no recent travel. despite triage note denies abd pain

## 2022-08-28 NOTE — ED CDU PROVIDER INITIAL DAY NOTE - WR ORDER STATUS 1
Medication/Dose: atorvastatin (LIPITOR) 20 MG   furosemide (LASIX) 80 MG   hydrALAZINE (APRESOLINE) 100 MG   ketoconazole (NIZORAL) 2 % shampoo (discontinued by Dr. Alba Ferrer 10/07/21)  Patient last seen by PCP: 10/07/21  Next office visit with PCP: None  Last Lab: Lipid panel/renal panel 10/07/21    Above information requires contacting patient: Yes. Spoke with patient and informed him he needed to schedule an appointment with new PCP for additional refills.     Prescription does not require PDMP check    Sent to provider to approve or deny
Refill approved for 1 month. Gloria Valverde needs to be seen for an appointment before further refills are given.
Resulted

## 2022-08-28 NOTE — ED CDU PROVIDER INITIAL DAY NOTE - MEDICAL DECISION MAKING DETAILS
chest pain with risk factors: seen by cardiology recommending echo, possible NST, NPO at midnight, serial troponins, EKG serial, cardiac monitor, resume home medications

## 2022-08-28 NOTE — ED ADULT TRIAGE NOTE - AS HEIGHT TYPE
DOS 10-  Patient seen and examined.  S  Went for PFTs today. Reviewed and show mild restriction, with possible mild obstruction, and mild decrease in DLCO.  Still coughing, despite nasal steroid and PPI.  O VSS  NAD.  Lungs bibasilar inspiratory crackles.  RRR.  Ab benign.  No significant LE edema.  No labs today.  PFTs  FEV1 1.02, 61%  FVC 1.46, 66%  Ratio 70  No significant BD response  TLC 3.50, 76%  RV 2.00, 87%  DLCO 11.94, 68%  CTA chest, this admission, reviewed - Mild areas of bronchial wall thickening. No PE.  CXR PA & lateral, today - No acute process.  Impression/Plan:  85-year-old female with past medical history of meningioma, neuropathy presenting with shortness of breath and chest pain  Cough -> ?bronchitis but RVP negative, CT, per my review, with mild areas of bronchial wall thickening   Dyspnea on exertion -> PE ruled out, normal BNP and CT with ? areas of bronchial wall thickening --> Airways disease?  ?Acute bronchitis  Recommendations:  On nasal steroid and PPI. Consider adding oral antihistamine. Consider outpatient ENT evaluation.  s/p 3 days of 500 mg azithromycin. Will trial prednisone 20 mg daily x 5 days.  Cardiac evaluation per Cardiology  Respiratory status is doing well on room air  Ok for d/c from pulm standpoint. Follow-up with me in 4-6 weeks.  Carmela Pinto MD  Pulmonary & [facility]   stated

## 2022-08-28 NOTE — ED CDU PROVIDER INITIAL DAY NOTE - NS ED ATTENDING STATEMENT MOD
This was a shared visit with the MADELINE. I reviewed and verified the documentation and independently performed the documented:

## 2022-08-28 NOTE — ED CDU PROVIDER INITIAL DAY NOTE - OBJECTIVE STATEMENT
73yo F hx of dm, htn, hld 1 stent pw right sided cp that started at 10:15am while driving in a car to [place]. last for 7-8 minutes, non radiating no diaphoresis sob dizziness n/v. no symptoms at this time but gets intermittent warm sensation to the right side of the chest. pain was not pleuritic no le swelling no hx of dvt/pe. states that feels similar to when had her stent in 2018 just pain on the different side. sees dr. pope cardiology. Denies f/c/n/v/sob/palpitations/ cough/rash/headache/dizziness/abd.pain/d/c/dysuria/hematuria. no sick contacts no recent travel. despite triage note denies abd pain.    Patient seen by MD Goldberg in ED from cardiology, reccomending echo and possible NST  Patient is not a smoker  Family history of sister with diabetes.

## 2022-08-28 NOTE — CONSULT NOTE ADULT - ASSESSMENT
72F with hx of CAD with prior stent in mLAD, DM, HTN, HLD, who comes to the c/o 1 day of chest pain, patient reports that while going in the car to [place] [employer] she started experiencing left sided chest pain, it lasted for about 10 mins, non radiated. not associated with diaphoresis, shortness of breath, nausea or vomiting, patient states that this pain is similar to the one she had when her coronary stent was placed. She refers unchanged YT.   but denies leg edema, orthopnea, syncopal episodes.   Cardiology service consulted for evaluation of chest pain. Currently chest pain free.     assessment    possible cardiac chest pain     Plan   complete 3 set of cardiac enzymes   serial ekgs   possible stress test tomorrow pending results of cardiac enzymes   echocardiogram   will continue to follow post results.

## 2022-08-28 NOTE — ED ADULT TRIAGE NOTE - WEIGHT IN KG
Consult - General Surgery   Courtney Elizabeth 48 y o  female MRN: [id number]  Encounter: [id number]    Assessment/Plan    Encounter for care related to vascular access port  Bandar Francisco appears stable with regards to her port after placement in 03/2019  The port is functioning without obvious soft tissue infection  Case reviewed with Dr Irlanda Sullivan by phone  We will obtain RUQ venous duplex to rule out DVT  Patient already on therapeutic anticoagulation  Additionally advised short 2-3 day course of NSAIDs for inflammation  Bleeding risk reviewed with the patient  Will arrange for follow-up recheck in 1 week  Questions answered, agreeable to the plan  Diagnoses and all orders for this visit:    Encounter for care related to vascular access port  -     VAS upper limb venous duplex scan, unilateral/limited; Future        History of Present Illness   Chief Complaint   Patient presents with    Follow-up     check mediport on right side, swelling above port area  -      Patient is here today for to have her Mediport checked  Stated that she is having swelling/pain above port area  No fever or chills  Jo Mehta 49 yo F here for port-check  Reports swelling and discomfort above the port for a few days  No fever or chills  No chest pain or difficulty breathing  No arm swelling  Review of Systems   Constitutional: Negative for chills and fever  Respiratory: Negative for shortness of breath  Cardiovascular: Negative for chest pain  Skin: Negative for color change and rash  All other systems reviewed and are negative        Historical Information   Past Medical History:   Diagnosis Date    Allergic rhinitis     last assessed 04/06/16    Arthritis     b/l feet    Chronic allergic conjunctivitis     Fluttering heart     takes magnesium for same   EKG OK    Fluttering sensation of heart     "periodically, not often since on magnesium"    Foot pain, left     Full dentures     upper    History of cancer chemotherapy 2016    History of dilation and curettage     Hx of tonsillectomy     Hypothyroid     Irregular heart beat     Lymph nodes enlarged     in chest pushing on her bronchioles necessitating inhalers    Neck mass     R neck mass-excised 3/15/2016,, 4/23/18 noted enlarged lymph node right neck-bx today 4/26/2018    Non Hodgkin's lymphoma ([address] )     T Cell  dx 3/2016- chemo    Obese     Obstructive sleep apnea     Port-A-Cath in place 2016    pt reports 'Has it flushed q 4weeks" right chest wall    Post-menopausal     since chemo 4/2016  no menses    Pulmonary embolism ([address] )     last assessed 10/31/16 attributed to Non-Hodgkins hx    Pulmonary embolism on right ([address] ) 7/12/2018    Last Assessment & Plan:  She will need to hold her Xarelto 2 days prior to her procedure and may resume therapy 1-2 days post procedure (depending on whether she is producing blood tinged sputum or not)      Shortness of breath     due to chest tumor    Tachycardia     awaiting cardiology eval,,,4/23/18 "pt reports saw cardiologist at that time and placed on magnesium and "hardly notices it"    Tinnitus     occas    Wears glasses     Wears partial dentures     /lower    [place] teeth extracted      Past Surgical History:   Procedure Laterality Date    COLONOSCOPY      COLONOSCOPY N/A 7/26/2018    Procedure: COLONOSCOPY with multiple bx;  Surgeon: Sameera Chin MD;  Location: [address] [place] [address];  Service: Gastroenterology    CYST REMOVAL Left     L  neck    DILATION AND CURETTAGE OF UTERUS      ESOPHAGOGASTRODUODENOSCOPY N/A 7/26/2018    Procedure: ESOPHAGOGASTRODUODENOSCOPY (EGD) with multiple bx;  Surgeon: Sameera Chin MD;  Location: [address] [place] [address] OR;  Service: Gastroenterology    FACIAL/NECK BIOPSY N/A 4/26/2018    Procedure: OPEN DEEP CERVICAL LYMPH NODE EXCISOION/BIOPSY;  Surgeon: Miley Acosta MD;  [facility];  Service: ENT    LYMPHADENECTOMY      PORTACATH PLACEMENT      ME BX/REMV,LYMPH NODE,DEEP CERV N/A 3/15/2016    Procedure: DISSECTION [facility] NODE NECK/DEEP NECK MASS ;  Surgeon: Gaye Solano DO;  Location[facility];  Service: ENT    TONSILLECTOMY      TUBAL LIGATION      TUNNELED VENOUS PORT PLACEMENT Right 3/21/2019    Procedure: INSERTION VENOUS PORT (PORT-A-CATH) remove and replace;  Surgeon: Robyn Reza MD;  Location: [address] MAIN OR;  Service: General     Social History   Social History     Substance and Sexual Activity   Alcohol Use Not Currently     Social History     Substance and Sexual Activity   Drug Use No     Social History     Tobacco Use   Smoking Status Former Smoker    Packs/day: 1 50    Years: 11 00    Pack years: 16 50    Types: Cigarettes    Quit date: Tom Miranda Years since quittin 2   Smokeless Tobacco Never Used     Family History   Problem Relation Age of Onset    Coronary artery disease Father     Diabetes Brother     Diabetes Family     Heart disease Family        Meds/Allergies     Current Outpatient Medications:     acetaminophen (TYLENOL) 325 mg tablet, Take 650 mg by mouth every 4 (four) hours as needed , Disp: , Rfl:     acyclovir (ZOVIRAX) 800 mg tablet, Take 800 mg by mouth every 12 (twelve) hours, Disp: , Rfl: 2    cholecalciferol (VITAMIN D3) 1,000 units tablet, Take 1,000 Units by mouth every morning , Disp: , Rfl:     fluticasone-vilanterol (BREO ELLIPTA) 100-25 mcg/inh inhaler, Inhale 1 puff every morning Rinse mouth after use , Disp: , Rfl:     levothyroxine (Euthyrox) 75 mcg tablet, Take 1 tablet (75 mcg total) by mouth every morning, Disp: 90 tablet, Rfl: 1    loratadine (CLARITIN) 10 mg tablet, Take 10 mg by mouth daily, Disp: , Rfl:     metoprolol tartrate (LOPRESSOR) 100 mg tablet, Take 1 tablet (100 mg total) by mouth 2 (two) times a day, Disp: 180 tablet, Rfl: 3    omeprazole (PriLOSEC) 10 mg delayed release capsule, Take 10 mg by mouth, Disp: , Rfl:     ondansetron (ZOFRAN) 8 mg tablet, Take 8 mg by mouth every 8 (eight) hours as needed for nausea or vomiting (after chemo) , Disp: , Rfl:     prochlorperazine (COMPAZINE) 10 mg tablet, Take 10 mg by mouth every 6 (six) hours as needed, Disp: , Rfl:     rivaroxaban (Xarelto) 20 mg tablet, Take 1 tablet (20 mg total) by mouth daily with dinner, Disp: 30 tablet, Rfl: 5    senna-docusate sodium (SENOKOT-S) 8 6-50 mg per tablet, Take 1 tablet by mouth 2 (two) times a day as needed for constipation (as needed during chemo tx) , Disp: , Rfl:     b complex vitamins tablet, Take 1 tablet by mouth 2 (two) times a day , Disp: , Rfl:     cetirizine (ZyrTEC) 10 mg tablet, Take 10 mg by mouth daily, Disp: , Rfl:     Combivent Respimat inhaler, Inhale 1 puff 4 (four) times a day , Disp: , Rfl:     diltiazem (TIAZAC) 120 MG 24 hr capsule, Take 1 capsule (120 mg total) by mouth daily (Patient not taking: Reported on 3/12/2021), Disp: 90 capsule, Rfl: 3    ipratropium-albuterol (DUO-NEB) 0 5-2 5 mg/3 mL nebulizer solution, Take 1 vial (3 mL total) by nebulization every 6 (six) hours (Patient not taking: Reported on 1/13/2021), Disp: 360 mL, Rfl: 0    LORazepam (ATIVAN) 0 5 mg tablet, Take 0 5 mg by mouth every 8 (eight) hours as needed , Disp: , Rfl:     MAGNESIUM OXIDE PO, Take 400 mg by mouth daily , Disp: , Rfl:     Multiple Vitamin (MULTIVITAMIN) tablet, Take 2 tablets by mouth 2 (two) times a day , Disp: , Rfl:   Allergies   Allergen Reactions    Medical Tape      Redness, soreness on skin       The following portions of the patient's history were reviewed and updated as appropriate: allergies, current medications, past family history, past medical history, past social history, past surgical history and problem list     Objective   Current Vitals:   Pulse 68, temperature 97 8 °F (36 6 °C), temperature source Temporal, resp  rate 20, height 5' 8" (1 727 m)  Physical Exam  Vitals signs and nursing note reviewed     Constitutional:       General: She is not in acute distress  Appearance: She is well-developed  She is not diaphoretic  HENT:      Head: Normocephalic and atraumatic  Eyes:      Pupils: Pupils are equal, round, and reactive to light  Neck:      Musculoskeletal: Normal range of motion  Pulmonary:      Effort: No respiratory distress  Chest:       Musculoskeletal: Normal range of motion  Skin:     General: Skin is warm and dry  Findings: No rash  Neurological:      Mental Status: She is alert and oriented to person, place, and time     Psychiatric:         Behavior: Behavior normal          Signature:  Jerrod Mauricio PA-C  Date: 3/15/2021 Time: 2:29 PM 106.1

## 2022-08-28 NOTE — ED CDU PROVIDER INITIAL DAY NOTE - ATTENDING APP SHARED VISIT CONTRIBUTION OF CARE
I, Sharan Restrepo, performed the initial face to face bedside interview with this patient regarding history of present illness, review of symptoms and relevant past medical, social and family history.  I completed an independent physical examination.  I was the initial provider who evaluated this patient. I have signed out the follow up of any pending tests (i.e. labs, radiological studies) to the ACP.  I have communicated the patient’s plan of care and disposition with the ACP.

## 2022-08-28 NOTE — CONSULT NOTE ADULT - SUBJECTIVE AND OBJECTIVE BOX
CHIEF COMPLAINT: chest pain     HPI: 72F with hx of CAD with prior stent in mLAD, DM, HTN, HLD, who comes to the c/o 1 day of chest pain, patient reports that while going in the car to the [employer] she started experiencing left sided chest pain, it lasted for about 10 mins, non radiated. not associated with diaphoresis, shortness of breath, nausea or vomiting, patient states that this pain is similar to the one she had when her coronary stent was placed. She refers unchanged TY.   but denies leg edema, orthopnea, syncopal episodes.   Cardiology service consulted for evaluation of chest pain. Currently chest pain free.     PAST MEDICAL & SURGICAL HISTORY:  HTN (hypertension)      DM (diabetes mellitus)      Chest pain      BONNY (obstructive sleep apnea)      Hiatal hernia      GERD (gastroesophageal reflux disease)      DJD (degenerative joint disease), lumbar      Fatty liver      Arthritis      Anemia      After cataract, bilateral          Allergies    codeine (Nausea)  omeprazole (Nausea)  Vicodin (Nausea)    Intolerances        MEDICATIONS  (STANDING):  aspirin enteric coated 162 milliGRAM(s) Oral daily    MEDICATIONS  (PRN):      FAMILY HISTORY:  Family history of diabetes mellitus (DM) (Sibling)    Family history of hypertension (Sibling)        No family history of premature coronary artery disease or sudden cardiac death    SOCIAL HISTORY:  Smoking-  Alcohol-  Illicit Drug use-    REVIEW OF SYSTEMS:  Constitutional: [ ] fever, [ ]weight loss,  [ ]fatigue  Eyes: [ ] visual changes  Respiratory: [ ]shortness of breath;  [ ] cough, [ ]wheezing, [ ]chills, [ ]hemoptysis  Cardiovascular: [x ] chest pain, [ ]palpitations, [ ]dizziness,  [ ]leg swelling [ ]syncope  Gastrointestinal: [ ] abdominal pain, [ ]nausea, [ ]vomiting,  [ ]diarrhea   Genitourinary: [ ] dysuria, [ ] hematuria  Neurologic: [ ] headaches [ ] tremors  [ ] weakness [ ] lightheadedness  Skin: [ ] itching, [ ]burning, [ ] rashes  Endocrine: [ ] heat or cold intolerance  Musculoskeletal: [ ] joint pain or swelling; [ ] muscle, back, or extremity pain  Psychiatric: [ ] depression, [ ]anxiety, [ ]mood swings, or [ ]difficulty sleeping  Hematologic: [ ] easy bruising, [ ] bleeding gums     [ x] All others negative	  [ ] Unable to obtain    Vital Signs Last 24 Hrs  T(C): 36.8 (28 Aug 2022 11:14), Max: 36.8 (28 Aug 2022 11:14)  T(F): 98.2 (28 Aug 2022 11:14), Max: 98.2 (28 Aug 2022 11:14)  HR: 77 (28 Aug 2022 11:14) (77 - 77)  BP: 147/77 (28 Aug 2022 11:14) (147/77 - 147/77)  BP(mean): --  RR: 20 (28 Aug 2022 11:14) (20 - 20)  SpO2: 96% (28 Aug 2022 11:14) (96% - 96%)    Parameters below as of 28 Aug 2022 11:14  Patient On (Oxygen Delivery Method): room air      I&O's Summary      PHYSICAL EXAM:  General: obese, female, No acute distress  HEENT: EOMI  Neck:  No JVD  Lungs: Clear to auscultation bilaterally; No rales or wheezing  Heart: Regular rate and rhythm; No murmurs, rubs, or gallops  Abdomen: soft, non tender, non distended   Extremities: warm, no edema   Nervous system:  Alert & Oriented X3  Psychiatric: Normal affect  Skin: No rashes or lesions    LABS:  08-28    142  |  111<H>  |  22.1<H>  ----------------------------<  107<H>  5.4<H>   |  19.0<L>  |  2.04<H>    Ca    9.0      28 Aug 2022 13:21    TPro  7.1  /  Alb  3.9  /  TBili  0.3<L>  /  DBili  x   /  AST  20  /  ALT  11  /  AlkPhos  105  08-28    Creatinine Trend: 2.04<--                        9.9    6.53  )-----------( 205      ( 28 Aug 2022 13:21 )             32.5     PT/INR - ( 28 Aug 2022 13:21 )   PT: 11.5 sec;   INR: 0.99 ratio         PTT - ( 28 Aug 2022 13:21 )  PTT:30.1 sec    Lipid Panel:   Cardiac Enzymes: CARDIAC MARKERS ( 28 Aug 2022 13:21 )  x     / <0.01 ng/mL / x     / x     / x          Serum Pro-Brain Natriuretic Peptide: 337 pg/mL (08-28-22 @ 13:21)        RADIOLOGY:    ECG: NSR    TELEMETRY: NSR     ECHO:    STRESS TEST:    CATHETERIZATION:

## 2022-08-29 VITALS
HEART RATE: 75 BPM | DIASTOLIC BLOOD PRESSURE: 68 MMHG | OXYGEN SATURATION: 98 % | TEMPERATURE: 98 F | RESPIRATION RATE: 19 BRPM | SYSTOLIC BLOOD PRESSURE: 135 MMHG

## 2022-08-29 LAB
A1C WITH ESTIMATED AVERAGE GLUCOSE RESULT: 7.3 % — HIGH (ref 4–5.6)
ESTIMATED AVERAGE GLUCOSE: 163 MG/DL — HIGH (ref 68–114)
GLUCOSE BLDC GLUCOMTR-MCNC: 129 MG/DL — HIGH (ref 70–99)

## 2022-08-29 PROCEDURE — 80053 COMPREHEN METABOLIC PANEL: CPT

## 2022-08-29 PROCEDURE — 93306 TTE W/DOPPLER COMPLETE: CPT

## 2022-08-29 PROCEDURE — 83036 HEMOGLOBIN GLYCOSYLATED A1C: CPT

## 2022-08-29 PROCEDURE — 84484 ASSAY OF TROPONIN QUANT: CPT

## 2022-08-29 PROCEDURE — 83880 ASSAY OF NATRIURETIC PEPTIDE: CPT

## 2022-08-29 PROCEDURE — 85730 THROMBOPLASTIN TIME PARTIAL: CPT

## 2022-08-29 PROCEDURE — 36415 COLL VENOUS BLD VENIPUNCTURE: CPT

## 2022-08-29 PROCEDURE — 99217: CPT

## 2022-08-29 PROCEDURE — 85025 COMPLETE CBC W/AUTO DIFF WBC: CPT

## 2022-08-29 PROCEDURE — 99232 SBSQ HOSP IP/OBS MODERATE 35: CPT

## 2022-08-29 PROCEDURE — 82962 GLUCOSE BLOOD TEST: CPT

## 2022-08-29 PROCEDURE — 85610 PROTHROMBIN TIME: CPT

## 2022-08-29 PROCEDURE — 99284 EMERGENCY DEPT VISIT MOD MDM: CPT | Mod: 25

## 2022-08-29 PROCEDURE — G0378: CPT

## 2022-08-29 PROCEDURE — 93005 ELECTROCARDIOGRAM TRACING: CPT

## 2022-08-29 PROCEDURE — 71046 X-RAY EXAM CHEST 2 VIEWS: CPT

## 2022-08-29 RX ADMIN — Medication 100 MILLIGRAM(S): at 05:42

## 2022-08-29 RX ADMIN — METFORMIN HYDROCHLORIDE 500 MILLIGRAM(S): 850 TABLET ORAL at 05:42

## 2022-08-29 RX ADMIN — Medication 0: at 07:50

## 2022-08-29 RX ADMIN — LISINOPRIL 20 MILLIGRAM(S): 2.5 TABLET ORAL at 05:41

## 2022-08-29 NOTE — ED CDU PROVIDER DISPOSITION NOTE - CLINICAL COURSE
71yo F pmhx CAD with prior stent in mLAD, DM, HTN, HLD presented for brief episode of chest pain. troponin negative in ED. evaluated by cardiology who recommended observation for TTE and serial enzymes. Troponin negative x 3, TTE EF 65-70% no wall motion abnormalities. Pt chest pain free. Case d/w Dr. Knutson, cardiology states patient can be discharged, would prefer to f/u in office for any further cardiac testing. return precautions discussed.

## 2022-08-29 NOTE — ED CDU PROVIDER DISPOSITION NOTE - NSFOLLOWUPINSTRUCTIONS_ED_ALL_ED_FT
- Return to the ED for any new or worsening symptoms.   - Follow-up with cardiologist in office within 2-4 weeks    Chest Pain    Chest pain can be caused by many different conditions which may or may not be dangerous. Causes include heartburn, lung infections, heart attack, blood clot in lungs, skin infections, strain or damage to muscle, cartilage, or bones, etc. In addition to a history and physical examination, an electrocardiogram (ECG) or other lab tests may have been performed to determine the cause of your chest pain. Follow up with your primary care provider or with a cardiologist as instructed.     SEEK IMMEDIATE MEDICAL CARE IF YOU HAVE ANY OF THE FOLLOWING SYMPTOMS: worsening chest pain, coughing up blood, unexplained back/neck/jaw pain, severe abdominal pain, dizziness or lightheadedness, fainting, shortness of breath, sweaty or clammy skin, vomiting, or racing heart beat. These symptoms may represent a serious problem that is an emergency. Do not wait to see if the symptoms will go away. Get medical help right away. Call 911 and do not drive yourself to the hospital.

## 2022-08-29 NOTE — ED ADULT NURSE REASSESSMENT NOTE - COMFORT CARE
darkened lights/plan of care explained/warm blanket provided
darkened lights/plan of care explained/warm blanket provided

## 2022-08-29 NOTE — ED CDU PROVIDER SUBSEQUENT DAY NOTE - WR ORDER NAME 1
s/p PCIOL-Stable PCIOL OU.-Monitor for PCO. DM s DR-Stressed the importance of keeping blood sugars under control blood pressure under control and weight normalization and regular visits with PCP. -Explained the possible effects of poorly controlled diabetes and the damage that diabetes can cause to ocular health. -Patient to check HgbA1C.-Pt instructed to contact our office with any vision changes.; Dr's Notes: [address] Renetta Millan Xray Chest 2 Views PA/Lat

## 2022-08-29 NOTE — ED ADULT NURSE REASSESSMENT NOTE - SYMPTOMS
none

## 2022-08-29 NOTE — ED CDU PROVIDER SUBSEQUENT DAY NOTE - HISTORY
Pt resting comfortably at time of re-assessment. No events overnight. Pt asymptomatic. Pending TTE. Will continue to monitor.

## 2022-08-29 NOTE — ED CDU PROVIDER DISPOSITION NOTE - PATIENT PORTAL LINK FT
You can access the FollowMyHealth Patient Portal offered by [facility] by registering at the following website: [email or web address]. By joining [employer]’s FollowMyHealth portal, you will also be able to view your health information using other applications (apps) compatible with our system.

## 2022-08-29 NOTE — ED ADULT NURSE REASSESSMENT NOTE - NS ED NURSE REASSESS COMMENT FT1
Pt presents to ED c/o right sided chest pain beginning this morning. Pt reports pain lasted for 7 minutes and was non radiating with no other accompanying symptoms. Cardiac monitor in place. Respirations are even and unlabored.  Pt to be NPO at midnight, pending TTE in the AM. Pt offers no complaints at this time. Educated on plan of care and expresses understanding.
Pt sitting up talking to md, no complaints at this time, pt is on monitor
Pt NPO at this time and awaiting TTE in the AM. Pt offers no complaints at this time.

## 2022-08-29 NOTE — ED CDU PROVIDER SUBSEQUENT DAY NOTE - NS ED ROS FT
Gen: denies fever, chills,  Skin: denies rashes  HEENT: denies visual changes, ear pain, nasal congestion, throat pain  Respiratory: denies TY, SOB  Cardiovascular: +Chest pain (resolved). denies palpitations, diaphoresis, LE edema  GI: denies abdominal pain, n/v/d  : denies dysuria, frequency  Neuro: denies headache, dizziness, weakness, numbness

## 2022-08-29 NOTE — PROGRESS NOTE ADULT - SUBJECTIVE AND OBJECTIVE BOX
JUANY AGUILAR  [id number]      Chief Complaint:  chest pain    Interval History:  No events overnight    Tele:  NSR      aspirin enteric coated 162 milliGRAM(s) Oral daily  atorvastatin 10 milliGRAM(s) Oral at bedtime  clopidogrel Tablet 75 milliGRAM(s) Oral daily  dextrose 5%. 1000 milliLiter(s) IV Continuous <Continuous>  dextrose 5%. 1000 milliLiter(s) IV Continuous <Continuous>  dextrose 50% Injectable 25 Gram(s) IV Push once  dextrose 50% Injectable 12.5 Gram(s) IV Push once  dextrose 50% Injectable 25 Gram(s) IV Push once  dextrose Oral Gel 15 Gram(s) Oral once PRN  glucagon  Injectable 1 milliGRAM(s) IntraMuscular once  insulin lispro (ADMELOG) corrective regimen sliding scale   SubCutaneous three times a day before meals  latanoprost 0.005% Ophthalmic Solution 1 Drop(s) Both EYES at bedtime  lisinopril 20 milliGRAM(s) Oral daily  metFORMIN 500 milliGRAM(s) Oral two times a day  metoprolol succinate  milliGRAM(s) Oral daily          Physical Exam:  T(C): 36.7 (08-29-22 @ 06:27), Max: 36.9 (08-28-22 @ 20:09)  HR: 78 (08-29-22 @ 06:27) (68 - 83)  BP: 138/63 (08-29-22 @ 06:27) (122/58 - 147/77)  RR: 21 (08-29-22 @ 06:27) (16 - 24)  SpO2: 97% (08-29-22 @ 06:27) (96% - 98%)  Wt(kg): --  General: Comfortable in NAD  Neck: No JVD  CVS: nl s1s2, no s3  Pulm: CTA b/l  Abd: soft, non-tender  Ext: No c/c/e  Neuro A&O x3  Psych: Normal affect      Labs:   28 Aug 2022 13:21    142    |  111    |  22.1   ----------------------------<  107    5.4     |  19.0   |  2.04     Ca    9.0        28 Aug 2022 13:21    TPro  7.1    /  Alb  3.9    /  TBili  0.3    /  DBili  x      /  AST  20     /  ALT  11     /  AlkPhos  105    28 Aug 2022 13:21                          9.9    6.53  )-----------( 205      ( 28 Aug 2022 13:21 )             32.5     PT/INR - ( 28 Aug 2022 13:21 )   PT: 11.5 sec;   INR: 0.99 ratio         PTT - ( 28 Aug 2022 13:21 )  PTT:30.1 sec  CARDIAC MARKERS ( 28 Aug 2022 21:46 )  x     / <0.01 ng/mL / x     / x     / x      CARDIAC MARKERS ( 28 Aug 2022 17:50 )  x     / <0.01 ng/mL / x     / x     / x      CARDIAC MARKERS ( 28 Aug 2022 13:21 )  x     / <0.01 ng/mL / x     / x     / x              72F with hx of CAD with prior stent in mLAD, DM, HTN, HLD, who comes to the c/o 1 day of chest pain, patient reports that while going in the car to the [employer] she started experiencing left sided chest pain, it lasted for about 10 mins, non radiated. not associated with diaphoresis, shortness of breath, nausea or vomiting, patient states that this pain is similar to the one she had when her coronary stent was placed. She refers unchanged TY.   but denies leg edema, orthopnea, syncopal episodes.   Cardiology service consulted for evaluation of chest pain. Currently chest pain free.     assessment    possible cardiac chest pain (currently chest pain free)    Plan   cardiac enzymes  are normal   ekgs with no ischemic changes   patient offered stress test but she states she would like to do in the outpatient setting.   echocardiogram is unremarkable   From cardiac perspective there is no indication for additional testing, patient to follow up with Dr Roa.   SACHIN from cardiology perspective.

## 2022-08-29 NOTE — ED CDU PROVIDER SUBSEQUENT DAY NOTE - MEDICAL DECISION MAKING DETAILS
73yo Female with pmhx of dm, htn, hld 1 stent presented to ED w/ right sided cp 10:15am that lasted 7 min. Tropx3 negative. Cardiology saw pt, recommended TTE.

## 2022-08-29 NOTE — ED CDU PROVIDER SUBSEQUENT DAY NOTE - PHYSICAL EXAMINATION
Gen: No acute distress, non toxic  HEENT: Mucous membranes moist, pink conjunctivae, EOMI. PERRL.  CV: RRR, nl s1/s2.  Resp: CTAB, normal rate and effort. No wheezes, rhonchi, or crackles.   GI: Abdomen soft, NT, ND. No rebound, no guarding  Neuro: A&O x4, MAEx4. 5/5 str ext x 4. Sensation intact, symmetric throughout. No focal neuro deficits. Gait intact.   MSK: No spine or joint tenderness to palpation  Skin: No rashes. intact and perfused. Cap refill <2sec  Vascular: Radial and dorsalis pedal pulses 2+ b/l. No LE edema. No calf ttp or swelling.

## 2022-09-01 ENCOUNTER — APPOINTMENT (OUTPATIENT)
Dept: CARDIOLOGY | Facility: CLINIC | Age: 72
End: 2022-09-01

## 2022-09-01 VITALS
HEART RATE: 64 BPM | SYSTOLIC BLOOD PRESSURE: 132 MMHG | HEIGHT: 64 IN | DIASTOLIC BLOOD PRESSURE: 74 MMHG | RESPIRATION RATE: 16 BRPM | WEIGHT: 228 LBS | BODY MASS INDEX: 38.93 KG/M2

## 2022-09-01 PROCEDURE — 93000 ELECTROCARDIOGRAM COMPLETE: CPT

## 2022-09-01 PROCEDURE — 99214 OFFICE O/P EST MOD 30 MIN: CPT

## 2022-09-01 NOTE — ASSESSMENT
[FreeTextEntry1] : EKG demonstrated normal sinus rhythm at a rate of 64, borderline nonspecific ST and T wave changes in leads one and aVL and T-wave flattening. Essentially unchanged;\par \par In summary, this 72-year-old woman has a history for CAD and remote history for PCI/stent. She has had a stable cardiac pattern and normal myocardial perfusion stress test this past summer;\par \par She's had occasional atypical upper chest discomfort which seems more musculoskeletal or GI in nature--and one episode that brought her to the hospital last weekend for which again she was found to have most likely musculoskeletal costochondritis as opposed to any acute coronary syndrome.;\par \par \par \par Plan:\par \par Patient reassured;\par \par Patient to report any significant recurrence of chest symptoms between now and next visit within 3 to 4 months;\par \par Discussed possibility of repeating nuclear stress test versus cardiac catheterization if patient has any additional significant chest pain;\par \par Patient to followup with primary care and discussing more sounded nutritional weight loss plan which could certainly help much of her additional symptoms;\par \par ; No additional cardiac workup indicated at this time\par \par ; Patient encouraged to continue current medical range\par \par Followup with primary care in the near future also recommend;\par \par

## 2022-09-01 NOTE — REASON FOR VISIT
[Arrhythmia/ECG Abnorrmalities] : arrhythmia/ECG abnormalities [Structural Heart and Valve Disease] : structural heart and valve disease [Hyperlipidemia] : hyperlipidemia [Hypertension] : hypertension [Coronary Artery Disease] : coronary artery disease [FreeTextEntry3] : F.  ALI [FreeTextEntry1] : The patient is a very pleasant 71-year-old  woman with long-standing history of obesity, ischemic heart disease and prior PCI/stent (2017);\par \par She presents back to the office today after experiencing a chest pain episode while going to [employer] last Sunday that lasted for "5 to 7 minutes";\par She was brought to the emergency department at the hospital; she states she did not recall having any injury to the chest wall or strain as a possible cause of this fleeting pain.  There is no associated nausea vomiting or diaphoresis; she was "ruled out for MI", and subsequent EKGs and exam was unremarkable and she was recommended for outpatient follow-up and possible stress test;\par \par The patient has not had any recurrence of chest symptoms since that episode 5 days ago;\par \par \par \par She denies any significant palpitations, dizziness or syncope;

## 2022-09-01 NOTE — PHYSICAL EXAM
[Well Developed] : well developed [Well Nourished] : well nourished [No Acute Distress] : no acute distress [Obese] : obese [Normal Conjunctiva] : normal conjunctiva [Normal Venous Pressure] : normal venous pressure [No Carotid Bruit] : no carotid bruit [Normal S1, S2] : normal S1, S2 [No Rub] : no rub [No Gallop] : no gallop [Clear Lung Fields] : clear lung fields [Good Air Entry] : good air entry [No Respiratory Distress] : no respiratory distress  [Soft] : abdomen soft [Non Tender] : non-tender [No Masses/organomegaly] : no masses/organomegaly [Normal Bowel Sounds] : normal bowel sounds [Normal Gait] : normal gait [No Edema] : no edema [No Cyanosis] : no cyanosis [No Clubbing] : no clubbing [No Varicosities] : no varicosities [No Rash] : no rash [No Skin Lesions] : no skin lesions [Moves all extremities] : moves all extremities [No Focal Deficits] : no focal deficits [Normal Speech] : normal speech [Alert and Oriented] : alert and oriented [Normal memory] : normal memory [de-identified] : Regular rhythm, grade 1/6 systolic murmur;

## 2022-09-01 NOTE — HISTORY OF PRESENT ILLNESS
[FreeTextEntry1] : Her last nuclear stress test was performed at [facility] in June 2021 with pharmacologic protocol and was negative for ischemia on the myocardial perfusion images demonstrated hyperdynamic LV systolic function;\par \par Recent carotid duplex study showed normal flow bilaterally with minimal calcified plaquing on the Right side and normal left ICA;

## 2022-09-19 ENCOUNTER — EMERGENCY (EMERGENCY)
Facility: HOSPITAL | Age: 72
LOS: 1 days | Discharge: HOME | End: 2022-09-19
Attending: STUDENT IN AN ORGANIZED HEALTH CARE EDUCATION/TRAINING PROGRAM
Payer: MEDICARE

## 2022-09-19 VITALS
HEART RATE: 89 BPM | WEIGHT: 233.91 LBS | HEIGHT: 64 IN | RESPIRATION RATE: 18 BRPM | DIASTOLIC BLOOD PRESSURE: 76 MMHG | OXYGEN SATURATION: 98 % | SYSTOLIC BLOOD PRESSURE: 133 MMHG | TEMPERATURE: 98 F

## 2022-09-19 DIAGNOSIS — H26.40 UNSPECIFIED SECONDARY CATARACT: Chronic | ICD-10-CM

## 2022-09-19 LAB
ALBUMIN SERPL ELPH-MCNC: 3.8 G/DL — SIGNIFICANT CHANGE UP (ref 3.3–5.2)
ALP SERPL-CCNC: 110 U/L — SIGNIFICANT CHANGE UP (ref 40–120)
ALT FLD-CCNC: 9 U/L — SIGNIFICANT CHANGE UP
ANION GAP SERPL CALC-SCNC: 14 MMOL/L — SIGNIFICANT CHANGE UP (ref 5–17)
APPEARANCE UR: CLEAR — SIGNIFICANT CHANGE UP
AST SERPL-CCNC: 17 U/L — SIGNIFICANT CHANGE UP
BASOPHILS # BLD AUTO: 0.02 K/UL — SIGNIFICANT CHANGE UP (ref 0–0.2)
BASOPHILS NFR BLD AUTO: 0.3 % — SIGNIFICANT CHANGE UP (ref 0–2)
BILIRUB SERPL-MCNC: 0.2 MG/DL — LOW (ref 0.4–2)
BILIRUB UR-MCNC: NEGATIVE — SIGNIFICANT CHANGE UP
BUN SERPL-MCNC: 26.2 MG/DL — HIGH (ref 8–20)
CALCIUM SERPL-MCNC: 9.3 MG/DL — SIGNIFICANT CHANGE UP (ref 8.4–10.5)
CHLORIDE SERPL-SCNC: 108 MMOL/L — HIGH (ref 98–107)
CO2 SERPL-SCNC: 18 MMOL/L — LOW (ref 22–29)
COLOR SPEC: YELLOW — SIGNIFICANT CHANGE UP
CREAT SERPL-MCNC: 1.69 MG/DL — HIGH (ref 0.5–1.3)
DIFF PNL FLD: NEGATIVE — SIGNIFICANT CHANGE UP
EGFR: 32 ML/MIN/1.73M2 — LOW
EOSINOPHIL # BLD AUTO: 0.51 K/UL — HIGH (ref 0–0.5)
EOSINOPHIL NFR BLD AUTO: 8.2 % — HIGH (ref 0–6)
GLUCOSE SERPL-MCNC: 115 MG/DL — HIGH (ref 70–99)
GLUCOSE UR QL: NEGATIVE MG/DL — SIGNIFICANT CHANGE UP
HCT VFR BLD CALC: 33.7 % — LOW (ref 34.5–45)
HGB BLD-MCNC: 10.2 G/DL — LOW (ref 11.5–15.5)
IMM GRANULOCYTES NFR BLD AUTO: 0.5 % — SIGNIFICANT CHANGE UP (ref 0–0.9)
KETONES UR-MCNC: ABNORMAL
LEUKOCYTE ESTERASE UR-ACNC: NEGATIVE — SIGNIFICANT CHANGE UP
LIDOCAIN IGE QN: 49 U/L — SIGNIFICANT CHANGE UP (ref 22–51)
LYMPHOCYTES # BLD AUTO: 1.55 K/UL — SIGNIFICANT CHANGE UP (ref 1–3.3)
LYMPHOCYTES # BLD AUTO: 25 % — SIGNIFICANT CHANGE UP (ref 13–44)
MCHC RBC-ENTMCNC: 26.5 PG — LOW (ref 27–34)
MCHC RBC-ENTMCNC: 30.3 GM/DL — LOW (ref 32–36)
MCV RBC AUTO: 87.5 FL — SIGNIFICANT CHANGE UP (ref 80–100)
MONOCYTES # BLD AUTO: 0.39 K/UL — SIGNIFICANT CHANGE UP (ref 0–0.9)
MONOCYTES NFR BLD AUTO: 6.3 % — SIGNIFICANT CHANGE UP (ref 2–14)
NEUTROPHILS # BLD AUTO: 3.69 K/UL — SIGNIFICANT CHANGE UP (ref 1.8–7.4)
NEUTROPHILS NFR BLD AUTO: 59.7 % — SIGNIFICANT CHANGE UP (ref 43–77)
NITRITE UR-MCNC: NEGATIVE — SIGNIFICANT CHANGE UP
PH UR: 6 — SIGNIFICANT CHANGE UP (ref 5–8)
PLATELET # BLD AUTO: 189 K/UL — SIGNIFICANT CHANGE UP (ref 150–400)
POTASSIUM SERPL-MCNC: 5.7 MMOL/L — HIGH (ref 3.5–5.3)
POTASSIUM SERPL-SCNC: 5.7 MMOL/L — HIGH (ref 3.5–5.3)
PROT SERPL-MCNC: 7.1 G/DL — SIGNIFICANT CHANGE UP (ref 6.6–8.7)
PROT UR-MCNC: NEGATIVE — SIGNIFICANT CHANGE UP
RBC # BLD: 3.85 M/UL — SIGNIFICANT CHANGE UP (ref 3.8–5.2)
RBC # FLD: 14.5 % — SIGNIFICANT CHANGE UP (ref 10.3–14.5)
SODIUM SERPL-SCNC: 140 MMOL/L — SIGNIFICANT CHANGE UP (ref 135–145)
SP GR SPEC: 1.02 — SIGNIFICANT CHANGE UP (ref 1.01–1.02)
UROBILINOGEN FLD QL: NEGATIVE MG/DL — SIGNIFICANT CHANGE UP
WBC # BLD: 6.19 K/UL — SIGNIFICANT CHANGE UP (ref 3.8–10.5)
WBC # FLD AUTO: 6.19 K/UL — SIGNIFICANT CHANGE UP (ref 3.8–10.5)

## 2022-09-19 PROCEDURE — 74176 CT ABD & PELVIS W/O CONTRAST: CPT | Mod: 26,ME

## 2022-09-19 PROCEDURE — 99284 EMERGENCY DEPT VISIT MOD MDM: CPT

## 2022-09-19 PROCEDURE — G1004: CPT

## 2022-09-19 RX ORDER — MORPHINE SULFATE 50 MG/1
4 CAPSULE, EXTENDED RELEASE ORAL ONCE
Refills: 0 | Status: DISCONTINUED | OUTPATIENT
Start: 2022-09-19 | End: 2022-09-19

## 2022-09-19 RX ORDER — ONDANSETRON 8 MG/1
4 TABLET, FILM COATED ORAL ONCE
Refills: 0 | Status: COMPLETED | OUTPATIENT
Start: 2022-09-19 | End: 2022-09-19

## 2022-09-19 RX ORDER — SODIUM CHLORIDE 9 MG/ML
1000 INJECTION INTRAMUSCULAR; INTRAVENOUS; SUBCUTANEOUS ONCE
Refills: 0 | Status: COMPLETED | OUTPATIENT
Start: 2022-09-19 | End: 2022-09-19

## 2022-09-19 RX ADMIN — MORPHINE SULFATE 4 MILLIGRAM(S): 50 CAPSULE, EXTENDED RELEASE ORAL at 18:08

## 2022-09-19 RX ADMIN — SODIUM CHLORIDE 1000 MILLILITER(S): 9 INJECTION INTRAMUSCULAR; INTRAVENOUS; SUBCUTANEOUS at 20:19

## 2022-09-19 RX ADMIN — ONDANSETRON 4 MILLIGRAM(S): 8 TABLET, FILM COATED ORAL at 18:08

## 2022-09-19 NOTE — ED PROVIDER NOTE - PROGRESS NOTE DETAILS
Marta Stuart PA :  PT evaluated by intake physician. HPI/PE/ROS as noted above. Will follow up plan per intake physician  labs chronic elev cr  ua negative  pending ct pA noted Esmaeizladeh   Ct With suspected diverticulitis pt is c.o lower abd pain - she has CKD will tx Augmentin with dose  adjusted Augmentin - result is explained to the pt will dc  pt states she had colonoscopy done dose know how long ago but she is due

## 2022-09-19 NOTE — ED ADULT NURSE NOTE - OBJECTIVE STATEMENT
Pt A&Ox4, NAD. Pt presents to the ED with complaints of lower abdominal pain. Breathing even and unlabored.

## 2022-09-19 NOTE — ED PROVIDER NOTE - NS ED ROS FT
No fever/chills   No photophobia/eye pain/changes in visio,   No ear pain/sore throat/dysphagia   No chest pain/palpitations  No SOB/cough/wheeze/stridor   + abdominal pain, No N/V/D  No dysuria/frequency/discharge   No neck/back pain,   No rash  No changes in neurological status/function.

## 2022-09-19 NOTE — ED PROVIDER NOTE - ATTENDING CONTRIBUTION TO CARE
labs and imaging reviewed. potassium mildly elevated instructed to f/up with pcp to have this repeated. ct with diverticulitis treated with augmentin. will d/c with outpatient f/up and return instructions.

## 2022-09-19 NOTE — ED PROVIDER NOTE - NSFOLLOWUPINSTRUCTIONS_ED_ALL_ED_FT
please take antibiotic for the pain   tylenol as need it for the pain   call and follow up with primary care and bring the result in 2-3 days   clear diet for 2-3 days and advance to semi solid as tolerating- avoid food contain fat and seeds        Diverticulitis    WHAT YOU NEED TO KNOW:    Diverticulitis is a condition that causes small pockets along your intestine called diverticula to become inflamed or infected. This is caused by hard bowel movements, food, or bacteria that get stuck in the pockets.  Diverticula         DISCHARGE INSTRUCTIONS:    Seek care immediately if:   •You have bowel movement or foul-smelling discharge leaking from your vagina or in your urine.      •You have severe diarrhea.      •You urinate less than usual or not at all.      •You are not able to have a bowel movement.      •You cannot stop vomiting.      •You have severe abdominal pain, a fever, and your abdomen is larger than usual.      •You have new or increased blood in your bowel movements.      Call your doctor if:   •You have pain when you urinate.      •Your symptoms get worse or do not go away.      •You have questions or concerns about your condition or care.      Medicines:   •Antibiotics may be given to help prevent or treat a bacterial infection.      •Prescription pain medicine may be given. Ask your healthcare provider how to take this medicine safely. Some prescription pain medicines contain acetaminophen. Do not take other medicines that contain acetaminophen without talking to your healthcare provider. Too much acetaminophen may cause liver damage. Prescription pain medicine may cause constipation. Ask your healthcare provider how to prevent or treat constipation.       •Take your medicine as directed. Contact your healthcare provider if you think your medicine is not helping or if you have side effects. Tell your provider if you are allergic to any medicine. Keep a list of the medicines, vitamins, and herbs you take. Include the amounts, and when and why you take them. Bring the list or the pill bottles to follow-up visits. Carry your medicine list with you in case of an emergency.      Clear liquid diet: A clear liquid diet includes any liquids that you can see through. Examples include water, ginger-john, cranberry or apple juice, frozen fruit ice, or broth. Stay on a clear liquid diet until your symptoms are gone, or as directed.    Follow up with your doctor as directed: You may need to return for a colonoscopy. When your symptoms are gone, you may need a low-fat, high-fiber diet to help prevent diverticulitis from developing again. Your healthcare provider or dietitian can help you create meal plans. Write down your questions so you remember to ask them during your visits.

## 2022-09-19 NOTE — ED PROVIDER NOTE - NS ED ATTENDING STATEMENT MOD
Attending with This was a shared visit with the MADELINE. I reviewed and verified the documentation and independently performed the documented:

## 2022-09-19 NOTE — ED PROVIDER NOTE - OBJECTIVE STATEMENT
Pt is a 71 yo F co lower abd pain. pmhx significant for htn, dm. PT states that three days ago she was getting off the toilet and when she stood up she had onset of lower abd pain that radiates to the back. Pt states that the pain is severe and worse when sitting certain ways and when she goes from sitting to standing. no n/v. no fever/chills. no diarrhea. no other complaints.

## 2022-09-20 VITALS
RESPIRATION RATE: 20 BRPM | DIASTOLIC BLOOD PRESSURE: 87 MMHG | OXYGEN SATURATION: 97 % | TEMPERATURE: 98 F | SYSTOLIC BLOOD PRESSURE: 168 MMHG | HEART RATE: 78 BPM

## 2022-09-20 PROCEDURE — 74176 CT ABD & PELVIS W/O CONTRAST: CPT | Mod: ME

## 2022-09-20 PROCEDURE — 81003 URINALYSIS AUTO W/O SCOPE: CPT

## 2022-09-20 PROCEDURE — 96375 TX/PRO/DX INJ NEW DRUG ADDON: CPT

## 2022-09-20 PROCEDURE — 83690 ASSAY OF LIPASE: CPT

## 2022-09-20 PROCEDURE — 99284 EMERGENCY DEPT VISIT MOD MDM: CPT | Mod: 25

## 2022-09-20 PROCEDURE — G1004: CPT

## 2022-09-20 PROCEDURE — 96361 HYDRATE IV INFUSION ADD-ON: CPT

## 2022-09-20 PROCEDURE — 80053 COMPREHEN METABOLIC PANEL: CPT

## 2022-09-20 PROCEDURE — 36415 COLL VENOUS BLD VENIPUNCTURE: CPT

## 2022-09-20 PROCEDURE — 96374 THER/PROPH/DIAG INJ IV PUSH: CPT

## 2022-09-20 PROCEDURE — 85025 COMPLETE CBC W/AUTO DIFF WBC: CPT

## 2022-09-20 RX ADMIN — Medication 1 TABLET(S): at 00:50

## 2022-09-26 ENCOUNTER — EMERGENCY (EMERGENCY)
Facility: HOSPITAL | Age: 72
LOS: 1 days | Discharge: HOME | End: 2022-09-26
Attending: EMERGENCY MEDICINE
Payer: MEDICARE

## 2022-09-26 VITALS
RESPIRATION RATE: 18 BRPM | DIASTOLIC BLOOD PRESSURE: 79 MMHG | TEMPERATURE: 98 F | OXYGEN SATURATION: 97 % | SYSTOLIC BLOOD PRESSURE: 128 MMHG | HEART RATE: 85 BPM

## 2022-09-26 VITALS
RESPIRATION RATE: 18 BRPM | HEIGHT: 64 IN | OXYGEN SATURATION: 96 % | HEART RATE: 100 BPM | TEMPERATURE: 99 F | SYSTOLIC BLOOD PRESSURE: 137 MMHG | DIASTOLIC BLOOD PRESSURE: 80 MMHG

## 2022-09-26 DIAGNOSIS — H26.40 UNSPECIFIED SECONDARY CATARACT: Chronic | ICD-10-CM

## 2022-09-26 LAB
ALBUMIN SERPL ELPH-MCNC: 4.2 G/DL — SIGNIFICANT CHANGE UP (ref 3.3–5.2)
ALP SERPL-CCNC: 107 U/L — SIGNIFICANT CHANGE UP (ref 40–120)
ALT FLD-CCNC: 9 U/L — SIGNIFICANT CHANGE UP
ANION GAP SERPL CALC-SCNC: 14 MMOL/L — SIGNIFICANT CHANGE UP (ref 5–17)
AST SERPL-CCNC: 15 U/L — SIGNIFICANT CHANGE UP
BASOPHILS # BLD AUTO: 0.02 K/UL — SIGNIFICANT CHANGE UP (ref 0–0.2)
BASOPHILS NFR BLD AUTO: 0.3 % — SIGNIFICANT CHANGE UP (ref 0–2)
BILIRUB SERPL-MCNC: <0.2 MG/DL — LOW (ref 0.4–2)
BUN SERPL-MCNC: 11.9 MG/DL — SIGNIFICANT CHANGE UP (ref 8–20)
CALCIUM SERPL-MCNC: 9.3 MG/DL — SIGNIFICANT CHANGE UP (ref 8.4–10.5)
CHLORIDE SERPL-SCNC: 108 MMOL/L — HIGH (ref 98–107)
CO2 SERPL-SCNC: 17 MMOL/L — LOW (ref 22–29)
CREAT SERPL-MCNC: 2.01 MG/DL — HIGH (ref 0.5–1.3)
EGFR: 26 ML/MIN/1.73M2 — LOW
EOSINOPHIL # BLD AUTO: 0.41 K/UL — SIGNIFICANT CHANGE UP (ref 0–0.5)
EOSINOPHIL NFR BLD AUTO: 6.5 % — HIGH (ref 0–6)
GLUCOSE SERPL-MCNC: 118 MG/DL — HIGH (ref 70–99)
HCT VFR BLD CALC: 34.8 % — SIGNIFICANT CHANGE UP (ref 34.5–45)
HGB BLD-MCNC: 10.5 G/DL — LOW (ref 11.5–15.5)
IMM GRANULOCYTES NFR BLD AUTO: 0.3 % — SIGNIFICANT CHANGE UP (ref 0–0.9)
LIDOCAIN IGE QN: 52 U/L — HIGH (ref 22–51)
LYMPHOCYTES # BLD AUTO: 2.04 K/UL — SIGNIFICANT CHANGE UP (ref 1–3.3)
LYMPHOCYTES # BLD AUTO: 32.3 % — SIGNIFICANT CHANGE UP (ref 13–44)
MCHC RBC-ENTMCNC: 26.4 PG — LOW (ref 27–34)
MCHC RBC-ENTMCNC: 30.2 GM/DL — LOW (ref 32–36)
MCV RBC AUTO: 87.4 FL — SIGNIFICANT CHANGE UP (ref 80–100)
MONOCYTES # BLD AUTO: 0.5 K/UL — SIGNIFICANT CHANGE UP (ref 0–0.9)
MONOCYTES NFR BLD AUTO: 7.9 % — SIGNIFICANT CHANGE UP (ref 2–14)
NEUTROPHILS # BLD AUTO: 3.32 K/UL — SIGNIFICANT CHANGE UP (ref 1.8–7.4)
NEUTROPHILS NFR BLD AUTO: 52.7 % — SIGNIFICANT CHANGE UP (ref 43–77)
PLATELET # BLD AUTO: 228 K/UL — SIGNIFICANT CHANGE UP (ref 150–400)
POTASSIUM SERPL-MCNC: 5.5 MMOL/L — HIGH (ref 3.5–5.3)
POTASSIUM SERPL-SCNC: 5.5 MMOL/L — HIGH (ref 3.5–5.3)
PROT SERPL-MCNC: 7.4 G/DL — SIGNIFICANT CHANGE UP (ref 6.6–8.7)
RBC # BLD: 3.98 M/UL — SIGNIFICANT CHANGE UP (ref 3.8–5.2)
RBC # FLD: 14.4 % — SIGNIFICANT CHANGE UP (ref 10.3–14.5)
SODIUM SERPL-SCNC: 139 MMOL/L — SIGNIFICANT CHANGE UP (ref 135–145)
WBC # BLD: 6.31 K/UL — SIGNIFICANT CHANGE UP (ref 3.8–10.5)
WBC # FLD AUTO: 6.31 K/UL — SIGNIFICANT CHANGE UP (ref 3.8–10.5)

## 2022-09-26 PROCEDURE — 99285 EMERGENCY DEPT VISIT HI MDM: CPT

## 2022-09-26 PROCEDURE — 80053 COMPREHEN METABOLIC PANEL: CPT

## 2022-09-26 PROCEDURE — 82962 GLUCOSE BLOOD TEST: CPT

## 2022-09-26 PROCEDURE — 83690 ASSAY OF LIPASE: CPT

## 2022-09-26 PROCEDURE — 85025 COMPLETE CBC W/AUTO DIFF WBC: CPT

## 2022-09-26 PROCEDURE — 74176 CT ABD & PELVIS W/O CONTRAST: CPT | Mod: MA

## 2022-09-26 PROCEDURE — 74176 CT ABD & PELVIS W/O CONTRAST: CPT | Mod: 26,MA

## 2022-09-26 PROCEDURE — 99284 EMERGENCY DEPT VISIT MOD MDM: CPT | Mod: 25

## 2022-09-26 PROCEDURE — 96375 TX/PRO/DX INJ NEW DRUG ADDON: CPT

## 2022-09-26 PROCEDURE — 36415 COLL VENOUS BLD VENIPUNCTURE: CPT

## 2022-09-26 PROCEDURE — 96374 THER/PROPH/DIAG INJ IV PUSH: CPT

## 2022-09-26 RX ORDER — METRONIDAZOLE 500 MG
500 TABLET ORAL ONCE
Refills: 0 | Status: COMPLETED | OUTPATIENT
Start: 2022-09-26 | End: 2022-09-26

## 2022-09-26 RX ORDER — ACETAMINOPHEN 500 MG
1000 TABLET ORAL ONCE
Refills: 0 | Status: COMPLETED | OUTPATIENT
Start: 2022-09-26 | End: 2022-09-26

## 2022-09-26 RX ORDER — LIDOCAINE 4 G/100G
1 CREAM TOPICAL ONCE
Refills: 0 | Status: COMPLETED | OUTPATIENT
Start: 2022-09-26 | End: 2022-09-26

## 2022-09-26 RX ORDER — RADIOPAQUE PVC MARKERS/BARIUM 24MARKERS
450 CAPSULE ORAL ONCE
Refills: 0 | Status: COMPLETED | OUTPATIENT
Start: 2022-09-26 | End: 2022-09-26

## 2022-09-26 RX ORDER — METHOCARBAMOL 500 MG/1
2 TABLET, FILM COATED ORAL
Qty: 18 | Refills: 0
Start: 2022-09-26 | End: 2022-09-28

## 2022-09-26 RX ORDER — METHOCARBAMOL 500 MG/1
750 TABLET, FILM COATED ORAL ONCE
Refills: 0 | Status: COMPLETED | OUTPATIENT
Start: 2022-09-26 | End: 2022-09-26

## 2022-09-26 RX ORDER — CIPROFLOXACIN LACTATE 400MG/40ML
400 VIAL (ML) INTRAVENOUS ONCE
Refills: 0 | Status: COMPLETED | OUTPATIENT
Start: 2022-09-26 | End: 2022-09-26

## 2022-09-26 RX ADMIN — Medication 100 MILLIGRAM(S): at 20:51

## 2022-09-26 RX ADMIN — METHOCARBAMOL 750 MILLIGRAM(S): 500 TABLET, FILM COATED ORAL at 23:34

## 2022-09-26 RX ADMIN — Medication 200 MILLIGRAM(S): at 19:51

## 2022-09-26 RX ADMIN — LIDOCAINE 1 PATCH: 4 CREAM TOPICAL at 23:34

## 2022-09-26 RX ADMIN — Medication 450 MILLILITER(S): at 19:51

## 2022-09-26 RX ADMIN — Medication 400 MILLIGRAM(S): at 19:50

## 2022-09-26 NOTE — ED ADULT TRIAGE NOTE - CHIEF COMPLAINT QUOTE
Pt states diagnosed with diverticulitis 1 week ago and has been taking her medications but her abd pains have increased and moved to b/l flanks.

## 2022-09-26 NOTE — ED ADULT NURSE NOTE - CCCP TRG CHIEF CMPLNT
Bed: 05  Expected date: 7/24/17  Expected time: 8:32 AM  Means of arrival: [facility]  Comments:  
abdominal pain

## 2022-09-26 NOTE — ED ADULT NURSE NOTE - OBJECTIVE STATEMENT
Pt. complaining of abdominal pain, diagnosed with diverticulitis a week ago. Pt. states she has been taking her meds, now pain is in flank area.

## 2022-09-26 NOTE — ED PROVIDER NOTE - CLINICAL SUMMARY MEDICAL DECISION MAKING FREE TEXT BOX
73 yo female PMHx IDDM2, HTN, anemia presents to ED c/o abdominal pain. Recently on Augmentin for diverticulitis. CT without. Continue abx, treat back pain as MSK. Patient to be discharged. Patient and/or guardian provided verbal/written discharge instructions and return precautions. Patient and/or guardian expresses understanding and agreement.

## 2022-09-26 NOTE — ED PROVIDER NOTE - NSFOLLOWUPCLINICS_GEN_ALL_ED_FT
[facility] - [facility]  Ortho/Spine  [address] [place] [address]  Phone: [phone]  Fax:

## 2022-09-26 NOTE — ED PROVIDER NOTE - OBJECTIVE STATEMENT
71 yo female PMHx IDDM2, HTN, anemia presents to ED c/o abdominal pain. Pain worse to RLQ with radiation around back. Patient presented to ED 7 days ago, diagnosed with diverticulitis. Prescribed low dose based on elevated creatine. Medicated with acetaminophen for pain. No further complaints at this time.   Denies fevers, vomiting. 73 yo female PMHx IDDM2, HTN, anemia presents to ED c/o abdominal pain. Pain worse to RLQ with radiation around back. Lower back pain is worse with movent. Patient presented to ED 7 days ago, diagnosed with diverticulitis. Prescribed low dose based on elevated creatine. Medicated with acetaminophen for pain. No further complaints at this time.   Denies fevers, vomiting.

## 2022-09-26 NOTE — ED PROVIDER NOTE - PATIENT PORTAL LINK FT
You can access the FollowMyHealth Patient Portal offered by [facility] by registering at the following website: [email or web address]. By joining [employer]’s FollowMyHealth portal, you will also be able to view your health information using other applications (apps) compatible with our system.

## 2022-09-26 NOTE — ED PROVIDER NOTE - PHYSICAL EXAMINATION
Gen: Nontoxic, well appearing, in NAD.  Skin: Warm and dry as visualized.  Head: NC/AT.  Eyes: PERRLA. EOMI.  Neck: Supple, FROM. Trachea midline.   Resp: No distress.  Cardio: Well perfused.  Abd: Nondistended. Soft, b/l lower abdominal tenderness.   Ext: No deformities. MAEx4. FROM.   Neuro: A&Ox3. Appears nonfocal.   Psych: Normal affect and mood.

## 2022-09-26 NOTE — ED PROVIDER NOTE - NS ED MD DISPO DISCHARGE CCDA
-- DO NOT REPLY / DO NOT REPLY ALL --  -- Message is from the [facility]--    General Patient Message      Reason for Call: Patient mother, Milan, is asking for a call back to discuss patient results from tests done in [facility]. Patient mother is also asking if Fatuma would be able to see patient around 4:00pm, since she is unable to get to an appointment earlier in the day than that. Please call Milan at [phone].    Caller Information       Type Contact Phone    01/27/2020 10:32 AM Phone (Incoming) MILAN GAMBOA (Mother) [phone]          Alternative phone number: n/a    Turnaround time given to caller:   \"This message will be sent to [state Provider's name]. The clinical team will fulfill your request as soon as they review your message.\"}     Patient/Caregiver provided printed discharge information.

## 2022-09-26 NOTE — ED PROVIDER NOTE - PROGRESS NOTE DETAILS
ROLAND Villanueva: No abdominal pain. Patient with back pain worse with movement. Muscle relaxer, follow up.

## 2022-09-26 NOTE — ED ADULT TRIAGE NOTE - TEMPERATURE IN FAHRENHEIT (DEGREES F)
Continuity of Care Document

                          Created on:2021



Patient:NEGRO GONZALES

Sex:Female

:1939

External Reference #:[id number]





Demographics







                          Address                   [address]



                                                    [place] [address]

 

                          Home Phone                [phone]

 

                          Mobile Phone              [phone]

 

                          Email Address             [email or web address]

 

                          Preferred Language        English

 

                          Marital Status            Unknown

 

                          [employer] Affiliation     Unknown

 

                          Race                      Unknown

 

                          Additional Race(s)        Unavailable



                                                    White

 

                          Ethnic Group              Unknown









Author







                          Organization              [facility]

t

 

                          Address                   [address]



                                                    [place] [address]

 

                          Phone                     [phone]









Support







                Name            Relationship    Address         Phone

 

                Homero          Child           Unavailable     [phone]

 

                Homero          Other           Unavailable     [phone]

 

                Prabhu         Daughter        Unavailable     [phone]









Care Team Providers







                    Name                Role                Phone

 

                    Denita RIZVI            Primary Care Physician [phone]

 

                    Tucker Bassett MD Attending Clinician [phone]

 

                    Leni Ramos MD  Attending Clinician [phone]

 

                    GRANT Christopher MD       Attending Clinician [phone]

 

                    Marcelino Kay MD   Attending Clinician [phone]

 

                    TUCKER BASSETT   Attending Clinician Unavailable

 

                    Abbe RIZVI           Attending Clinician [phone]

 

                    LENI RAMOS     Admitting Clinician Unavailable









Payers







           Payer Name Policy Type Policy     Effective Date Expiration Date Sour

ce



                                 Number                           

 

           AETNA MEDICAREAETNA            [id number]   2014            [facility]

dist



           MEDICARE HMO/PPO                       00:00:00              [facility]



           [id number]                                             



           -PresentHMO                                             







Problems







       Condition Condition Condition Status Onset  Resolution Last   Treating Co

mments 

Source



       Name   Details Category        Date   Date   Treatment Clinician        



                                                 Date                 

 

       Atrial Atrial Disease Active                              [facility]



       fibrillati fibrillati               8-16                               [facility]

[facility] -



       on with on with               00:00:                             Medical



       RVR    RVR                  00                                 Center

 

       CORNELIA    CORNELIA    Disease Active                                    [facility]



       (obstructi (obstructi                                                  [facility]

kes -



       ve sleep ve sleep                                                  [facility]

l



       apnea) apnea)                                                  Center







Allergies, Adverse Reactions, Alerts







       Allergy Allergy Status Severity Reaction(s) Onset  Inactive Treating Comm

ents 

Source



       Name   Type                        Date   Date   Clinician        

 

       Strawber Propensi Active        Rash   2021-0                      [facility]



       ry     ty to                                               [facility]



              adverse                      00:00:                      Medical



              reaction                      00                          Center



              s                                                       

 

       Clindamy Propensi Active        Rash   -0                      [facility]



       jayna Hcl ty to                                               [facility]



              adverse                      00:00:                      Medical



              reaction                      00                          Center



              s                                                       

 

       Penicill Propensi Active        Rash   -0                      [facility]



       in     ty to                                               [facility]



              adverse                      00:00:                      Medical



              reaction                      00                          Center



              s                                                       

 

       Sulfa  Propensi Active        Rash   -0                      [facility]



       (Sulfona ty to                                               [facility] -



       mide   adverse                      00:00:                      Medical



       Antibiot reaction                      00                          Center



       ics)   s                                                       

 

       Clindamy Propensi Active        Unknown 2020-0                      [facility]

[facility]



       jayna    ty to                Reaction 6-30                        st



       Phosphat adverse                      00:00:                      [facility]osp[facility]



       e      reaction                      00                          l



              s to                                                    



              drug                                                    

 

       Propoxyp Propensi Active        Unknown 2020-0                      [facility]

di



       hene   ty to                Reaction 6-30                        st



       N-Acetam adverse                      00:00:                      [facility]



       inophen reaction                      00                          l



              s to                                                    



              drug                                                    

 

       Penicill Propensi Active        Unknown 2020-0                      [facility]

di



       ins    ty to                Reaction 6-30                        st



              adverse                      00:00:                      Hospita



              reaction                      00                          l



              s to                                                    



              drug                                                    

 

       Sulfa  Propensi Active        Unknown 2020-0                      [facility]



       (Sulfona ty to                Reaction 6-30                        st



       mide   adverse                      00:00:                      Hospita



       Antibiot reaction                      00                          l



       ics)   s to                                                    



              drug                                                    

 

       Acetamin Propensi Active        Unknown 2020-0                      [facility]

di



       ophen  ty to                Reaction 6-30                        st



              adverse                      00:00:                      [facility]ospita



              reaction                      00                          l



              s to                                                    



              drug                                                    







Family History







           Family Member Diagnosis  Comments   Start Date Stop Date  Source

 

           Natural father Diabetes                                    [facility]

 

           Natural father Heart disease                                  [facility]

 

           Natural father Hypertension                                  [facility]

 

           Natural mother Asthma                                      [facility]







Social History







           Social Habit Start Date Stop Date  Quantity   Comments   Source

 

           History SDOH                                             [facility]

 -



           Alcohol Std Drinks                                             [facility]

l Center

 

           History SDOH                                             [facility]

 -



           Alcohol Binge                                             [facility]

ter

 

           History SDOH                                             [facility]

 -



           Alcohol Comment                                             [facility]

enter

 

           Exposure to                       Not sure              [facility] 

-



           SARS-CoV-2 (event)                                             [facility]

l Center

 

           History of tobacco                       Smoker                [facility]

ist



           use                                                    Hospital

 

           Tobacco use and 2021 Never used            [facility]

[facility] -



           exposure   00:00:00   00:00:00                         [facility]

 

           Alcohol intake 2021 Lifetime              [facility]

es -



                      00:00:00   00:00:00   non-drinker            [facility] Cent[facility]

r



                                            (finding)             

 

           History SDOH 2021 1                     [facility]

 -



           Alcohol Frequency 00:00:00   00:00:00                         [facility]

 

           Cigarettes smoked 2020                       [facility]

st



           current ([facility]

[facility]



           day) - Reported                                             

 

           Cigarette  2020                       [facility]



           pack-years 00:00:00   00:00:00                         [facility]

 

           Sex Assigned At [birth year]                       [facility]

kes -



           Birth      00:00:00   00:00:00                         [facility]









                Smoking Status  Start Date      Stop Date       Source

 

                Former smoker   2020 00:00:00 2020 00:00:00 [facility]







Medications







       Ordered Filled Start  Stop   Current Ordering Indication Dosage Frequency

 Signature

                    Comments            Components          Source



     Medication Medication Date Date Medication? Clinician                (SIG) 

          



     Name Name                                                   

 

     furosemide      2022- Yes            20mg QD   Take 1           [facility]



     (LASIX) 20      8- 08-23                          tablet (20           [facility]

kes -



     MG tablet      00:00: 23:59                          mg total)           Me

dical



               00   :00                           by mouth           Center



                                                  daily.           

 

     zolpidem            Yes            10mg      Take 10 mg           [facility]



     (AMBIEN) 10      8-22                               by mouth           [facility]

s -



     mg tablet      12:43:                               every           Medical



               24                                 night as           Center



                                                  needed for           



                                                  Insomnia.           

 

     donepeziL            Yes            10mg QD   Take 10 mg           [facility]



     (ARICEPT)      8-22                               by mouth           [facility] 

-



     10 MG      12:43:                               nightly.           Medical



     tablet      24                                                Center

 

     DULoxetine            Yes            60mg Q.5D Take 60 mg           [facility]

[facility]



     (CYMBALTA)      8-22                               by mouth 2           [facility]

es -



     60 MG      12:43:                               (two)           Medical



     capsule      24                                 times           Center



                                                  daily.           

 

     gabapentin            Yes            300mg [id number] Take 300       

    [facility]



     (NEURONTIN)      8-22                          [id number] mg by           L

ukes -



     300 MG      12:43:                          3D   mouth 3           Medical



     capsule      24                                 (three)           Center



                                                  times           



                                                  daily.           

 

     indapamide            Yes            2.5mg QD   Take 2.5           [facility]

[facility]



     (LOZOL) 2.5      8-22                               mg by           [facility] -



     MG tablet      12:43:                               mouth           Medical



               24                                 every           Center



                                                  morning.           

 

     memantine            Yes            10mg Q.5D Take 10 mg           [facility]



     (NAMENDA)      8-22                               by mouth 2           [facility]

s -



     10 MG      12:43:                               (two)           Medical



     tablet      24                                 times           Center



                                                  daily.           

 

     metoprolol            Yes            25mg QD   Take 25 mg           [facility]

[facility]



     succinate      8-22                               by mouth           [facility] 

-



     (TOPROL-XL)      12:43:                               nightly.           Me

dical



     25 MG 24 hr      24                                                Center



     tablet                                                        

 

     HYDROcodone            Yes            1{tbl}      Take 1           [facility]



     -acetaminop      8-22                               tablet by           [facility]

es -



     hen (NORCO      12:43:                               mouth 3           Medi

bella



     7.5-325)      24                                 (three)           Center



     7.5-325 mg                                         times           



     per tablet                                         daily as           



                                                  needed for           



                                                  Pain.           

 

     potassium            Yes            20meq Q.5D Take 20           [facility]



     chloride      8-22                               mEq by           [facility] -



     (KLOR-CON)      12:43:                               mouth 2           Medi

bella



     20 mEq      24                                 (two)           Center



     packet                                         times           



                                                  daily.           

 

     QUEtiapine            Yes            200mg QD   Take 200           [facility]



     (SEROquel)      8-22                               mg by           [facility] -



     200 MG      12:43:                               mouth           Medical



     tablet      24                                 nightly.           Center

 

     simvastatin            Yes            40mg QD   Take 40 mg           

[facility]



     (ZOCOR) 40      8-22                               by mouth           [facility]

 -



     MG tablet      12:43:                               nightly.           Medi

bella



               24                                                Center

 

     topiramate            Yes            200mg Q.5D Take 200           [facility]



     (TOPAMAX)      8-22                               mg by           [facility] -



     200 MG      12:43:                               mouth 2           Medical



     tablet      24                                 (two)           Center



                                                  times           



                                                  daily.           

 

     buPROPion            Yes            150mg QD   Take 150           [facility]



     (WELLBUTRIN      8-22                               mg by           [facility] -



     XL) 150 MG      12:43:                               mouth           Medica

l



     24 hr      24                                 daily.           Center



     tablet                                                        

 

     ALPRAZolam            Yes            .25mg      Take 0.25           [facility]

[facility]



     (XANAX)      8-22                               mg by           [facility] -



     0.25 MG      12:43:                               mouth           Medical



     tablet      24                                 every           Center



                                                  night as           



                                                  needed for           



                                                  Anxiety.           

 

     tiZANidine            Yes            4mg  QD   Take 4 mg           [facility]



     (ZANAFLEX)      8-22                               by mouth           [facility]

 -



     4 MG tablet      12:43:                               daily.           Medi

bella



               24                                                Center

 

     fluticasone            Yes                 QD   Inhale 1           [facility]



     -umeclidin-                                     Inhalation           [facility]

kes -



     vilanter      12:43:                               by mouth           Medic

al



     (Trelegy      24                                 via            Center



     Ellipta)                                         inhaler           



     100-62.5-25                                         daily .           



     mcg DsDv                                                        

 

     clopidogreL      2021- No             75mg QD   Take 75 mg          

 [facility]



     (PLAVIX) 75                                by mouth           [facility]

es -



     mg tablet      10:48: 00:00                          daily.           Medic

al



               43   :00                                          Center

 

     apixaban      2022- Yes            5mg  Q.5D Take 1           [facility]



     (ELIQUIS) 5                                tablet (5           [facility]

kes -



     mg Tab      00:00: 23:59                          mg total)           Medic

al



     tablet      00   :00                           by mouth 2           Center



                                                  (two)           



                                                  times           



                                                  daily.           

 

     cyanocobala      2022- Yes            1000ug QD   Take 1           [facility]

[facility]



     min,                                tablet           [facility] -



     vitamin      00:00: 23:59                          (1,000 mcg           Med

ical



     B-12, 1000      00   :00                           total) by           Cent

er



     MCG tablet                                         mouth           



                                                  daily.           

 

     flecainide      2022- Yes            50mg      Take 1           [facility]



     (TAMBOCOR)                                tablet (50           [facility]

kes -



     50 MG      00:00: 23:59                          mg total)           Medica

l



     tablet      00   :00                           by mouth           Center



                                                  every 12           



                                                  (twelve)           



                                                  hours.           

 

     benzonatate       No             100mg      Take 1           [facility]



     (TESSALON)                                capsule           [facility]

 -



     100 MG      00:00: 23:59                          (100 mg           Medical



     capsule      00   :00                           total) by           Center



                                                  mouth 3           



                                                  (three)           



                                                  times           



                                                  daily as           



                                                  needed for           



                                                  Cough for           



                                                  up to 7           



                                                  days.           







Vital Signs







             Vital Name   Observation Time Observation Value Comments     Source

 

             Heart rate   2021 08:01:00 56 /min                   [facility]

[facility] -



                                                                 [facility]

 

             Respiratory rate 2021 08:01:00 16 /min                   [facility] 

[facility]



                                                                 [facility]

 

             Oxygen saturation in 2021 08:01:00 93 /min                   

[facility]



             Arterial blood by                                        Medical Ce

nter



             Pulse oximetry                                        

 

             Systolic blood 2021 07:00:00 126 mm[Hg]                [facility]



             pressure                                            [facility]

 

             Diastolic blood 2021 07:00:00 60 mm[Hg]                 [facility]

 

             Body temperature 2021 07:00:00 36.61 Brittany                 [facility]

 

             Body weight  2021 06:47:00 97.478 kg                 [facility]

 

             BMI          2021 06:47:00 38.07 kg/m2               [facility]

 

             Body height  2021 22:25:00 160 cm                    [facility]







Procedures







                Procedure       Date / Time     Performing Clinician Source



                                Performed                       

 

                HIGH SENSITIVITY TROPONIN 2021 16:40:00 Tara Kay [facility]

 

                ECG 12-LEAD     2021 15:59:02 Unknown, [facility]

 

                ECG 12-LEAD     2021 15:59:02 Unknown, [facility]

 

                XR CHEST 1 VIEW PORTABLE / 2021 07:54:00 REA Abarca [facility]

 

                BASIC METABOLIC PANEL (7) 2021 04:39:00 Susanna Landa [facility]

 

                CBC W/PLT COUNT & AUTO 2021 04:39:00 Susanna [facility]anda [facility]

t [facility]



                DIFFERENTIAL                    [facility]

 

                MAGNESIUM       2021 04:39:00 Susanna Landa [facility]

 

                CBC W/PLT COUNT & AUTO 2021 04:39:00 Mullinsalvarez Landa [facility]

t [facility]



                DIFFERENTIAL                    [facility]

 

                B-TYPE NATRIURETIC FACTOR 2021 03:34:00 Ivanna Abarca [facility]



                (BNP)                           [facility]

 

                XR CHEST 1 VIEW PORTABLE / 2021 13:03:00 Tara Kay

[facility]

 

                BASIC METABOLIC PANEL (7) 2021 11:41:00 Tara Kay ELIAS STEWART [facility]

 

                MAGNESIUM       2021 11:41:00 Rahul, Bebetoifeoma [facility]

 

                B-TYPE NATRIURETIC FACTOR 2021 11:41:00 RahulBebeto wolfeifeoma [facility]



                (BNP)                           [facility]

 

                ECG 12-LEAD     2021 10:53:46 Cornelius Garzon   [facility]

 

                TRANSESOPHAGEAL ECHO 2021 09:39:19 [facility]

 

                BASIC METABOLIC PANEL (7) 2021 05:17:00 Rachel [facility]

 

                HEPATIC FUNCTION PANEL 2021 05:17:00 Rachel [facility]

 

                CBC W/PLT COUNT & AUTO 2021 05:17:00 Rachel [facility]



                DIFFERENTIAL                    [facility]

 

                CBC W/PLT COUNT & AUTO 2021 05:17:00 Rachel [facility]



                DIFFERENTIAL                    [facility]

 

                CARDIOVERSION   2021 00:31:19 [facility]

 

                HIGH SENSITIVITY TROPONIN 2021 18:35:00 Rachel [facility]

 

                2D ECHO W/ DOPPLER 2021 14:07:13 Sammy Armstrong [facility]



                (CW/PW/COLOR)                   [facility]

 

                CT CHEST FOR PULMONARY 2021 05:36:00 Rachel [facility]



                EMBOLUS                         [facility]

 

                XR CHEST 1 VIEW PORTABLE / 2021 04:08:00 Chao Ramos

[facility] -



                BEDSIDE                         [facility]

 

                SARS-COV2/RT-PCR ([facility] & 2021 03:48:00 Rachel [facility] -



                REF LABS)                       [facility]

 

                BASIC METABOLIC PANEL (7) 2021 03:41:00 Rachel, [facility]

 

                HEPATIC FUNCTION PANEL 2021 03:41:00 Rachel, [facility]

 

                CBC W/PLT COUNT & AUTO 2021 03:41:00 Rachel, [facility]

 

                HIGH SENSITIVITY TROPONIN 2021 03:41:00 Rachel [facility]

 

                B-TYPE NATRIURETIC FACTOR 2021 03:41:00 Rachel, [facility]



                (BNP)                           [facility]

 

                CBC W/PLT COUNT & AUTO 2021 03:41:00 Rachel [facility]

 

                BASIC METABOLIC PANEL (7) 2021 23:21:00 Rachel [facility]

 

                HEPATIC FUNCTION PANEL 2021 23:21:00 Rachel [facility]

 

                MAGNESIUM       2021 23:21:00 Rachel [facility]

 

                PHOSPHORUS      2021 23:21:00 Rachel [facility]

 

                PROTHROMBIN TIME/INR 2021 23:21:00 Rachel [facility]

 

                CBC W/PLT COUNT & AUTO 2021 23:21:00 Rachel [facility]

 

                TSH/FREE T4 IF INDICATED 2021 23:21:00 Rachel, [facility]

 

                HIGH SENSITIVITY TROPONIN 2021 23:21:00 Rachel [facility]

 

                D[facility]DIMER         2021 [facility]

 

                VITAMIN B12 AND FOLATE 2021 23:21:00 Rachel, [facility]

 

                CBC W/PLT COUNT & AUTO 2021 23:21:00 RachelChao thakkar [facility] -



                DIFFERENTIAL                    [facility]

 

                ECG 12-LEAD     2021 23:12:15 [facility]ao thakkar [facility]

 -



                                                [facility]

 

                ECG 12-LEAD     2021 23:07:40 Unknown, H[facility] Doctor [facility] -



                                                                [facility]

 

                REPORT OF PROCEDURE - 2021 00:00:00 Provider, Default NATIVIDAD Whitmore -



                ENDOSCOPY SCAN                  Scanning        [facility]

 

                FL EXTERNAL STUDY EXAM 2020 16:20:00 Ellis Mcadams

[facility]







Plan of Care







             Planned Activity Planned Date Details      Comments     Source

 

             Future Scheduled 2021   INFLUENZA VACCINE (#1)              C

[facility]



             Test         00:00:00     [code = INFLUENZA              Medical Ce

nter



                                       VACCINE (#1)]              

 

             Future Scheduled 2021   DEPRESSION SCREENING              [facility]



             [facility]         00:00:00     (12+) [code =              Medical Center



                                       DEPRESSION SCREENING              



                                       (12+)]                    

 

             Future Scheduled 2021   FALLS RISK SCREENING              [facility]



             Test         00:00:00     [code = FALLS RISK              Medical C

enter



                                       SCREENING]                

 

             Future Scheduled 2021   Medicare IPPE (WELCOME              C

[facility]



             Test         00:00:00     TO MEDICARE) [code =              Medical

 Center



                                       Medicare IPPE (WELCOME              



                                       TO MEDICARE)]              

 

             Future Scheduled 2006-10-07   PNEUMOCOCCAL 65+ YRS              [facility]



             Test         00:00:00     (1 of 1 -                 Medical Center



                                       [id number] PCV13)              



                                       [code = PNEUMOCOCCAL              



                                       65+ YRS (1 of 1 -              



                                       [id number] PCV13)]              

 

             Future Scheduled 1991-10-07   SHINGLES VACCINES (1              [facility]



             Test         00:00:00     of 2) [code = SHINGLES              Medic

al Center



                                       VACCINES (1 of 2)]              

 

             Future Scheduled 1960-10-07   DTAP/TDAP/TD VACCINES              [facility]

[facility]



             Test         00:00:00     (1 - Tdap) [code =              Medical C

enter



                                       DTAP/TDAP/TD VACCINES              



                                       (1 - Tdap)]               

 

             Future Scheduled 1959-10-07   HEPATITIS C SCREENING              [facility]

[facility]



             [facility]         00:00:00     [code = HEPATITIS C              Medical 

Center



                                       SCREENING]                

 

             Future Scheduled              Hepatitis C screening              [facility]



             Test                      (procedure) [code =              



                                       809161189]                

 

             Future Scheduled              SHINGLES VACCINES (#1)              [facility]

[facility] Hospital



             Test                      [code = SHINGLES              



                                       VACCINES (#1)]              

 

             Future Scheduled              65+ PNEUMOCOCCAL              Methodi

 Hospital



             Test                      VACCINE (1 of 1 -              



                                       PPSV23) [code = 65+              



                                       PNEUMOCOCCAL VACCINE              



                                       (1 of 1 - PPSV23)]              

 

             Future Scheduled              INFLUENZA VACCINE              [facility]

[facility] Hospital



             Test                      [code = INFLUENZA              



                                       VACCINE]                  

 

             Future Scheduled              Hepatitis C screening              [facility]



             Test                      (procedure) [code =              



                                       434503477]                

 

             Future Scheduled              SHINGLES VACCINES (#1)              M

[facility]



             Test                      [code = SHINGLES              



                                       VACCINES (#1)]              

 

             Future Scheduled              65+ PNEUMOCOCCAL              Methodi

 Hospital



             Test                      VACCINE (1 of 1 -              



                                       PPSV23) [code = 65+              



                                       PNEUMOCOCCAL VACCINE              



                                       (1 of 1 - PPSV23)]              

 

             Future Scheduled              INFLUENZA VACCINE              [facility]

ist Hospital



             Test                      [code = INFLUENZA              



                                       VACCINE]                  







Encounters







        Start   End     Encounter Admission Attending Care    Care    Encounter 

Source



        Date/Time Date/Time Type    Type    Clinicians Facility Department ID   

   

 

        2021 [facility]         Selin Bassett [facility] 

  [id number] 

[id number]                              [facility]



        22:07:00 12:43:00 Encounter         Chao Ramos Yashash D                       

  [facility]

 

        2021 Orders                  [facility]   [id number]

[id number] [facility]



        00:00:00 00:00:00 Only                                            [facility]

 

        2021 Outpatient                 [facility]     [id number]

[id number]



        00:00:00 23:59:00                                                 Mun[facility]

 

        2021 Travel                  [facility]   [id number]

 [facility]



        00:00:00 00:00:00                                                 [facility]

 

        2021-05-10 2021-05-10 [facility] 1.2.840.1 [id number] 

[id number] Methodi



        08:36:31 23:59:00 Encounter         Ellis    11621.1.1         [address]



                                                3.430.2.7                 Hospit

a



                                                .3.145568                 l



                                                .8                      

 

        2021-05-10 2021-05-10 [place] [id number] 

[id number] Methodi



        08:36:31 23:59:00 Encounter         Ellis    60126.1.1         [address]



                                                3.430.2.7                 Hospit

a



                                                .3.848547                 l



                                                .8                      

 

        2021 Office          Mcadams, 1.2.840.1 [id number]

[id number] Methodi



        13:03:28 13:29:49 Visit           Ellis    09723.1.1         [address]



                                                3.430.2.7                 Hospit

a



                                                .3.389125                 l



                                                .8                      

 

        2021 Office          Mcadams, 1.2.840.1 [id number]

5389 Methodi



        13:03:28 13:29:49 Visit           Ellis    34235.1.1         [address]     [address]



                                                3.430.2.7                 Hospit

a



                                                .3.036903                 l



                                                .8                      

 

        2021 Travel                  1.2.840.1 1.2.846.972 [id number]

[id number] Methodi



        00:00:00 00:00:00                         51066.1.1 350.1.13.43 383     

st



                                                3.430.2.7 0.2.7.3.698         Ho

spita



                                                .3.550629 084.8           l



                                                .8                      

 

        2021 Travel                  1.2.840.1 1.2.415.445 [id number]

[id number] Methodi



        00:00:00 00:00:00                         66744.1.1 350.1.13.43 383     

st



                                                3.430.2.7 0.2.7.3.698         Ho

spita



                                                .3.294179 084.8           l



                                                .8                      







Results







           Test Description Test Time  Test Comments Results    Result Comments 

Source









                    High Sensitivity Troponin I ([facility] Only) 2021 1

7:20:00 









                      Test Item  Value      Reference Range Interpretation Comme

nts









             Troponin I HS    (test 6 pg/ml      See_Comment                [Aut

omated message] The



             code = 30029-8)                                        system which

 generated



                                                                 this result tra

nsmitted



                                                                 reference range

: <=17.



                                                                 The reference r

nurys was



                                                                 not used to int

erpret



                                                                 this result as



                                                                 normal/abnormal

.

 

             MAGGI (test code = MAGGI)  ID - DBThe                          

 



                           STAT High                           



                          Sensitivity Troponin-I                           



                          results should be used                           



                          in conjunction with                           



                          other diagnostic                           



                          information such as                           



                          ECG, clinical                           



                          observations and                           



                          information, and                           



                          patient symptoms to                           



                          aid in the diagnosis                           



                          of MI.                                 

 

             Lab Interpretation (test Normal                                 



             code = 90607-3)                                        



[facility] -91-82 17:20:00





             Test Item    Value        Reference Range Interpretation Comments

 

             HIGH SENSITIVITY 6 pg/ml      See_Comment                [Automated

 message]



             TROPONIN I (test code =                                        The 

system which



             9720943)                                            generated this 

result



                                                                 transmitted ref

erence



                                                                 range: <=17. Th

e



                                                                 reference range

 was not



                                                                 used to interpr

et this



                                                                 result as



                                                                 normal/abnormal

.



 ID - DBThe  STAT High Sensitivity Troponin-I results should be
used in conjunctionwith other diagnostic information such as ECG, clinical 
observations and information, and patient symptoms to aid in the diagnosis of 
MI.RAD, CHEST, 1 VIEW, NON [id number] 10:23:00Reason for exam:-&gt;pulm 
edemaShould this be performed at the bedside?-&gt;Yes
************************************************************[facility]        : 1939        Sex: 
F************************************************************FINAL REPORT 
PATIENT ID:   [id number]  HISTORY: Pulmonary edema COMPARISON: 2021  FIND
INGS: Mild interstitial pulmonary edema. No pleural effusions or pneumothorax. 
The heart shadow is borderline enlarged. The thoracic aorta is mildly tortuous. 
There are postoperative changes in the cervical spine. A right shoulder 
arthroplasty is partially imaged. Signed: Nikolas Rickettseport Verified 
Date/Time:  2021 10:23:36 Reading Location: [address] 
Reading Room       Electronically signed by: NIKOLAS RICKETTS MD on 2021 
10:23 AMBasic Metabolic [id number] 05:46:00





             Test Item    Value        Reference Range Interpretation Comments

 

             Sodium (test code = 140 meq/L    136-145                   



             2951-2)                                             

 

             Potassium (test code = 3.4 meq/L    3.5-5.1      L            



             2823-3)                                             

 

             Chloride (test code = 108 meq/L           H            



             2075-0)                                             

 

             CO2 (test code = 25 meq/L                          



             2028-9)                                             

 

             BUN (test code = 11 mg/dL     -                      



             3094-0)                                             

 

             Creatinine (test code 1.12 mg/dL   0.57-1.25                 



             = 2160-0)                                           

 

             Glucose (test code = 113 mg/dL           H            



             2345-7)                                             

 

             Calcium (test code = 8.9 mg/dL    8.4-10.2                  



             56358-1)                                            

 

             EGFR (test code = 47           mL/min/1.73 sq m              ESTIMA

MANI GFR IS



             33914-3)                                            NOT AS ACCURATE



                                                                 AS CREATININE



                                                                 CLEARANCE IN



                                                                 PREDICTING



                                                                 GLOMERULAR



                                                                 FILTRATION RATE

.



                                                                 ESTIMATED GFR I

S



                                                                 NOT APPLICABLE



                                                                 FOR DIALYSIS



                                                                 PATIENTS.

 

             MAGGI (test code = MAGGI)  ID -                           



                          NICOLAS M                                 

 

             Lab Interpretation Abnormal                               



             (test code = 61634-0)                                        



[facility]





             Test Item    Value        Reference Range Interpretation Comments

 

             Magnesium (test code = 1.7 mg/dL    1.6-2.6                   



             92539-5)                                            

 

             MAGGI (test code = MAGGI)  ID - NICOLAS                           



                          M                                      

 

             Lab Interpretation (test Normal                                 



             code = 34030-8)                                        



[facility] Center[facility] MET[facility]OLIC [id number] 05:46:00





             Test Item    Value        Reference Range Interpretation Comments

 

             SODIUM (BEAKER) 140 meq/L    136-145                   



             (test code = 381)                                        

 

             POTASSIUM (BEAKER) 3.4 meq/L    3.5-5.1      L            



             (test code = 379)                                        

 

             CHLORIDE (BEAKER) 108 meq/L           H            



             (test code = 382)                                        

 

             CO2 (BEAKER) (test 25 meq/L                          



             code = 355)                                         

 

             BLOOD UREA NITROGEN 11 mg/dL                           



             (BEAKER) (test code                                        



             = 354)                                              

 

             CREATININE (BEAKER) 1.12 mg/dL   0.57-1.25                 



             (test code = 358)                                        

 

             GLUCOSE RANDOM 113 mg/dL           H            



             (BEAKER) (test code                                        



             = 652)                                              

 

             CALCIUM (BEAKER) 8.9 mg/dL    8.4-10.2                  



             (test code = 697)                                        

 

             EGFR (BEAKER) (test 47 mL/min/1.73                           ESTIMA

MANI GFR IS



             code = 1092) sq m                                   NOT AS ACCURATE

 AS



                                                                 CREATININE



                                                                 CLEARANCE IN



                                                                 PREDICTING



                                                                 GLOMERULAR



                                                                 FILTRATION RATE

.



                                                                 ESTIMATED GFR I

S



                                                                 NOT APPLICABLE 

FOR



                                                                 DIALYSIS PATIEN

TS.



 ID - NICOLAS [id number] 05:46:00





             Test Item    Value        Reference Range Interpretation Comments

 

             MAGNESIUM (BEAKER) (test code = 1.7 mg/dL    1.6-2.6               

    



             627)                                                



 ID - NICOLAS MCBC with platelet count + automated [id number] 05:04:00





             Test Item    Value        Reference Range Interpretation Comments

 

             WBC (test code = 6690-2) 5.7          See_Comment                [A

utomated message]



                                                                 The system [employer]



                                                                 generated this 

result



                                                                 transmitted ref

erence



                                                                 range: 3.5 - 10

.5



                                                                 K/L. The refe

rence



                                                                 range was not u

sed to



                                                                 interpret this 

result



                                                                 as normal/abnor

mal.

 

             RBC (test code = 789-8) 3.85         See_Comment  L             [Au

tomated message]



                                                                 The system [employer]



                                                                 generated this 

result



                                                                 transmitted ref

erence



                                                                 range: 3.93 - 5

.22



                                                                 M/L. The refe

rence



                                                                 range was not u

sed to



                                                                 interpret this 

result



                                                                 as normal/abnor

mal.

 

             MCHC (test code = 786-4) 30.9         See_Comment  L             [A

utomated message]



                                                                 The system [employer]



                                                                 generated this 

result



                                                                 transmitted ref

erence



                                                                 range: 32.2 - 3

5.5



                                                                 GM/DL. The refe

rence



                                                                 range was not u

sed to



                                                                 interpret this 

result



                                                                 as normal/abnor

mal.

 

             Hematocrit (test code = 39.1 %       34.1-44.9                 



             4544-3)                                             

 

             MCV (test code = 787-2) 101.6 fL     79.4-94.8    H            

 

             MCH (test code = 785-6) 31.4 pg      25.6-32.2                 

 

             RDW (test code = 788-0) 13.8 %       11.7-14.4                 

 

             Platelets (test code = 204          See_Comment                [Aut

omated message]



             777-3)                                              The system [employer]



                                                                 generated this 

result



                                                                 transmitted ref

erence



                                                                 range: 150 - 45

0 K/CU



                                                                 MM. The referen

ce



                                                                 range was not u

sed to



                                                                 interpret this 

result



                                                                 as normal/abnor

mal.

 

             MPV (test code = 9.5 fL       9.4-12.3                  



             55920-6)                                            

 

             nRBC (test code = 413) 0            See_Comment                [Aut

omated message]



                                                                 The system [employer]



                                                                 generated this 

result



                                                                 transmitted ref

erence



                                                                 range: 0 - 0 /1

00



                                                                 WBC. The refere

nce



                                                                 range was not u

sed to



                                                                 interpret this 

result



                                                                 as normal/abnor

mal.

 

             % Neutros (test code = 52 %                                   



             429)                                                

 

             % Lymphs (test code = 32 %                                   



             430)                                                

 

             % Monos (test code = 11 %                                   



             431)                                                

 

             % Eos (test code = 432) 5 %                                    

 

             % Baso (test code = 437) 1 %                                    

 

             # Neutros (test code = 2.96         See_Comment                [Aut

omated message]



             670)                                                The system [employer]



                                                                 generated this 

result



                                                                 transmitted ref

erence



                                                                 range: 1.56 - 6

.13



                                                                 K/L. The refe

rence



                                                                 range was not u

sed to



                                                                 interpret this 

result



                                                                 as normal/abnor

mal.

 

             # Lymphs (test code = 1.83         See_Comment                [Auto

mated message]



             414)                                                The system [employer]



                                                                 generated this 

result



                                                                 transmitted ref

erence



                                                                 range: 1.18 - 3

.74



                                                                 K/L. The refe

rence



                                                                 range was not u

sed to



                                                                 interpret this 

result



                                                                 as normal/abnor

mal.

 

             # Monos (test code = 0.60         See_Comment  H             [Autom

ated message]



             415)                                                The system [employer]



                                                                 generated this 

result



                                                                 transmitted ref

erence



                                                                 range: 0.24 - 0

.36



                                                                 K/L. The refe

rence



                                                                 range was not u

sed to



                                                                 interpret this 

result



                                                                 as normal/abnor

mal.

 

             # Eos (test code = 416) 0.26         See_Comment                [Au

tomated message]



                                                                 The system [employer]



                                                                 generated this 

result



                                                                 transmitted ref

erence



                                                                 range: 0.04 - 0

.36



                                                                 K/L. The refe

rence



                                                                 range was not u

sed to



                                                                 interpret this 

result



                                                                 as normal/abnor

mal.

 

             # Baso (test code = 417) 0.05         See_Comment                [A

utomated message]



                                                                 The system [employer]



                                                                 generated this 

result



                                                                 transmitted ref

erence



                                                                 range: 0.01 - 0

.08



                                                                 K/L. The refe

rence



                                                                 range was not u

sed to



                                                                 interpret this 

result



                                                                 as normal/abnor

mal.

 

             Immature     1 %          0-1                       



             Granulocytes-Relative                                        



             (test code = 2801)                                        

 

             Lab Interpretation (test Abnormal                               



             code = 43374-1)                                        



[facility] W/PLT COUNT &amp; AUTO [id number] 
05:04:00





             Test Item    Value        Reference Range Interpretation Comments

 

             WHITE BLOOD CELL COUNT (BEAKER) 5.7 K/ L     3.5-10.5              

    



             (test code = 775)                                        

 

             RED BLOOD CELL COUNT (BEAKER) 3.85 M/ L    3.93-5.22    L          

  



             (test code = 761)                                        

 

             HEMOGLOBIN (BEAKER) (test code = 12.1 GM/DL   11.2-15.7            

     



             410)                                                

 

             HEMATOCRIT (BEAKER) (test code = 39.1 %       34.1-44.9            

     



             411)                                                

 

             MEAN CORPUSCULAR VOLUME (BEAKER) 101.6 fL     79.4-94.8    H       

     



             (test code = 753)                                        

 

             MEAN CORPUSCULAR HEMOGLOBIN 31.4 pg      25.6-32.2                 



             (BEAKER) (test code = 751)                                        

 

             MEAN CORPUSCULAR HEMOGLOBIN CONC 30.9 GM/DL   32.2-35.5    L       

     



             (BEAKER) (test code = 752)                                        

 

             RED CELL DISTRIBUTION WIDTH 13.8 %       11.7-14.4                 



             (BEAKER) (test code = 412)                                        

 

             PLATELET COUNT (BEAKER) (test 204 K/CU MM  150-450                 

  



             code = 756)                                         

 

             MEAN PLATELET VOLUME (BEAKER) 9.5 fL       9.4-12.3                

  



             (test code = 754)                                        

 

             NUCLEATED RED BLOOD CELLS 0 /100 WBC   0-0                       



             (BEAKER) (test code = 413)                                        

 

             NEUTROPHILS RELATIVE PERCENT 52 %                                  

 



             (BEAKER) (test code = 429)                                        

 

             LYMPHOCYTES RELATIVE PERCENT 32 %                                  

 



             (BEAKER) (test code = 430)                                        

 

             MONOCYTES RELATIVE PERCENT 11 %                                   



             (BEAKER) (test code = 431)                                        

 

             EOSINOPHILS RELATIVE PERCENT 5 %                                   

 



             (BEAKER) (test code = 432)                                        

 

             BASOPHILS RELATIVE PERCENT 1 %                                    



             (BEAKER) (test code = 437)                                        

 

             NEUTROPHILS ABSOLUTE COUNT 2.96 K/ L    1.56-6.13                 



             (BEAKER) (test code = 670)                                        

 

             LYMPHOCYTES ABSOLUTE COUNT 1.83 K/ L    1.18-3.74                 



             (BEAKER) (test code = 414)                                        

 

             MONOCYTES ABSOLUTE COUNT (BEAKER) 0.60 K/ L    0.24-0.36    H      

      



             (test code = 415)                                        

 

             EOSINOPHILS ABSOLUTE COUNT 0.26 K/ L    0.04-0.36                 



             (BEAKER) (test code = 416)                                        

 

             BASOPHILS ABSOLUTE COUNT (BEAKER) 0.05 K/ L    0.01-0.08           

      



             (test code = 417)                                        

 

             IMMATURE GRANULOCYTES-RELATIVE 1 %          0-1                    

   



             PERCENT (BEAKER) (test code =                                      

  



             2801)                                               



B-type Natriuretic Factor (BNP)2021 04:52:00





             Test Item    Value        Reference Range Interpretation Comments

 

             BNP (test code = 15646-0) 97 pg/mL     0-100                     

 

             MAGGI (test code = MAGGI)  ID - ROBYN                          

 



                          L                                      

 

             Lab Interpretation (test Normal                                 



             code = 06525-7)                                        



[facility]52[facility]00





             Test Item    Value        Reference Range Interpretation Comments

 

             B-TYPE NATRIURETIC PEPTIDE (BEAKER) 97 pg/mL     0-100             

        



             (test code = 700)                                        



 ID - ROBYN LRAD, CHEST, 1 VIEW, NON [id number] 14:01:00Reason for 
exam:-&gt;dyspneaShould this be performed at the bedside?-&gt;Yes
************************************************************[facility]        : 1939        Sex: 
F************************************************************FINAL REPORT 
PATIENT ID:   [id number] CLINICAL HISTORY: dyspnea  TECHNIQUE: 1 view of the c
hest. COMPARISON: 2021 IMPRESSION: Pulmonary vascular congestion is again 
seen with slightly increased prominence of the interstitial lung markings 
bilaterally. There is no focal lobar consolidation or significant pleural fluid.
The cardiomediastinal silhouette is magnified by technique. Cervicalfusion 
hardware right shoulder hardware is again seen. Signed: Juan [facility] 
Verified Date/Time:  2021 14:01:32 Reading Location: [facility] 
Radiology Reading Room      Electronicallysigned by: JUAN FRANCISCO M.D. on 
2021 02:01 PMBASI METABOLIC [id number] 12:22:00





             Test Item    Value        Reference Range Interpretation Comments

 

             SODIUM (BEAKER) 134 meq/L    136-145      L            



             (test code = 381)                                        

 

             POTASSIUM (BEAKER) 3.7 meq/L    3.5-5.1                   



             (test code = 379)                                        

 

             CHLORIDE (BEAKER) 103 meq/L                        



             (test code = 382)                                        

 

             CO2 (BEAKER) (test 25 meq/L     22-29                     



             code = 355)                                         

 

             BLOOD UREA NITROGEN 15 mg/dL     7-21                      



             (BEAKER) (test code                                        



             = 354)                                              

 

             CREATININE (BEAKER) 1.24 mg/dL   0.57-1.25                 



             (test code = 358)                                        

 

             GLUCOSE RANDOM 168 mg/dL           H            



             (BEAKER) (test code                                        



             = 652)                                              

 

             CALCIUM (BEAKER) 8.3 mg/dL    8.4-10.2     L            



             (test code = 697)                                        

 

             EGFR (BEAKER) (test 42 mL/min/1.73                           ESTIMA

MANI GFR IS



             code = 1092) sq m                                   NOT AS ACCURATE

 AS



                                                                 CREATININE



                                                                 CLEARANCE IN



                                                                 PREDICTING



                                                                 GLOMERULAR



                                                                 FILTRATION RATE

.



                                                                 ESTIMATED GFR I

S



                                                                 NOT APPLICABLE 

FOR



                                                                 DIALYSIS PATIEN

TS.



 ID - [id number] 12:22:00





             Test Item    Value        Reference Range Interpretation Comments

 

             MAGNESIUM (BEAKER) (test code = 1.7 mg/dL    1.6-2.6               

    



             627)                                                



 ID - EMERSONB-TYPE NATRIURETIC FACTOR (BNP)2021 12:13:00





             Test Item    Value        Reference Range Interpretation Comments

 

             B-TYPE NATRIURETIC PEPTIDE (BEAKER) 88 pg/mL     0-100             

        



             (test code = 700)                                        



 ID - EMERSONTransesophageal [id number] 21:25:57Ejection 
FractionSLEH ECHO HEARTLAB MKCKESSON [facility] Center[facility] 
function [id number] 06:35:00





             Test Item    Value        Reference Range Interpretation Comments

 

             Protein, Total (test 5.4          See_Comment  L             [Autom

ated



             code = 2885-2)                                        message] The



                                                                 system which



                                                                 generated this



                                                                 result transmit

mani



                                                                 reference range

:



                                                                 6.0 - 8.3 gm/dL

.



                                                                 The reference



                                                                 range was not u

sed



                                                                 to interpret th

is



                                                                 result as



                                                                 normal/abnormal

.

 

             Albumin (test code = 2.7 g/dL     3.5-5.0      L            



             77898-9)                                            

 

             Total Bilirubin (test 0.1 mg/dL    0.2-1.2      L            



             code = -2)                                        

 

             Bilirubin, Direct 0.1 mg/dL    0.1-0.5                   



             (test code = -7)                                        

 

             Alkaline Phosphatase 80 U/L                           



             (test code = 6768-6)                                        

 

             AST (test code = 15 U/L       5-34                      



             1920-8)                                             

 

             ALT (test code = 8 U/L        6-55                      



             1742-6)                                             

 

             MAGGI (test code = MAGGI)  ID -                           



                          ROBYN L                                

 

             Lab Interpretation Abnormal                               



             (test code = 94924-0)                                        



[facility] METABOLIC [id number] 06:35:00





             Test Item    Value        Reference Range Interpretation Comments

 

             SODIUM (BEAKER) 138 meq/L    136-145                   



             (test code = 381)                                        

 

             POTASSIUM (BEAKER) 3.6 meq/L    3.5-5.1                   



             (test code = 379)                                        

 

             CHLORIDE (BEAKER) 106 meq/L                        



             (test code = 382)                                        

 

             CO2 (BEAKER) (test 22 meq/L     22-29                     



             code = 355)                                         

 

             BLOOD UREA NITROGEN 15 mg/dL     7-21                      



             (BEAKER) (test code                                        



             = 354)                                              

 

             CREATININE (BEAKER) 1.12 mg/dL   0.57-1.25                 



             (test code = 358)                                        

 

             GLUCOSE RANDOM 69 mg/dL            L            



             (BEAKER) (test code                                        



             = 652)                                              

 

             CALCIUM (BEAKER) 8.4 mg/dL    8.4-10.2                  



             (test code = 697)                                        

 

             EGFR (BEAKER) (test 47 mL/min/1.73                           ESTIMA

MANI GFR IS



             code = 1092) sq m                                   NOT AS ACCURATE

 AS



                                                                 CREATININE



                                                                 CLEARANCE IN



                                                                 PREDICTING



                                                                 GLOMERULAR



                                                                 FILTRATION RATE

.



                                                                 ESTIMATED GFR I

S



                                                                 NOT APPLICABLE 

FOR



                                                                 DIALYSIS PATIEN

TS.



 ID - PIAYA LHEPATIC FUNCTION [id number] 06:35:00





             Test Item    Value        Reference Range Interpretation Comments

 

             TOTAL PROTEIN (BEAKER) (test code = 5.4 gm/dL    6.0-8.3      L    

        



             770)                                                

 

             ALBUMIN (BEAKER) (test code = 1145) 2.7 g/dL     3.5-5.0      L    

        

 

             BILIRUBIN TOTAL (BEAKER) (test code 0.1 mg/dL    0.2-1.2      L    

        



             = 377)                                              

 

             BILIRUBIN DIRECT (BEAKER) (test 0.1 mg/dL    0.1-0.5               

    



             code = 706)                                         

 

             ALKALINE PHOSPHATASE (BEAKER) (test 80 U/L                   

        



             code = 346)                                         

 

             AST (SGOT) (BEAKER) (test code = 15 U/L       5-34                 

     



             353)                                                

 

             ALT (SGPT) (BEAKER) (test code = 8 U/L        6-55                 

     



             347)                                                



 ID - PIAYA LCBC W/PLT COUNT &amp; AUTO [id number] 05:44:00





             Test Item    Value        Reference Range Interpretation Comments

 

             WHITE BLOOD CELL COUNT (BEAKER) 5.8 K/ L     3.5-10.5              

    



             (test code = 775)                                        

 

             RED BLOOD CELL COUNT (BEAKER) 3.95 M/ L    3.93-5.22               

  



             (test code = 761)                                        

 

             HEMOGLOBIN (BEAKER) (test code = 12.7 GM/DL   11.2-15.7            

     



             410)                                                

 

             HEMATOCRIT (BEAKER) (test code = 40.1 %       34.1-44.9            

     



             411)                                                

 

             MEAN CORPUSCULAR VOLUME (BEAKER) 101.5 fL     79.4-94.8    H       

     



             (test code = 753)                                        

 

             MEAN CORPUSCULAR HEMOGLOBIN 32.2 pg      25.6-32.2                 



             (BEAKER) (test code = 751)                                        

 

             MEAN CORPUSCULAR HEMOGLOBIN CONC 31.7 GM/DL   32.2-35.5    L       

     



             (BEAKER) (test code = 752)                                        

 

             RED CELL DISTRIBUTION WIDTH 13.4 %       11.7-14.4                 



             (BEAKER) (test code = 412)                                        

 

             PLATELET COUNT (BEAKER) (test 213 K/CU MM  150-450                 

  



             code = 756)                                         

 

             MEAN PLATELET VOLUME (BEAKER) 9.8 fL       9.4-12.3                

  



             (test code = 754)                                        

 

             NUCLEATED RED BLOOD CELLS 0 /100 WBC   0-0                       



             (BEAKER) (test code = 413)                                        

 

             NEUTROPHILS RELATIVE PERCENT 41 %                                  

 



             (BEAKER) (test code = 429)                                        

 

             LYMPHOCYTES RELATIVE PERCENT 41 %                                  

 



             (BEAKER) (test code = 430)                                        

 

             MONOCYTES RELATIVE PERCENT 10 %                                   



             (BEAKER) (test code = 431)                                        

 

             EOSINOPHILS RELATIVE PERCENT 6 %                                   

 



             (BEAKER) (test code = 432)                                        

 

             BASOPHILS RELATIVE PERCENT 1 %                                    



             (BEAKER) (test code = 437)                                        

 

             NEUTROPHILS ABSOLUTE COUNT 2.34 K/ L    1.56-6.13                 



             (BEAKER) (test code = 670)                                        

 

             LYMPHOCYTES ABSOLUTE COUNT 2.38 K/ L    1.18-3.74                 



             (BEAKER) (test code = 414)                                        

 

             MONOCYTES ABSOLUTE COUNT (BEAKER) 0.57 K/ L    0.24-0.36    H      

      



             (test code = 415)                                        

 

             EOSINOPHILS ABSOLUTE COUNT 0.34 K/ L    0.04-0.36                 



             (BEAKER) (test code = 416)                                        

 

             BASOPHILS ABSOLUTE COUNT (BEAKER) 0.07 K/ L    0.01-0.08           

      



             (test code = 417)                                        

 

             IMMATURE GRANULOCYTES-RELATIVE 1 %          0-1                    

   



             PERCENT (BEAKER) (test code =                                      

  



             2801)                                               



SARS-CoV2/RT-PCR (Asymptomatic ONLY)2021 23:22:00





             Test Item    Value        Reference Range Interpretation Comments

 

             SARS-COV2/RT-PCR (test Negative     Negative                  



             code = 40189-1)                                        

 

             MAGGI (test code = MAGGI) Negative result for                          

 



                          this test determines                           



                          that SARS-CoV-2 RNA was                           



                          not present in the                           



                          specimen above the                           



                          Limit of Detection                           



                          (LOD).  However,                           



                          Negative results do not                           



                          preclude SARS-CoV-2                           



                          infection and should                           



                          not be used as the sole                           



                          basis for treatment or                           



                          patient management                           



                          decisions.  Negative                           



                          results must be                           



                          combined with clinical                           



                          observations, patient                           



                          history, and                           



                          epidemiological                           



                          information.  A false                           



                          negative result may                           



                          occur if a specimen is                           



                          improperly collected,                           



                          transported, or                           



                          handled.  A false                           



                          negative result should                           



                          be considered if                           



                          patient's recent                           



                          exposures or clinical                           



                          presentation indicate                           



                          that COVID-19                           



                          (SARS-CoV-2) is likely                           



                          and diagnostic tests                           



                          for other causes of                           



                          illness are negative.                           



                          Re-testing should be                           



                          considered in cases of                           



                          suspected false                           



                          negatives. The limit of                           



                          detection for this                           



                          assay is 100 copies/mL.                           



                          This SARS-CoV-2 test is                           



                          a real-time RT_PCR test                           



                          intended for the                           



                          qualitative detection                           



                          of nucleic acid from                           



                          SARS-CoV-2 in a                           



                          nasopharyngeal swab                           



                          specimen collected from                           



                          individuals suspected                           



                          of COVID-19 by their                           



                          healthcare provider.                           



                          This test has not been                           



                          Food and Drug                           



                          Administration (FDA)                           



                          cleared or approved.                           



                          This is a modified                           



                          version of an approved                           



                          Emergency Use                           



                          Authorization (EUA) and                           



                          is in the process of                           



                          review by the FDA.                           



                          Once authorized by the                           



                          FDA, the issued EUA                           



                          will be effective until                           



                          the declaration that                           



                          circumstances exist                           



                          justifying the                           



                          authorization of the                           



                          emergency use of in                           



                          vitro diagnostic tests                           



                          for detection and/or                           



                          diagnosis of COVID-19                           



                          is terminated under                           



                          Section 564(b)(2) of                           



                          the Act or the EUA is                           



                          revoked under Section                           



                          564(g) of the Act.                           



                          Testing was performed                           



                          using the Abbott                           



                          SARS-CoV-2 assay. Fact                           



                          Sheet for Healthcare                           



                          Providers:https://www.natty davey/sal/RT                           



                          SARS-CoV-2 HCP Fact                           



                          Sheet 51-990028.pdf                           



                          Fact Sheet for                           



                          Healthcare                             



                          Patients:https://www.mo                           



                          lecular.abbott/sal/RT                           



                          SARS-CoV-2 Patient Fact                           



                          Sheet EN                               



                          51-735839L0.pdf                           

 

             Lab Interpretation Normal                                 



             (test code = 16683-7)                                        



[facility] 
23:22:00





             Test Item    Value        Reference Range Interpretation Comments

 

             SARS-COV2/RT-PCR (test code = Negative     Negative                

  



             1004214)                                            



Negative result for this test determines that SARS-CoV-2 RNA was not present in 
the specimen above the Limit of Detection (LOD).  However, Negative results do 
not preclude SARS-CoV-2 infection and should not be used as the sole basis for 
treatment or patient management decisions.  Negative results must be combined 
with clinical observations, patient history, and epidemiological information.  A
false negative result may occur if a specimen is improperly collected, 
transported, or handled.  A false negative result should be considered if 
patient's recent exposures or clinical presentation indicate that COVID-19 
(SARS-CoV-2) is likely and diagnostic tests for other causes of illness are 
negative.  Re-testing should be considered in cases of suspected false 
negatives.The limit of detection for this assay is 100 copies/mL.This SARS-CoV-2
test is a real-time RT_PCR test intended for the qualitative detection of 
nucleic acid from SARS-CoV-2 in a nasopharyngeal swab specimen collected from 
individuals suspected of COVID-19 by their healthcare provider.This test has not
been Food and Drug Administration (FDA) cleared or approved.  This is a modified
version of an approved Emergency Use Authorization (EUA) and is in the process 
of review by the FDA.  Once authorized by the FDA, the issued EUA will be e
ffective until the declaration that circumstances exist justifying the 
authorization of the emergency use of in vitro diagnostic tests for detection 
and/or diagnosis of COVID-19 is terminated under Section 564(b)(2) of the Act or
the EUA is revoked under Section 564(g) of the Act.Testing was performedusing 
the Abbott SARS-CoV-2 assay.Fact Sheet for Healthcare 
Providers:https://www.TwentyFour[employer].abbott/sal/RT SARS-CoV-2 HCP Fact Sheet 51-
010371.pdfFact Sheet for Healthcare Patients:https://www.TwentyFour6.abbott/sal/RT
SARS-CoV-2 Patient Fact Sheet EN 51-956628O4.pdfHIGH SENSITIVITY TROPONIN I
2021 19:21:00





             Test Item    Value        Reference Range Interpretation Comments

 

             HIGH SENSITIVITY 8 pg/ml      See_Comment                [Automated

 message]



             TROPONIN I (test code =                                        The 

system which



             2998058)                                            generated this 

result



                                                                 transmitted ref

erence



                                                                 range: <=17. Th

e



                                                                 reference range

 was not



                                                                 used to interpr

et this



                                                                 result as



                                                                 normal/abnormal

.



 ID - dwayne Gomez  STAT High Sensitivity Troponin-I results 
should be used in conjunction with other diagnostic information such as ECG, 
clinical observations and information, and patient symptoms to aid in the 
diagnosis of MI.2D Echo W/Doppler(CW/PW/Color)2021 17:56:11Ejection 
FractionSLEH ECHO HEARTLAB MKCKESSON CPACHI [facility]
WITH IV CONTRAST- PE TEST [id number] 06:05:00Unlisted Reason for Exam - 
Click Yes and Enter Reason Below-&gt;No
************************************************************[facility]        : 1939        Sex: 
F************************************************************FINAL REPORT 
PATIENT ID:   [id number] EXAM/TECHNIQUE: CT angiography of the chest pulmonary 
embolus protocol INDICATION: Concern for pulmonary embolism. COMPARISON: None. 
FINDINGS:  Lower Neck: Unremarkable. Parenchyma/Pleura: Bilateral patchy 
groundglass opacities are present in the lungs. 13 mm left lower lobe pulmonary 
nodule. Small bilateral pleural effusions. Septal thickening and peribronchial 
thickening is present. Airways: Unremarkable. Cardiac: Cardiomegaly. 
Mediastinum/lymph nodes: No lymphadenopathy. Vessels: No filling defects in the 
main, lobar, or segmental pulmonary arteries. Osseous: No acute osseous process.
No suspicious osseous lesion. Upper abdomen: Unremarkable. Impression: 1. No 
acute pulmonary embolism.2. Bilateral groundglass opacities, bilateral pleural 
effusions, and interstitial thickening may represent pulmonary edema and/or 
infection.3. Left lower lobe pulmonary nodule measuring 13 mm. Consider follow-
up CT in three months, PET/CT, or tissue sampling. Signed: Will Matthews 
[facility] Verified Date/Time:  2021 06:05:30       Electronically signed 
by:WILL MATTHEWS MD on 2021 06:05 AMHEPATIC FUNCTION [id number] 
06:00:00





             Test Item    Value        Reference Range Interpretation Comments

 

             TOTAL PROTEIN (BEAKER) (test code = 5.2 gm/dL    6.0-8.3      L    

        



             770)                                                

 

             ALBUMIN (BEAKER) (test code = 1145) 2.7 g/dL     3.5-5.0      L    

        

 

             BILIRUBIN TOTAL (BEAKER) (test code 0.6 mg/dL    0.2-1.2           

        



             = 377)                                              

 

             BILIRUBIN DIRECT (BEAKER) (test 0.3 mg/dL    0.1-0.5               

    



             code = 706)                                         

 

             ALKALINE PHOSPHATASE (BEAKER) (test 92 U/L                   

        



             code = 346)                                         

 

             AST (SGOT) (BEAKER) (test code = 14 U/L       5-34                 

     



             353)                                                

 

             ALT (SGPT) (BEAKER) (test code = 7 U/L        6-55                 

     



             347)                                                



 ID - NICOLAS MOperator ID - PIAYA LBASIC METABOLIC [id number] 
05:13:00





             Test Item    Value        Reference Range Interpretation Comments

 

             SODIUM (BEAKER) 139 meq/L    136-145                   



             (test code = 381)                                        

 

             POTASSIUM (BEAKER) 4.0 meq/L    3.5-5.1                   



             (test code = 379)                                        

 

             CHLORIDE (BEAKER) 110 meq/L           H            



             (test code = 382)                                        

 

             CO2 (BEAKER) (test 24 meq/L     22-29                     



             code = 355)                                         

 

             BLOOD UREA NITROGEN 14 mg/dL     7-21                      



             (BEAKER) (test code                                        



             = 354)                                              

 

             CREATININE (BEAKER) 1.05 mg/dL   0.57-1.25                 



             (test code = 358)                                        

 

             GLUCOSE RANDOM 81 mg/dL                         



             (BEAKER) (test code                                        



             = 652)                                              

 

             CALCIUM (BEAKER) 8.4 mg/dL    8.4-10.2                  



             (test code = 697)                                        

 

             EGFR (BEAKER) (test 51 mL/min/1.73                           ESTIMA

MANI GFR IS



             code = 1092) sq m                                   NOT AS ACCURATE

 AS



                                                                 CREATININE



                                                                 CLEARANCE IN



                                                                 PREDICTING



                                                                 GLOMERULAR



                                                                 FILTRATION RATE

.



                                                                 ESTIMATED GFR I

S



                                                                 NOT APPLICABLE 

FOR



                                                                 DIALYSIS PATIEN

TS.



 ID - NICOLAS MVitamin B12 and [id number] 04:44:00





             Test Item    Value        Reference Range Interpretation Comments

 

             Vitamin B12 (test 272 pg/mL    213-816                   



             code = 2132-9)                                        

 

             Folate (test code = 7.70 ng/mL   See_Comment                [Automa

mani



             2284-8)                                             message] The



                                                                 system which



                                                                 generated this



                                                                 result transmit

mani



                                                                 reference range

:



                                                                 >=7.00. The



                                                                 reference range



                                                                 was not used to



                                                                 interpret this



                                                                 result as



                                                                 normal/abnormal

.

 

             MAGGI (test code = MAGGI)  ID -                           



                          NICOLAS MEDELLIN                                 

 

             Lab Interpretation Normal                                 



             (test code = 71291-5)                                        



[facility] [id number] 04:44:00





             Test Item    Value        Reference Range Interpretation Comments

 

             VITAMIN B12 (BEAKER) 272 pg/mL    213-816                   



             (test code = 774)                                        

 

             FOLATE (BEAKER) 7.70 ng/mL   See_Comment                [Automated 

message]



             (test code = 362)                                        The system

 which



                                                                 generated this 

result



                                                                 transmitted ref

erence



                                                                 range: >=7.00. 

The



                                                                 reference range

 was not



                                                                 used to interpr

et this



                                                                 result as



                                                                 normal/abnormal

.



 ID - NICOLAS MRAD, CHEST, 1 VIEW, NON [id number] 04:41:00Reason for 
exam:-&gt;afibShould this be performed at the bedside?-&gt;Yes
************************************************************[facility]        : 1939        Sex: 
F************************************************************FINAL REPORT 
PATIENT ID:   [id number] EXAM/TECHNIQUE: Single view frontal radiograph of the 
chest. INDICATION: Atrial fibrillation. COMPARISON: None. FINDINGS:  
Devices/Objects: None. Lungs: No focal consolidation. No pleural effusion. No 
pneumothorax. Heart/Mediastinum: No cardiomegaly. Mild interstitial thickening. 
Osseous: No acute osseous process. No suspicious osseous lesion. Upper abdomen: 
Unremarkable. Impression: Mild interstitial thickening may represent 
interstitial pulmonary edema. Signed: Will Matthews MDReport Verified 
Date/Time:  2021 04:41:03       Electronically signed by: WILL MATTHEWS MD on 2021 04:41 AMHIGH SENSITIVITY TROPONIN -54-83 04:36:00





             Test Item    Value        Reference Range Interpretation Comments

 

             HIGH SENSITIVITY 8 pg/ml      See_Comment                [Automated

 message]



             TROPONIN I (test code =                                        The 

system which



             7930524)                                            generated this 

result



                                                                 transmitted ref

erence



                                                                 range: <=17. Th

e



                                                                 reference range

 was not



                                                                 used to interpr

et this



                                                                 result as



                                                                 normal/abnormal

.



 ID - NICOLAS MThe  STAT High Sensitivity Troponin-I results 
should be used in conjunction with other diagnostic information such as ECG, 
clinical observations and information, and patient symptoms to aid in the 
diagnosis of MI.B-TYPE NATRIURETIC FACTOR (BNP)2021 04:27:00





             Test Item    Value        Reference Range Interpretation Comments

 

             B-TYPE NATRIURETIC PEPTIDE (BEAKER) 144 pg/mL    0-100        H    

        



             (test code = 700)                                        



 ID - NICOLAS MCBC W/PLT COUNT &amp; AUTO [id number] 04:01:00





             Test Item    Value        Reference Range Interpretation Comments

 

             WHITE BLOOD CELL COUNT (BEAKER) 6.7 K/ L     3.5-10.5              

    



             (test code = 775)                                        

 

             RED BLOOD CELL COUNT (BEAKER) 3.83 M/ L    3.93-5.22    L          

  



             (test code = 761)                                        

 

             HEMOGLOBIN (BEAKER) (test code = 12.3 GM/DL   11.2-15.7            

     



             410)                                                

 

             HEMATOCRIT (BEAKER) (test code = 38.7 %       34.1-44.9            

     



             411)                                                

 

             MEAN CORPUSCULAR VOLUME (BEAKER) 101.0 fL     79.4-94.8    H       

     



             (test code = 753)                                        

 

             MEAN CORPUSCULAR HEMOGLOBIN 32.1 pg      25.6-32.2                 



             (BEAKER) (test code = 751)                                        

 

             MEAN CORPUSCULAR HEMOGLOBIN CONC 31.8 GM/DL   32.2-35.5    L       

     



             (BEAKER) (test code = 752)                                        

 

             RED CELL DISTRIBUTION WIDTH 13.5 %       11.7-14.4                 



             (BEAKER) (test code = 412)                                        

 

             PLATELET COUNT (BEAKER) (test 205 K/CU MM  150-450                 

  



             code = 756)                                         

 

             MEAN PLATELET VOLUME (BEAKER) 9.7 fL       9.4-12.3                

  



             (test code = 754)                                        

 

             NUCLEATED RED BLOOD CELLS 0 /100 WBC   0-0                       



             (BEAKER) (test code = 413)                                        

 

             NEUTROPHILS RELATIVE PERCENT 47 %                                  

 



             (BEAKER) (test code = 429)                                        

 

             LYMPHOCYTES RELATIVE PERCENT 38 %                                  

 



             (BEAKER) (test code = 430)                                        

 

             MONOCYTES RELATIVE PERCENT 10 %                                   



             (BEAKER) (test code = 431)                                        

 

             EOSINOPHILS RELATIVE PERCENT 4 %                                   

 



             (BEAKER) (test code = 432)                                        

 

             BASOPHILS RELATIVE PERCENT 1 %                                    



             (BEAKER) (test code = 437)                                        

 

             NEUTROPHILS ABSOLUTE COUNT 3.14 K/ L    1.56-6.13                 



             (BEAKER) (test code = 670)                                        

 

             LYMPHOCYTES ABSOLUTE COUNT 2.55 K/ L    1.18-3.74                 



             (BEAKER) (test code = 414)                                        

 

             MONOCYTES ABSOLUTE COUNT (BEAKER) 0.66 K/ L    0.24-0.36    H      

      



             (test code = 415)                                        

 

             EOSINOPHILS ABSOLUTE COUNT 0.23 K/ L    0.04-0.36                 



             (BEAKER) (test code = 416)                                        

 

             BASOPHILS ABSOLUTE COUNT (BEAKER) 0.05 K/ L    0.01-0.08           

      



             (test code = 417)                                        

 

             IMMATURE GRANULOCYTES-RELATIVE 1 %          0-1                    

   



             PERCENT (BEAKER) (test code =                                      

  



             2801)                                               



TSH/Free T4 If [id number] 00:19:00





             Test Item    Value        Reference Range Interpretation Comments

 

             TSH (test code = 1.723        See_Comment                [Automated



             08368-6)                                            message] The



                                                                 system which



                                                                 generated this



                                                                 result transmit

mani



                                                                 reference range

:



                                                                 0.350 - 4.940



                                                                 uIU/mL. The



                                                                 reference range



                                                                 was not used to



                                                                 interpret this



                                                                 result as



                                                                 normal/abnormal

.

 

             MAGGI (test code = MAGGI)  ID -                           



                          CLAYTONAYA L                                

 

             Lab Interpretation Normal                                 



             (test code = 33578-1)                                        



[facility] [id number] 00:19:00





             Test Item    Value        Reference Range Interpretation Comments

 

             THYROID STIMULATING HORMONE 1.723 uIU/mL 0.350-4.940               



             (BEAKER) (test code = 772)                                        



 ID - CLAYTONAYA LHIGH SENSITIVITY TROPONIN -20-60 00:02:00





             Test Item    Value        Reference Range Interpretation Comments

 

             HIGH SENSITIVITY 7 pg/ml      See_Comment                [Automated

 message]



             TROPONIN I (test code =                                        The 

system which



             3979681)                                            generated this 

result



                                                                 transmitted ref

erence



                                                                 range: <=17. Th

e



                                                                 reference range

 was not



                                                                 used to interpr

et this



                                                                 result as



                                                                 normal/abnormal

.



 ID - DBThe  STAT High Sensitivity Troponin-I results should be
used in conjunctionwith other diagnostic information such as ECG, clinical 
observations and information, and patient symptoms to aid in the diagnosis of 
MI.[id number] 23:58:00





             Test Item    Value        Reference Range Interpretation Comments

 

             Phosphorus (test code = 3.3 mg/dL    2.3-4.7                   



             2777-1)                                             

 

             MAGGI (test code = MAGGI)  ID - DB                           

 

             Lab Interpretation (test Normal                                 



             code = 06841-8)                                        



[facility] [id number] 23:58:00





             Test Item    Value        Reference Range Interpretation Comments

 

             SODIUM (BEAKER) 138 meq/L    136-145                   



             (test code = 381)                                        

 

             POTASSIUM (BEAKER) 4.0 meq/L    3.5-5.1                   



             (test code = 379)                                        

 

             CHLORIDE (BEAKER) 109 meq/L           H            



             (test code = 382)                                        

 

             CO2 (BEAKER) (test 20 meq/L     22-29        L            



             code = 355)                                         

 

             BLOOD UREA NITROGEN 14 mg/dL     7-21                      



             (BEAKER) (test code                                        



             = 354)                                              

 

             CREATININE (BEAKER) 1.09 mg/dL   0.57-1.25                 



             (test code = 358)                                        

 

             GLUCOSE RANDOM 83 mg/dL                         



             (BEAKER) (test code                                        



             = 652)                                              

 

             CALCIUM (BEAKER) 8.1 mg/dL    8.4-10.2     L            



             (test code = 697)                                        

 

             EGFR (BEAKER) (test 48 mL/min/1.73                           ESTIMA

MANI GFR IS



             code = 1092) sq m                                   NOT AS ACCURATE

 AS



                                                                 CREATININE



                                                                 CLEARANCE IN



                                                                 PREDICTING



                                                                 GLOMERULAR



                                                                 FILTRATION RATE

.



                                                                 ESTIMATED GFR I

S



                                                                 NOT APPLICABLE 

FOR



                                                                 DIALYSIS PATIEN

TS.



 ID - [id number] 23:58:00





             Test Item    Value        Reference Range Interpretation Comments

 

             MAGNESIUM (BEAKER) (test code = 1.7 mg/dL    1.6-2.6               

    



             627)                                                



 ID - [id number] 23:58:00





             Test Item    Value        Reference Range Interpretation Comments

 

             PHOSPHORUS (BEAKER) (test code = 3.3 mg/dL    2.3-4.7              

     



             604)                                                



 ID - DBHEPATIC FUNCTION [id number] 23:58:00





             Test Item    Value        Reference Range Interpretation Comments

 

             TOTAL PROTEIN (BEAKER) (test code = 5.4 gm/dL    6.0-8.3      L    

        



             770)                                                

 

             ALBUMIN (BEAKER) (test code = 1145) 2.8 g/dL     3.5-5.0      L    

        

 

             BILIRUBIN TOTAL (BEAKER) (test code 0.7 mg/dL    0.2-1.2           

        



             = 377)                                              

 

             BILIRUBIN DIRECT (BEAKER) (test 0.3 mg/dL    0.1-0.5               

    



             code = 706)                                         

 

             ALKALINE PHOSPHATASE (BEAKER) (test 92 U/L                   

        



             code = 346)                                         

 

             AST (SGOT) (BEAKER) (test code = 14 U/L       5-34                 

     



             353)                                                

 

             ALT (SGPT) (BEAKER) (test code = 9 U/L        6-55                 

     



             347)                                                



 ID - [id number] 23:42:00





             Test Item    Value        Reference Range Interpretation Comments

 

             D-Dimer, Quant (test 1.26         See_Comment  H             [Autom

ated



             code = 35083-3)                                        message] The



                                                                 system which



                                                                 generated this



                                                                 result



                                                                 transmitted



                                                                 reference range

:



                                                                 <0.50 MG/L FEU.



                                                                 The reference



                                                                 range was not



                                                                 used to interpr

et



                                                                 this result as



                                                                 normal/abnormal

.

 

             MAGGI (test code = MAGGI) Intended Use: The                           



                          D-Dimer Assay can                           



                          be used to aid in                           



                          the diagnosis of                           



                          Deep Vein                              



                          Thrombosis (DVT)                           



                          and Pulmonary                           



                          Embolism Disease                           



                          (PED).In patients                           



                          with low pre-test                           



                          probability,                           



                          various studies                           



                          concerning STA                           



                          Liatest D-dimer                           



                          test have                              



                          reported that                           



                          with a cutoff                           



                          value of 0.50                           



                          MG/L FEU, the                           



                          Negative                               



                          Predictive Value                           



                          (NPV) regarding                           



                          the exclusion of                           



                          thrombosis is                           



                          within %                           



                          range.                                 

 

             Lab Interpretation Abnormal                               



             (test code = 47919-8)                                        



[facility] Center[id number] 23:42:00





             Test Item    Value        Reference Range Interpretation Comments

 

             D-DIMER QUANTITATIVE (BEAKER) 1.26 MG/L FEU <0.50        H         

   



             (test code = 671)                                        



Intended Use: The D-Dimer Assay can be used to aid in the diagnosis of Deep Vein
Thrombosis (DVT) and Pulmonary Embolism Disease (PED).In patients with low pre-
test probability, various studies concerning STA Liatest D-dimer test have 
reported that with a cutoff value of 0.50 MG/L FEU, the Negative Predictive 
Value (NPV) regarding the exclusion of thrombosis is within % range.
Prothrombin time/[id number] 23:39:00





             Test Item    Value        Reference    Interpretation Comments



                                       Range                     

 

             Protime (test code = 15.4         See_Comment  H             [Autom

ated



             5902-2)                                             message] The



                                                                 system which



                                                                 generated this



                                                                 result



                                                                 transmitted



                                                                 reference range

:



                                                                 11.9 - 14.2



                                                                 seconds. The



                                                                 reference range



                                                                 was not used to



                                                                 interpret this



                                                                 result as



                                                                 normal/abnormal

.

 

             INR (test code = 1.24         See_Comment                [Automated



             3241-6)                                             message] The



                                                                 system which



                                                                 generated this



                                                                 result



                                                                 transmitted



                                                                 reference range

:



                                                                 <=5.90. The



                                                                 reference range



                                                                 was not used to



                                                                 interpret this



                                                                 result as



                                                                 normal/abnormal

.

 

             MAGGI (test code = RECOMMENDED                            



             MAGGI)         COUMADIN/WARFARIN                           



                          INR THERAPY                            



                          RANGESSTANDARD DOSE:                           



                          2.0 - 3.0                              



                          Includes:                              



                          PROPHYLAXIS for                           



                          venous thrombosis,                           



                          systemic                               



                          embolization;                           



                          TREATMENT for venous                           



                          thrombosis and/or                           



                          pulmonary                              



                          embolus.HIGH RISK:                           



                          Target INR is                           



                          2.5-3.5 for patients                           



                          with mechanical                           



                          heart valves.                           

 

             Lab Interpretation Abnormal                               



             (test code =                                        



             95867-6)                                            



[facility] CenterPROTHROM[facility]/[id number] 23:39:00





             Test Item    Value        Reference Range Interpretation Comments

 

             PROTIME (BEAKER) 15.4 seconds 11.9-14.2    H            



             (test code = 759)                                        

 

             INR (BEAKER) (test 1.24         See_Comment                [Automat

ed message]



             code = 370)                                         The system [employer]



                                                                 generated this 

result



                                                                 transmitted ref

erence



                                                                 range: <=5.90. 

The



                                                                 reference range

 was



                                                                 not used to int

erpret



                                                                 this result as



                                                                 normal/abnormal

.



RECOMMENDED COUMADIN/WARFARIN INR THERAPY RANGESSTANDARD DOSE: 2.0 - 3.0   
Includes: PROPHYLAXIS forvenous thrombosis, systemic embolization; TREATMENT for
venous thrombosis and/or pulmonary embolus.HIGH RISK: Target INR is 2.5-3.5 for 
patients with mechanical heart valves.CBC W/PLT COUNT &amp; AUTO DIFFERENTIAL
2021 23:32:00





             Test Item    Value        Reference Range Interpretation Comments

 

             WHITE BLOOD CELL COUNT (BEAKER) 7.9 K/ L     3.5-10.5              

    



             (test code = 775)                                        

 

             RED BLOOD CELL COUNT (BEAKER) 3.96 M/ L    3.93-5.22               

  



             (test code = 761)                                        

 

             HEMOGLOBIN (BEAKER) (test code = 12.7 GM/DL   11.2-15.7            

     



             410)                                                

 

             HEMATOCRIT (BEAKER) (test code = 40.3 %       34.1-44.9            

     



             411)                                                

 

             MEAN CORPUSCULAR VOLUME (BEAKER) 101.8 fL     79.4-94.8    H       

     



             (test code = 753)                                        

 

             MEAN CORPUSCULAR HEMOGLOBIN 32.1 pg      25.6-32.2                 



             (BEAKER) (test code = 751)                                        

 

             MEAN CORPUSCULAR HEMOGLOBIN CONC 31.5 GM/DL   32.2-35.5    L       

     



             (BEAKER) (test code = 752)                                        

 

             RED CELL DISTRIBUTION WIDTH 13.3 %       11.7-14.4                 



             (BEAKER) (test code = 412)                                        

 

             PLATELET COUNT (BEAKER) (test 210 K/CU MM  150-450                 

  



             code = 756)                                         

 

             MEAN PLATELET VOLUME (BEAKER) 9.3 fL       9.4-12.3     L          

  



             (test code = 754)                                        

 

             NUCLEATED RED BLOOD CELLS 0 /100 WBC   0-0                       



             (BEAKER) (test code = 413)                                        

 

             NEUTROPHILS RELATIVE PERCENT 58 %                                  

 



             (BEAKER) (test code = 429)                                        

 

             LYMPHOCYTES RELATIVE PERCENT 31 %                                  

 



             (BEAKER) (test code = 430)                                        

 

             MONOCYTES RELATIVE PERCENT 8 %                                    



             (BEAKER) (test code = 431)                                        

 

             EOSINOPHILS RELATIVE PERCENT 2 %                                   

 



             (BEAKER) (test code = 432)                                        

 

             BASOPHILS RELATIVE PERCENT 1 %                                    



             (BEAKER) (test code = 437)                                        

 

             NEUTROPHILS ABSOLUTE COUNT 4.59 K/ L    1.56-6.13                 



             (BEAKER) (test code = 670)                                        

 

             LYMPHOCYTES ABSOLUTE COUNT 2.42 K/ L    1.18-3.74                 



             (BEAKER) (test code = 414)                                        

 

             MONOCYTES ABSOLUTE COUNT (BEAKER) 0.63 K/ L    0.24-0.36    H      

      



             (test code = 415)                                        

 

             EOSINOPHILS ABSOLUTE COUNT 0.17 K/ L    0.04-0.36                 



             (BEAKER) (test code = 416)                                        

 

             BASOPHILS ABSOLUTE COUNT (BEAKER) 0.06 K/ L    0.01-0.08           

      



             (test code = 417)                                        

 

             IMMATURE GRANULOCYTES-RELATIVE 1 %          0-1                    

   



             PERCENT (BEAKER) (test code =                                      

  



             2801)                                               



FL External Study Exam2021-05-10 13:55:15This exam was not acquired at a 
[facility] facility and has not been interpreted by a [facility] Provider.  The 
exam was imported into our imaging system.[facility] 99.2

## 2022-09-26 NOTE — ED PROVIDER NOTE - ATTENDING APP SHARED VISIT CONTRIBUTION OF CARE
Edward PEACOCK- 73 Y/O F with h/o type 2 diabetes, htn , ckd not on hd p/w abd pain diffusely with back pain worsening for last 2 days, pt was seen here and treated for diverticulitis and is on liquid diet and has liquid stool , no diarrhea, taking Augmentin. No fever, chills, nausea, vomiting. Pt has no dysuria but does not feel well and feels everything is getting worse.    Pt is alert, well appearing female, s1s2 normal reg, b/l clear breath sounds, abd soft, nt,  appears distended but pt states that's normal girth , normal bowel sounds, no cvat b/l, neuro exam aox3, cn 2-12 intact, no focal deficits, skin warm, dry, good turgor    plan to check labs, do repeat ct abd, check urine, control pain

## 2022-09-26 NOTE — ED PROVIDER NOTE - NSFOLLOWUPINSTRUCTIONS_ED_ALL_ED_FT
- Prescription sent to pharmacy.  - Acetaminophen 650mg every 6 hours as needed for pain.   - Continue antibiotics.   - Over the counter Salon Pas.  - Please bring all documentation from your ED visit to any related future follow up appointment.  - Please call to schedule follow up appointment with your primary care physician within 24-48 hours.  - Please seek immediate medical attention or return to the ED for any new/worsening, signs/symptoms, or concerns.    Feel better!      Acute Low Back Pain    WHAT YOU NEED TO KNOW:    What is acute low back pain? Acute low back pain is sudden discomfort that lasts up to 6 weeks and makes activity difficult.    What causes or increases my risk for acute low back pain? Conditions that affect the spine, joints, or muscles can cause back pain. These may include arthritis, spinal stenosis (narrowing of the spinal column), muscle tension, or breakdown of the spinal discs. The following increase your risk for back pain:  •Repeated bending, lifting, or twisting, or lifting heavy items      •Injury from a fall or accident      •Lack of regular physical activity      •Obesity or pregnancy      •Smoking      •Aging      •Driving, sitting, or standing for long periods      •Bad posture while sitting or standing      How is the cause of acute low back pain diagnosed? Your healthcare provider will ask about your medical history and examine you. He or she may ask when you last had low back pain and how it started. Show him or her where you feel the pain and what makes it better or worse. Tell your provider about the type of pain you have, how bad it is, and how long it lasts. Tell him or her if your pain worsens at night or when you lie on your back.    Pain Scale          How is acute low back pain treated? The goal of treatment is to relieve your pain and help you be able to do your regular activities. Most people with acute low back pain get better within 4 to 6 weeks. You may need any of the following:  •NSAIDs, such as ibuprofen, help decrease swelling, pain, and fever. This medicine is available with or without a doctor's order. NSAIDs can cause stomach bleeding or kidney problems in certain people. If you take blood thinner medicine, always ask your healthcare provider if NSAIDs are safe for you. Always read the medicine label and follow directions.      •Acetaminophen decreases pain and fever. It is available without a doctor's order. Ask how much to take and how often to take it. Follow directions. Read the labels of all other medicines you are using to see if they also contain acetaminophen, or ask your doctor or pharmacist. Acetaminophen can cause liver damage if not taken correctly. Do not use more than 4 grams (4,000 milligrams) total of acetaminophen in one day.       •Muscle relaxers decrease pain by relaxing the muscles in your lower spine.      •Prescription pain medicine may be given. Ask your healthcare provider how to take this medicine safely. Some prescription pain medicines contain acetaminophen. Do not take other medicines that contain acetaminophen without talking to your healthcare provider. Too much acetaminophen may cause liver damage. Prescription pain medicine may cause constipation. Ask your healthcare provider how to prevent or treat constipation.       What can I do to prevent low back pain?   •Use proper body mechanics. ?Bend at the hips and knees when you  objects. Do not bend from the waist. Use your leg muscles as you lift the load. Do not use your back. Keep the object close to your chest as you lift it. Try not to twist or lift anything above your waist.      ?Change your position often when you stand for long periods of time. Rest one foot on a small box or footrest, and then switch to the other foot often.      ?Try not to sit for long periods of time. When you do, sit in a straight-backed chair with your feet flat on the floor. Never reach, pull, or push while you are sitting.      •Do exercises that strengthen your back muscles. Warm up before you exercise. Ask your healthcare provider for the best exercises for you.      •Maintain a healthy weight. Ask your healthcare provider what a healthy weight is for you. Ask him or her to help you create a weight loss plan if you are overweight.      How can I take care of myself if I have acute low back pain?   •Stay active as much as you can without causing more pain. Bed rest could make your back pain worse. Start with some light exercises, such as walking. Avoid heavy lifting until your pain is gone. Ask for more information about the activities or exercises that are right for you.   FAMILY WALKING FOR EXERCISE           •Apply heat on your back for 20 to 30 minutes every 2 hours for as many days as directed. Heat helps decrease pain and muscle spasms. Alternate heat and ice.      •Apply ice on your back for 15 to 20 minutes every hour or as directed. Use an ice pack, or put crushed ice in a plastic bag. Cover it with a towel before you apply it to your skin. Ice helps prevent tissue damage and decreases swelling and pain.      When should I seek immediate care?   •You have severe pain.      •You have sudden stiffness and heaviness down both legs.      •You have numbness or weakness in one leg, or pain in both legs.      •You have numbness in your genital area or across your lower back.      •You cannot control your urine or bowel movements.      When should I call my doctor?   •You have a fever.      •You have pain at night or when you rest.      •Your pain does not get better with treatment.      •You have pain that worsens when you cough or sneeze.      •You suddenly feel something pop or snap in your back.      •You have questions or concerns about your condition or care.      CARE AGREEMENT:    You have the right to help plan your care. Learn about your health condition and how it may be treated. Discuss treatment options with your healthcare providers to decide what care you want to receive. You always have the right to refuse treatment.

## 2022-09-29 ENCOUNTER — INPATIENT (INPATIENT)
Facility: HOSPITAL | Age: 72
LOS: 1 days | Discharge: HOME | DRG: 552 | End: 2022-10-01
Attending: STUDENT IN AN ORGANIZED HEALTH CARE EDUCATION/TRAINING PROGRAM
Payer: MEDICARE

## 2022-09-29 VITALS
RESPIRATION RATE: 16 BRPM | WEIGHT: 229.94 LBS | DIASTOLIC BLOOD PRESSURE: 79 MMHG | HEART RATE: 76 BPM | SYSTOLIC BLOOD PRESSURE: 158 MMHG | HEIGHT: 64 IN | TEMPERATURE: 98 F | OXYGEN SATURATION: 96 %

## 2022-09-29 DIAGNOSIS — H26.40 UNSPECIFIED SECONDARY CATARACT: Chronic | ICD-10-CM

## 2022-09-29 DIAGNOSIS — E87.5 HYPERKALEMIA: ICD-10-CM

## 2022-09-29 LAB
ALBUMIN SERPL ELPH-MCNC: 4.2 G/DL — SIGNIFICANT CHANGE UP (ref 3.3–5.2)
ALP SERPL-CCNC: 108 U/L — SIGNIFICANT CHANGE UP (ref 40–120)
ALT FLD-CCNC: 8 U/L — SIGNIFICANT CHANGE UP
ANION GAP SERPL CALC-SCNC: 12 MMOL/L — SIGNIFICANT CHANGE UP (ref 5–17)
AST SERPL-CCNC: 15 U/L — SIGNIFICANT CHANGE UP
BASOPHILS # BLD AUTO: 0.03 K/UL — SIGNIFICANT CHANGE UP (ref 0–0.2)
BASOPHILS NFR BLD AUTO: 0.5 % — SIGNIFICANT CHANGE UP (ref 0–2)
BILIRUB SERPL-MCNC: 0.3 MG/DL — LOW (ref 0.4–2)
BUN SERPL-MCNC: 14.4 MG/DL — SIGNIFICANT CHANGE UP (ref 8–20)
CALCIUM SERPL-MCNC: 9.4 MG/DL — SIGNIFICANT CHANGE UP (ref 8.4–10.5)
CHLORIDE SERPL-SCNC: 112 MMOL/L — HIGH (ref 98–107)
CO2 SERPL-SCNC: 19 MMOL/L — LOW (ref 22–29)
CREAT SERPL-MCNC: 1.81 MG/DL — HIGH (ref 0.5–1.3)
EGFR: 30 ML/MIN/1.73M2 — LOW
EOSINOPHIL # BLD AUTO: 0.33 K/UL — SIGNIFICANT CHANGE UP (ref 0–0.5)
EOSINOPHIL NFR BLD AUTO: 5.7 % — SIGNIFICANT CHANGE UP (ref 0–6)
GLUCOSE BLDC GLUCOMTR-MCNC: 201 MG/DL — HIGH (ref 70–99)
GLUCOSE BLDC GLUCOMTR-MCNC: 216 MG/DL — HIGH (ref 70–99)
GLUCOSE SERPL-MCNC: 149 MG/DL — HIGH (ref 70–99)
HCT VFR BLD CALC: 35 % — SIGNIFICANT CHANGE UP (ref 34.5–45)
HGB BLD-MCNC: 10.7 G/DL — LOW (ref 11.5–15.5)
IMM GRANULOCYTES NFR BLD AUTO: 0.2 % — SIGNIFICANT CHANGE UP (ref 0–0.9)
LYMPHOCYTES # BLD AUTO: 1.47 K/UL — SIGNIFICANT CHANGE UP (ref 1–3.3)
LYMPHOCYTES # BLD AUTO: 25.3 % — SIGNIFICANT CHANGE UP (ref 13–44)
MCHC RBC-ENTMCNC: 26.8 PG — LOW (ref 27–34)
MCHC RBC-ENTMCNC: 30.6 GM/DL — LOW (ref 32–36)
MCV RBC AUTO: 87.5 FL — SIGNIFICANT CHANGE UP (ref 80–100)
MONOCYTES # BLD AUTO: 0.31 K/UL — SIGNIFICANT CHANGE UP (ref 0–0.9)
MONOCYTES NFR BLD AUTO: 5.3 % — SIGNIFICANT CHANGE UP (ref 2–14)
NEUTROPHILS # BLD AUTO: 3.65 K/UL — SIGNIFICANT CHANGE UP (ref 1.8–7.4)
NEUTROPHILS NFR BLD AUTO: 63 % — SIGNIFICANT CHANGE UP (ref 43–77)
PLATELET # BLD AUTO: 214 K/UL — SIGNIFICANT CHANGE UP (ref 150–400)
POTASSIUM SERPL-MCNC: 5.7 MMOL/L — HIGH (ref 3.5–5.3)
POTASSIUM SERPL-SCNC: 5.7 MMOL/L — HIGH (ref 3.5–5.3)
PROT SERPL-MCNC: 7.4 G/DL — SIGNIFICANT CHANGE UP (ref 6.6–8.7)
RBC # BLD: 4 M/UL — SIGNIFICANT CHANGE UP (ref 3.8–5.2)
RBC # FLD: 14.6 % — HIGH (ref 10.3–14.5)
SARS-COV-2 RNA SPEC QL NAA+PROBE: SIGNIFICANT CHANGE UP
SODIUM SERPL-SCNC: 143 MMOL/L — SIGNIFICANT CHANGE UP (ref 135–145)
WBC # BLD: 5.8 K/UL — SIGNIFICANT CHANGE UP (ref 3.8–10.5)
WBC # FLD AUTO: 5.8 K/UL — SIGNIFICANT CHANGE UP (ref 3.8–10.5)

## 2022-09-29 PROCEDURE — 76830 TRANSVAGINAL US NON-OB: CPT | Mod: 26

## 2022-09-29 PROCEDURE — 76856 US EXAM PELVIC COMPLETE: CPT | Mod: 26

## 2022-09-29 PROCEDURE — 72148 MRI LUMBAR SPINE W/O DYE: CPT | Mod: 26

## 2022-09-29 PROCEDURE — 99285 EMERGENCY DEPT VISIT HI MDM: CPT

## 2022-09-29 PROCEDURE — 72131 CT LUMBAR SPINE W/O DYE: CPT | Mod: 26,MA

## 2022-09-29 PROCEDURE — 99223 1ST HOSP IP/OBS HIGH 75: CPT

## 2022-09-29 PROCEDURE — 72146 MRI CHEST SPINE W/O DYE: CPT | Mod: 26

## 2022-09-29 RX ORDER — FOLIC ACID 0.8 MG
1 TABLET ORAL DAILY
Refills: 0 | Status: DISCONTINUED | OUTPATIENT
Start: 2022-09-29 | End: 2022-10-01

## 2022-09-29 RX ORDER — DEXTROSE 50 % IN WATER 50 %
12.5 SYRINGE (ML) INTRAVENOUS ONCE
Refills: 0 | Status: DISCONTINUED | OUTPATIENT
Start: 2022-09-29 | End: 2022-10-01

## 2022-09-29 RX ORDER — SODIUM BICARBONATE 1 MEQ/ML
650 SYRINGE (ML) INTRAVENOUS
Refills: 0 | Status: DISCONTINUED | OUTPATIENT
Start: 2022-09-29 | End: 2022-10-01

## 2022-09-29 RX ORDER — INSULIN GLARGINE 100 [IU]/ML
35 INJECTION, SOLUTION SUBCUTANEOUS AT BEDTIME
Refills: 0 | Status: DISCONTINUED | OUTPATIENT
Start: 2022-09-29 | End: 2022-10-01

## 2022-09-29 RX ORDER — TRAMADOL HYDROCHLORIDE 50 MG/1
25 TABLET ORAL EVERY 6 HOURS
Refills: 0 | Status: DISCONTINUED | OUTPATIENT
Start: 2022-09-29 | End: 2022-10-01

## 2022-09-29 RX ORDER — ATORVASTATIN CALCIUM 80 MG/1
10 TABLET, FILM COATED ORAL DAILY
Refills: 0 | Status: DISCONTINUED | OUTPATIENT
Start: 2022-09-29 | End: 2022-09-29

## 2022-09-29 RX ORDER — DEXAMETHASONE 0.5 MG/5ML
10 ELIXIR ORAL ONCE
Refills: 0 | Status: COMPLETED | OUTPATIENT
Start: 2022-09-29 | End: 2022-09-29

## 2022-09-29 RX ORDER — DEXTROSE 50 % IN WATER 50 %
15 SYRINGE (ML) INTRAVENOUS ONCE
Refills: 0 | Status: DISCONTINUED | OUTPATIENT
Start: 2022-09-29 | End: 2022-10-01

## 2022-09-29 RX ORDER — DEXTROSE 50 % IN WATER 50 %
25 SYRINGE (ML) INTRAVENOUS ONCE
Refills: 0 | Status: DISCONTINUED | OUTPATIENT
Start: 2022-09-29 | End: 2022-10-01

## 2022-09-29 RX ORDER — LANOLIN ALCOHOL/MO/W.PET/CERES
3 CREAM (GRAM) TOPICAL AT BEDTIME
Refills: 0 | Status: DISCONTINUED | OUTPATIENT
Start: 2022-09-29 | End: 2022-10-01

## 2022-09-29 RX ORDER — SODIUM POLYSTYRENE SULFONATE 4.1 MEQ/G
30 POWDER, FOR SUSPENSION ORAL ONCE
Refills: 0 | Status: COMPLETED | OUTPATIENT
Start: 2022-09-29 | End: 2022-09-29

## 2022-09-29 RX ORDER — METOPROLOL TARTRATE 50 MG
100 TABLET ORAL DAILY
Refills: 0 | Status: DISCONTINUED | OUTPATIENT
Start: 2022-09-29 | End: 2022-10-01

## 2022-09-29 RX ORDER — ATORVASTATIN CALCIUM 80 MG/1
10 TABLET, FILM COATED ORAL DAILY
Refills: 0 | Status: DISCONTINUED | OUTPATIENT
Start: 2022-09-29 | End: 2022-10-01

## 2022-09-29 RX ORDER — GLUCAGON INJECTION, SOLUTION 0.5 MG/.1ML
1 INJECTION, SOLUTION SUBCUTANEOUS ONCE
Refills: 0 | Status: DISCONTINUED | OUTPATIENT
Start: 2022-09-29 | End: 2022-10-01

## 2022-09-29 RX ORDER — LIDOCAINE 4 G/100G
1 CREAM TOPICAL ONCE
Refills: 0 | Status: COMPLETED | OUTPATIENT
Start: 2022-09-29 | End: 2022-09-29

## 2022-09-29 RX ORDER — SODIUM CHLORIDE 9 MG/ML
1000 INJECTION, SOLUTION INTRAVENOUS
Refills: 0 | Status: DISCONTINUED | OUTPATIENT
Start: 2022-09-29 | End: 2022-10-01

## 2022-09-29 RX ORDER — HYDROCHLOROTHIAZIDE 25 MG
12.5 TABLET ORAL DAILY
Refills: 0 | Status: DISCONTINUED | OUTPATIENT
Start: 2022-09-29 | End: 2022-10-01

## 2022-09-29 RX ORDER — ONDANSETRON 8 MG/1
4 TABLET, FILM COATED ORAL EVERY 8 HOURS
Refills: 0 | Status: DISCONTINUED | OUTPATIENT
Start: 2022-09-29 | End: 2022-10-01

## 2022-09-29 RX ORDER — LATANOPROST 0.05 MG/ML
1 SOLUTION/ DROPS OPHTHALMIC; TOPICAL AT BEDTIME
Refills: 0 | Status: DISCONTINUED | OUTPATIENT
Start: 2022-09-29 | End: 2022-10-01

## 2022-09-29 RX ORDER — ACETAMINOPHEN 500 MG
650 TABLET ORAL EVERY 6 HOURS
Refills: 0 | Status: DISCONTINUED | OUTPATIENT
Start: 2022-09-29 | End: 2022-10-01

## 2022-09-29 RX ORDER — CLOPIDOGREL BISULFATE 75 MG/1
75 TABLET, FILM COATED ORAL DAILY
Refills: 0 | Status: DISCONTINUED | OUTPATIENT
Start: 2022-09-29 | End: 2022-10-01

## 2022-09-29 RX ORDER — LIDOCAINE 4 G/100G
1 CREAM TOPICAL DAILY
Refills: 0 | Status: DISCONTINUED | OUTPATIENT
Start: 2022-09-29 | End: 2022-10-01

## 2022-09-29 RX ORDER — FLUTICASONE PROPIONATE 220 MCG
1 AEROSOL WITH ADAPTER (GRAM) INHALATION
Qty: 0 | Refills: 0 | DISCHARGE

## 2022-09-29 RX ORDER — INSULIN LISPRO 100/ML
VIAL (ML) SUBCUTANEOUS
Refills: 0 | Status: DISCONTINUED | OUTPATIENT
Start: 2022-09-29 | End: 2022-10-01

## 2022-09-29 RX ORDER — DIAZEPAM 5 MG
5 TABLET ORAL ONCE
Refills: 0 | Status: DISCONTINUED | OUTPATIENT
Start: 2022-09-29 | End: 2022-09-29

## 2022-09-29 RX ORDER — GABAPENTIN 400 MG/1
600 CAPSULE ORAL ONCE
Refills: 0 | Status: COMPLETED | OUTPATIENT
Start: 2022-09-29 | End: 2022-09-29

## 2022-09-29 RX ORDER — ASPIRIN/CALCIUM CARB/MAGNESIUM 324 MG
81 TABLET ORAL DAILY
Refills: 0 | Status: DISCONTINUED | OUTPATIENT
Start: 2022-09-29 | End: 2022-10-01

## 2022-09-29 RX ADMIN — SODIUM POLYSTYRENE SULFONATE 30 GRAM(S): 4.1 POWDER, FOR SUSPENSION ORAL at 13:32

## 2022-09-29 RX ADMIN — GABAPENTIN 600 MILLIGRAM(S): 400 CAPSULE ORAL at 12:21

## 2022-09-29 RX ADMIN — Medication 10 MILLIGRAM(S): at 12:21

## 2022-09-29 RX ADMIN — INSULIN GLARGINE 35 UNIT(S): 100 INJECTION, SOLUTION SUBCUTANEOUS at 22:18

## 2022-09-29 RX ADMIN — Medication 650 MILLIGRAM(S): at 22:18

## 2022-09-29 RX ADMIN — LIDOCAINE 1 PATCH: 4 CREAM TOPICAL at 12:21

## 2022-09-29 RX ADMIN — LATANOPROST 1 DROP(S): 0.05 SOLUTION/ DROPS OPHTHALMIC; TOPICAL at 22:20

## 2022-09-29 RX ADMIN — Medication 5 MILLIGRAM(S): at 12:20

## 2022-09-29 RX ADMIN — Medication 4: at 22:50

## 2022-09-29 RX ADMIN — Medication 100 MILLIGRAM(S): at 22:18

## 2022-09-29 NOTE — ED PROVIDER NOTE - PROGRESS NOTE DETAILS
labs reviewed with hyperkalemia and elevation in BUN/cr in all 3 visits, nephrology called for evaluation, Kayexalate ordered, CT reviewed, pain management consult appreciated, pending MRI and US

## 2022-09-29 NOTE — H&P ADULT - MUSCULOSKELETAL
ROM intact/no joint swelling/strength 5/5 bilateral upper extremities/strength 5/5 bilateral lower extremities details…

## 2022-09-29 NOTE — ED PROVIDER NOTE - CLINICAL SUMMARY MEDICAL DECISION MAKING FREE TEXT BOX
back pain: with lower abdominal pain back pain: with lower abdominal pain here for similar x 2 ED visits, labs indicate hyperkalemia, EKG shows no changes, BUN/Cr elevated, nephrology consulted, kayexalate given, CT reviewed, pending US and MRI back pain: with lower abdominal pain here for similar x 2 ED visits, labs indicate hyperkalemia, EKG shows no changes, BUN/Cr elevated, nephrology consulted, Kayexalate given, CT reviewed, pending US and MRI

## 2022-09-29 NOTE — ED ADULT TRIAGE NOTE - CHIEF COMPLAINT QUOTE
lower abdominal pain and back pain. here 3 times previously in last few weeks. was told she had diverticulitis. reports finished abx and still has pain.

## 2022-09-29 NOTE — CONSULT NOTE ADULT - ASSESSMENT
72F  unclear cause of back pain radiating to abdomen  ddx include pelvic etiology vs thoracic radiculopathy vs myalgias    would recommend mri thoracic spine non-contrast to r/o thoracic spine disc hernation causing thoracic radiculopathy

## 2022-09-29 NOTE — CONSULT NOTE ADULT - SUBJECTIVE AND OBJECTIVE BOX
CC: back and abdominal pain    HPI: per chart review:  · HPI Objective Statement: This is a 72 year old female here for lower back pain radiating to abdomen x 2 weeks.  She reports has been here three times for similar pain, and was told it was diverticulitis.  She was also told had DJD.  She has been using muscle relaxer with no relief.  She notes pmhx of DM, HTN, arthritis, shx cardiac stent.  She reports is on ASA.  She reports pain is worsening.  She notes pain is worse with movement.  She denies any trauma or falls.  She denies f/u with spine MD, denies injections in back.  She denies any urinary symptoms.  She denies any fevers.  She denies steroid usage.      Interval Hx:  Patient seen during rounds  Patient reports pain to be mod controlled on current medications  Patient denies sedation with medications   back pain radiating to abdomen x 3 weeks  pain has not improved  denies any burning/electrical/shooting quality to the pain   no clear etiology  prev dx w diverticulitis, recent scan was negative        Analgesic Dosing for past 24 hours reviewed as below:  diazepam    Tablet   5 milliGRAM(s) Oral (09-29-22 @ 12:20)    gabapentin   600 milliGRAM(s) Oral (09-29-22 @ 12:21)          T(C): 36.8 (09-29-22 @ 10:10), Max: 36.8 (09-29-22 @ 10:10)  HR: 76 (09-29-22 @ 10:10) (76 - 76)  BP: 158/79 (09-29-22 @ 10:10) (158/79 - 158/79)  RR: 16 (09-29-22 @ 10:10) (16 - 16)  SpO2: 96% (09-29-22 @ 10:10) (96% - 96%)                                10.7   5.80  )-----------( 214      ( 29 Sep 2022 11:55 )             35.0     09-29    143  |  112<H>  |  14.4  ----------------------------<  149<H>  5.7<H>   |  19.0<L>  |  1.81<H>    Ca    9.4      29 Sep 2022 11:55    TPro  7.4  /  Alb  4.2  /  TBili  0.3<L>  /  DBili  x   /  AST  15  /  ALT  8   /  AlkPhos  108  09-29          Pain Service   [phone]  
The patient is a 72 year old female with PMH of morbid obesity, DM, HTN, BONNY, fatty liver, CKD 3b/4 (baseline creatinine 0.7-1.0mg/dL), GERD and hiatal hernia present with lower abdominal pain with radiation to lower back. CT a/p without IV contrast showed L adnexal cyst. CT lumbar spine showed grade 1 subluxation of L4-L5, multilevel degenerative changes and no acute fracture. Admitted for further work up. Nephrology was consulted for elevated creatinine associated with hyperK and metabolic acidosis. Denied hematuria, hx of nephrolithiasis and NSAID use. Admits to frothy urine. Family hx of ESRD in brother (etiology of ESRD is unclear).     PAST MEDICAL & SURGICAL HISTORY:  HTN (hypertension)  DM (diabetes mellitus)  Chest pain  BONNY (obstructive sleep apnea)  Hiatal hernia  GERD (gastroesophageal reflux disease)  DJD (degenerative joint disease), lumbar  Fatty liver  Arthritis  Anemia  After cataract, bilateral    Allergies  codeine (Nausea)  omeprazole (Nausea)  Vicodin (Nausea)  Intolerances    Home Medications:  aspirin 81 mg oral tablet: 1 tab(s) orally once a day (29 Sep 2022 16:44)  atorvastatin 10 mg oral tablet: 1 tab(s) orally once a day (29 Sep 2022 16:44)  azelastine 137 mcg/inh (0.1%) nasal spray: 2 spray(s) nasal 2 times a day (29 Sep 2022 16:47)  benazepril 20 mg oral tablet: 1 tab(s) orally once a day (29 Sep 2022 16:44)  clopidogrel 75 mg oral tablet: 1 tab(s) orally once a day (29 Sep 2022 16:44)  Combigan 0.2%-0.5% ophthalmic solution: 1 drop(s) in the right eye once a day (at bedtime) (29 Sep 2022 16:44)  dorzolamide 2% ophthalmic solution: 1 drop(s) in each eye 3 times a day (29 Sep 2022 16:44)  folic acid 1 mg oral tablet: 1 tab(s) orally once a day (29 Sep 2022 16:44)  Lumigan 0.01% ophthalmic solution: 1 drop(s) in each eye once a day (in the evening) (29 Sep 2022 16:44)  metFORMIN 500 mg oral tablet: 2 tab(s) orally 2 times a day  RESTART IN 2 Days on 9/3 am  (29 Sep 2022 16:44)  metoprolol succinate 100 mg oral tablet, extended release: 1 tab(s) orally once a day (29 Sep 2022 16:44)  omeprazole 40 mg oral delayed release capsule: 1 cap(s) orally once a day (29 Sep 2022 16:44)  Tresiba: 35 unit(s) subcutaneous once a day (at bedtime) (29 Sep 2022 16:44)    FAMILY HISTORY:  Family history of diabetes mellitus (DM) (Sibling)  Family history of hypertension (Sibling)    Social History: Denied    Vital Signs Last 24 Hrs  T(C): 36.8 (29 Sep 2022 10:10), Max: 36.8 (29 Sep 2022 10:10)  T(F): 98.2 (29 Sep 2022 10:10), Max: 98.2 (29 Sep 2022 10:10)  HR: 76 (29 Sep 2022 10:10) (76 - 76)  BP: 158/79 (29 Sep 2022 10:10) (158/79 - 158/79)  BP(mean): --  RR: 16 (29 Sep 2022 10:10) (16 - 16)  SpO2: 96% (29 Sep 2022 10:10) (96% - 96%)    Parameters below as of 29 Sep 2022 10:10  Patient On (Oxygen Delivery Method): room air    I&O's Summary: Not recorded     Physical Exam  General: Morbidly obese female in NAD  Cardiac: S1S2 RRR  Respiratory: CTAB  Abdomen: Obese, soft  Extremities: No edema  Neuro: AAOx3    09-29    143  |  112<H>  |  14.4  ----------------------------<  149<H>  5.7<H>   |  19.0<L>  |  1.81<H>    Ca    9.4      29 Sep 2022 11:55    TPro  7.4  /  Alb  4.2  /  TBili  0.3<L>  /  DBili  x   /  AST  15  /  ALT  8   /  AlkPhos  108  09-29                          10.7   5.80  )-----------( 214      ( 29 Sep 2022 11:55 )             35.0     MEDICATIONS  (STANDING):  aspirin  chewable 81 milliGRAM(s) Oral daily  atorvastatin 10 milliGRAM(s) Oral daily  clopidogrel Tablet 75 milliGRAM(s) Oral daily  dextrose 5%. 1000 milliLiter(s) (50 mL/Hr) IV Continuous <Continuous>  dextrose 5%. 1000 milliLiter(s) (100 mL/Hr) IV Continuous <Continuous>  dextrose 50% Injectable 25 Gram(s) IV Push once  dextrose 50% Injectable 12.5 Gram(s) IV Push once  dextrose 50% Injectable 25 Gram(s) IV Push once  folic acid 1 milliGRAM(s) Oral daily  glucagon  Injectable 1 milliGRAM(s) IntraMuscular once  insulin glargine Injectable (LANTUS) 35 Unit(s) SubCutaneous at bedtime  insulin lispro (ADMELOG) corrective regimen sliding scale   SubCutaneous three times a day before meals  latanoprost 0.005% Ophthalmic Solution 1 Drop(s) Both EYES at bedtime  lidocaine   4% Patch 1 Patch Transdermal daily  metoprolol succinate  milliGRAM(s) Oral daily    MEDICATIONS  (PRN):  acetaminophen     Tablet .. 650 milliGRAM(s) Oral every 6 hours PRN Temp greater or equal to 38C (100.4F), Mild Pain (1 - 3)  aluminum hydroxide/magnesium hydroxide/simethicone Suspension 30 milliLiter(s) Oral every 4 hours PRN Dyspepsia  dextrose Oral Gel 15 Gram(s) Oral once PRN Blood Glucose LESS THAN 70 milliGRAM(s)/deciliter  melatonin 3 milliGRAM(s) Oral at bedtime PRN Insomnia  ondansetron Injectable 4 milliGRAM(s) IV Push every 8 hours PRN Nausea and/or Vomiting  traMADol 25 milliGRAM(s) Oral every 6 hours PRN Moderate Pain (4 - 6)

## 2022-09-29 NOTE — ED ADULT NURSE NOTE - SUICIDE SCREENING QUESTION 3
MEASURED FOR TEDS/SCDS IN PAT    CALF MEASUREMENT    13.5IN  LENGTH MEASUREMENT 15.5IN    NO CONSENT ORDER IN EPICADAMARIS FROM DR RODRIGUEZ OFFICE MADE AWARE AND STATED TO HAVE PT SIGN ON DATE OF SURGERY IN PRE OP     
No

## 2022-09-29 NOTE — H&P ADULT - ASSESSMENT
72 yr old female with CAD s/p stents, hypertension, hyperlipidemia, diabetes mellitus, CKD stage 3, morbid obesity, glaucoma presented with worsening lower back and abdominal pain for 3 weeks. Labs and imaging noted. Hyperkalemia on BMP.    1. Intractable back pain:  MRI spine ordered  pain control  pain management f/u post MRI results and PT  fall precautions  avoid NSAIDS.    2. CKD 3 with metabolic acidosis:  Nephro eval appreciated  start sodium bicarbonate  no obst on imaging  monitor BMP  avoid ACEI/nsaids for now    3. CAD s/p stent:  Recent outpatient cardio follow up noted  neg stress a year ago  continue DAPT, statin, Metoprolol.    4. Hypertension:  Continue Metoprolol  hold ACEI    5. Diabetes mellitus:  Continue Lantus 35 u  sliding scale coverage  monitor FS  hold Metformin.    6. Hyperlipidemia:  Continue statin.    7. DVT ppx:  SCDs    Discussed with patient plan of care.

## 2022-09-29 NOTE — H&P ADULT - HIV OFFER
Caller: SALLY HEIN    Relationship to patient: MOTHER     Best call back number: [phone]    Patient is needing:  TO GIVE PERMISSION FOR THE GRANDPARENT TO BRING HER IN FOR HER APPOINTMENT TODAY 08/22/22         Unable to answer due to medical condition/unresponsive/etc...

## 2022-09-29 NOTE — CONSULT NOTE ADULT - ASSESSMENT
The patient is a 72 year old female with PMH of morbid obesity, DM, HTN, BONNY, fatty liver, CKD 3b/4 (baseline creatinine 0.7-1.0mg/dL), GERD and hiatal hernia present with lower abdominal pain with radiation to lower back. CT a/p without IV contrast showed L adnexal cyst. CT lumbar spine showed grade 1 subluxation of L4-L5, multilevel degenerative changes and no acute fracture. Admitted for further work up. Nephrology was consulted for elevated creatinine associated with hyperK and metabolic acidosis. Denied hematuria, hx of nephrolithiasis and NSAID use. Admits to frothy urine. Family hx of ESRD in brother (etiology of ESRD is unclear).     -Notable for CKD since at least 2019   -Suspecting underlying diabetic kidney disease + hypertensive nephrosclerosis   -Baseline creatinine appears to be ~1.7-2.0mg/dL  -Renal function remains at baseline at this time  -UA bland   -Will check UPCR and UACR   -CT a/p without IV contrast is negative for obstruction  -HyperK is likely from ACEi use - recommend discontinuing at this time   -Will start Lokelma 5g daily  -Once normokalemia is achieved, will consider reintroducing ACEi (but at a lower dose)  -Metabolic acidosis in setting of CKD - will start oral sodium bicarb 650mg BID   -Mineral Bone Disease in setting of CKD - calcium is okay; will check phos, PTH and 25 Vitamin D    Marci Mueller, DO  Nephrology      The patient is a 72 year old female with PMH of morbid obesity, DM, HTN, BONNY, fatty liver, CKD 3b/4 (baseline creatinine 0.7-1.0mg/dL), GERD and hiatal hernia present with lower abdominal pain with radiation to lower back. CT a/p without IV contrast showed L adnexal cyst. CT lumbar spine showed grade 1 subluxation of L4-L5, multilevel degenerative changes and no acute fracture. Admitted for further work up. Nephrology was consulted for elevated creatinine associated with hyperK and metabolic acidosis. Denied hematuria, hx of nephrolithiasis and NSAID use. Admits to frothy urine. Family hx of ESRD in brother (etiology of ESRD is unclear).     -Notable for CKD since at least 2019   -Suspecting underlying diabetic kidney disease + hypertensive nephrosclerosis   -Baseline creatinine appears to be ~1.7-2.0mg/dL  -Renal function remains at baseline at this time  -UA bland   -Will check UPCR and UACR   -CT a/p without IV contrast is negative for obstruction  -HyperK is likely from ACEi use - recommend discontinuing at this time   -Recommend using chlorthalidone 12.5mg daily    -Once normokalemia is achieved, will consider reintroducing ACEi (but at a lower dose)  -Metabolic acidosis in setting of CKD - will start oral sodium bicarb 650mg BID   -Mineral Bone Disease in setting of CKD - calcium is okay; will check phos, PTH and 25 Vitamin D  -Will change PPI to H2 blocker (famotidine 10mg daily)    Marci Mueller, DO  Nephrology      The patient is a 72 year old female with PMH of morbid obesity, DM, HTN, BONNY, fatty liver, CKD 3b/4 (baseline creatinine 0.7-1.0mg/dL), GERD and hiatal hernia present with lower abdominal pain with radiation to lower back. CT a/p without IV contrast showed L adnexal cyst. CT lumbar spine showed grade 1 subluxation of L4-L5, multilevel degenerative changes and no acute fracture. Admitted for further work up. Nephrology was consulted for elevated creatinine associated with hyperK and metabolic acidosis. Denied hematuria, hx of nephrolithiasis and NSAID use. Admits to frothy urine. Family hx of ESRD in brother (etiology of ESRD is unclear).     -Notable for CKD since at least 2019   -Suspecting underlying diabetic kidney disease + hypertensive nephrosclerosis   -Baseline creatinine appears to be ~1.7-2.0mg/dL  -Renal function remains at baseline at this time  -UA bland   -Will check UPCR and UACR   -CT a/p without IV contrast is negative for obstruction  -HyperK is likely from ACEi use - recommend discontinuing at this time   -Recommend starting chlorthalidone 12.5mg daily    -Once normokalemia is achieved, will consider reintroducing ACEi (but at a lower dose)  -Metabolic acidosis in setting of CKD - will start oral sodium bicarb 650mg BID   -Mineral Bone Disease in setting of CKD - calcium is okay; will check phos, PTH and 25 Vitamin D  -Will change PPI to H2 blocker (famotidine 10mg daily)    Marci Mueller, DO  Nephrology      The patient is a 72 year old female with PMH of morbid obesity, DM, HTN, BONNY, fatty liver, CKD 3b/4 (baseline creatinine 0.7-1.0mg/dL), GERD and hiatal hernia present with lower abdominal pain with radiation to lower back. CT a/p without IV contrast showed L adnexal cyst. CT lumbar spine showed grade 1 subluxation of L4-L5, multilevel degenerative changes and no acute fracture. Admitted for further work up. Nephrology was consulted for elevated creatinine associated with hyperK and metabolic acidosis. Denied hematuria, hx of nephrolithiasis and NSAID use. Admits to frothy urine. Family hx of ESRD in brother (etiology of ESRD is unclear).     -Notable for CKD since at least 2019   -Suspecting underlying diabetic kidney disease + hypertensive nephrosclerosis   -Baseline creatinine appears to be ~1.7-2.0mg/dL  -Renal function remains at baseline at this time  -UA bland   -Will check UPCR and UACR   -CT a/p without IV contrast is negative for obstruction  -HyperK is likely from ACEi use - recommend discontinuing at this time   -Chlorthalidone and indapamide are non formulary here - so will start HCTZ 12.5mg daily     -Once normokalemia is achieved, will consider reintroducing ACEi (but at a lower dose)  -Metabolic acidosis in setting of CKD - will start oral sodium bicarb 650mg BID   -Mineral Bone Disease in setting of CKD - calcium is okay; will check phos, PTH and 25 Vitamin D  -Will change PPI to H2 blocker (famotidine 10mg daily)    Marci Mueller, DO  Nephrology      The patient is a 72 year old female with PMH of morbid obesity, DM, HTN, BONNY, fatty liver, CKD 3b/4 (baseline creatinine 0.7-1.0mg/dL), GERD and hiatal hernia present with lower abdominal pain with radiation to lower back. CT a/p without IV contrast showed L adnexal cyst. CT lumbar spine showed grade 1 subluxation of L4-L5, multilevel degenerative changes and no acute fracture. Admitted for further work up. Nephrology was consulted for elevated creatinine associated with hyperK and metabolic acidosis. Denied hematuria, hx of nephrolithiasis and NSAID use. Admits to frothy urine. Family hx of ESRD in brother (etiology of ESRD is unclear).     -Notable for CKD since at least 2019   -Suspecting underlying diabetic kidney disease + hypertensive nephrosclerosis   -Baseline creatinine appears to be ~1.7-2.0mg/dL  -Renal function remains at baseline at this time  -UA bland   -Will check UPCR and UACR   -CT a/p without IV contrast is negative for obstruction  -HyperK is likely from ACEi use - recommend discontinuing at this time   -Chlorthalidone and indapamide are non formulary here - so will start HCTZ 12.5mg daily (for BP management and kaliuresis)   -Once normokalemia is achieved, will consider reintroducing ACEi (but at a lower dose)  -Metabolic acidosis in setting of CKD - will start oral sodium bicarb 650mg BID   -Mineral Bone Disease in setting of CKD - calcium is okay; will check phos, PTH and 25 Vitamin D  -Will change PPI to H2 blocker (famotidine 10mg daily)    Marci Mueller,   Nephrology

## 2022-09-29 NOTE — PATIENT PROFILE ADULT - FALL HARM RISK - HARM RISK INTERVENTIONS

## 2022-09-29 NOTE — ED ADULT NURSE NOTE - OBJECTIVE STATEMENT
Pt is alert and oriented. Pt states that she has been having lower abdominal pain and lower back pain for 3 weeks. Pt states that the pain came on suddenly. Pt came to the ER 3 times and was diagnosed with diverticulitis. Pt finished abx. Pt denies tingling in hand and feet. Pt denies sob, chest pain, nausea, vomiting, and dizziness. Pt resp are even and unlabored, skin color maye for race. Pt updated on plan of care.

## 2022-09-29 NOTE — H&P ADULT - HISTORY OF PRESENT ILLNESS
72 yr old female with CAD s/p stents, hypertension, hyperlipidemia, diabetes mellitus, CKD stage 3, morbid obesity, glaucoma 72 yr old female with CAD s/p stents, hypertension, hyperlipidemia, diabetes mellitus, CKD stage 3, morbid obesity, glaucoma presented with worsening lower back and abdominal pain for 3 weeks. Denies trauma, states pain is sharp and band like in lower abdomen, non radiating and worse with movement. Recently in the ED for similar complaints, was treated with diverticulitis and recently with NSAIDS and muscle relaxants without much relief.  Denies leg weakness or numbness. Pain is sharp 12/10 as per patient, no urinary or bowel complaints or incontinence. Denies fall.

## 2022-09-29 NOTE — ED PROVIDER NOTE - ATTENDING APP SHARED VISIT CONTRIBUTION OF CARE
pt with lower abd pain radiating to bilateral lower back  seen in ED twice for same. Originally dx diverticulitis, next visit it was resolved. Now pain reoccurred.  Past imaging sig left ovarian cyst and diverticulosis. PE as documented Will recheck labs, CT LS spine, Pelvic US, PT and PM consults reassess  agree w jadyn and tino

## 2022-09-29 NOTE — ED PROVIDER NOTE - OBJECTIVE STATEMENT
This is a 72 year old female here for lower back pain radiating to abdomen x 2 weeks.  She reports has been here three times for similar pain, and was told it was diverticulitis.  She was also told had DJD.  She has been using muscle relaxer with no relief.  She notes pmhx of DM, HTN, arthritis, shx cardiac stent.  She reports is on ASA.  She reports pain is worsening.  She notes pain is worse with movement.  She denies any trauma or falls.  She denies f/u with spine MD, denies injections in back.  She denies any urinary symptoms.  She denies any fevers.  She denies steroid usage.

## 2022-09-30 ENCOUNTER — TRANSCRIPTION ENCOUNTER (OUTPATIENT)
Age: 72
End: 2022-09-30

## 2022-09-30 LAB
24R-OH-CALCIDIOL SERPL-MCNC: 49.9 NG/ML — SIGNIFICANT CHANGE UP (ref 30–80)
ALBUMIN SERPL ELPH-MCNC: 3.8 G/DL — SIGNIFICANT CHANGE UP (ref 3.3–5.2)
ALP SERPL-CCNC: 101 U/L — SIGNIFICANT CHANGE UP (ref 40–120)
ALT FLD-CCNC: 8 U/L — SIGNIFICANT CHANGE UP
ANION GAP SERPL CALC-SCNC: 13 MMOL/L — SIGNIFICANT CHANGE UP (ref 5–17)
AST SERPL-CCNC: 16 U/L — SIGNIFICANT CHANGE UP
BASOPHILS # BLD AUTO: 0.01 K/UL — SIGNIFICANT CHANGE UP (ref 0–0.2)
BASOPHILS NFR BLD AUTO: 0.1 % — SIGNIFICANT CHANGE UP (ref 0–2)
BILIRUB SERPL-MCNC: 0.2 MG/DL — LOW (ref 0.4–2)
BUN SERPL-MCNC: 20 MG/DL — SIGNIFICANT CHANGE UP (ref 8–20)
CALCIUM SERPL-MCNC: 9.4 MG/DL — SIGNIFICANT CHANGE UP (ref 8.4–10.5)
CALCIUM SERPL-MCNC: 9.4 MG/DL — SIGNIFICANT CHANGE UP (ref 8.4–10.5)
CHLORIDE SERPL-SCNC: 110 MMOL/L — HIGH (ref 98–107)
CHOLEST SERPL-MCNC: 123 MG/DL — SIGNIFICANT CHANGE UP
CO2 SERPL-SCNC: 18 MMOL/L — LOW (ref 22–29)
CREAT SERPL-MCNC: 1.72 MG/DL — HIGH (ref 0.5–1.3)
EGFR: 32 ML/MIN/1.73M2 — LOW
EOSINOPHIL # BLD AUTO: 0 K/UL — SIGNIFICANT CHANGE UP (ref 0–0.5)
EOSINOPHIL NFR BLD AUTO: 0 % — SIGNIFICANT CHANGE UP (ref 0–6)
GLUCOSE BLDC GLUCOMTR-MCNC: 113 MG/DL — HIGH (ref 70–99)
GLUCOSE BLDC GLUCOMTR-MCNC: 139 MG/DL — HIGH (ref 70–99)
GLUCOSE BLDC GLUCOMTR-MCNC: 155 MG/DL — HIGH (ref 70–99)
GLUCOSE BLDC GLUCOMTR-MCNC: 211 MG/DL — HIGH (ref 70–99)
GLUCOSE SERPL-MCNC: 142 MG/DL — HIGH (ref 70–99)
HCT VFR BLD CALC: 32.6 % — LOW (ref 34.5–45)
HDLC SERPL-MCNC: 61 MG/DL — SIGNIFICANT CHANGE UP
HGB BLD-MCNC: 10.1 G/DL — LOW (ref 11.5–15.5)
IMM GRANULOCYTES NFR BLD AUTO: 0.3 % — SIGNIFICANT CHANGE UP (ref 0–0.9)
INR BLD: 1.1 RATIO — SIGNIFICANT CHANGE UP (ref 0.88–1.16)
LIPID PNL WITH DIRECT LDL SERPL: 45 MG/DL — SIGNIFICANT CHANGE UP
LYMPHOCYTES # BLD AUTO: 1.02 K/UL — SIGNIFICANT CHANGE UP (ref 1–3.3)
LYMPHOCYTES # BLD AUTO: 14.4 % — SIGNIFICANT CHANGE UP (ref 13–44)
MCHC RBC-ENTMCNC: 26.9 PG — LOW (ref 27–34)
MCHC RBC-ENTMCNC: 31 GM/DL — LOW (ref 32–36)
MCV RBC AUTO: 86.9 FL — SIGNIFICANT CHANGE UP (ref 80–100)
MONOCYTES # BLD AUTO: 0.31 K/UL — SIGNIFICANT CHANGE UP (ref 0–0.9)
MONOCYTES NFR BLD AUTO: 4.4 % — SIGNIFICANT CHANGE UP (ref 2–14)
NEUTROPHILS # BLD AUTO: 5.72 K/UL — SIGNIFICANT CHANGE UP (ref 1.8–7.4)
NEUTROPHILS NFR BLD AUTO: 80.8 % — HIGH (ref 43–77)
NON HDL CHOLESTEROL: 62 MG/DL — SIGNIFICANT CHANGE UP
PHOSPHATE SERPL-MCNC: 3.5 MG/DL — SIGNIFICANT CHANGE UP (ref 2.4–4.7)
PLATELET # BLD AUTO: 232 K/UL — SIGNIFICANT CHANGE UP (ref 150–400)
POTASSIUM SERPL-MCNC: 5 MMOL/L — SIGNIFICANT CHANGE UP (ref 3.5–5.3)
POTASSIUM SERPL-SCNC: 5 MMOL/L — SIGNIFICANT CHANGE UP (ref 3.5–5.3)
PROT SERPL-MCNC: 7.1 G/DL — SIGNIFICANT CHANGE UP (ref 6.6–8.7)
PROTHROM AB SERPL-ACNC: 12.8 SEC — SIGNIFICANT CHANGE UP (ref 10.5–13.4)
PTH-INTACT FLD-MCNC: 160 PG/ML — HIGH (ref 15–65)
RBC # BLD: 3.75 M/UL — LOW (ref 3.8–5.2)
RBC # FLD: 14.6 % — HIGH (ref 10.3–14.5)
SODIUM SERPL-SCNC: 141 MMOL/L — SIGNIFICANT CHANGE UP (ref 135–145)
TRIGL SERPL-MCNC: 85 MG/DL — SIGNIFICANT CHANGE UP
WBC # BLD: 7.08 K/UL — SIGNIFICANT CHANGE UP (ref 3.8–10.5)
WBC # FLD AUTO: 7.08 K/UL — SIGNIFICANT CHANGE UP (ref 3.8–10.5)

## 2022-09-30 PROCEDURE — 99232 SBSQ HOSP IP/OBS MODERATE 35: CPT

## 2022-09-30 PROCEDURE — 99233 SBSQ HOSP IP/OBS HIGH 50: CPT

## 2022-09-30 RX ORDER — FAMOTIDINE 10 MG/ML
20 INJECTION INTRAVENOUS
Refills: 0 | Status: DISCONTINUED | OUTPATIENT
Start: 2022-09-30 | End: 2022-10-01

## 2022-09-30 RX ORDER — LISINOPRIL 2.5 MG/1
2.5 TABLET ORAL DAILY
Refills: 0 | Status: DISCONTINUED | OUTPATIENT
Start: 2022-09-30 | End: 2022-10-01

## 2022-09-30 RX ADMIN — Medication 12.5 MILLIGRAM(S): at 05:07

## 2022-09-30 RX ADMIN — LIDOCAINE 1 PATCH: 4 CREAM TOPICAL at 21:00

## 2022-09-30 RX ADMIN — LIDOCAINE 1 PATCH: 4 CREAM TOPICAL at 09:38

## 2022-09-30 RX ADMIN — TRAMADOL HYDROCHLORIDE 25 MILLIGRAM(S): 50 TABLET ORAL at 05:07

## 2022-09-30 RX ADMIN — LATANOPROST 1 DROP(S): 0.05 SOLUTION/ DROPS OPHTHALMIC; TOPICAL at 21:41

## 2022-09-30 RX ADMIN — Medication 650 MILLIGRAM(S): at 11:38

## 2022-09-30 RX ADMIN — Medication 81 MILLIGRAM(S): at 11:39

## 2022-09-30 RX ADMIN — Medication 100 MILLIGRAM(S): at 21:42

## 2022-09-30 RX ADMIN — CLOPIDOGREL BISULFATE 75 MILLIGRAM(S): 75 TABLET, FILM COATED ORAL at 11:39

## 2022-09-30 RX ADMIN — INSULIN GLARGINE 35 UNIT(S): 100 INJECTION, SOLUTION SUBCUTANEOUS at 21:42

## 2022-09-30 RX ADMIN — Medication 4: at 13:39

## 2022-09-30 RX ADMIN — Medication 650 MILLIGRAM(S): at 19:00

## 2022-09-30 RX ADMIN — TRAMADOL HYDROCHLORIDE 25 MILLIGRAM(S): 50 TABLET ORAL at 05:54

## 2022-09-30 RX ADMIN — LIDOCAINE 1 PATCH: 4 CREAM TOPICAL at 00:00

## 2022-09-30 RX ADMIN — FAMOTIDINE 20 MILLIGRAM(S): 10 INJECTION INTRAVENOUS at 21:41

## 2022-09-30 RX ADMIN — Medication 1 MILLIGRAM(S): at 11:39

## 2022-09-30 RX ADMIN — ATORVASTATIN CALCIUM 10 MILLIGRAM(S): 80 TABLET, FILM COATED ORAL at 11:39

## 2022-09-30 NOTE — PHYSICAL THERAPY INITIAL EVALUATION ADULT - ADDITIONAL COMMENTS
Pt reports living alone in an apartment with 0 [address] c rail and elevator. Pt amb without device and is independent with functional mobility, ADLs, and IADLs. Pt does not drive and is not currently working. Pt has support of friends and family.  Pt owns no DME.

## 2022-09-30 NOTE — DISCHARGE NOTE PROVIDER - PROVIDER TOKENS
PROVIDER:[TOKEN:[[id number]]],PROVIDER:[TOKEN:[[id number]]],PROVIDER:[TOKEN:[[id number]]]

## 2022-09-30 NOTE — DISCHARGE NOTE PROVIDER - NSDCCPCAREPLAN_GEN_ALL_CORE_FT
PRINCIPAL DISCHARGE DIAGNOSIS  Diagnosis: Hyperkalemia  Assessment and Plan of Treatment: Resolved  hold Benazepril      SECONDARY DISCHARGE DIAGNOSES  Diagnosis: Back pain  Assessment and Plan of Treatment: MRI with degenerative changes  pain control  follow up with pain management as outpatient.    Diagnosis: Diabetes mellitus  Assessment and Plan of Treatment: hold Metformin  continue Tresiba    Diagnosis: Hypertension  Assessment and Plan of Treatment: hold Benazepril    Diagnosis: MUNA (acute kidney injury)  Assessment and Plan of Treatment: follow up with nephrology  no NSAIDS  like Advil, Motrin, Aleve    Diagnosis: Metabolic acidosis  Assessment and Plan of Treatment: Continue sodium bicarbonate       PRINCIPAL DISCHARGE DIAGNOSIS  Diagnosis: Back pain  Assessment and Plan of Treatment: MRI with degenerative changes  pain control  follow up with pain management as outpatient.      SECONDARY DISCHARGE DIAGNOSES  Diagnosis: Hyperkalemia  Assessment and Plan of Treatment: Resolved    Diagnosis: Diabetes mellitus  Assessment and Plan of Treatment: hold Metformin  continue Tresiba    Diagnosis: Hypertension  Assessment and Plan of Treatment: hold Benazepril    Diagnosis: MUNA (acute kidney injury)  Assessment and Plan of Treatment: follow up with nephrology  no NSAIDS  like Advil, Motrin, Aleve    Diagnosis: Metabolic acidosis  Assessment and Plan of Treatment: Continue sodium bicarbonate      Diagnosis: Back pain  Assessment and Plan of Treatment: MRI with degenerative changes  pain control  follow up with pain management as outpatient.

## 2022-09-30 NOTE — DISCHARGE NOTE PROVIDER - NSDCMRMEDTOKEN_GEN_ALL_CORE_FT
aspirin 81 mg oral tablet: 1 tab(s) orally once a day  atorvastatin 10 mg oral tablet: 1 tab(s) orally once a day  azelastine 137 mcg/inh (0.1%) nasal spray: 2 spray(s) nasal 2 times a day  benazepril 20 mg oral tablet: 1 tab(s) orally once a day  clopidogrel 75 mg oral tablet: 1 tab(s) orally once a day  Combigan 0.2%-0.5% ophthalmic solution: 1 drop(s) in the right eye once a day (at bedtime)  cyclobenzaprine 10 mg oral tablet: 1 tab(s) orally 3 times a day   cyclobenzaprine 10 mg oral tablet: 1 tab(s) orally 3 times a day   dorzolamide 2% ophthalmic solution: 1 drop(s) in each eye 3 times a day  folic acid 1 mg oral tablet: 1 tab(s) orally once a day   mg oral tablet: 1 tab(s) orally 3 times a day   Lidocare Pain Relief Patch 4% topical film: Apply topically to affected area once a day   Lumigan 0.01% ophthalmic solution: 1 drop(s) in each eye once a day (in the evening)  metFORMIN 500 mg oral tablet: 2 tab(s) orally 2 times a day  RESTART IN 2 Days on 9/3 am   methocarbamol 750 mg oral tablet: 1-2 tab(s) orally every 8 hours   metoprolol succinate 100 mg oral tablet, extended release: 1 tab(s) orally once a day  omeprazole 40 mg oral delayed release capsule: 1 cap(s) orally once a day  Tresiba: 35 unit(s) subcutaneous once a day (at bedtime)   aspirin 81 mg oral tablet: 1 tab(s) orally once a day  atorvastatin 10 mg oral tablet: 1 tab(s) orally once a day  azelastine 137 mcg/inh (0.1%) nasal spray: 2 spray(s) nasal 2 times a day  benazepril 20 mg oral tablet: 1 tab(s) orally once a day  clopidogrel 75 mg oral tablet: 1 tab(s) orally once a day  Combigan 0.2%-0.5% ophthalmic solution: 1 drop(s) in the right eye once a day (at bedtime)  cyclobenzaprine 10 mg oral tablet: 1 tab(s) orally 3 times a day   dorzolamide 2% ophthalmic solution: 1 drop(s) in each eye 3 times a day  folic acid 1 mg oral tablet: 1 tab(s) orally once a day   mg oral tablet: 1 tab(s) orally 3 times a day   Lidocare Pain Relief Patch 4% topical film: Apply topically to affected area once a day   Lumigan 0.01% ophthalmic solution: 1 drop(s) in each eye once a day (in the evening)  metFORMIN 500 mg oral tablet: 2 tab(s) orally 2 times a day    methocarbamol 750 mg oral tablet: 2 tab(s) orally every 8 hours, As Needed for pain  metoprolol succinate 100 mg oral tablet, extended release: 1 tab(s) orally once a day  omeprazole 40 mg oral delayed release capsule: 1 cap(s) orally once a day  senna leaf extract oral tablet: 2 tab(s) orally once a day, As Needed -for constipation   sodium bicarbonate 650 mg oral tablet: 1 tab(s) orally 2 times a day  traMADol 50 mg oral tablet: 0.5 tab(s) orally every 6 hours, As needed, Severe back pain MDD:1.5 tabs  Tresiba: 35 unit(s) subcutaneous once a day (at bedtime)

## 2022-09-30 NOTE — DISCHARGE NOTE PROVIDER - ATTENDING DISCHARGE PHYSICAL EXAMINATION:
alert, not in distress  lungs: b/l air entry  cvs: regular  abdo: soft, bs+  ext: no edema, tenderness Vital Signs Last 24 Hrs  T(C): 36.4 (01 Oct 2022 04:28), Max: 36.7 (30 Sep 2022 11:25)  T(F): 97.6 (01 Oct 2022 04:28), Max: 98 (30 Sep 2022 11:25)  HR: 61 (01 Oct 2022 04:28) (61 - 92)  BP: 146/80 (01 Oct 2022 04:28) (121/77 - 154/80)  BP(mean): --  RR: 20 (01 Oct 2022 04:28) (18 - 20)  SpO2: 98% (01 Oct 2022 04:28) (95% - 100%)    Parameters below as of 01 Oct 2022 04:28  Patient On (Oxygen Delivery Method): room air    alert, not in distress  lungs: b/l air entry  cvs: regular  abdo: soft, bs+  ext: no edema, tenderness

## 2022-09-30 NOTE — PROGRESS NOTE ADULT - SUBJECTIVE AND OBJECTIVE BOX
INTERVAL HPI/OVERNIGHT EVENTS:    CC: intractable back pain, CKD with metabolic acidosis, CAD, hypertension, diabetes mellitus    No overnight events  denies complaints      Vital Signs Last 24 Hrs  T(C): 36.7 (30 Sep 2022 11:25), Max: 36.9 (29 Sep 2022 21:20)  T(F): 98 (30 Sep 2022 11:25), Max: 98.4 (29 Sep 2022 21:20)  HR: 77 (30 Sep 2022 11:25) (64 - 98)  BP: 154/80 (30 Sep 2022 11:25) (145/79 - 163/85)  BP(mean): --  RR: 18 (30 Sep 2022 11:25) (17 - 18)  SpO2: 97% (30 Sep 2022 11:25) (95% - 98%)    Parameters below as of 30 Sep 2022 11:25  Patient On (Oxygen Delivery Method): room air        PHYSICAL EXAM:    GENERAL: alert, not in distress  CHEST/LUNG: b/l air entry  HEART: reg  ABDOMEN: soft, bs+  EXTREMITIES:  no edema, tenderness    MEDICATIONS  (STANDING):  aspirin  chewable 81 milliGRAM(s) Oral daily  atorvastatin 10 milliGRAM(s) Oral daily  clopidogrel Tablet 75 milliGRAM(s) Oral daily  dextrose 5%. 1000 milliLiter(s) (50 mL/Hr) IV Continuous <Continuous>  dextrose 5%. 1000 milliLiter(s) (100 mL/Hr) IV Continuous <Continuous>  dextrose 50% Injectable 25 Gram(s) IV Push once  dextrose 50% Injectable 12.5 Gram(s) IV Push once  dextrose 50% Injectable 25 Gram(s) IV Push once  folic acid 1 milliGRAM(s) Oral daily  glucagon  Injectable 1 milliGRAM(s) IntraMuscular once  hydrochlorothiazide 12.5 milliGRAM(s) Oral daily  insulin glargine Injectable (LANTUS) 35 Unit(s) SubCutaneous at bedtime  insulin lispro (ADMELOG) corrective regimen sliding scale   SubCutaneous three times a day before meals  latanoprost 0.005% Ophthalmic Solution 1 Drop(s) Both EYES at bedtime  lidocaine   4% Patch 1 Patch Transdermal daily  metoprolol succinate  milliGRAM(s) Oral daily  sodium bicarbonate 650 milliGRAM(s) Oral two times a day    MEDICATIONS  (PRN):  acetaminophen     Tablet .. 650 milliGRAM(s) Oral every 6 hours PRN Temp greater or equal to 38C (100.4F), Mild Pain (1 - 3)  aluminum hydroxide/magnesium hydroxide/simethicone Suspension 30 milliLiter(s) Oral every 4 hours PRN Dyspepsia  dextrose Oral Gel 15 Gram(s) Oral once PRN Blood Glucose LESS THAN 70 milliGRAM(s)/deciliter  melatonin 3 milliGRAM(s) Oral at bedtime PRN Insomnia  ondansetron Injectable 4 milliGRAM(s) IV Push every 8 hours PRN Nausea and/or Vomiting  traMADol 25 milliGRAM(s) Oral every 6 hours PRN Moderate Pain (4 - 6)      Allergies    codeine (Nausea)  omeprazole (Nausea)  Vicodin (Nausea)    Intolerances          LABS:                          10.1   7.08  )-----------( 232      ( 30 Sep 2022 02:40 )             32.6     09-30    141  |  110<H>  |  20.0  ----------------------------<  142<H>  5.0   |  18.0<L>  |  1.72<H>    Ca    9.4      30 Sep 2022 02:40  Phos  3.5     09-30    TPro  7.1  /  Alb  3.8  /  TBili  0.2<L>  /  DBili  x   /  AST  16  /  ALT  8   /  AlkPhos  101  09-30    PT/INR - ( 30 Sep 2022 02:40 )   PT: 12.8 sec;   INR: 1.10 ratio               RADIOLOGY & ADDITIONAL TESTS:  
Complained of ongoing abdominal pain rated as 8/10 with use of analgesics.     Vital Signs Last 24 Hrs  T(C): 36.7 (30 Sep 2022 11:25), Max: 36.9 (29 Sep 2022 21:20)  T(F): 98 (30 Sep 2022 11:25), Max: 98.4 (29 Sep 2022 21:20)  HR: 77 (30 Sep 2022 11:25) (64 - 98)  BP: 154/80 (30 Sep 2022 11:25) (145/79 - 163/85)  BP(mean): --  RR: 18 (30 Sep 2022 11:25) (17 - 18)  SpO2: 97% (30 Sep 2022 11:25) (95% - 98%)    Parameters below as of 30 Sep 2022 11:25  Patient On (Oxygen Delivery Method): room air    I&O's Summary: Not recorded    09-30    141  |  110<H>  |  20.0  ----------------------------<  142<H>  5.0   |  18.0<L>  |  1.72<H>    Ca    9.4      30 Sep 2022 02:40  Phos  3.5     09-30    TPro  7.1  /  Alb  3.8  /  TBili  0.2<L>  /  DBili  x   /  AST  16  /  ALT  8   /  AlkPhos  101  09-30                          10.1   7.08  )-----------( 232      ( 30 Sep 2022 02:40 )             32.6     MEDICATIONS  (STANDING):  aspirin  chewable 81 milliGRAM(s) Oral daily  atorvastatin 10 milliGRAM(s) Oral daily  clopidogrel Tablet 75 milliGRAM(s) Oral daily  dextrose 5%. 1000 milliLiter(s) (50 mL/Hr) IV Continuous <Continuous>  dextrose 5%. 1000 milliLiter(s) (100 mL/Hr) IV Continuous <Continuous>  dextrose 50% Injectable 25 Gram(s) IV Push once  dextrose 50% Injectable 12.5 Gram(s) IV Push once  dextrose 50% Injectable 25 Gram(s) IV Push once  folic acid 1 milliGRAM(s) Oral daily  glucagon  Injectable 1 milliGRAM(s) IntraMuscular once  hydrochlorothiazide 12.5 milliGRAM(s) Oral daily  insulin glargine Injectable (LANTUS) 35 Unit(s) SubCutaneous at bedtime  insulin lispro (ADMELOG) corrective regimen sliding scale   SubCutaneous three times a day before meals  latanoprost 0.005% Ophthalmic Solution 1 Drop(s) Both EYES at bedtime  lidocaine   4% Patch 1 Patch Transdermal daily  metoprolol succinate  milliGRAM(s) Oral daily  sodium bicarbonate 650 milliGRAM(s) Oral two times a day    MEDICATIONS  (PRN):  acetaminophen     Tablet .. 650 milliGRAM(s) Oral every 6 hours PRN Temp greater or equal to 38C (100.4F), Mild Pain (1 - 3)  aluminum hydroxide/magnesium hydroxide/simethicone Suspension 30 milliLiter(s) Oral every 4 hours PRN Dyspepsia  dextrose Oral Gel 15 Gram(s) Oral once PRN Blood Glucose LESS THAN 70 milliGRAM(s)/deciliter  melatonin 3 milliGRAM(s) Oral at bedtime PRN Insomnia  ondansetron Injectable 4 milliGRAM(s) IV Push every 8 hours PRN Nausea and/or Vomiting  traMADol 25 milliGRAM(s) Oral every 6 hours PRN Moderate Pain (4 - 6)        
Interval Hx:  Patient seen during rounds  feels better today but still w abdominal pain  better with meds  no indication for epidural  denies leg pain or radiculopathy  Patient denies sedation with medications     Analgesic Dosing for past 24 hours reviewed as below:    traMADol   25 milliGRAM(s) Oral (09-30-22 @ 05:07)          T(C): 36.7 (09-30-22 @ 11:25), Max: 36.9 (09-29-22 @ 21:20)  HR: 77 (09-30-22 @ 11:25) (64 - 98)  BP: 154/80 (09-30-22 @ 11:25) (145/79 - 163/85)  RR: 18 (09-30-22 @ 11:25) (17 - 18)  SpO2: 97% (09-30-22 @ 11:25) (95% - 98%)        acetaminophen     Tablet .. 650 milliGRAM(s) Oral every 6 hours PRN  aluminum hydroxide/magnesium hydroxide/simethicone Suspension 30 milliLiter(s) Oral every 4 hours PRN  aspirin  chewable 81 milliGRAM(s) Oral daily  atorvastatin 10 milliGRAM(s) Oral daily  clopidogrel Tablet 75 milliGRAM(s) Oral daily  dextrose 5%. 1000 milliLiter(s) IV Continuous <Continuous>  dextrose 5%. 1000 milliLiter(s) IV Continuous <Continuous>  dextrose 50% Injectable 25 Gram(s) IV Push once  dextrose 50% Injectable 12.5 Gram(s) IV Push once  dextrose 50% Injectable 25 Gram(s) IV Push once  dextrose Oral Gel 15 Gram(s) Oral once PRN  folic acid 1 milliGRAM(s) Oral daily  glucagon  Injectable 1 milliGRAM(s) IntraMuscular once  hydrochlorothiazide 12.5 milliGRAM(s) Oral daily  insulin glargine Injectable (LANTUS) 35 Unit(s) SubCutaneous at bedtime  insulin lispro (ADMELOG) corrective regimen sliding scale   SubCutaneous three times a day before meals  latanoprost 0.005% Ophthalmic Solution 1 Drop(s) Both EYES at bedtime  lidocaine   4% Patch 1 Patch Transdermal daily  melatonin 3 milliGRAM(s) Oral at bedtime PRN  metoprolol succinate  milliGRAM(s) Oral daily  ondansetron Injectable 4 milliGRAM(s) IV Push every 8 hours PRN  sodium bicarbonate 650 milliGRAM(s) Oral two times a day  traMADol 25 milliGRAM(s) Oral every 6 hours PRN                          10.1   7.08  )-----------( 232      ( 30 Sep 2022 02:40 )             32.6     09-30    141  |  110<H>  |  20.0  ----------------------------<  142<H>  5.0   |  18.0<L>  |  1.72<H>    Ca    9.4      30 Sep 2022 02:40  Phos  3.5     09-30    TPro  7.1  /  Alb  3.8  /  TBili  0.2<L>  /  DBili  x   /  AST  16  /  ALT  8   /  AlkPhos  101  09-30    PT/INR - ( 30 Sep 2022 02:40 )   PT: 12.8 sec;   INR: 1.10 ratio               Pain Service   [phone]

## 2022-09-30 NOTE — PROGRESS NOTE ADULT - ASSESSMENT
72F  unclear cause of back pain radiating to abdomen    thoracic mri wnl  lumbar mri with DH but patient denies leg pain    back pain may be secondary for lumbar facet arthropathy - may find relief with RFA in office  unclear cause of abdominal pain    May follow up with us as outpatient for NON-OPIOID Pain Management at:  [facility]  [address] [place] [address]  [phone]  or  [address] [facility]; [address]; [place] [address]  [phone]  or   [address] [place] [address]  [phone]  72F  unclear cause of back pain radiating to abdomen    thoracic mri wnl  lumbar mri with multi-level DH but patient denies leg pain    back pain may be secondary for lumbar facet arthropathy - may find relief with RFA in office  unclear cause of abdominal pain    May follow up with us as outpatient for Pain Management at:  [facility]  [address] [place] [address]  [phone]  or  [address] [facility]; [address]; [place] [address]  [phone]  or   [address] [facility]. , [place] [address]  [phone]

## 2022-09-30 NOTE — PHYSICAL THERAPY INITIAL EVALUATION ADULT - PERTINENT HX OF CURRENT PROBLEM, REHAB EVAL
72 yr old female with CAD s/p stents, hypertension, hyperlipidemia, diabetes mellitus, CKD stage 3, morbid obesity, glaucoma presented with worsening lower back and abdominal pain for 3 weeks. Labs and imaging noted. Hyperkalemia on BMP.

## 2022-09-30 NOTE — DISCHARGE NOTE PROVIDER - HOSPITAL COURSE
72 yr old female with CAD s/p stents, hypertension, hyperlipidemia, diabetes mellitus, CKD stage 3, morbid obesity, glaucoma presented with worsening lower back and abdominal pain for 3 weeks. Labs and imaging noted. Hyperkalemia on BMP, improved with Kayexalate. MRI spine was done, showed degenerative changes. No fracture. Her pain improved. Sodium bicarbonate was given for metabolic acidosis. She was advised to avoid NSAIDs. She is stable for discharge home.

## 2022-09-30 NOTE — PROGRESS NOTE ADULT - ASSESSMENT
72 yr old female with CAD s/p stents, hypertension, hyperlipidemia, diabetes mellitus, CKD stage 3, morbid obesity, glaucoma presented with worsening lower back and abdominal pain for 3 weeks. Labs and imaging noted. Hyperkalemia on BMP, improved with Kayexalate. MRI spine was done, showed degenerative changes. No fracture. Her pain improved.     1. Intractable back pain:  MRI spine noted  pain control  outpatient PT recommended   fall precautions  avoid NSAIDS.    2. CKD 3 with metabolic acidosis:  Nephro eval appreciated  continue sodium bicarbonate  no obst on imaging  monitor BMP  avoid ACEI/nsaids    3. CAD s/p stent:  Recent outpatient cardio follow up noted  neg stress a year ago  continue DAPT, statin, Metoprolol.    4. Hypertension:  Continue Metoprolol  hold ACEI    5. Diabetes mellitus:  Continue Lantus 35 u  sliding scale coverage  monitor FS  hold Metformin.    6. Hyperlipidemia:  Continue statin.    7. DVT ppx:  SCDs    Discussed with patient plan of care. 72 yr old female with CAD s/p stents, hypertension, hyperlipidemia, diabetes mellitus, CKD stage 3, morbid obesity, glaucoma presented with worsening lower back and abdominal pain for 3 weeks. Labs and imaging noted. Hyperkalemia on BMP, improved with Kayexalate. MRI spine was done, showed degenerative changes. No fracture. Her pain improved.     1. Intractable back pain:  MRI spine noted  pain control  outpatient PT recommended   fall precautions  avoid NSAIDS.    2. CKD 3 with metabolic acidosis:  Nephro eval appreciated  continue sodium bicarbonate  no obst on imaging  monitor BMP  avoid nsaids    3. CAD s/p stent:  Recent outpatient cardio follow up noted  neg stress a year ago  continue DAPT, statin, Metoprolol.    4. Hypertension:  Continue Metoprolol      5. Diabetes mellitus:  Continue Lantus 35 u  sliding scale coverage  monitor FS  hold Metformin.    6. Hyperlipidemia:  Continue statin.    7. DVT ppx:  SCDs    Discussed with patient plan of care.  Wishes DC in am as no one able to pick her up today.

## 2022-09-30 NOTE — PROGRESS NOTE ADULT - ASSESSMENT
The patient is a 72 year old female with PMH of morbid obesity, DM, HTN, BONNY, fatty liver, CKD 3b/4 (baseline creatinine 0.7-1.0mg/dL), GERD and hiatal hernia present with lower abdominal pain with radiation to lower back. CT a/p without IV contrast showed L adnexal cyst. CT lumbar spine showed grade 1 subluxation of L4-L5, multilevel degenerative changes and no acute fracture. Admitted for further work up. Nephrology was consulted for elevated creatinine associated with hyperK and metabolic acidosis. Denied hematuria, hx of nephrolithiasis and NSAID use. Admits to frothy urine. Family hx of ESRD in brother (etiology of ESRD is unclear).     -Notable for CKD since at least 2019   -Suspecting underlying diabetic kidney disease + hypertensive nephrosclerosis   -Baseline creatinine appears to be ~1.7-2.0mg/dL  -Renal function remains at baseline at this time  -UA bland   -Awaiting collection of UPCR and UACR (will reorder)   -CT a/p without IV contrast is negative for obstruction  -HyperK have resolved   -Continue HCTZ 12.5mg daily (for BP management and kaliuresis) + metoprolol   -BP remains suboptimal -will reintroduce ACEi/ARB at low dose - benazapril (home medication) is non formulary here, so will start lisinopril 2.5mg daily     -Metabolic acidosis in setting of CKD - continue oral sodium bicarb 650mg BID   -Mineral Bone Disease in setting of CKD - calcium is okay; phos, PTH and 25 Vitamin D are pending     Marci Mueller DO  Nephrology

## 2022-09-30 NOTE — DISCHARGE NOTE PROVIDER - CARE PROVIDERS DIRECT ADDRESSES
,DirectAddress_Unknown,[email or web address],DirectAddress_Unknown

## 2022-09-30 NOTE — PHYSICAL THERAPY INITIAL EVALUATION ADULT - GAIT DEVIATIONS NOTED, PT EVAL
Tele monitor 32 obtained for patient. ED tech to transport patient to inpatient room with all belongings.  updated with room number per patient request.    decreased gabi/decreased step length/decreased stride length

## 2022-09-30 NOTE — DISCHARGE NOTE PROVIDER - CARE PROVIDER_API CALL
Maya Hewitt)  Internal Medicine; Nephrology  [address] [place], [place] [address]  Phone: [phone]  Fax: [phone]  Follow Up Time:     Glynn Roa)  Internal Medicine  [address] [place] [address]  Phone: [phone]  Fax: [phone]  Follow Up Time:     Héctor Weston)  Anesthesiology; Pain Medicine  [address] [facility], [place] [address]  Phone: [phone]  Fax: [phone]  Follow Up Time:

## 2022-10-01 ENCOUNTER — TRANSCRIPTION ENCOUNTER (OUTPATIENT)
Age: 72
End: 2022-10-01

## 2022-10-01 VITALS
DIASTOLIC BLOOD PRESSURE: 76 MMHG | SYSTOLIC BLOOD PRESSURE: 118 MMHG | RESPIRATION RATE: 18 BRPM | HEART RATE: 84 BPM | OXYGEN SATURATION: 95 % | TEMPERATURE: 98 F

## 2022-10-01 LAB
GLUCOSE BLDC GLUCOMTR-MCNC: 102 MG/DL — HIGH (ref 70–99)
GLUCOSE BLDC GLUCOMTR-MCNC: 139 MG/DL — HIGH (ref 70–99)

## 2022-10-01 PROCEDURE — 99239 HOSP IP/OBS DSCHRG MGMT >30: CPT

## 2022-10-01 RX ORDER — TRAMADOL HYDROCHLORIDE 50 MG/1
0.5 TABLET ORAL
Qty: 6 | Refills: 0
Start: 2022-10-01 | End: 2022-10-03

## 2022-10-01 RX ORDER — SENNA PLUS 8.6 MG/1
2 TABLET ORAL
Qty: 60 | Refills: 0
Start: 2022-10-01 | End: 2022-10-30

## 2022-10-01 RX ORDER — LIDOCAINE 4 G/100G
1 CREAM TOPICAL
Qty: 14 | Refills: 0
Start: 2022-10-01 | End: 2022-10-14

## 2022-10-01 RX ORDER — SODIUM BICARBONATE 1 MEQ/ML
1 SYRINGE (ML) INTRAVENOUS
Qty: 60 | Refills: 0
Start: 2022-10-01 | End: 2022-10-30

## 2022-10-01 RX ORDER — METFORMIN HYDROCHLORIDE 850 MG/1
2 TABLET ORAL
Qty: 0 | Refills: 0 | DISCHARGE

## 2022-10-01 RX ORDER — METHOCARBAMOL 500 MG/1
2 TABLET, FILM COATED ORAL
Qty: 18 | Refills: 0
Start: 2022-10-01 | End: 2022-10-03

## 2022-10-01 RX ORDER — SENNA PLUS 8.6 MG/1
2 TABLET ORAL DAILY
Refills: 0 | Status: DISCONTINUED | OUTPATIENT
Start: 2022-10-01 | End: 2022-10-01

## 2022-10-01 RX ADMIN — ATORVASTATIN CALCIUM 10 MILLIGRAM(S): 80 TABLET, FILM COATED ORAL at 11:40

## 2022-10-01 RX ADMIN — CLOPIDOGREL BISULFATE 75 MILLIGRAM(S): 75 TABLET, FILM COATED ORAL at 11:41

## 2022-10-01 RX ADMIN — Medication 12.5 MILLIGRAM(S): at 06:17

## 2022-10-01 RX ADMIN — LISINOPRIL 2.5 MILLIGRAM(S): 2.5 TABLET ORAL at 06:16

## 2022-10-01 RX ADMIN — Medication 1 MILLIGRAM(S): at 11:41

## 2022-10-01 RX ADMIN — SENNA PLUS 2 TABLET(S): 8.6 TABLET ORAL at 11:41

## 2022-10-01 RX ADMIN — Medication 650 MILLIGRAM(S): at 06:17

## 2022-10-01 RX ADMIN — Medication 81 MILLIGRAM(S): at 11:41

## 2022-10-01 RX ADMIN — LIDOCAINE 1 PATCH: 4 CREAM TOPICAL at 11:41

## 2022-10-01 NOTE — DISCHARGE NOTE NURSING/CASE MANAGEMENT/SOCIAL WORK - PATIENT PORTAL LINK FT
You can access the FollowMyHealth Patient Portal offered by [facility] by registering at the following website: [email or web address]. By joining [employer]’s FollowMyHealth portal, you will also be able to view your health information using other applications (apps) compatible with our system.

## 2022-10-20 PROCEDURE — 83970 ASSAY OF PARATHORMONE: CPT

## 2022-10-20 PROCEDURE — 82306 VITAMIN D 25 HYDROXY: CPT

## 2022-10-20 PROCEDURE — 80053 COMPREHEN METABOLIC PANEL: CPT

## 2022-10-20 PROCEDURE — 97163 PT EVAL HIGH COMPLEX 45 MIN: CPT

## 2022-10-20 PROCEDURE — 80061 LIPID PANEL: CPT

## 2022-10-20 PROCEDURE — 36415 COLL VENOUS BLD VENIPUNCTURE: CPT

## 2022-10-20 PROCEDURE — 93005 ELECTROCARDIOGRAM TRACING: CPT

## 2022-10-20 PROCEDURE — 76830 TRANSVAGINAL US NON-OB: CPT

## 2022-10-20 PROCEDURE — 72148 MRI LUMBAR SPINE W/O DYE: CPT | Mod: MA

## 2022-10-20 PROCEDURE — 76856 US EXAM PELVIC COMPLETE: CPT

## 2022-10-20 PROCEDURE — 85025 COMPLETE CBC W/AUTO DIFF WBC: CPT

## 2022-10-20 PROCEDURE — 72146 MRI CHEST SPINE W/O DYE: CPT | Mod: MA

## 2022-10-20 PROCEDURE — 96372 THER/PROPH/DIAG INJ SC/IM: CPT

## 2022-10-20 PROCEDURE — U0005: CPT

## 2022-10-20 PROCEDURE — 82310 ASSAY OF CALCIUM: CPT

## 2022-10-20 PROCEDURE — 99285 EMERGENCY DEPT VISIT HI MDM: CPT

## 2022-10-20 PROCEDURE — 82962 GLUCOSE BLOOD TEST: CPT

## 2022-10-20 PROCEDURE — 72131 CT LUMBAR SPINE W/O DYE: CPT | Mod: MA

## 2022-10-20 PROCEDURE — U0003: CPT

## 2022-10-20 PROCEDURE — 84100 ASSAY OF PHOSPHORUS: CPT

## 2022-10-20 PROCEDURE — 85610 PROTHROMBIN TIME: CPT

## 2022-10-30 ENCOUNTER — INPATIENT (INPATIENT)
Facility: HOSPITAL | Age: 72
LOS: 3 days | Discharge: HOME | DRG: 69 | End: 2022-11-03
Attending: GENERAL ACUTE CARE HOSPITAL | Admitting: STUDENT IN AN ORGANIZED HEALTH CARE EDUCATION/TRAINING PROGRAM
Payer: MEDICARE

## 2022-10-30 VITALS
HEIGHT: 62 IN | OXYGEN SATURATION: 99 % | HEART RATE: 120 BPM | RESPIRATION RATE: 20 BRPM | SYSTOLIC BLOOD PRESSURE: 187 MMHG | TEMPERATURE: 98 F | DIASTOLIC BLOOD PRESSURE: 82 MMHG

## 2022-10-30 DIAGNOSIS — R47.01 APHASIA: ICD-10-CM

## 2022-10-30 DIAGNOSIS — H26.40 UNSPECIFIED SECONDARY CATARACT: Chronic | ICD-10-CM

## 2022-10-30 LAB
ALBUMIN SERPL ELPH-MCNC: 3.8 G/DL — SIGNIFICANT CHANGE UP (ref 3.3–5.2)
ALP SERPL-CCNC: 102 U/L — SIGNIFICANT CHANGE UP (ref 40–120)
ALT FLD-CCNC: 11 U/L — SIGNIFICANT CHANGE UP
ANION GAP SERPL CALC-SCNC: 14 MMOL/L — SIGNIFICANT CHANGE UP (ref 5–17)
APPEARANCE UR: CLEAR — SIGNIFICANT CHANGE UP
APTT BLD: 27.1 SEC — LOW (ref 27.5–35.5)
AST SERPL-CCNC: 22 U/L — SIGNIFICANT CHANGE UP
BACTERIA # UR AUTO: ABNORMAL
BASOPHILS # BLD AUTO: 0.02 K/UL — SIGNIFICANT CHANGE UP (ref 0–0.2)
BASOPHILS NFR BLD AUTO: 0.3 % — SIGNIFICANT CHANGE UP (ref 0–2)
BILIRUB SERPL-MCNC: <0.2 MG/DL — LOW (ref 0.4–2)
BILIRUB UR-MCNC: NEGATIVE — SIGNIFICANT CHANGE UP
BUN SERPL-MCNC: 15.1 MG/DL — SIGNIFICANT CHANGE UP (ref 8–20)
CALCIUM SERPL-MCNC: 8.8 MG/DL — SIGNIFICANT CHANGE UP (ref 8.4–10.5)
CHLORIDE SERPL-SCNC: 110 MMOL/L — HIGH (ref 96–108)
CO2 SERPL-SCNC: 17 MMOL/L — LOW (ref 22–29)
COLOR SPEC: YELLOW — SIGNIFICANT CHANGE UP
CREAT SERPL-MCNC: 1.68 MG/DL — HIGH (ref 0.5–1.3)
DIFF PNL FLD: NEGATIVE — SIGNIFICANT CHANGE UP
EGFR: 33 ML/MIN/1.73M2 — LOW
EOSINOPHIL # BLD AUTO: 0.31 K/UL — SIGNIFICANT CHANGE UP (ref 0–0.5)
EOSINOPHIL NFR BLD AUTO: 5 % — SIGNIFICANT CHANGE UP (ref 0–6)
EPI CELLS # UR: SIGNIFICANT CHANGE UP
GLUCOSE BLDC GLUCOMTR-MCNC: 93 MG/DL — SIGNIFICANT CHANGE UP (ref 70–99)
GLUCOSE SERPL-MCNC: 117 MG/DL — HIGH (ref 70–99)
GLUCOSE UR QL: NEGATIVE MG/DL — SIGNIFICANT CHANGE UP
HCT VFR BLD CALC: 31.8 % — LOW (ref 34.5–45)
HGB BLD-MCNC: 9.9 G/DL — LOW (ref 11.5–15.5)
IMM GRANULOCYTES NFR BLD AUTO: 0.3 % — SIGNIFICANT CHANGE UP (ref 0–0.9)
INR BLD: 0.97 RATIO — SIGNIFICANT CHANGE UP (ref 0.88–1.16)
KETONES UR-MCNC: NEGATIVE — SIGNIFICANT CHANGE UP
LEUKOCYTE ESTERASE UR-ACNC: NEGATIVE — SIGNIFICANT CHANGE UP
LYMPHOCYTES # BLD AUTO: 1.91 K/UL — SIGNIFICANT CHANGE UP (ref 1–3.3)
LYMPHOCYTES # BLD AUTO: 30.8 % — SIGNIFICANT CHANGE UP (ref 13–44)
MCHC RBC-ENTMCNC: 27 PG — SIGNIFICANT CHANGE UP (ref 27–34)
MCHC RBC-ENTMCNC: 31.1 GM/DL — LOW (ref 32–36)
MCV RBC AUTO: 86.9 FL — SIGNIFICANT CHANGE UP (ref 80–100)
MONOCYTES # BLD AUTO: 0.38 K/UL — SIGNIFICANT CHANGE UP (ref 0–0.9)
MONOCYTES NFR BLD AUTO: 6.1 % — SIGNIFICANT CHANGE UP (ref 2–14)
NEUTROPHILS # BLD AUTO: 3.56 K/UL — SIGNIFICANT CHANGE UP (ref 1.8–7.4)
NEUTROPHILS NFR BLD AUTO: 57.5 % — SIGNIFICANT CHANGE UP (ref 43–77)
NITRITE UR-MCNC: NEGATIVE — SIGNIFICANT CHANGE UP
PH UR: 6.5 — SIGNIFICANT CHANGE UP (ref 5–8)
PLATELET # BLD AUTO: 231 K/UL — SIGNIFICANT CHANGE UP (ref 150–400)
POTASSIUM SERPL-MCNC: 5.5 MMOL/L — HIGH (ref 3.5–5.3)
POTASSIUM SERPL-SCNC: 5.5 MMOL/L — HIGH (ref 3.5–5.3)
PROT SERPL-MCNC: 6.8 G/DL — SIGNIFICANT CHANGE UP (ref 6.6–8.7)
PROT UR-MCNC: 15
PROTHROM AB SERPL-ACNC: 11.2 SEC — SIGNIFICANT CHANGE UP (ref 10.5–13.4)
RBC # BLD: 3.66 M/UL — LOW (ref 3.8–5.2)
RBC # FLD: 14.8 % — HIGH (ref 10.3–14.5)
RBC CASTS # UR COMP ASSIST: SIGNIFICANT CHANGE UP /HPF (ref 0–4)
SARS-COV-2 RNA SPEC QL NAA+PROBE: SIGNIFICANT CHANGE UP
SODIUM SERPL-SCNC: 141 MMOL/L — SIGNIFICANT CHANGE UP (ref 135–145)
SP GR SPEC: 1 — LOW (ref 1.01–1.02)
TROPONIN T SERPL-MCNC: <0.01 NG/ML — SIGNIFICANT CHANGE UP (ref 0–0.06)
UROBILINOGEN FLD QL: NEGATIVE MG/DL — SIGNIFICANT CHANGE UP
WBC # BLD: 6.2 K/UL — SIGNIFICANT CHANGE UP (ref 3.8–10.5)
WBC # FLD AUTO: 6.2 K/UL — SIGNIFICANT CHANGE UP (ref 3.8–10.5)
WBC UR QL: SIGNIFICANT CHANGE UP /HPF (ref 0–5)

## 2022-10-30 PROCEDURE — 70498 CT ANGIOGRAPHY NECK: CPT | Mod: 26,MA

## 2022-10-30 PROCEDURE — 99291 CRITICAL CARE FIRST HOUR: CPT

## 2022-10-30 PROCEDURE — 0042T: CPT | Mod: MA

## 2022-10-30 PROCEDURE — 93010 ELECTROCARDIOGRAM REPORT: CPT

## 2022-10-30 PROCEDURE — 99222 1ST HOSP IP/OBS MODERATE 55: CPT

## 2022-10-30 PROCEDURE — 70496 CT ANGIOGRAPHY HEAD: CPT | Mod: 26,MA

## 2022-10-30 PROCEDURE — 71045 X-RAY EXAM CHEST 1 VIEW: CPT | Mod: 26

## 2022-10-30 RX ORDER — SODIUM CHLORIDE 9 MG/ML
1000 INJECTION, SOLUTION INTRAVENOUS
Refills: 0 | Status: DISCONTINUED | OUTPATIENT
Start: 2022-10-30 | End: 2022-11-03

## 2022-10-30 RX ORDER — HEPARIN SODIUM 5000 [USP'U]/ML
5000 INJECTION INTRAVENOUS; SUBCUTANEOUS EVERY 8 HOURS
Refills: 0 | Status: DISCONTINUED | OUTPATIENT
Start: 2022-10-30 | End: 2022-11-03

## 2022-10-30 RX ORDER — ACETAMINOPHEN 500 MG
650 TABLET ORAL EVERY 6 HOURS
Refills: 0 | Status: DISCONTINUED | OUTPATIENT
Start: 2022-10-30 | End: 2022-11-03

## 2022-10-30 RX ORDER — LATANOPROST 0.05 MG/ML
1 SOLUTION/ DROPS OPHTHALMIC; TOPICAL AT BEDTIME
Refills: 0 | Status: DISCONTINUED | OUTPATIENT
Start: 2022-10-30 | End: 2022-11-03

## 2022-10-30 RX ORDER — SENNA PLUS 8.6 MG/1
2 TABLET ORAL AT BEDTIME
Refills: 0 | Status: DISCONTINUED | OUTPATIENT
Start: 2022-10-30 | End: 2022-11-03

## 2022-10-30 RX ORDER — ASPIRIN/CALCIUM CARB/MAGNESIUM 324 MG
324 TABLET ORAL ONCE
Refills: 0 | Status: COMPLETED | OUTPATIENT
Start: 2022-10-30 | End: 2022-10-30

## 2022-10-30 RX ORDER — PANTOPRAZOLE SODIUM 20 MG/1
40 TABLET, DELAYED RELEASE ORAL
Refills: 0 | Status: DISCONTINUED | OUTPATIENT
Start: 2022-10-30 | End: 2022-11-03

## 2022-10-30 RX ORDER — DEXTROSE 50 % IN WATER 50 %
12.5 SYRINGE (ML) INTRAVENOUS ONCE
Refills: 0 | Status: DISCONTINUED | OUTPATIENT
Start: 2022-10-30 | End: 2022-11-03

## 2022-10-30 RX ORDER — CYCLOBENZAPRINE HYDROCHLORIDE 10 MG/1
10 TABLET, FILM COATED ORAL AT BEDTIME
Refills: 0 | Status: DISCONTINUED | OUTPATIENT
Start: 2022-10-30 | End: 2022-11-03

## 2022-10-30 RX ORDER — LANOLIN ALCOHOL/MO/W.PET/CERES
3 CREAM (GRAM) TOPICAL AT BEDTIME
Refills: 0 | Status: DISCONTINUED | OUTPATIENT
Start: 2022-10-30 | End: 2022-11-03

## 2022-10-30 RX ORDER — INSULIN LISPRO 100/ML
VIAL (ML) SUBCUTANEOUS
Refills: 0 | Status: DISCONTINUED | OUTPATIENT
Start: 2022-10-30 | End: 2022-11-03

## 2022-10-30 RX ORDER — GLUCAGON INJECTION, SOLUTION 0.5 MG/.1ML
1 INJECTION, SOLUTION SUBCUTANEOUS ONCE
Refills: 0 | Status: DISCONTINUED | OUTPATIENT
Start: 2022-10-30 | End: 2022-11-03

## 2022-10-30 RX ORDER — FOLIC ACID 0.8 MG
1 TABLET ORAL DAILY
Refills: 0 | Status: DISCONTINUED | OUTPATIENT
Start: 2022-10-30 | End: 2022-11-03

## 2022-10-30 RX ORDER — ONDANSETRON 8 MG/1
4 TABLET, FILM COATED ORAL EVERY 8 HOURS
Refills: 0 | Status: DISCONTINUED | OUTPATIENT
Start: 2022-10-30 | End: 2022-11-03

## 2022-10-30 RX ORDER — INSULIN GLARGINE 100 [IU]/ML
15 INJECTION, SOLUTION SUBCUTANEOUS AT BEDTIME
Refills: 0 | Status: DISCONTINUED | OUTPATIENT
Start: 2022-10-30 | End: 2022-11-03

## 2022-10-30 RX ORDER — DORZOLAMIDE HYDROCHLORIDE 20 MG/ML
1 SOLUTION/ DROPS OPHTHALMIC THREE TIMES A DAY
Refills: 0 | Status: DISCONTINUED | OUTPATIENT
Start: 2022-10-30 | End: 2022-11-03

## 2022-10-30 RX ORDER — DEXTROSE 50 % IN WATER 50 %
25 SYRINGE (ML) INTRAVENOUS ONCE
Refills: 0 | Status: DISCONTINUED | OUTPATIENT
Start: 2022-10-30 | End: 2022-11-03

## 2022-10-30 RX ORDER — ASPIRIN/CALCIUM CARB/MAGNESIUM 324 MG
81 TABLET ORAL DAILY
Refills: 0 | Status: DISCONTINUED | OUTPATIENT
Start: 2022-10-30 | End: 2022-11-03

## 2022-10-30 RX ORDER — INSULIN LISPRO 100/ML
VIAL (ML) SUBCUTANEOUS AT BEDTIME
Refills: 0 | Status: DISCONTINUED | OUTPATIENT
Start: 2022-10-30 | End: 2022-11-03

## 2022-10-30 RX ORDER — ACETAMINOPHEN 500 MG
1000 TABLET ORAL ONCE
Refills: 0 | Status: COMPLETED | OUTPATIENT
Start: 2022-10-30 | End: 2022-10-30

## 2022-10-30 RX ORDER — ATORVASTATIN CALCIUM 80 MG/1
10 TABLET, FILM COATED ORAL AT BEDTIME
Refills: 0 | Status: DISCONTINUED | OUTPATIENT
Start: 2022-10-30 | End: 2022-11-03

## 2022-10-30 RX ORDER — METHOCARBAMOL 500 MG/1
1000 TABLET, FILM COATED ORAL ONCE
Refills: 0 | Status: COMPLETED | OUTPATIENT
Start: 2022-10-30 | End: 2022-10-30

## 2022-10-30 RX ORDER — DEXTROSE 50 % IN WATER 50 %
15 SYRINGE (ML) INTRAVENOUS ONCE
Refills: 0 | Status: DISCONTINUED | OUTPATIENT
Start: 2022-10-30 | End: 2022-11-03

## 2022-10-30 RX ORDER — SODIUM CHLORIDE 9 MG/ML
1000 INJECTION INTRAMUSCULAR; INTRAVENOUS; SUBCUTANEOUS ONCE
Refills: 0 | Status: COMPLETED | OUTPATIENT
Start: 2022-10-30 | End: 2022-10-30

## 2022-10-30 RX ORDER — CLOPIDOGREL BISULFATE 75 MG/1
75 TABLET, FILM COATED ORAL DAILY
Refills: 0 | Status: DISCONTINUED | OUTPATIENT
Start: 2022-10-30 | End: 2022-11-03

## 2022-10-30 RX ADMIN — METHOCARBAMOL 1000 MILLIGRAM(S): 500 TABLET, FILM COATED ORAL at 20:10

## 2022-10-30 RX ADMIN — SODIUM CHLORIDE 500 MILLILITER(S): 9 INJECTION INTRAMUSCULAR; INTRAVENOUS; SUBCUTANEOUS at 20:16

## 2022-10-30 RX ADMIN — DORZOLAMIDE HYDROCHLORIDE 1 DROP(S): 20 SOLUTION/ DROPS OPHTHALMIC at 23:46

## 2022-10-30 RX ADMIN — CYCLOBENZAPRINE HYDROCHLORIDE 10 MILLIGRAM(S): 10 TABLET, FILM COATED ORAL at 23:46

## 2022-10-30 RX ADMIN — INSULIN GLARGINE 15 UNIT(S): 100 INJECTION, SOLUTION SUBCUTANEOUS at 23:53

## 2022-10-30 RX ADMIN — ATORVASTATIN CALCIUM 10 MILLIGRAM(S): 80 TABLET, FILM COATED ORAL at 23:46

## 2022-10-30 RX ADMIN — Medication 324 MILLIGRAM(S): at 20:10

## 2022-10-30 RX ADMIN — LATANOPROST 1 DROP(S): 0.05 SOLUTION/ DROPS OPHTHALMIC; TOPICAL at 23:47

## 2022-10-30 RX ADMIN — Medication 400 MILLIGRAM(S): at 21:54

## 2022-10-30 NOTE — ED ADULT NURSE NOTE - OBJECTIVE STATEMENT
Patient arrived to ED today with c/o difficulty speaking.  Patient at around 8am started to have trouble speaking and getting her words out.  Patient was known normal last night, no facial droop, c/o pain radiating down her right arm from her neck.

## 2022-10-30 NOTE — ED PROVIDER NOTE - ATTENDING CONTRIBUTION TO CARE
I, Kerri Ferrera DO, have personally provided 35 minutes of critical care time exclusive of time spent on separately billable procedures. Time includes review of laboratory data, radiology results, discussion with consultants, and monitoring for potential decompensation. Interventions were performed as documented above.     I personally saw the patient with the resident, and completed the key components of the history and physical exam. I then discussed the management plan with the resident.    73 y/o F with CKD, IDDM, CAD, HTN presents for word-finding difficulty and aphasia since 8 am this morning while on the phone with her sister. Denies numbness, weakness, focal deficits.    PE - NAD, NIH 1 for mild aphasia while speaking, no other focal deficits, no ataxia, no respiratory distress.    Not tPA candidate due to LKW and low NIH - no penumbra on perfusion scan, no LVO - will admit for MRI and neurology to see tomorrow.

## 2022-10-30 NOTE — ED ADULT NURSE REASSESSMENT NOTE - NEURO ASSESSMENT
- - -
43 yo m past medical history nephrolithiasis, Dr constance zepeda, with left sided flank pain radiating down to testes similar to prior renal colic pain. + n/v, no fever chills, cp, sob. tested + to covid last week, no symptoms, including renal colic pain until today. prior ct reviewed. exam as above. concern for recurrent renal colic pain. plan: labs. ivf, symptom relief, reassess.

## 2022-10-30 NOTE — ED ADULT TRIAGE NOTE - CHIEF COMPLAINT QUOTE
patient with trouble finding words last known to be normal last night. no facial droop, ambulating without difficulty.  pain in neck radiating down right arm.  also c/o back pain due for surgery. code stroke activated

## 2022-10-30 NOTE — ED PROVIDER NOTE - PHYSICAL EXAMINATION
Gen: no acute distress  Head: normocephalic, atraumatic  EENT: EOMI, PERRLA  Lung: no increased work of breathing, CTABL  CV: normal s1/s2a  Abd: soft, non-tender, non-distended, no rebound tenderness or guarding  MSK: no visible deformities, full range of motion in all 4 extremities  Neuro: A&Ox4; No focal neurologic deficits

## 2022-10-30 NOTE — H&P ADULT - NSHPPHYSICALEXAM_GEN_ALL_CORE
Vital Signs Last 24 Hrs  T(C): 36.8 (30 Oct 2022 19:47), Max: 36.9 (30 Oct 2022 17:48)  T(F): 98.3 (30 Oct 2022 19:47), Max: 98.4 (30 Oct 2022 17:48)  HR: 101 (30 Oct 2022 19:47) (101 - 120)  BP: 171/95 (30 Oct 2022 19:47) (171/95 - 187/82)  BP(mean): --  RR: 20 (30 Oct 2022 19:47) (20 - 20)  SpO2: 100% (30 Oct 2022 19:47) (99% - 100%)    Parameters below as of 30 Oct 2022 19:47  Patient On (Oxygen Delivery Method): room air

## 2022-10-30 NOTE — ED ADULT NURSE NOTE - NSICDXFAMILYHX_GEN_ALL_CORE_FT
Plan of Care FAMILY HISTORY:  Sibling  Still living? Yes, Estimated age: 61-70  Family history of diabetes mellitus (DM), Age at diagnosis: Age Unknown  Family history of hypertension, Age at diagnosis: Age Unknown

## 2022-10-30 NOTE — ED ADULT NURSE REASSESSMENT NOTE - NS ED NURSE REASSESS COMMENT FT1
received report from day RN, assumed care of pt at change of shift.  pt is aox4, has intermittent aphasia.  NIH - 1 at change of shift and pupils equal and reactive.   pt denies dizziness, lightheadedness, numbness, weakness, tingling, sob, cp.
pt care assumed at 1830, no apparent distress noted at this time, charting as noted. Pt received A&Ox3 resting in bed comfortably at this time. SO at bedside. pt states her back is hurting from chronic back pain. pt c/o difficulty finding her words.

## 2022-10-30 NOTE — H&P ADULT - ASSESSMENT
71 y/o female with PMH of HTN, DM-2, CAD s/p stents, CKD-3, chronic back pain came to the ED complaining of difficulty speaking. Patient reported she was shaking when she woke up this morning, checked her FS, glucose was fine. Later she noted HA and difficulty articulating words. Her words are not slurred but hard time getting them out; also noted stuttering. CT head/CT angio head/neck: no acute finding. Patient was a code stroke in the ED.     Expressive aphasia   Admit to telemetry   CT head no acute intracranial finding   Aspirin 324mg given in the ED, will continue home dose of Aspirin 81mg   Atorvastatin 10mg   HbA1C and Lipid panel with AM lab  MRI   Echo ordered   Neuro checks every 4 hours   Neurology consulted for AM   Speech eval in AM     HTN/CAD  Hold BP medications for permissive HTN   Metoprolol ER 100mg   Benazepril 20mg   Plavix 75mg   Asprin and Statin   Monitor BP     CKD-3 with hyperkalemia   K: 5.5 (patient on Benazepril which can cause hyperkalemia)   Cr Stable   IVF given in the ED   Will repeat BMP     DM-2   Hold Metformin 1000mg bid   Tresiba 35 units HS, will give 15 units HS for now because FS in low 100s   Insulin sliding scale     Supportive   DVT prophylaxis: Heparin sc   Diet: NPO pending dysphagia screening     Plan of care discussed with patient      73 y/o female with PMH of HTN, DM-2, CAD s/p stents, CKD-3, chronic back pain came to the ED complaining of difficulty speaking. Patient reported she was shaking when she woke up this morning, checked her FS, glucose was fine. Later she noted HA and difficulty articulating words. Her words are not slurred but hard time getting them out; also noted stuttering. CT head/CT angio head/neck: no acute finding. Patient was a code stroke in the ED.     Expressive aphasia   Admit to telemetry   CT head no acute intracranial finding   Aspirin 324mg given in the ED, will continue home dose of Aspirin 81mg   Atorvastatin 10mg   HbA1C and Lipid panel with AM lab  MRI   Echo ordered   Neuro checks every 4 hours   Neurology consulted for AM   Speech eval in AM     HTN/CAD  Hold BP medications for permissive HTN   Metoprolol ER 100mg   Benazepril 20mg   Plavix 75mg   Asprin and Statin   Monitor BP     CKD-3 with hyperkalemia   K: 5.5 (patient on Benazepril which can cause hyperkalemia)   Cr Stable   IVF given in the ED   Will repeat BMP STAT    DM-2   Hold Metformin 1000mg bid   Tresiba 35 units HS, will give 15 units HS for now because FS in low 100s   Insulin sliding scale     Supportive   DVT prophylaxis: Heparin sc   Diet: DASH passed dysphagia screening     Plan of care discussed with patient

## 2022-10-30 NOTE — ED PROVIDER NOTE - CLINICAL SUMMARY MEDICAL DECISION MAKING FREE TEXT BOX
72yF with pmh CKD, IDDM, CAD s/p 3 stents, HTN, chronic spine issues presenting with difficulty speaking. FS ok. NIHSS 1 for word finding issues. Patient is out of window for thrombolytics. Low suspicion for LVO. If CT normal will likely still admit given persistent symptoms.

## 2022-10-30 NOTE — H&P ADULT - HISTORY OF PRESENT ILLNESS
73 y/o female with PMH of HTN, DM-2, CAD s/p stents, CKD-3, chronic back pain came to the ED complaining of difficulty speaking. Patient reported she was shaking when she woke up this morning, checked her FS, glucose was fine. Later she noted HA and difficulty articulating words. Her words are not slurred but hard time getting them out; also noted stuttering. She has no weakness, blurry vision, dizziness, chest pain, shortness of breath, palpitation, nausea, vomiting, abdominal pain, fever, chills.

## 2022-10-30 NOTE — ED PROVIDER NOTE - OBJECTIVE STATEMENT
72yF with pmh CKD, IDDM, CAD s/p 3 stents, HTN, chronic spine issues presenting with difficulty speaking. Reports going to bed and waking up fine. Around 8am today she reports having word finding issues and stuttering. Denies slurring of words, extremity weakness/numbness, difficulty with ambulation, or asymmetric facial features. Patient denies chest pain, dyspnea, abd pain, N/V, fevers. Code stroke called from triage.

## 2022-10-31 LAB
A1C WITH ESTIMATED AVERAGE GLUCOSE RESULT: 7.1 % — HIGH (ref 4–5.6)
ANION GAP SERPL CALC-SCNC: 11 MMOL/L — SIGNIFICANT CHANGE UP (ref 5–17)
BUN SERPL-MCNC: 13.7 MG/DL — SIGNIFICANT CHANGE UP (ref 8–20)
CALCIUM SERPL-MCNC: 8.3 MG/DL — LOW (ref 8.4–10.5)
CHLORIDE SERPL-SCNC: 109 MMOL/L — HIGH (ref 96–108)
CHOLEST SERPL-MCNC: 123 MG/DL — SIGNIFICANT CHANGE UP
CO2 SERPL-SCNC: 19 MMOL/L — LOW (ref 22–29)
CREAT SERPL-MCNC: 1.48 MG/DL — HIGH (ref 0.5–1.3)
CULTURE RESULTS: SIGNIFICANT CHANGE UP
EGFR: 38 ML/MIN/1.73M2 — LOW
ESTIMATED AVERAGE GLUCOSE: 157 MG/DL — HIGH (ref 68–114)
GLUCOSE BLDC GLUCOMTR-MCNC: 110 MG/DL — HIGH (ref 70–99)
GLUCOSE BLDC GLUCOMTR-MCNC: 124 MG/DL — HIGH (ref 70–99)
GLUCOSE BLDC GLUCOMTR-MCNC: 135 MG/DL — HIGH (ref 70–99)
GLUCOSE BLDC GLUCOMTR-MCNC: 96 MG/DL — SIGNIFICANT CHANGE UP (ref 70–99)
GLUCOSE SERPL-MCNC: 95 MG/DL — SIGNIFICANT CHANGE UP (ref 70–99)
HDLC SERPL-MCNC: 55 MG/DL — SIGNIFICANT CHANGE UP
LIPID PNL WITH DIRECT LDL SERPL: 52 MG/DL — SIGNIFICANT CHANGE UP
NON HDL CHOLESTEROL: 68 MG/DL — SIGNIFICANT CHANGE UP
POTASSIUM SERPL-MCNC: 5.2 MMOL/L — SIGNIFICANT CHANGE UP (ref 3.5–5.3)
POTASSIUM SERPL-SCNC: 5.2 MMOL/L — SIGNIFICANT CHANGE UP (ref 3.5–5.3)
SODIUM SERPL-SCNC: 139 MMOL/L — SIGNIFICANT CHANGE UP (ref 135–145)
SPECIMEN SOURCE: SIGNIFICANT CHANGE UP
TRIGL SERPL-MCNC: 82 MG/DL — SIGNIFICANT CHANGE UP

## 2022-10-31 PROCEDURE — 93306 TTE W/DOPPLER COMPLETE: CPT | Mod: 26

## 2022-10-31 PROCEDURE — 99232 SBSQ HOSP IP/OBS MODERATE 35: CPT

## 2022-10-31 PROCEDURE — 70551 MRI BRAIN STEM W/O DYE: CPT | Mod: 26

## 2022-10-31 PROCEDURE — 95819 EEG AWAKE AND ASLEEP: CPT | Mod: 26

## 2022-10-31 PROCEDURE — 99222 1ST HOSP IP/OBS MODERATE 55: CPT

## 2022-10-31 RX ADMIN — LATANOPROST 1 DROP(S): 0.05 SOLUTION/ DROPS OPHTHALMIC; TOPICAL at 22:17

## 2022-10-31 RX ADMIN — ATORVASTATIN CALCIUM 10 MILLIGRAM(S): 80 TABLET, FILM COATED ORAL at 22:24

## 2022-10-31 RX ADMIN — CYCLOBENZAPRINE HYDROCHLORIDE 10 MILLIGRAM(S): 10 TABLET, FILM COATED ORAL at 22:18

## 2022-10-31 RX ADMIN — DORZOLAMIDE HYDROCHLORIDE 1 DROP(S): 20 SOLUTION/ DROPS OPHTHALMIC at 22:17

## 2022-10-31 RX ADMIN — HEPARIN SODIUM 5000 UNIT(S): 5000 INJECTION INTRAVENOUS; SUBCUTANEOUS at 22:18

## 2022-10-31 RX ADMIN — DORZOLAMIDE HYDROCHLORIDE 1 DROP(S): 20 SOLUTION/ DROPS OPHTHALMIC at 14:32

## 2022-10-31 RX ADMIN — Medication 1 MILLIGRAM(S): at 13:10

## 2022-10-31 RX ADMIN — HEPARIN SODIUM 5000 UNIT(S): 5000 INJECTION INTRAVENOUS; SUBCUTANEOUS at 14:32

## 2022-10-31 RX ADMIN — Medication 81 MILLIGRAM(S): at 13:10

## 2022-10-31 RX ADMIN — DORZOLAMIDE HYDROCHLORIDE 1 DROP(S): 20 SOLUTION/ DROPS OPHTHALMIC at 05:25

## 2022-10-31 RX ADMIN — PANTOPRAZOLE SODIUM 40 MILLIGRAM(S): 20 TABLET, DELAYED RELEASE ORAL at 05:25

## 2022-10-31 RX ADMIN — HEPARIN SODIUM 5000 UNIT(S): 5000 INJECTION INTRAVENOUS; SUBCUTANEOUS at 05:26

## 2022-10-31 RX ADMIN — CLOPIDOGREL BISULFATE 75 MILLIGRAM(S): 75 TABLET, FILM COATED ORAL at 13:11

## 2022-10-31 RX ADMIN — INSULIN GLARGINE 15 UNIT(S): 100 INJECTION, SOLUTION SUBCUTANEOUS at 22:17

## 2022-10-31 NOTE — PROGRESS NOTE ADULT - ASSESSMENT
71 y/o female with PMH of HTN, DM-2, CAD s/p stents, CKD-3, chronic back pain came to the ED complaining of difficulty speaking. Patient reported she was shaking when she woke up this morning, checked her FS, glucose was fine. Later she noted HA and difficulty articulating words. Her words are not slurred but hard time getting them out; also noted stuttering. CT head/CT angio head/neck: no acute finding. Patient was a code stroke in the ED.     Expressive aphasia   CT head no acute intracranial finding   Aspirin 324mg given in the ED, will continue home dose of Aspirin 81mg   Atorvastatin 10mg   HbA1C and Lipid panel with AM lab  obtain MRI   Echo ordered   Neuro checks every 4 hours   Neuro consulted - F/U PT and MRI  Passed Speech eval    HTN/CAD  Hold BP medications for permissive HTN, restart bp meds slowly   HOLD Metoprolol ER 100mg   HOLD Benazepril 20mg   c/w Plavix 75mg   c/w Asprin and Statin   Monitor BP     CKD-3 with hyperkalemia   K: 5.5 -> 5.2  Cr Stable   IVF given in the ED   trend bmp daily    DM-2   Hold Metformin 1000mg bid   c/w Lantus 15 units Q24  Insulin sliding scale     Healthcare Maintenance  DVT prophylaxis: Heparin sc   Diet: DASH passed dysphagia screening     Plan of care discussed with patient

## 2022-10-31 NOTE — PROGRESS NOTE ADULT - SUBJECTIVE AND OBJECTIVE BOX
[facility] Division of Hospital Medicine    Chief Complaint:  Expressive Aphasia    SUBJECTIVE / OVERNIGHT EVENTS:  Pt admitted overnight. Seen at bedside, in NAD, complaining of chronic R arm and lower back pain. Patient denies chest pain, SOB, abd pain, N/V, fever, chills, dysuria or any other complaints. All remainder ROS negative.     MEDICATIONS  (STANDING):  aspirin  chewable 81 milliGRAM(s) Oral daily  atorvastatin 10 milliGRAM(s) Oral at bedtime  clopidogrel Tablet 75 milliGRAM(s) Oral daily  cyclobenzaprine 10 milliGRAM(s) Oral at bedtime  dextrose 5%. 1000 milliLiter(s) (100 mL/Hr) IV Continuous <Continuous>  dextrose 5%. 1000 milliLiter(s) (50 mL/Hr) IV Continuous <Continuous>  dextrose 50% Injectable 25 Gram(s) IV Push once  dextrose 50% Injectable 12.5 Gram(s) IV Push once  dextrose 50% Injectable 25 Gram(s) IV Push once  dorzolamide 2% Ophthalmic Solution 1 Drop(s) Both EYES three times a day  folic acid 1 milliGRAM(s) Oral daily  glucagon  Injectable 1 milliGRAM(s) IntraMuscular once  heparin   Injectable 5000 Unit(s) SubCutaneous every 8 hours  insulin glargine Injectable (LANTUS) 15 Unit(s) SubCutaneous at bedtime  insulin lispro (ADMELOG) corrective regimen sliding scale   SubCutaneous three times a day before meals  insulin lispro (ADMELOG) corrective regimen sliding scale   SubCutaneous at bedtime  latanoprost 0.005% Ophthalmic Solution 1 Drop(s) Both EYES at bedtime  pantoprazole    Tablet 40 milliGRAM(s) Oral before breakfast    MEDICATIONS  (PRN):  acetaminophen     Tablet .. 650 milliGRAM(s) Oral every 6 hours PRN Temp greater or equal to 38C (100.4F), Mild Pain (1 - 3)  aluminum hydroxide/magnesium hydroxide/simethicone Suspension 30 milliLiter(s) Oral every 4 hours PRN Dyspepsia  dextrose Oral Gel 15 Gram(s) Oral once PRN Blood Glucose LESS THAN 70 milliGRAM(s)/deciliter  melatonin 3 milliGRAM(s) Oral at bedtime PRN Insomnia  ondansetron Injectable 4 milliGRAM(s) IV Push every 8 hours PRN Nausea and/or Vomiting  senna 2 Tablet(s) Oral at bedtime PRN Constipation        I&O's Summary      PHYSICAL EXAM:  Vital Signs Last 24 Hrs  T(C): 36.8 (31 Oct 2022 04:00), Max: 36.9 (30 Oct 2022 17:48)  T(F): 98.2 (31 Oct 2022 04:00), Max: 98.4 (30 Oct 2022 17:48)  HR: 90 (31 Oct 2022 04:00) (80 - 120)  BP: 151/81 (31 Oct 2022 04:00) (151/81 - 187/82)  BP(mean): 101 (31 Oct 2022 04:00) (101 - 101)  RR: 20 (31 Oct 2022 04:00) (17 - 20)  SpO2: 96% (31 Oct 2022 04:00) (96% - 100%)    Parameters below as of 31 Oct 2022 04:00  Patient On (Oxygen Delivery Method): room air    PHYSICAL EXAM:    General: in no acute distress  Eyes: PERRLA, EOMI; conjunctiva and sclera clear  Head: Normocephalic; atraumatic  ENMT: No nasal discharge; airway clear  Neck: Supple; non tender; no masses  Respiratory: No wheezes, rales or rhonchi  Cardiovascular: Regular rate and rhythm. S1 and S2 Normal; No murmurs, gallops or rubs  Gastrointestinal: Soft non-tender non-distended; Normal bowel sounds  Genitourinary: No costovertebral angle tenderness  Extremities: Normal range of motion, No clubbing, cyanosis or edema  Vascular: Peripheral pulses palpable 2+ bilaterally  Neurological: Alert and oriented x4  Skin: Warm and dry. No acute rash  Lymph Nodes: No acute cervical adenopathy  Musculoskeletal: Normal gait, tone, without deformities  Psychiatric: Cooperative and appropriate    LABS:                        9.9    6.20  )-----------( 231      ( 30 Oct 2022 18:00 )             31.8     10-31    139  |  109<H>  |  13.7  ----------------------------<  95  5.2   |  19.0<L>  |  1.48<H>    Ca    8.3<L>      31 Oct 2022 00:16    TPro  6.8  /  Alb  3.8  /  TBili  <0.2<L>  /  DBili  x   /  AST  22  /  ALT  11  /  AlkPhos  102  10-30    PT/INR - ( 30 Oct 2022 18:00 )   PT: 11.2 sec;   INR: 0.97 ratio         PTT - ( 30 Oct 2022 18:00 )  PTT:27.1 sec  CARDIAC MARKERS ( 30 Oct 2022 18:00 )  x     / <0.01 ng/mL / x     / x     / x          Urinalysis Basic - ( 30 Oct 2022 19:52 )    Color: Yellow / Appearance: Clear / S.005 / pH: x  Gluc: x / Ketone: Negative  / Bili: Negative / Urobili: Negative mg/dL   Blood: x / Protein: 15 / Nitrite: Negative   Leuk Esterase: Negative / RBC: 0-2 /HPF / WBC 3-5 /HPF   Sq Epi: x / Non Sq Epi: Few / Bacteria: Occasional        CAPILLARY BLOOD GLUCOSE      POCT Blood Glucose.: 96 mg/dL (31 Oct 2022 08:37)  POCT Blood Glucose.: 93 mg/dL (30 Oct 2022 23:51)  POCT Blood Glucose.: 108 mg/dL (30 Oct 2022 18:19)  POCT Blood Glucose.: 123 mg/dL (30 Oct 2022 17:51)        RADIOLOGY & ADDITIONAL TESTS:  Results Reviewed: y  Imaging Personally Reviewed: n  Electrocardiogram Personally Reviewed: danny

## 2022-10-31 NOTE — CONSULT NOTE ADULT - SUBJECTIVE AND OBJECTIVE BOX
Preliminary note, official recommendations pending attending signature/review     HPI:  71 y/o female with PMH of HTN, DM-2, CAD s/p stents on DAPT at home iwth asa/plavix , CKD-3, chronic back pain came to the ED complaining of difficulty speaking. Patient reported she was shaking when she woke up morning of admission , checked her FS, glucose was fine. Later she noted HA and hard time getting them out; also noted stuttering. She has no weakness, blurry vision, dizziness,  nausea, vomiting, headache.      SUBJECTIVE: No events overnight.  No new neurologic complaints. ROS reported negative unless otherwise noted.    acetaminophen     Tablet .. 650 milliGRAM(s) Oral every 6 hours PRN  aluminum hydroxide/magnesium hydroxide/simethicone Suspension 30 milliLiter(s) Oral every 4 hours PRN  aspirin  chewable 81 milliGRAM(s) Oral daily  atorvastatin 10 milliGRAM(s) Oral at bedtime  clopidogrel Tablet 75 milliGRAM(s) Oral daily  cyclobenzaprine 10 milliGRAM(s) Oral at bedtime  dextrose 5%. 1000 milliLiter(s) IV Continuous <Continuous>  dextrose 5%. 1000 milliLiter(s) IV Continuous <Continuous>  dextrose 50% Injectable 25 Gram(s) IV Push once  dextrose 50% Injectable 12.5 Gram(s) IV Push once  dextrose 50% Injectable 25 Gram(s) IV Push once  dextrose Oral Gel 15 Gram(s) Oral once PRN  dorzolamide 2% Ophthalmic Solution 1 Drop(s) Both EYES three times a day  folic acid 1 milliGRAM(s) Oral daily  glucagon  Injectable 1 milliGRAM(s) IntraMuscular once  heparin   Injectable 5000 Unit(s) SubCutaneous every 8 hours  insulin glargine Injectable (LANTUS) 15 Unit(s) SubCutaneous at bedtime  insulin lispro (ADMELOG) corrective regimen sliding scale   SubCutaneous three times a day before meals  insulin lispro (ADMELOG) corrective regimen sliding scale   SubCutaneous at bedtime  latanoprost 0.005% Ophthalmic Solution 1 Drop(s) Both EYES at bedtime  melatonin 3 milliGRAM(s) Oral at bedtime PRN  ondansetron Injectable 4 milliGRAM(s) IV Push every 8 hours PRN  pantoprazole    Tablet 40 milliGRAM(s) Oral before breakfast  senna 2 Tablet(s) Oral at bedtime PRN    NIH SS:  DATE: 10/31/22   TIME: 0830  1A: Level of consciousness (0-3):   1B: Questions (0-2):   1C: Commands (0-2):   2: Gaze (0-2):   3: Visual fields (0-3):   4: Facial palsy (0-3):   MOTOR:  5A: Left arm motor drift (0-4):   5B: Right arm motor drift (0-4):   6A: Left leg motor drift (0-4):   6B: Right leg motor drift (0-4):   7: Limb ataxia (0-2):   SENSORY:  8: Sensation (0-2):   SPEECH:  9: Language (0-3): 1  10: Dysarthria (0-2):   EXTINCTION:  11: Extinction/inattention (0-2):     TOTAL SCORE: 1    PHYSICAL EXAM:   Vital Signs Last 24 Hrs  T(C): 36.8 (31 Oct 2022 04:00), Max: 36.9 (30 Oct 2022 17:48)  T(F): 98.2 (31 Oct 2022 04:00), Max: 98.4 (30 Oct 2022 17:48)  HR: 90 (31 Oct 2022 04:00) (80 - 120)  BP: 151/81 (31 Oct 2022 04:00) (151/81 - 187/82)  BP(mean): 101 (31 Oct 2022 04:00) (101 - 101)  RR: 20 (31 Oct 2022 04:00) (17 - 20)  SpO2: 96% (31 Oct 2022 04:00) (96% - 100%)    Parameters below as of 31 Oct 2022 04:00  Patient On (Oxygen Delivery Method): room air        General: No acute distress, upset towards end of exam       NEUROLOGICAL EXAM:  Mental status: Awake, alert, oriented x3, speech slightly dysfluent,  follows simple commands and cross midline , repetition intact, able to ID objects, some hesitancy in speech    Cranial Nerves: No facial asymmetry, no nystagmus, no dysarthria,  tongue midline  Motor exam: Normal tone, RUE/LUE: 4/5 b/l subtle drifts (reports chronic shoulder pain R>L), LE trace movement against gravity falls < 5 sec (reports due to back pain- chronic)  Sensation: Intact to light touch   Coordination/ Gait: No dysmetria, gait not tested, tremulous     LABS:                        9.9    6.20  )-----------( 231      ( 30 Oct 2022 18:00 )             31.8    10-31    139  |  109<H>  |  13.7  ----------------------------<  95  5.2   |  19.0<L>  |  1.48<H>    Ca    8.3<L>      31 Oct 2022 00:16    TPro  6.8  /  Alb  3.8  /  TBili  <0.2<L>  /  DBili  x   /  AST  22  /  ALT  11  /  AlkPhos  102  10-30  PT/INR - ( 30 Oct 2022 18:00 )   PT: 11.2 sec;   INR: 0.97 ratio         PTT - ( 30 Oct 2022 18:00 )  PTT:27.1 sec      IMAGING: Reviewed by me.   (10.30.22 @ 18:18)   CT BRAIN WITHOUT CONTRAST:  1. No acute intracranial hemorrhage. No large arterial distribution acute   infarct.  2. Proptosis involving the bilateral optic globes is incidentally noted.   Correlate clinically for evidence of thyroid ophthalmopathy.    CT PERFUSION:  Somewhat limited by patient motion during image acquisition.  0 mL core infarction. No active ischemia determined using the   conventional parameter of Tmax greater than 6s. However, when using the   more sensitive but less accurate Tmax >4s, there is 4 mL tissue at risk   for active ischemia in a left periatrial location.  If symptoms persist consider follow up head CT or MRI, MRA  if no   contraindication  CTA COW: Mild deposition of calcified plaque in association with the   right carotid siphon without significant stenosis involvingthe cavernous   portion of the right internal carotid artery. Right P1 segment is   hypoplastic with a relative fetal origin of the right posterior cerebral   artery. Fetal origin of the left posterior cerebral artery. No evidence   of aneurysm formation in association with the major arterial vascular   components at the level of the [place].  CTA NECK:  1. Mild deposition of calcified plaque without significant stenosis at   the origins of the bilateral internal carotid arteries.  2. Hypoplastic appearing bilateral vertebral arteries.        HPI:  71 y/o female with PMH of HTN, DM-2, CAD s/p stents on DAPT at home iwth asa/plavix , CKD-3, chronic back pain came to the ED complaining of difficulty speaking. Patient reported she was shaking when she woke up morning of admission , checked her FS, glucose was fine. Later she noted HA and hard time getting them out; also noted stuttering. She has no weakness, blurry vision, dizziness,  nausea, vomiting, headache.      SUBJECTIVE: No events overnight.  No new neurologic complaints. ROS reported negative unless otherwise noted.    acetaminophen     Tablet .. 650 milliGRAM(s) Oral every 6 hours PRN  aluminum hydroxide/magnesium hydroxide/simethicone Suspension 30 milliLiter(s) Oral every 4 hours PRN  aspirin  chewable 81 milliGRAM(s) Oral daily  atorvastatin 10 milliGRAM(s) Oral at bedtime  clopidogrel Tablet 75 milliGRAM(s) Oral daily  cyclobenzaprine 10 milliGRAM(s) Oral at bedtime  dextrose 5%. 1000 milliLiter(s) IV Continuous <Continuous>  dextrose 5%. 1000 milliLiter(s) IV Continuous <Continuous>  dextrose 50% Injectable 25 Gram(s) IV Push once  dextrose 50% Injectable 12.5 Gram(s) IV Push once  dextrose 50% Injectable 25 Gram(s) IV Push once  dextrose Oral Gel 15 Gram(s) Oral once PRN  dorzolamide 2% Ophthalmic Solution 1 Drop(s) Both EYES three times a day  folic acid 1 milliGRAM(s) Oral daily  glucagon  Injectable 1 milliGRAM(s) IntraMuscular once  heparin   Injectable 5000 Unit(s) SubCutaneous every 8 hours  insulin glargine Injectable (LANTUS) 15 Unit(s) SubCutaneous at bedtime  insulin lispro (ADMELOG) corrective regimen sliding scale   SubCutaneous three times a day before meals  insulin lispro (ADMELOG) corrective regimen sliding scale   SubCutaneous at bedtime  latanoprost 0.005% Ophthalmic Solution 1 Drop(s) Both EYES at bedtime  melatonin 3 milliGRAM(s) Oral at bedtime PRN  ondansetron Injectable 4 milliGRAM(s) IV Push every 8 hours PRN  pantoprazole    Tablet 40 milliGRAM(s) Oral before breakfast  senna 2 Tablet(s) Oral at bedtime PRN    NIH SS:  DATE: 10/31/22   TIME: 0830  1A: Level of consciousness (0-3):   1B: Questions (0-2):   1C: Commands (0-2):   2: Gaze (0-2):   3: Visual fields (0-3):   4: Facial palsy (0-3):   MOTOR:  5A: Left arm motor drift (0-4):   5B: Right arm motor drift (0-4):   6A: Left leg motor drift (0-4):   6B: Right leg motor drift (0-4):   7: Limb ataxia (0-2):   SENSORY:  8: Sensation (0-2):   SPEECH:  9: Language (0-3): 1  10: Dysarthria (0-2):   EXTINCTION:  11: Extinction/inattention (0-2):     TOTAL SCORE: 1    PHYSICAL EXAM:   Vital Signs Last 24 Hrs  T(C): 36.8 (31 Oct 2022 04:00), Max: 36.9 (30 Oct 2022 17:48)  T(F): 98.2 (31 Oct 2022 04:00), Max: 98.4 (30 Oct 2022 17:48)  HR: 90 (31 Oct 2022 04:00) (80 - 120)  BP: 151/81 (31 Oct 2022 04:00) (151/81 - 187/82)  BP(mean): 101 (31 Oct 2022 04:00) (101 - 101)  RR: 20 (31 Oct 2022 04:00) (17 - 20)  SpO2: 96% (31 Oct 2022 04:00) (96% - 100%)    Parameters below as of 31 Oct 2022 04:00  Patient On (Oxygen Delivery Method): room air        General: No acute distress, upset towards end of exam       NEUROLOGICAL EXAM:  Mental status: Awake, alert, oriented x3, speech slightly dysfluent,  follows simple commands and cross midline , repetition intact, able to ID objects, some hesitancy in speech    Cranial Nerves: No facial asymmetry, no nystagmus, no dysarthria,  tongue midline  Motor exam: Normal tone, RUE/LUE: 4/5 b/l subtle drifts (reports chronic shoulder pain R>L), LE trace movement against gravity falls < 5 sec (reports due to back pain- chronic)  Sensation: Intact to light touch   Coordination/ Gait: No dysmetria, gait not tested, tremulous     LABS:                        9.9    6.20  )-----------( 231      ( 30 Oct 2022 18:00 )             31.8    10-31    139  |  109<H>  |  13.7  ----------------------------<  95  5.2   |  19.0<L>  |  1.48<H>    Ca    8.3<L>      31 Oct 2022 00:16    TPro  6.8  /  Alb  3.8  /  TBili  <0.2<L>  /  DBili  x   /  AST  22  /  ALT  11  /  AlkPhos  102  10-30  PT/INR - ( 30 Oct 2022 18:00 )   PT: 11.2 sec;   INR: 0.97 ratio         PTT - ( 30 Oct 2022 18:00 )  PTT:27.1 sec      IMAGING: Reviewed by me.   (10.30.22 @ 18:18)   CT BRAIN WITHOUT CONTRAST:  1. No acute intracranial hemorrhage. No large arterial distribution acute   infarct.  2. Proptosis involving the bilateral optic globes is incidentally noted.   Correlate clinically for evidence of thyroid ophthalmopathy.    CT PERFUSION:  Somewhat limited by patient motion during image acquisition.  0 mL core infarction. No active ischemia determined using the   conventional parameter of Tmax greater than 6s. However, when using the   more sensitive but less accurate Tmax >4s, there is 4 mL tissue at risk   for active ischemia in a left periatrial location.  If symptoms persist consider follow up head CT or MRI, MRA  if no   contraindication  CTA COW: Mild deposition of calcified plaque in association with the   right carotid siphon without significant stenosis involvingthe cavernous   portion of the right internal carotid artery. Right P1 segment is   hypoplastic with a relative fetal origin of the right posterior cerebral   artery. Fetal origin of the left posterior cerebral artery. No evidence   of aneurysm formation in association with the major arterial vascular   components at the level of the [place] of [place].  CTA NECK:  1. Mild deposition of calcified plaque without significant stenosis at   the origins of the bilateral internal carotid arteries.  2. Hypoplastic appearing bilateral vertebral arteries.

## 2022-10-31 NOTE — CONSULT NOTE ADULT - ASSESSMENT
ASSESSMENT: 71 y/o women with PMH of HTN, DM-2, CAD s/p stents on DAPT at home with asa/plavix , CKD-3, chronic back pain came to the ED complaining of difficulty speaking on the morning of 10/31/22.  CTH without evidence of acute hemorrhage or stroke, CTA with no significant flow limiting stenosis, right carotid calcified plaque, hypoplastic right P1, fetal left PCA, mild b/l ICA calcified plaque without flow limiting stenosis. Hypoplastic b/l vertebral arteries.  Rule out stroke.  EEG to rule out seizure. Ensure no thyroid dysfunction given proptosis and tremor.     NEURO: neurologically appears somewhat improved vs. presenting symptoms, patient unclear if she is back to baseline-appears mildly aphasic, Continue close monitoring for neurologic deterioration, permissive  to 180mmhg, gradual titration as tolerated long term, home statin regimen as LDL < goal of 70, MRI Brain pending, EEG, seizure precautions, evaluation for tremor, Physical therapy/OT/Speech eval/treatment.     ANTITHROMBOTIC THERAPY: home regimen DAPT with asa and clopidogrel- can check P2Y12 to ensure adequate response     check TTE, cardiac monitoring to ensure no atrial fibrillation, she follows with Dr. Roa                             dysphagia screen passed 10/31/22 0117     Diet: advance as tolerated      maintain adequate hydration     patient should have all age and risk malignancy screenings      DVT ppx: LMWH [x]     screen for infectious process     OTHER: condition and plan of care d/w patient , questions and concerns addressed. She appears upset about the event, support provided. continue to monitor . if persists recommend Behavioral health assessment.     DISPOSITION: Rehab or home depending on PT eval once stable and workup is complete      CORE MEASURES:        Admission NIHSS:1      TPA: [] YES [x] NO      LDL/HDL: 52/55     Depression Screen: as noted      Statin Therapy: y      Dysphagia Screen: [x] PASS [] FAIL     Smoking [] YES [x] NO      Afib [] YES [x] NO     Stroke Education [x] YES [] NO

## 2022-11-01 ENCOUNTER — APPOINTMENT (OUTPATIENT)
Dept: NEPHROLOGY | Facility: CLINIC | Age: 72
End: 2022-11-01

## 2022-11-01 ENCOUNTER — TRANSCRIPTION ENCOUNTER (OUTPATIENT)
Age: 72
End: 2022-11-01

## 2022-11-01 LAB
ANION GAP SERPL CALC-SCNC: 11 MMOL/L — SIGNIFICANT CHANGE UP (ref 5–17)
ANION GAP SERPL CALC-SCNC: 11 MMOL/L — SIGNIFICANT CHANGE UP (ref 5–17)
BUN SERPL-MCNC: 16.8 MG/DL — SIGNIFICANT CHANGE UP (ref 8–20)
BUN SERPL-MCNC: 17.2 MG/DL — SIGNIFICANT CHANGE UP (ref 8–20)
CALCIUM SERPL-MCNC: 8.8 MG/DL — SIGNIFICANT CHANGE UP (ref 8.4–10.5)
CALCIUM SERPL-MCNC: 9.1 MG/DL — SIGNIFICANT CHANGE UP (ref 8.4–10.5)
CHLORIDE SERPL-SCNC: 107 MMOL/L — SIGNIFICANT CHANGE UP (ref 96–108)
CHLORIDE SERPL-SCNC: 109 MMOL/L — HIGH (ref 96–108)
CO2 SERPL-SCNC: 21 MMOL/L — LOW (ref 22–29)
CO2 SERPL-SCNC: 22 MMOL/L — SIGNIFICANT CHANGE UP (ref 22–29)
CREAT SERPL-MCNC: 1.73 MG/DL — HIGH (ref 0.5–1.3)
CREAT SERPL-MCNC: 1.85 MG/DL — HIGH (ref 0.5–1.3)
CRP SERPL-MCNC: 12 MG/L — HIGH
EGFR: 29 ML/MIN/1.73M2 — LOW
EGFR: 31 ML/MIN/1.73M2 — LOW
GLUCOSE BLDC GLUCOMTR-MCNC: 142 MG/DL — HIGH (ref 70–99)
GLUCOSE BLDC GLUCOMTR-MCNC: 146 MG/DL — HIGH (ref 70–99)
GLUCOSE BLDC GLUCOMTR-MCNC: 161 MG/DL — HIGH (ref 70–99)
GLUCOSE BLDC GLUCOMTR-MCNC: 162 MG/DL — HIGH (ref 70–99)
GLUCOSE SERPL-MCNC: 154 MG/DL — HIGH (ref 70–99)
GLUCOSE SERPL-MCNC: 169 MG/DL — HIGH (ref 70–99)
HCT VFR BLD CALC: 31.4 % — LOW (ref 34.5–45)
HGB BLD-MCNC: 9.7 G/DL — LOW (ref 11.5–15.5)
MAGNESIUM SERPL-MCNC: 2.1 MG/DL — SIGNIFICANT CHANGE UP (ref 1.6–2.6)
MCHC RBC-ENTMCNC: 27.2 PG — SIGNIFICANT CHANGE UP (ref 27–34)
MCHC RBC-ENTMCNC: 30.9 GM/DL — LOW (ref 32–36)
MCV RBC AUTO: 88 FL — SIGNIFICANT CHANGE UP (ref 80–100)
PA ADP PRP-ACNC: 224 PRU — SIGNIFICANT CHANGE UP (ref 180–376)
PHOSPHATE SERPL-MCNC: 4.4 MG/DL — SIGNIFICANT CHANGE UP (ref 2.4–4.7)
PLATELET # BLD AUTO: 218 K/UL — SIGNIFICANT CHANGE UP (ref 150–400)
POTASSIUM SERPL-MCNC: 4.7 MMOL/L — SIGNIFICANT CHANGE UP (ref 3.5–5.3)
POTASSIUM SERPL-MCNC: 5.7 MMOL/L — HIGH (ref 3.5–5.3)
POTASSIUM SERPL-SCNC: 4.7 MMOL/L — SIGNIFICANT CHANGE UP (ref 3.5–5.3)
POTASSIUM SERPL-SCNC: 5.7 MMOL/L — HIGH (ref 3.5–5.3)
RBC # BLD: 3.57 M/UL — LOW (ref 3.8–5.2)
RBC # FLD: 15 % — HIGH (ref 10.3–14.5)
SODIUM SERPL-SCNC: 140 MMOL/L — SIGNIFICANT CHANGE UP (ref 135–145)
SODIUM SERPL-SCNC: 140 MMOL/L — SIGNIFICANT CHANGE UP (ref 135–145)
T GONDII IGG SER QL: <3 IU/ML — SIGNIFICANT CHANGE UP
T GONDII IGG SER QL: NEGATIVE — SIGNIFICANT CHANGE UP
T GONDII IGM SER QL: <3 AU/ML — SIGNIFICANT CHANGE UP
T GONDII IGM SER QL: NEGATIVE — SIGNIFICANT CHANGE UP
T PALLIDUM AB TITR SER: NEGATIVE — SIGNIFICANT CHANGE UP
WBC # BLD: 5.77 K/UL — SIGNIFICANT CHANGE UP (ref 3.8–10.5)
WBC # FLD AUTO: 5.77 K/UL — SIGNIFICANT CHANGE UP (ref 3.8–10.5)

## 2022-11-01 PROCEDURE — 99232 SBSQ HOSP IP/OBS MODERATE 35: CPT

## 2022-11-01 RX ORDER — SENNA PLUS 8.6 MG/1
2 TABLET ORAL AT BEDTIME
Refills: 0 | Status: DISCONTINUED | OUTPATIENT
Start: 2022-11-01 | End: 2022-11-03

## 2022-11-01 RX ORDER — POLYETHYLENE GLYCOL 3350 17 G/17G
17 POWDER, FOR SOLUTION ORAL DAILY
Refills: 0 | Status: DISCONTINUED | OUTPATIENT
Start: 2022-11-01 | End: 2022-11-03

## 2022-11-01 RX ORDER — SODIUM ZIRCONIUM CYCLOSILICATE 10 G/10G
10 POWDER, FOR SUSPENSION ORAL ONCE
Refills: 0 | Status: COMPLETED | OUTPATIENT
Start: 2022-11-01 | End: 2022-11-01

## 2022-11-01 RX ADMIN — ATORVASTATIN CALCIUM 10 MILLIGRAM(S): 80 TABLET, FILM COATED ORAL at 21:59

## 2022-11-01 RX ADMIN — DORZOLAMIDE HYDROCHLORIDE 1 DROP(S): 20 SOLUTION/ DROPS OPHTHALMIC at 05:18

## 2022-11-01 RX ADMIN — SENNA PLUS 2 TABLET(S): 8.6 TABLET ORAL at 22:02

## 2022-11-01 RX ADMIN — SODIUM ZIRCONIUM CYCLOSILICATE 10 GRAM(S): 10 POWDER, FOR SUSPENSION ORAL at 10:00

## 2022-11-01 RX ADMIN — Medication 1 MILLIGRAM(S): at 13:00

## 2022-11-01 RX ADMIN — CYCLOBENZAPRINE HYDROCHLORIDE 10 MILLIGRAM(S): 10 TABLET, FILM COATED ORAL at 21:59

## 2022-11-01 RX ADMIN — DORZOLAMIDE HYDROCHLORIDE 1 DROP(S): 20 SOLUTION/ DROPS OPHTHALMIC at 22:01

## 2022-11-01 RX ADMIN — DORZOLAMIDE HYDROCHLORIDE 1 DROP(S): 20 SOLUTION/ DROPS OPHTHALMIC at 13:00

## 2022-11-01 RX ADMIN — Medication 1: at 17:31

## 2022-11-01 RX ADMIN — Medication 81 MILLIGRAM(S): at 13:00

## 2022-11-01 RX ADMIN — HEPARIN SODIUM 5000 UNIT(S): 5000 INJECTION INTRAVENOUS; SUBCUTANEOUS at 05:18

## 2022-11-01 RX ADMIN — INSULIN GLARGINE 15 UNIT(S): 100 INJECTION, SOLUTION SUBCUTANEOUS at 22:01

## 2022-11-01 RX ADMIN — HEPARIN SODIUM 5000 UNIT(S): 5000 INJECTION INTRAVENOUS; SUBCUTANEOUS at 13:00

## 2022-11-01 RX ADMIN — LATANOPROST 1 DROP(S): 0.05 SOLUTION/ DROPS OPHTHALMIC; TOPICAL at 22:01

## 2022-11-01 RX ADMIN — CLOPIDOGREL BISULFATE 75 MILLIGRAM(S): 75 TABLET, FILM COATED ORAL at 13:00

## 2022-11-01 RX ADMIN — HEPARIN SODIUM 5000 UNIT(S): 5000 INJECTION INTRAVENOUS; SUBCUTANEOUS at 21:59

## 2022-11-01 RX ADMIN — PANTOPRAZOLE SODIUM 40 MILLIGRAM(S): 20 TABLET, DELAYED RELEASE ORAL at 05:18

## 2022-11-01 NOTE — EEG REPORT - NS EEG TEXT BOX
[facility]   COMPREHENSIVE EPILEPSY CENTER   REPORT OF ROUTINE VIDEO EEG     [facility]: [address] [place] [address] [address], [place] [address], Ph#: [phone]  [facility]: [address], [place] [address], Ph#: [phone]  [facility]: [address] [place] [address], Ph#: [phone]    Patient Name: JUANY AGUILAR  Age and : 72y ([birth year])  MRN #: [id number]  Location: [facility] [id number]  Referring Physician: René Gaona    Study Date: 10-31-22    _____________________________________________________________  TECHNICAL INFORMATION    Placement and Labeling of Electrodes:  The EEG was performed utilizing 20 channels referential EEG connections (coronal over temporal over parasagittal montage) using all standard 10-20 electrode placements with EKG.  Recording was at a sampling rate of 256 samples per second per channel.  Time synchronized digital video recording was done simultaneously with EEG recording.  A low light infrared camera was used for low light recording.  Lenin and seizure detection algorithms were utilized.    _____________________________________________________________  HISTORY    Patient is a 72y old  Female who presents with a chief complaint of Expressive aphasia? (2022 09:21)      PERTINENT MEDICATION:  none    _____________________________________________________________  STUDY INTERPRETATION    Findings: The background was continuous, diffusely attenuted and reactive. During wakefulness, no posterior dominant rhythm seen.    Background Slowing:  No generalized background slowing was present.    Focal Slowing:   None were present.    Sleep Background:  Drowsiness was characterized by fragmentation, attenuation, and slowing of the background activity.    Stage II sleep transients were not recorded.    Other Non-Epileptiform Findings:  None were present.    Interictal Epileptiform Activity:   None were present.    Events:  No event or seizure recorded.    Activation Procedures:   Hyperventilation was not performed.    Photic stimulation was performed and did not elicit any abnormality.     Artifacts:  Intermittent myogenic and movement artifacts were noted.    ECG:  The heart rate on single channel ECG was predominantly between 80-90 BPM.    _____________________________________________________________  EEG SUMMARY/CLASSIFICATION    Normal EEG in the awake, drowsy states.    _____________________________________________________________  EEG IMPRESSION/CLINICAL CORRELATE    Normal EEG study.  No epileptiform pattern or seizure seen.    _____________________________________________________________    Lisa Lloyd MD  Director, Epilepsy/EMU - [facility]

## 2022-11-01 NOTE — SPEECH LANGUAGE PATHOLOGY EVALUATION - COMMENTS
Consider formal assessment of cognition, upon discharge from this facility Formal assessment warranted As per MD note: "71 y/o women with PMH of HTN, DM-2, CAD s/p stents on DAPT at home with asa/plavix , CKD-3, chronic back pain came to the ED complaining of difficulty speaking on the morning of 10/31/22.  CTH without evidence of acute hemorrhage or stroke, CTA with no significant flow limiting stenosis, right carotid calcified plaque, hypoplastic right P1, fetal left PCA, mild b/l ICA calcified plaque without flow limiting stenosis. Hypoplastic b/l vertebral arteries. MRI without  acute infarction, there is evidence of white matter disease which continued risk factor control should be encouraged, TIA can not be excluded. There are multiple flair hyperintensities which can be further evaluated to rule out demyelinating disease or vasculitis." Not assessed Hesitations & occasional fillers observed in conversational speech. Pt however, able to successfully express thoughts in conversation Speech intelligibility WFL

## 2022-11-01 NOTE — CHART NOTE - NSCHARTNOTEFT_GEN_A_CORE
RN called pt had HR in 111, 150's when walking on monitor. evaluated the patient, she was found comfortable in bed denies CP, palpitations, SOB, dyspnea. Pt had 104-106 rate when resting, she is currently not taking metoprolol. Will continue to monitor VS and sx. VSS stable.     T(C): 36.6 (11-01-22 @ 02:55), Max: 36.8 (10-31-22 @ 04:00)  HR: 104 on monitor  BP: 156/79 (11-01-22 @ 02:55) (126/67 - 161/82)  RR: 18 (11-01-22 @ 02:55) (18 - 20)  SpO2: 95% (11-01-22 @ 02:55) (95% - 100%)    CONSTITUTIONAL: Well groomed, no apparent distress  RESP: No respiratory distress, no use of accessory muscles; CTA b/l, no WRR  CV: sinus tachycardia, S1 and S2 sounds heard   SKIN: No rashes or ulcers noted; no subcutaneous nodules or induration palpable  PSYCH: Appropriate insight/judgment; A+O x 3, mood and affect appropriate, recent/remote memory intact

## 2022-11-01 NOTE — DISCHARGE NOTE NURSING/CASE MANAGEMENT/SOCIAL WORK - PATIENT PORTAL LINK FT
You can access the FollowMyHealth Patient Portal offered by [facility] by registering at the following website: [email or web address]. By joining [facility]’s FollowMyHealth portal, you will also be able to view your health information using other applications (apps) compatible with our system.

## 2022-11-01 NOTE — PROGRESS NOTE ADULT - SUBJECTIVE AND OBJECTIVE BOX
[facility] Division of Hospital Medicine    Chief Complaint:  Expressive Aphasia    SUBJECTIVE / OVERNIGHT EVENTS:  Pt seen at bedside, in NAD, complaining of chronic R arm and lower back pain. Patient denies chest pain, SOB, abd pain, N/V, fever, chills, dysuria or any other complaints. All remainder ROS negative.     MEDICATIONS  (STANDING):  aspirin  chewable 81 milliGRAM(s) Oral daily  atorvastatin 10 milliGRAM(s) Oral at bedtime  clopidogrel Tablet 75 milliGRAM(s) Oral daily  cyclobenzaprine 10 milliGRAM(s) Oral at bedtime  dextrose 5%. 1000 milliLiter(s) (100 mL/Hr) IV Continuous <Continuous>  dextrose 5%. 1000 milliLiter(s) (50 mL/Hr) IV Continuous <Continuous>  dextrose 50% Injectable 25 Gram(s) IV Push once  dextrose 50% Injectable 12.5 Gram(s) IV Push once  dextrose 50% Injectable 25 Gram(s) IV Push once  dorzolamide 2% Ophthalmic Solution 1 Drop(s) Both EYES three times a day  folic acid 1 milliGRAM(s) Oral daily  glucagon  Injectable 1 milliGRAM(s) IntraMuscular once  heparin   Injectable 5000 Unit(s) SubCutaneous every 8 hours  insulin glargine Injectable (LANTUS) 15 Unit(s) SubCutaneous at bedtime  insulin lispro (ADMELOG) corrective regimen sliding scale   SubCutaneous three times a day before meals  insulin lispro (ADMELOG) corrective regimen sliding scale   SubCutaneous at bedtime  latanoprost 0.005% Ophthalmic Solution 1 Drop(s) Both EYES at bedtime  pantoprazole    Tablet 40 milliGRAM(s) Oral before breakfast  senna 2 Tablet(s) Oral at bedtime    MEDICATIONS  (PRN):  acetaminophen     Tablet .. 650 milliGRAM(s) Oral every 6 hours PRN Temp greater or equal to 38C (100.4F), Mild Pain (1 - 3)  aluminum hydroxide/magnesium hydroxide/simethicone Suspension 30 milliLiter(s) Oral every 4 hours PRN Dyspepsia  dextrose Oral Gel 15 Gram(s) Oral once PRN Blood Glucose LESS THAN 70 milliGRAM(s)/deciliter  melatonin 3 milliGRAM(s) Oral at bedtime PRN Insomnia  ondansetron Injectable 4 milliGRAM(s) IV Push every 8 hours PRN Nausea and/or Vomiting  polyethylene glycol 3350 17 Gram(s) Oral daily PRN Constipation  senna 2 Tablet(s) Oral at bedtime PRN Constipation        I&O's Summary      PHYSICAL EXAM:  Vital Signs Last 24 Hrs  T(C): 36.6 (2022 11:13), Max: 36.8 (31 Oct 2022 16:23)  T(F): 97.9 (2022 11:13), Max: 98.2 (31 Oct 2022 16:23)  HR: 100 (2022 11:13) (97 - 111)  BP: 131/81 (2022 11:13) (126/67 - 170/81)  BP(mean): --  RR: 16 (2022 05:02) (16 - 18)  SpO2: 93% (2022 11:) (93% - 100%)    Parameters below as of 2022 11:13  Patient On (Oxygen Delivery Method): room air        General: in no acute distress  Eyes: PERRLA, EOMI; conjunctiva and sclera clear  Head: Normocephalic; atraumatic  ENMT: No nasal discharge; airway clear  Neck: Supple; non tender; no masses  Respiratory: No wheezes, rales or rhonchi  Cardiovascular: Regular rate and rhythm. S1 and S2 Normal; No murmurs, gallops or rubs  Gastrointestinal: Soft non-tender non-distended; Normal bowel sounds  Genitourinary: No costovertebral angle tenderness  Extremities: Normal range of motion, No clubbing, cyanosis or edema  Vascular: Peripheral pulses palpable 2+ bilaterally  Neurological: Alert and oriented x4  Skin: Warm and dry. No acute rash  Lymph Nodes: No acute cervical adenopathy  Musculoskeletal: Normal gait, tone, without deformities  Psychiatric: Cooperative and appropriate    LABS:                        9.7    5.77  )-----------( 218      ( 2022 04:50 )             31.4     11    140  |  107  |  16.8  ----------------------------<  154<H>  5.7<H>   |  22.0  |  1.85<H>    Ca    9.1      2022 04:50  Phos  4.4       Mg     2.1         TPro  6.8  /  Alb  3.8  /  TBili  <0.2<L>  /  DBili  x   /  AST  22  /  ALT  11  /  AlkPhos  102  10-30    PT/INR - ( 30 Oct 2022 18:00 )   PT: 11.2 sec;   INR: 0.97 ratio         PTT - ( 30 Oct 2022 18:00 )  PTT:27.1 sec  CARDIAC MARKERS ( 30 Oct 2022 18:00 )  x     / <0.01 ng/mL / x     / x     / x          Urinalysis Basic - ( 30 Oct 2022 19:52 )    Color: Yellow / Appearance: Clear / S.005 / pH: x  Gluc: x / Ketone: Negative  / Bili: Negative / Urobili: Negative mg/dL   Blood: x / Protein: 15 / Nitrite: Negative   Leuk Esterase: Negative / RBC: 0-2 /HPF / WBC 3-5 /HPF   Sq Epi: x / Non Sq Epi: Few / Bacteria: Occasional        Culture - Urine (collected 30 Oct 2022 19:52)  Source: Clean Catch Clean Catch (Midstream)  Final Report (31 Oct 2022 22:33):    <10,000 CFU/mL Normal Urogenital Reena      CAPILLARY BLOOD GLUCOSE      POCT Blood Glucose.: 142 mg/dL (2022 12:45)  POCT Blood Glucose.: 146 mg/dL (2022 08:38)  POCT Blood Glucose.: 135 mg/dL (31 Oct 2022 22:15)  POCT Blood Glucose.: 110 mg/dL (31 Oct 2022 17:39)        RADIOLOGY & ADDITIONAL TESTS:  Results Reviewed: y  Imaging Personally Reviewed: n  Electrocardiogram Personally Reviewed: n

## 2022-11-01 NOTE — PROGRESS NOTE ADULT - ASSESSMENT
ASSESSMENT: 73 y/o women with PMH of HTN, DM-2, CAD s/p stents on DAPT at home with asa/plavix , CKD-3, chronic back pain came to the ED complaining of difficulty speaking on the morning of 10/31/22.  CTH without evidence of acute hemorrhage or stroke, CTA with no significant flow limiting stenosis, right carotid calcified plaque, hypoplastic right P1, fetal left PCA, mild b/l ICA calcified plaque without flow limiting stenosis. Hypoplastic b/l vertebral arteries. MRI without  acute infarction, there is evidence of white matter disease which continued risk factor control should be encouraged, TIA can not be excluded. There are multiple flair hyperintensities which can be further evaluated to rule out demyelinating disease or vasculitis.        NEURO: neurologically without acute change, appears overall improved since presentation, Continue close monitoring for neurologic deterioration, permissive  to 180mmhg, gradual titration as tolerated long term, home statin regimen as LDL < goal of 70, MRI Brain as noted, d/w Dr. Becerra- obtain MRI C spine to screen for further lesions as noted on MRI, EEG results pending, vasculitis labs sent, seizure precautions, evaluation for tremor, she would need close outpatient neurology follow up as well. Physical therapy/OT/Speech eval/treatment.     ANTITHROMBOTIC THERAPY: home regimen DAPT with asa and clopidogrel- can check P2Y12 to ensure adequate response     TTE- as noted, 30 day holter to screen for occult arrhythmia, further monitoring pending outpatient follow up and workup                 dysphagia screen passed 10/31/22 0117     Diet: advance as tolerated      maintain adequate hydration, avoid hypotension and rapid fluctuations      patient should have all age and risk malignancy screenings      DVT ppx: LMWH [x]     screen for infectious process     OTHER: condition and plan of care d/w patient , questions and concerns addressed. She appears upset about the event, support provided. continue to monitor . if persists recommend Behavioral health assessment.     DISPOSITION: Rehab or home depending on PT eval once stable and workup is complete    CORE MEASURES:        Admission NIHSS:1      TPA: [] YES [x] NO      LDL/HDL: 52/55     Depression Screen: as noted      Statin Therapy: y      Dysphagia Screen: [x] PASS [] FAIL     Smoking [] YES [x] NO      Afib [] YES [x] NO     Stroke Education [x] YES [] NO      ASSESSMENT: 71 y/o women with PMH of HTN, DM-2, CAD s/p stents on DAPT at home with asa/plavix , CKD-3, chronic back pain came to the ED complaining of difficulty speaking on the morning of 10/31/22.  CTH without evidence of acute hemorrhage or stroke, CTA with no significant flow limiting stenosis, right carotid calcified plaque, hypoplastic right P1, fetal left PCA, mild b/l ICA calcified plaque without flow limiting stenosis. Hypoplastic b/l vertebral arteries. MRI without  acute infarction, there is evidence of white matter disease which continued risk factor control should be encouraged, TIA can not be excluded. There are multiple flair hyperintensities which can be further evaluated to rule out demyelinating disease or vasculitis.        NEURO: neurologically without acute change, appears overall improved since presentation, Continue close monitoring for neurologic deterioration, permissive  to 180mmhg, gradual titration as tolerated long term, home statin regimen as LDL < goal of 70, MRI Brain as noted, d/w Dr. Becerra- obtain MRI C spine non con to screen for further lesions as noted on MRI, EEG results pending, vasculitis labs sent, seizure precautions, evaluation for tremor, she would need close outpatient neurology follow up as well. Physical therapy/OT/Speech eval/treatment.     ANTITHROMBOTIC THERAPY: home regimen DAPT with asa and clopidogrel- can check P2Y12 to ensure adequate response     TTE- as noted, 30 day holter to screen for occult arrhythmia, further monitoring pending outpatient follow up and workup                 dysphagia screen passed 10/31/22 0117     Diet: advance as tolerated      maintain adequate hydration, avoid hypotension and rapid fluctuations      patient should have all age and risk malignancy screenings      DVT ppx: LMWH [x]     screen for infectious process     OTHER: condition and plan of care d/w patient , questions and concerns addressed. She appears upset about the event, support provided. continue to monitor . if persists recommend Behavioral health assessment.     DISPOSITION: Rehab or home depending on PT eval once stable and workup is complete    CORE MEASURES:        Admission NIHSS:1      TPA: [] YES [x] NO      LDL/HDL: 52/55     Depression Screen: as noted      Statin Therapy: y      Dysphagia Screen: [x] PASS [] FAIL     Smoking [] YES [x] NO      Afib [] YES [x] NO     Stroke Education [x] YES [] NO

## 2022-11-01 NOTE — SPEECH LANGUAGE PATHOLOGY EVALUATION - SLP DIAGNOSIS
Receptive & expressive language skills grossly WFL for simple information. Pt able to express thoughts successfully in conversation without overt difficulty. Formal assessment of cognition is RX & can be completed upon discharge from this facility

## 2022-11-01 NOTE — DISCHARGE NOTE NURSING/CASE MANAGEMENT/SOCIAL WORK - NSDCPEFALRISK_GEN_ALL_CORE
For information on Fall & Injury Prevention, visit: [email or web address] OR  [email or web address] OR  https://www.cdc.gov/steadi/patient.html

## 2022-11-01 NOTE — PROGRESS NOTE ADULT - SUBJECTIVE AND OBJECTIVE BOX
Preliminary note, official recommendations pending attending signature/review     HPI:  73 y/o female with PMH of HTN, DM-2, CAD s/p stents on DAPT at home iwth asa/plavix , CKD-3, chronic back pain came to the ED complaining of difficulty speaking. Patient reported she was shaking when she woke up morning of admission , checked her FS, glucose was fine. Later she noted HA and hard time getting them out; also noted stuttering. She has no weakness, blurry vision, dizziness,  nausea, vomiting, headache.      SUBJECTIVE: No events overnight.  No new neurologic complaints. Still some shaking.  ROS reported negative unless otherwise noted.    acetaminophen     Tablet .. 650 milliGRAM(s) Oral every 6 hours PRN  aluminum hydroxide/magnesium hydroxide/simethicone Suspension 30 milliLiter(s) Oral every 4 hours PRN  aspirin  chewable 81 milliGRAM(s) Oral daily  atorvastatin 10 milliGRAM(s) Oral at bedtime  clopidogrel Tablet 75 milliGRAM(s) Oral daily  cyclobenzaprine 10 milliGRAM(s) Oral at bedtime  dextrose 5%. 1000 milliLiter(s) IV Continuous <Continuous>  dextrose 5%. 1000 milliLiter(s) IV Continuous <Continuous>  dextrose 50% Injectable 25 Gram(s) IV Push once  dextrose 50% Injectable 12.5 Gram(s) IV Push once  dextrose 50% Injectable 25 Gram(s) IV Push once  dextrose Oral Gel 15 Gram(s) Oral once PRN  dorzolamide 2% Ophthalmic Solution 1 Drop(s) Both EYES three times a day  folic acid 1 milliGRAM(s) Oral daily  glucagon  Injectable 1 milliGRAM(s) IntraMuscular once  heparin   Injectable 5000 Unit(s) SubCutaneous every 8 hours  insulin glargine Injectable (LANTUS) 15 Unit(s) SubCutaneous at bedtime  insulin lispro (ADMELOG) corrective regimen sliding scale   SubCutaneous three times a day before meals  insulin lispro (ADMELOG) corrective regimen sliding scale   SubCutaneous at bedtime  latanoprost 0.005% Ophthalmic Solution 1 Drop(s) Both EYES at bedtime  melatonin 3 milliGRAM(s) Oral at bedtime PRN  ondansetron Injectable 4 milliGRAM(s) IV Push every 8 hours PRN  pantoprazole    Tablet 40 milliGRAM(s) Oral before breakfast  senna 2 Tablet(s) Oral at bedtime PRN  sodium zirconium cyclosilicate 10 Gram(s) Oral once      PHYSICAL EXAM:   Vital Signs Last 24 Hrs  T(C): 36.6 (01 Nov 2022 05:02), Max: 36.8 (31 Oct 2022 16:23)  T(F): 97.8 (01 Nov 2022 05:02), Max: 98.2 (31 Oct 2022 16:23)  HR: 110 (01 Nov 2022 05:02) (97 - 111)  BP: 147/88 (01 Nov 2022 05:17) (126/67 - 170/81)  BP(mean): 99 (31 Oct 2022 12:00) (99 - 99)  RR: 16 (01 Nov 2022 05:02) (16 - 18)  SpO2: 96% (01 Nov 2022 05:02) (95% - 100%)    Parameters below as of 01 Nov 2022 05:02  Patient On (Oxygen Delivery Method): room air        General: No acute distress, upset towards end of exam     NEUROLOGICAL EXAM:  Mental status: Awake, alert, oriented x3, speech slightly hesitent,  follows simple commands and cross midline , repetition intact, able to ID objects   Cranial Nerves: Right eye blurred vision - sees movement only (chronic x 2 years per patient- glaucoma/cataract), No facial asymmetry, no nystagmus, no dysarthria,  tongue midline  Motor exam: Normal tone, RUE/LUE: 4/5 b/l subtle drifts (reports chronic shoulder pain R>L), LE trace movement against gravity falls < 5 sec (reports due to back pain- chronic)  Sensation: Intact to light touch   Coordination/ Gait: No dysmetria, gait not tested, slightly tremulous       LABS:                        9.7    5.77  )-----------( 218      ( 01 Nov 2022 04:50 )             31.4    11-01    140  |  107  |  16.8  ----------------------------<  154<H>  5.7<H>   |  22.0  |  1.85<H>    Ca    9.1      01 Nov 2022 04:50  Phos  4.4     11-01  Mg     2.1     11-01    TPro  6.8  /  Alb  3.8  /  TBili  <0.2<L>  /  DBili  x   /  AST  22  /  ALT  11  /  AlkPhos  102  10-30  PT/INR - ( 30 Oct 2022 18:00 )   PT: 11.2 sec;   INR: 0.97 ratio         PTT - ( 30 Oct 2022 18:00 )  PTT:27.1 sec      IMAGING: Reviewed by me.   MR Head No Cont (10.31.22 @ 11:27)     Several focal lesions of increased FLAIR signal   hyperintensity LEFT periventricular white matter, RIGHT parietal   subcortical white matter and posterior limb of RIGHT internal capsule   which are nonspecific but may reflect demyelinating disease such as   multiple sclerosis. No acute infarction is seen.    (10.30.22 @ 18:18)   CT BRAIN WITHOUT CONTRAST:  1. No acute intracranial hemorrhage. No large arterial distribution acute   infarct.  2. Proptosis involving the bilateral optic globes is incidentally noted.   Correlate clinically for evidence of thyroid ophthalmopathy.    CT PERFUSION:  Somewhat limited by patient motion during image acquisition.  0 mL core infarction. No active ischemia determined using the   conventional parameter of Tmax greater than 6s. However, when using the   more sensitive but less accurate Tmax >4s, there is 4 mL tissue at risk   for active ischemia in a left periatrial location.  If symptoms persist consider follow up head CT or MRI, MRA  if no   contraindication  CTA COW: Mild deposition of calcified plaque in association with the   right carotid siphon without significant stenosis involvingthe cavernous   portion of the right internal carotid artery. Right P1 segment is   hypoplastic with a relative fetal origin of the right posterior cerebral   artery. Fetal origin of the left posterior cerebral artery. No evidence   of aneurysm formation in association with the major arterial vascular   components at the level of the [place].  CTA NECK:  1. Mild deposition of calcified plaque without significant stenosis at   the origins of the bilateral internal carotid arteries.  2. Hypoplastic appearing bilateral vertebral arteries.     TTE Echo Complete w/ Contrast w/ Doppler (10.31.22 @ 11:36)   Summary:   1. Endocardial visualization was enhanced with intravenous echo contrast.   2. Left ventricular ejection fraction, by visual estimation, is 70 to   75%.   3. Hyperdynamic global left ventricular systolic function.   4. Spectral Doppler shows impaired relaxation pattern of left   ventricular myocardial filling (Grade I diastolic dysfunction).   5. Normal left ventricular internal cavity size.   6. There is mild concentric left ventricular hypertrophy.   7. Normal right ventricular size and function.   8. The left atrium is normal in size.   9. The right atrium is normal in size.  10. Moderate mitral annular calcification.  11. Mild thickening of the anterior and posterior mitral valve leaflets.  12. Trace mitral valve regurgitation.  13. Sclerotic aortic valve with normal opening.  14. There is no evidence of pericardial effusion.            HPI:  71 y/o female with PMH of HTN, DM-2, CAD s/p stents on DAPT at home iwth asa/plavix , CKD-3, chronic back pain came to the ED complaining of difficulty speaking. Patient reported she was shaking when she woke up morning of admission , checked her FS, glucose was fine. Later she noted HA and hard time getting them out; also noted stuttering. She has no weakness, blurry vision, dizziness,  nausea, vomiting, headache.      SUBJECTIVE: No events overnight.  No new neurologic complaints. Still some shaking.  ROS reported negative unless otherwise noted.    acetaminophen     Tablet .. 650 milliGRAM(s) Oral every 6 hours PRN  aluminum hydroxide/magnesium hydroxide/simethicone Suspension 30 milliLiter(s) Oral every 4 hours PRN  aspirin  chewable 81 milliGRAM(s) Oral daily  atorvastatin 10 milliGRAM(s) Oral at bedtime  clopidogrel Tablet 75 milliGRAM(s) Oral daily  cyclobenzaprine 10 milliGRAM(s) Oral at bedtime  dextrose 5%. 1000 milliLiter(s) IV Continuous <Continuous>  dextrose 5%. 1000 milliLiter(s) IV Continuous <Continuous>  dextrose 50% Injectable 25 Gram(s) IV Push once  dextrose 50% Injectable 12.5 Gram(s) IV Push once  dextrose 50% Injectable 25 Gram(s) IV Push once  dextrose Oral Gel 15 Gram(s) Oral once PRN  dorzolamide 2% Ophthalmic Solution 1 Drop(s) Both EYES three times a day  folic acid 1 milliGRAM(s) Oral daily  glucagon  Injectable 1 milliGRAM(s) IntraMuscular once  heparin   Injectable 5000 Unit(s) SubCutaneous every 8 hours  insulin glargine Injectable (LANTUS) 15 Unit(s) SubCutaneous at bedtime  insulin lispro (ADMELOG) corrective regimen sliding scale   SubCutaneous three times a day before meals  insulin lispro (ADMELOG) corrective regimen sliding scale   SubCutaneous at bedtime  latanoprost 0.005% Ophthalmic Solution 1 Drop(s) Both EYES at bedtime  melatonin 3 milliGRAM(s) Oral at bedtime PRN  ondansetron Injectable 4 milliGRAM(s) IV Push every 8 hours PRN  pantoprazole    Tablet 40 milliGRAM(s) Oral before breakfast  senna 2 Tablet(s) Oral at bedtime PRN  sodium zirconium cyclosilicate 10 Gram(s) Oral once      PHYSICAL EXAM:   Vital Signs Last 24 Hrs  T(C): 36.6 (01 Nov 2022 05:02), Max: 36.8 (31 Oct 2022 16:23)  T(F): 97.8 (01 Nov 2022 05:02), Max: 98.2 (31 Oct 2022 16:23)  HR: 110 (01 Nov 2022 05:02) (97 - 111)  BP: 147/88 (01 Nov 2022 05:17) (126/67 - 170/81)  BP(mean): 99 (31 Oct 2022 12:00) (99 - 99)  RR: 16 (01 Nov 2022 05:02) (16 - 18)  SpO2: 96% (01 Nov 2022 05:02) (95% - 100%)    Parameters below as of 01 Nov 2022 05:02  Patient On (Oxygen Delivery Method): room air        General: No acute distress, upset towards end of exam     NEUROLOGICAL EXAM:  Mental status: Awake, alert, oriented x3, speech slightly hesitent,  follows simple commands and cross midline , repetition intact, able to ID objects   Cranial Nerves: Right eye blurred vision - sees movement only (chronic x 2 years per patient- glaucoma/cataract), No facial asymmetry, no nystagmus, no dysarthria,  tongue midline  Motor exam: Normal tone, RUE/LUE: 4/5 b/l subtle drifts (reports chronic shoulder pain R>L), LE trace movement against gravity falls < 5 sec (reports due to back pain- chronic)  Sensation: Intact to light touch   Coordination/ Gait: No dysmetria, gait not tested, slightly tremulous       LABS:                        9.7    5.77  )-----------( 218      ( 01 Nov 2022 04:50 )             31.4    11-01    140  |  107  |  16.8  ----------------------------<  154<H>  5.7<H>   |  22.0  |  1.85<H>    Ca    9.1      01 Nov 2022 04:50  Phos  4.4     11-01  Mg     2.1     11-01    TPro  6.8  /  Alb  3.8  /  TBili  <0.2<L>  /  DBili  x   /  AST  22  /  ALT  11  /  AlkPhos  102  10-30  PT/INR - ( 30 Oct 2022 18:00 )   PT: 11.2 sec;   INR: 0.97 ratio         PTT - ( 30 Oct 2022 18:00 )  PTT:27.1 sec      IMAGING: Reviewed by me.   MR Head No Cont (10.31.22 @ 11:27)     Several focal lesions of increased FLAIR signal   hyperintensity LEFT periventricular white matter, RIGHT parietal   subcortical white matter and posterior limb of RIGHT internal capsule   which are nonspecific but may reflect demyelinating disease such as   multiple sclerosis. No acute infarction is seen.    (10.30.22 @ 18:18)   CT BRAIN WITHOUT CONTRAST:  1. No acute intracranial hemorrhage. No large arterial distribution acute   infarct.  2. Proptosis involving the bilateral optic globes is incidentally noted.   Correlate clinically for evidence of thyroid ophthalmopathy.    CT PERFUSION:  Somewhat limited by patient motion during image acquisition.  0 mL core infarction. No active ischemia determined using the   conventional parameter of Tmax greater than 6s. However, when using the   more sensitive but less accurate Tmax >4s, there is 4 mL tissue at risk   for active ischemia in a left periatrial location.  If symptoms persist consider follow up head CT or MRI, MRA  if no   contraindication  CTA COW: Mild deposition of calcified plaque in association with the   right carotid siphon without significant stenosis involvingthe cavernous   portion of the right internal carotid artery. Right P1 segment is   hypoplastic with a relative fetal origin of the right posterior cerebral   artery. Fetal origin of the left posterior cerebral artery. No evidence   of aneurysm formation in association with the major arterial vascular   components at the level of the [place] of [place].  CTA NECK:  1. Mild deposition of calcified plaque without significant stenosis at   the origins of the bilateral internal carotid arteries.  2. Hypoplastic appearing bilateral vertebral arteries.     TTE Echo Complete w/ Contrast w/ Doppler (10.31.22 @ 11:36)   Summary:   1. Endocardial visualization was enhanced with intravenous echo contrast.   2. Left ventricular ejection fraction, by visual estimation, is 70 to   75%.   3. Hyperdynamic global left ventricular systolic function.   4. Spectral Doppler shows impaired relaxation pattern of left   ventricular myocardial filling (Grade I diastolic dysfunction).   5. Normal left ventricular internal cavity size.   6. There is mild concentric left ventricular hypertrophy.   7. Normal right ventricular size and function.   8. The left atrium is normal in size.   9. The right atrium is normal in size.  10. Moderate mitral annular calcification.  11. Mild thickening of the anterior and posterior mitral valve leaflets.  12. Trace mitral valve regurgitation.  13. Sclerotic aortic valve with normal opening.  14. There is no evidence of pericardial effusion.

## 2022-11-01 NOTE — PROGRESS NOTE ADULT - ASSESSMENT
71 y/o female with PMH of HTN, DM-2, CAD s/p stents, CKD-3, chronic back pain came to the ED complaining of difficulty speaking. Patient reported she was shaking when she woke up this morning, checked her FS, glucose was fine. Later she noted HA and difficulty articulating words. Her words are not slurred but hard time getting them out; also noted stuttering. CT head/CT angio head/neck: no acute finding. Patient was a code stroke in the ED.     Expressive aphasia, ?MS  CT head no acute intracranial finding   MRI;   Several focal lesions of increased FLAIR signal   hyperintensity LEFT periventricular white matter, RIGHT parietal   subcortical white matter and posterior limb of RIGHT internal capsule   which are nonspecific but may reflect demyelinating disease such as   multiple sclerosis. No acute infarction is seen  Obtain Spine MRI to assist w/ diagnosis of MS   Aspirin 324mg given in the ED, will continue home dose of Aspirin 81mg   Atorvastatin 10mg   HbA1C and Lipid panel with AM lab  Echo ordered   Neuro checks every 4 hours   Neuro consulted - F/U PT and MRI spine    HTN/CAD  Hold BP medications for permissive HTN, restart bp meds as per neuro  HOLD Metoprolol ER 100mg   HOLD Benazepril 20mg   c/w Plavix 75mg   c/w Asprin and Statin   Monitor BP     CKD-3 with hyperkalemia   K: 5.5 -> 5.2 -> 5.7  Cr Stable   IVF given in the ED   lokelma 10mg Stat  trend bmp daily    DM-2   Hold Metformin 1000mg bid   c/w Lantus 15 units Q24  Insulin sliding scale     Healthcare Maintenance  DVT prophylaxis: Heparin sc   Diet: DASH    Plan of care discussed with patient

## 2022-11-01 NOTE — DISCHARGE NOTE NURSING/CASE MANAGEMENT/SOCIAL WORK - HAVE YOU RECEIVED AT LEAST TWO PFIZER AND/OR MODERNA VACCINATIONS (IN ANY COMBINATION) AND/OR ONE JOHNSON & JOHNSON VACCINATION?
Yes
I was present for and supervised the key/critical aspects of the procedures performed during the care of the patient.

## 2022-11-02 LAB
ANION GAP SERPL CALC-SCNC: 13 MMOL/L — SIGNIFICANT CHANGE UP (ref 5–17)
AUTO DIFF PNL BLD: NEGATIVE — SIGNIFICANT CHANGE UP
BUN SERPL-MCNC: 21.5 MG/DL — HIGH (ref 8–20)
C-ANCA SER-ACNC: NEGATIVE — SIGNIFICANT CHANGE UP
CALCIUM SERPL-MCNC: 9 MG/DL — SIGNIFICANT CHANGE UP (ref 8.4–10.5)
CHLORIDE SERPL-SCNC: 109 MMOL/L — HIGH (ref 96–108)
CO2 SERPL-SCNC: 21 MMOL/L — LOW (ref 22–29)
CREAT SERPL-MCNC: 1.81 MG/DL — HIGH (ref 0.5–1.3)
EGFR: 30 ML/MIN/1.73M2 — LOW
GLUCOSE BLDC GLUCOMTR-MCNC: 139 MG/DL — HIGH (ref 70–99)
GLUCOSE BLDC GLUCOMTR-MCNC: 144 MG/DL — HIGH (ref 70–99)
GLUCOSE BLDC GLUCOMTR-MCNC: 185 MG/DL — HIGH (ref 70–99)
GLUCOSE BLDC GLUCOMTR-MCNC: 200 MG/DL — HIGH (ref 70–99)
GLUCOSE SERPL-MCNC: 144 MG/DL — HIGH (ref 70–99)
HCT VFR BLD CALC: 32.3 % — LOW (ref 34.5–45)
HGB BLD-MCNC: 10 G/DL — LOW (ref 11.5–15.5)
MCHC RBC-ENTMCNC: 27.4 PG — SIGNIFICANT CHANGE UP (ref 27–34)
MCHC RBC-ENTMCNC: 31 GM/DL — LOW (ref 32–36)
MCV RBC AUTO: 88.5 FL — SIGNIFICANT CHANGE UP (ref 80–100)
MPO AB + PR3 PNL SER: SIGNIFICANT CHANGE UP
P-ANCA SER-ACNC: NEGATIVE — SIGNIFICANT CHANGE UP
PLATELET # BLD AUTO: 233 K/UL — SIGNIFICANT CHANGE UP (ref 150–400)
POTASSIUM SERPL-MCNC: 5.2 MMOL/L — SIGNIFICANT CHANGE UP (ref 3.5–5.3)
POTASSIUM SERPL-SCNC: 5.2 MMOL/L — SIGNIFICANT CHANGE UP (ref 3.5–5.3)
RBC # BLD: 3.65 M/UL — LOW (ref 3.8–5.2)
RBC # FLD: 14.9 % — HIGH (ref 10.3–14.5)
SODIUM SERPL-SCNC: 142 MMOL/L — SIGNIFICANT CHANGE UP (ref 135–145)
WBC # BLD: 6.04 K/UL — SIGNIFICANT CHANGE UP (ref 3.8–10.5)
WBC # FLD AUTO: 6.04 K/UL — SIGNIFICANT CHANGE UP (ref 3.8–10.5)

## 2022-11-02 PROCEDURE — 99232 SBSQ HOSP IP/OBS MODERATE 35: CPT

## 2022-11-02 PROCEDURE — 72141 MRI NECK SPINE W/O DYE: CPT | Mod: 26

## 2022-11-02 RX ADMIN — SENNA PLUS 2 TABLET(S): 8.6 TABLET ORAL at 22:31

## 2022-11-02 RX ADMIN — Medication 81 MILLIGRAM(S): at 11:44

## 2022-11-02 RX ADMIN — HEPARIN SODIUM 5000 UNIT(S): 5000 INJECTION INTRAVENOUS; SUBCUTANEOUS at 06:22

## 2022-11-02 RX ADMIN — CLOPIDOGREL BISULFATE 75 MILLIGRAM(S): 75 TABLET, FILM COATED ORAL at 11:45

## 2022-11-02 RX ADMIN — PANTOPRAZOLE SODIUM 40 MILLIGRAM(S): 20 TABLET, DELAYED RELEASE ORAL at 06:22

## 2022-11-02 RX ADMIN — POLYETHYLENE GLYCOL 3350 17 GRAM(S): 17 POWDER, FOR SOLUTION ORAL at 19:52

## 2022-11-02 RX ADMIN — INSULIN GLARGINE 15 UNIT(S): 100 INJECTION, SOLUTION SUBCUTANEOUS at 22:35

## 2022-11-02 RX ADMIN — DORZOLAMIDE HYDROCHLORIDE 1 DROP(S): 20 SOLUTION/ DROPS OPHTHALMIC at 22:29

## 2022-11-02 RX ADMIN — HEPARIN SODIUM 5000 UNIT(S): 5000 INJECTION INTRAVENOUS; SUBCUTANEOUS at 22:30

## 2022-11-02 RX ADMIN — DORZOLAMIDE HYDROCHLORIDE 1 DROP(S): 20 SOLUTION/ DROPS OPHTHALMIC at 06:22

## 2022-11-02 RX ADMIN — CYCLOBENZAPRINE HYDROCHLORIDE 10 MILLIGRAM(S): 10 TABLET, FILM COATED ORAL at 22:29

## 2022-11-02 RX ADMIN — DORZOLAMIDE HYDROCHLORIDE 1 DROP(S): 20 SOLUTION/ DROPS OPHTHALMIC at 11:43

## 2022-11-02 RX ADMIN — ATORVASTATIN CALCIUM 10 MILLIGRAM(S): 80 TABLET, FILM COATED ORAL at 22:28

## 2022-11-02 RX ADMIN — HEPARIN SODIUM 5000 UNIT(S): 5000 INJECTION INTRAVENOUS; SUBCUTANEOUS at 11:44

## 2022-11-02 RX ADMIN — Medication 1 MILLIGRAM(S): at 11:45

## 2022-11-02 RX ADMIN — LATANOPROST 1 DROP(S): 0.05 SOLUTION/ DROPS OPHTHALMIC; TOPICAL at 22:30

## 2022-11-02 RX ADMIN — Medication 1: at 11:44

## 2022-11-02 NOTE — PROGRESS NOTE ADULT - ASSESSMENT
71 y/o female with PMH of HTN, DM-2, CAD s/p stents, CKD-3, chronic back pain came to the ED complaining of difficulty speaking. Patient reported she was shaking when she woke up this morning, checked her FS, glucose was fine. Later she noted HA and difficulty articulating words. Her words are not slurred but hard time getting them out; also noted stuttering. CT head/CT angio head/neck: no acute finding. Patient was a code stroke in the ED.     Expressive aphasia, ?MS  CT head no acute intracranial finding   MRI;   Several focal lesions of increased FLAIR signal   hyperintensity LEFT periventricular white matter, RIGHT parietal   subcortical white matter and posterior limb of RIGHT internal capsule   which are nonspecific but may reflect demyelinating disease such as   multiple sclerosis. No acute infarction is seen  Obtain Spine MRI to assist w/ diagnosis of MS   Aspirin 324mg given in the ED, will continue home dose of Aspirin 81mg   Atorvastatin 10mg   HbA1C and Lipid panel with AM lab  Echo ordered   Neuro checks every 4 hours   Neuro consulted - F/U PT and MRI C Spine    HTN/CAD  Hold BP medications for permissive HTN, restart bp meds as per neuro  HOLD Metoprolol ER 100mg   HOLD Benazepril 20mg   c/w Plavix 75mg   c/w Asprin and Statin   Monitor BP     CKD-3 with hyperkalemia   K: 5.5 -> 5.2 -> 5.7 -> 4.7 -> 5.2  Cr Stable   IVF given in the ED   trend bmp daily    DM-2   Hold Metformin 1000mg bid   c/w Lantus 15 units Q24  Insulin sliding scale     Healthcare Maintenance  DVT prophylaxis: Heparin sc   Diet: DASH    Plan of care discussed with patient

## 2022-11-02 NOTE — PROGRESS NOTE ADULT - ASSESSMENT
ASSESSMENT: 73 y/o women with PMH of HTN, DM-2, CAD s/p stents on DAPT at home with asa/plavix , CKD-3, chronic back pain came to the ED complaining of difficulty speaking on the morning of 10/31/22.  CTH without evidence of acute hemorrhage or stroke, CTA with no significant flow limiting stenosis, right carotid calcified plaque, hypoplastic right P1, fetal left PCA, mild b/l ICA calcified plaque without flow limiting stenosis. Hypoplastic b/l vertebral arteries. MRI without  acute infarction, there is evidence of white matter disease which continued risk factor control should be encouraged, TIA can not be excluded. There are multiple flair hyperintensities which can be further evaluated to rule out demyelinating disease or vasculitis.        NEURO: neurologically without acute change, appears overall improved since presentation, Continue close monitoring for neurologic deterioration, permissive  to 180mmhg, gradual titration as tolerated long term, home statin regimen as LDL < goal of 70, MRI Brain as noted, d/w Dr. Becerra- obtain MRI C spine non con to screen for further lesions as noted on MRI, EEG results pending, vasculitis labs sent, seizure precautions, evaluation for tremor, she would need close outpatient neurology follow up as well. Physical therapy/OT/Speech eval/treatment.     ANTITHROMBOTIC THERAPY: home regimen DAPT with asa and clopidogrel- can check P2Y12 to ensure adequate response     TTE- as noted, 30 day holter to screen for occult arrhythmia, further monitoring pending outpatient follow up and workup                 dysphagia screen passed 10/31/22 0117     Diet: advance as tolerated      maintain adequate hydration, avoid hypotension and rapid fluctuations      patient should have all age and risk malignancy screenings      DVT ppx: LMWH [x]     screen for infectious process     OTHER: condition and plan of care d/w patient , questions and concerns addressed. She appears upset about the event, support provided. Slightly improved today. continue to monitor . if persists recommend Behavioral health assessment.     DISPOSITION: Rehab or home depending on PT eval once stable and workup is complete    CORE MEASURES:        Admission NIHSS:1      TPA: [] YES [x] NO      LDL/HDL: 52/55     Depression Screen: as noted      Statin Therapy: y      Dysphagia Screen: [x] PASS [] FAIL     Smoking [] YES [x] NO      Afib [] YES [x] NO     Stroke Education [x] YES [] NO      ASSESSMENT: 73 y/o women with PMH of HTN, DM-2, CAD s/p stents on DAPT at home with asa/plavix , CKD-3, chronic back pain came to the ED complaining of difficulty speaking on the morning of 10/31/22.  CTH without evidence of acute hemorrhage or stroke, CTA with no significant flow limiting stenosis, right carotid calcified plaque, hypoplastic right P1, fetal left PCA, mild b/l ICA calcified plaque without flow limiting stenosis. Hypoplastic b/l vertebral arteries. MRI without  acute infarction, there is evidence of white matter disease which continued risk factor control should be encouraged, TIA can not be excluded. There are multiple flair hyperintensities which can be further evaluated to rule out demyelinating disease or vasculitis.        NEURO: neurologically without acute change, appears overall improved since presentation, Continue close monitoring for neurologic deterioration, permissive  to 180mmhg, gradual titration as tolerated long term, home statin regimen as LDL < goal of 70, MRI Brain as noted, pending MRI C spine non con to screen for further lesions as noted on MRI, EEG  Normal EEG study. No epileptiform pattern or seizure seen., vasculitis labs sent, seizure precautions, evaluation for tremor, she would need close outpatient neurology follow up as well. Physical therapy/OT/Speech eval/treatment.     ANTITHROMBOTIC THERAPY: home regimen DAPT with asa and clopidogrel-  P2Y12 224- would repeat      TTE- as noted, 30 day holter to screen for occult arrhythmia, further monitoring pending outpatient follow up and workup                 dysphagia screen passed 10/31/22 0117     Diet: advance as tolerated      maintain adequate hydration, avoid hypotension and rapid fluctuations      patient should have all age and risk malignancy screenings      DVT ppx: LMWH [x]     screen for infectious process     OTHER: condition and plan of care d/w patient , questions and concerns addressed. She appears upset about the event, support provided. Slightly improved today. continue to monitor . if persists recommend Behavioral health assessment.     DISPOSITION: Rehab or home depending on PT eval once stable and workup is complete    CORE MEASURES:        Admission NIHSS:1      TPA: [] YES [x] NO      LDL/HDL: 52/55     Depression Screen: as noted      Statin Therapy: y      Dysphagia Screen: [x] PASS [] FAIL     Smoking [] YES [x] NO      Afib [] YES [x] NO     Stroke Education [x] YES [] NO

## 2022-11-02 NOTE — PROGRESS NOTE ADULT - SUBJECTIVE AND OBJECTIVE BOX
Preliminary note, official recommendations pending attending signature/review     HPI:  71 y/o female with PMH of HTN, DM-2, CAD s/p stents on DAPT at home iwth asa/plavix , CKD-3, chronic back pain came to the ED complaining of difficulty speaking. Patient reported she was shaking when she woke up morning of admission , checked her FS, glucose was fine. Later she noted HA and hard time getting them out; also noted stuttering. She has no weakness, blurry vision, dizziness,  nausea, vomiting, headache.      SUBJECTIVE: No events overnight.  No new neurologic complaints.  ROS reported negative unless otherwise noted.    acetaminophen     Tablet .. 650 milliGRAM(s) Oral every 6 hours PRN  aluminum hydroxide/magnesium hydroxide/simethicone Suspension 30 milliLiter(s) Oral every 4 hours PRN  aspirin  chewable 81 milliGRAM(s) Oral daily  atorvastatin 10 milliGRAM(s) Oral at bedtime  clopidogrel Tablet 75 milliGRAM(s) Oral daily  cyclobenzaprine 10 milliGRAM(s) Oral at bedtime  dextrose 5%. 1000 milliLiter(s) IV Continuous <Continuous>  dextrose 5%. 1000 milliLiter(s) IV Continuous <Continuous>  dextrose 50% Injectable 25 Gram(s) IV Push once  dextrose 50% Injectable 12.5 Gram(s) IV Push once  dextrose 50% Injectable 25 Gram(s) IV Push once  dextrose Oral Gel 15 Gram(s) Oral once PRN  dorzolamide 2% Ophthalmic Solution 1 Drop(s) Both EYES three times a day  folic acid 1 milliGRAM(s) Oral daily  glucagon  Injectable 1 milliGRAM(s) IntraMuscular once  heparin   Injectable 5000 Unit(s) SubCutaneous every 8 hours  insulin glargine Injectable (LANTUS) 15 Unit(s) SubCutaneous at bedtime  insulin lispro (ADMELOG) corrective regimen sliding scale   SubCutaneous three times a day before meals  insulin lispro (ADMELOG) corrective regimen sliding scale   SubCutaneous at bedtime  latanoprost 0.005% Ophthalmic Solution 1 Drop(s) Both EYES at bedtime  melatonin 3 milliGRAM(s) Oral at bedtime PRN  ondansetron Injectable 4 milliGRAM(s) IV Push every 8 hours PRN  pantoprazole    Tablet 40 milliGRAM(s) Oral before breakfast  polyethylene glycol 3350 17 Gram(s) Oral daily PRN  senna 2 Tablet(s) Oral at bedtime  senna 2 Tablet(s) Oral at bedtime PRN      PHYSICAL EXAM:   Vital Signs Last 24 Hrs  T(C): 36.6 (02 Nov 2022 05:03), Max: 36.8 (01 Nov 2022 16:44)  T(F): 97.8 (02 Nov 2022 05:03), Max: 98.2 (01 Nov 2022 16:44)  HR: 105 (02 Nov 2022 05:03) (100 - 114)  BP: 145/85 (02 Nov 2022 05:03) (131/81 - 145/85)  BP(mean): --  RR: 20 (02 Nov 2022 05:03) (18 - 20)  SpO2: 97% (02 Nov 2022 05:03) (93% - 97%)    Parameters below as of 02 Nov 2022 05:03  Patient On (Oxygen Delivery Method): room air        General: No acute distress    NEUROLOGICAL EXAM:  Mental status: Awake, alert, oriented x3, speech slightly hesitant  follows simple commands and cross midline , repetition intact, able to ID objects   Cranial Nerves: Right eye blurred vision - sees movement only (chronic x 2 years per patient- glaucoma/cataract), No facial asymmetry, no nystagmus, no dysarthria,  tongue midline  Motor exam: Normal tone, RUE/LUE: 4/5 b/l subtle drifts (reports chronic shoulder pain R>L), LE trace movement against gravity- holds for 5 sec  (reports due to back pain- chronic)  Sensation: Intact to light touch   Coordination/ Gait: No dysmetria, gait not tested, slightly tremulous intermittently     LABS:                        10.0   6.04  )-----------( 233      ( 02 Nov 2022 05:21 )             32.3    11-02    142  |  109<H>  |  21.5<H>  ----------------------------<  144<H>  5.2   |  21.0<L>  |  1.81<H>    Ca    9.0      02 Nov 2022 05:21  Phos  4.4     11-01  Mg     2.1     11-01          IMAGING: Reviewed by me.   MR Head No Cont (10.31.22 @ 11:27)     Several focal lesions of increased FLAIR signal   hyperintensity LEFT periventricular white matter, RIGHT parietal   subcortical white matter and posterior limb of RIGHT internal capsule   which are nonspecific but may reflect demyelinating disease such as   multiple sclerosis. No acute infarction is seen.    (10.30.22 @ 18:18)   CT BRAIN WITHOUT CONTRAST:  1. No acute intracranial hemorrhage. No large arterial distribution acute   infarct.  2. Proptosis involving the bilateral optic globes is incidentally noted.   Correlate clinically for evidence of thyroid ophthalmopathy.    CT PERFUSION:  Somewhat limited by patient motion during image acquisition.  0 mL core infarction. No active ischemia determined using the   conventional parameter of Tmax greater than 6s. However, when using the   more sensitive but less accurate Tmax >4s, there is 4 mL tissue at risk   for active ischemia in a left periatrial location.  If symptoms persist consider follow up head CT or MRI, MRA  if no   contraindication  CTA COW: Mild deposition of calcified plaque in association with the   right carotid siphon without significant stenosis involvingthe cavernous   portion of the right internal carotid artery. Right P1 segment is   hypoplastic with a relative fetal origin of the right posterior cerebral   artery. Fetal origin of the left posterior cerebral artery. No evidence   of aneurysm formation in association with the major arterial vascular   components at the level of the [place].  CTA NECK:  1. Mild deposition of calcified plaque without significant stenosis at   the origins of the bilateral internal carotid arteries.  2. Hypoplastic appearing bilateral vertebral arteries.     TTE Echo Complete w/ Contrast w/ Doppler (10.31.22 @ 11:36)   Summary:   1. Endocardial visualization was enhanced with intravenous echo contrast.   2. Left ventricular ejection fraction, by visual estimation, is 70 to   75%.   3. Hyperdynamic global left ventricular systolic function.   4. Spectral Doppler shows impaired relaxation pattern of left   ventricular myocardial filling (Grade I diastolic dysfunction).   5. Normal left ventricular internal cavity size.   6. There is mild concentric left ventricular hypertrophy.   7. Normal right ventricular size and function.   8. The left atrium is normal in size.   9. The right atrium is normal in size.  10. Moderate mitral annular calcification.  11. Mild thickening of the anterior and posterior mitral valve leaflets.  12. Trace mitral valve regurgitation.  13. Sclerotic aortic valve with normal opening.  14. There is no evidence of pericardial effusion.        HPI:  71 y/o female with PMH of HTN, DM-2, CAD s/p stents on DAPT at home iwth asa/plavix , CKD-3, chronic back pain came to the ED complaining of difficulty speaking. Patient reported she was shaking when she woke up morning of admission , checked her FS, glucose was fine. Later she noted HA and hard time getting them out; also noted stuttering. She has no weakness, blurry vision, dizziness,  nausea, vomiting, headache.      SUBJECTIVE: No events overnight.  No new neurologic complaints.  ROS reported negative unless otherwise noted.    acetaminophen     Tablet .. 650 milliGRAM(s) Oral every 6 hours PRN  aluminum hydroxide/magnesium hydroxide/simethicone Suspension 30 milliLiter(s) Oral every 4 hours PRN  aspirin  chewable 81 milliGRAM(s) Oral daily  atorvastatin 10 milliGRAM(s) Oral at bedtime  clopidogrel Tablet 75 milliGRAM(s) Oral daily  cyclobenzaprine 10 milliGRAM(s) Oral at bedtime  dextrose 5%. 1000 milliLiter(s) IV Continuous <Continuous>  dextrose 5%. 1000 milliLiter(s) IV Continuous <Continuous>  dextrose 50% Injectable 25 Gram(s) IV Push once  dextrose 50% Injectable 12.5 Gram(s) IV Push once  dextrose 50% Injectable 25 Gram(s) IV Push once  dextrose Oral Gel 15 Gram(s) Oral once PRN  dorzolamide 2% Ophthalmic Solution 1 Drop(s) Both EYES three times a day  folic acid 1 milliGRAM(s) Oral daily  glucagon  Injectable 1 milliGRAM(s) IntraMuscular once  heparin   Injectable 5000 Unit(s) SubCutaneous every 8 hours  insulin glargine Injectable (LANTUS) 15 Unit(s) SubCutaneous at bedtime  insulin lispro (ADMELOG) corrective regimen sliding scale   SubCutaneous three times a day before meals  insulin lispro (ADMELOG) corrective regimen sliding scale   SubCutaneous at bedtime  latanoprost 0.005% Ophthalmic Solution 1 Drop(s) Both EYES at bedtime  melatonin 3 milliGRAM(s) Oral at bedtime PRN  ondansetron Injectable 4 milliGRAM(s) IV Push every 8 hours PRN  pantoprazole    Tablet 40 milliGRAM(s) Oral before breakfast  polyethylene glycol 3350 17 Gram(s) Oral daily PRN  senna 2 Tablet(s) Oral at bedtime  senna 2 Tablet(s) Oral at bedtime PRN      PHYSICAL EXAM:   Vital Signs Last 24 Hrs  T(C): 36.6 (02 Nov 2022 05:03), Max: 36.8 (01 Nov 2022 16:44)  T(F): 97.8 (02 Nov 2022 05:03), Max: 98.2 (01 Nov 2022 16:44)  HR: 105 (02 Nov 2022 05:03) (100 - 114)  BP: 145/85 (02 Nov 2022 05:03) (131/81 - 145/85)  BP(mean): --  RR: 20 (02 Nov 2022 05:03) (18 - 20)  SpO2: 97% (02 Nov 2022 05:03) (93% - 97%)    Parameters below as of 02 Nov 2022 05:03  Patient On (Oxygen Delivery Method): room air        General: No acute distress    NEUROLOGICAL EXAM:  Mental status: Awake, alert, oriented x3, speech slightly hesitant  follows simple commands and cross midline , repetition intact, able to ID objects   Cranial Nerves: Right eye blurred vision - sees movement only (chronic x 2 years per patient- glaucoma/cataract), No facial asymmetry, no nystagmus, no dysarthria,  tongue midline  Motor exam: Normal tone, RUE/LUE: 4/5 b/l subtle drifts (reports chronic shoulder pain R>L), LE trace movement against gravity- holds for 5 sec  (reports due to back pain- chronic)  Sensation: Intact to light touch   Coordination/ Gait: No dysmetria, gait not tested, slightly tremulous intermittently     LABS:                        10.0   6.04  )-----------( 233      ( 02 Nov 2022 05:21 )             32.3    11-02    142  |  109<H>  |  21.5<H>  ----------------------------<  144<H>  5.2   |  21.0<L>  |  1.81<H>    Ca    9.0      02 Nov 2022 05:21  Phos  4.4     11-01  Mg     2.1     11-01          IMAGING: Reviewed by me.   MR Head No Cont (10.31.22 @ 11:27)     Several focal lesions of increased FLAIR signal   hyperintensity LEFT periventricular white matter, RIGHT parietal   subcortical white matter and posterior limb of RIGHT internal capsule   which are nonspecific but may reflect demyelinating disease such as   multiple sclerosis. No acute infarction is seen.    (10.30.22 @ 18:18)   CT BRAIN WITHOUT CONTRAST:  1. No acute intracranial hemorrhage. No large arterial distribution acute   infarct.  2. Proptosis involving the bilateral optic globes is incidentally noted.   Correlate clinically for evidence of thyroid ophthalmopathy.    CT PERFUSION:  Somewhat limited by patient motion during image acquisition.  0 mL core infarction. No active ischemia determined using the   conventional parameter of Tmax greater than 6s. However, when using the   more sensitive but less accurate Tmax >4s, there is 4 mL tissue at risk   for active ischemia in a left periatrial location.  If symptoms persist consider follow up head CT or MRI, MRA  if no   contraindication  CTA COW: Mild deposition of calcified plaque in association with the   right carotid siphon without significant stenosis involvingthe cavernous   portion of the right internal carotid artery. Right P1 segment is   hypoplastic with a relative fetal origin of the right posterior cerebral   artery. Fetal origin of the left posterior cerebral artery. No evidence   of aneurysm formation in association with the major arterial vascular   components at the level of the [place] of [place].  CTA NECK:  1. Mild deposition of calcified plaque without significant stenosis at   the origins of the bilateral internal carotid arteries.  2. Hypoplastic appearing bilateral vertebral arteries.     TTE Echo Complete w/ Contrast w/ Doppler (10.31.22 @ 11:36)   Summary:   1. Endocardial visualization was enhanced with intravenous echo contrast.   2. Left ventricular ejection fraction, by visual estimation, is 70 to   75%.   3. Hyperdynamic global left ventricular systolic function.   4. Spectral Doppler shows impaired relaxation pattern of left   ventricular myocardial filling (Grade I diastolic dysfunction).   5. Normal left ventricular internal cavity size.   6. There is mild concentric left ventricular hypertrophy.   7. Normal right ventricular size and function.   8. The left atrium is normal in size.   9. The right atrium is normal in size.  10. Moderate mitral annular calcification.  11. Mild thickening of the anterior and posterior mitral valve leaflets.  12. Trace mitral valve regurgitation.  13. Sclerotic aortic valve with normal opening.  14. There is no evidence of pericardial effusion.

## 2022-11-02 NOTE — PROGRESS NOTE ADULT - NS ATTEND AMEND GEN_ALL_CORE FT
Patient was seen and examined by me. History and exam as documented above by PA/NP was confirmed by me.  Agree with plan as outlined above.
Patient was seen and examined by me. History and exam as documented above by PA/NP was confirmed by me.  Agree with plan as outlined above.

## 2022-11-02 NOTE — PROGRESS NOTE ADULT - SUBJECTIVE AND OBJECTIVE BOX
[facility] Division of Hospital Medicine    Chief Complaint:  Expressive Aphasia    SUBJECTIVE / OVERNIGHT EVENTS:  Pt seen at bedside, in NAD, complaining of chronic R arm and lower back pain. Patient denies chest pain, SOB, abd pain, N/V, fever, chills, dysuria or any other complaints. All remainder ROS negative.       MEDICATIONS  (STANDING):  aspirin  chewable 81 milliGRAM(s) Oral daily  atorvastatin 10 milliGRAM(s) Oral at bedtime  clopidogrel Tablet 75 milliGRAM(s) Oral daily  cyclobenzaprine 10 milliGRAM(s) Oral at bedtime  dextrose 5%. 1000 milliLiter(s) (100 mL/Hr) IV Continuous <Continuous>  dextrose 5%. 1000 milliLiter(s) (50 mL/Hr) IV Continuous <Continuous>  dextrose 50% Injectable 25 Gram(s) IV Push once  dextrose 50% Injectable 12.5 Gram(s) IV Push once  dextrose 50% Injectable 25 Gram(s) IV Push once  dorzolamide 2% Ophthalmic Solution 1 Drop(s) Both EYES three times a day  folic acid 1 milliGRAM(s) Oral daily  glucagon  Injectable 1 milliGRAM(s) IntraMuscular once  heparin   Injectable 5000 Unit(s) SubCutaneous every 8 hours  insulin glargine Injectable (LANTUS) 15 Unit(s) SubCutaneous at bedtime  insulin lispro (ADMELOG) corrective regimen sliding scale   SubCutaneous three times a day before meals  insulin lispro (ADMELOG) corrective regimen sliding scale   SubCutaneous at bedtime  latanoprost 0.005% Ophthalmic Solution 1 Drop(s) Both EYES at bedtime  pantoprazole    Tablet 40 milliGRAM(s) Oral before breakfast  senna 2 Tablet(s) Oral at bedtime    MEDICATIONS  (PRN):  acetaminophen     Tablet .. 650 milliGRAM(s) Oral every 6 hours PRN Temp greater or equal to 38C (100.4F), Mild Pain (1 - 3)  aluminum hydroxide/magnesium hydroxide/simethicone Suspension 30 milliLiter(s) Oral every 4 hours PRN Dyspepsia  dextrose Oral Gel 15 Gram(s) Oral once PRN Blood Glucose LESS THAN 70 milliGRAM(s)/deciliter  melatonin 3 milliGRAM(s) Oral at bedtime PRN Insomnia  ondansetron Injectable 4 milliGRAM(s) IV Push every 8 hours PRN Nausea and/or Vomiting  polyethylene glycol 3350 17 Gram(s) Oral daily PRN Constipation  senna 2 Tablet(s) Oral at bedtime PRN Constipation        I&O's Summary      PHYSICAL EXAM:  Vital Signs Last 24 Hrs  T(C): 36.5 (02 Nov 2022 10:43), Max: 36.8 (01 Nov 2022 16:44)  T(F): 97.7 (02 Nov 2022 10:43), Max: 98.2 (01 Nov 2022 16:44)  HR: 104 (02 Nov 2022 10:43) (104 - 114)  BP: 111/74 (02 Nov 2022 10:43) (111/74 - 145/85)  BP(mean): --  RR: 18 (02 Nov 2022 10:43) (18 - 20)  SpO2: 96% (02 Nov 2022 10:43) (94% - 97%)    Parameters below as of 02 Nov 2022 10:43  Patient On (Oxygen Delivery Method): room air        General: in no acute distress  Eyes: PERRLA, EOMI; conjunctiva and sclera clear  Head: Normocephalic; atraumatic  ENMT: No nasal discharge; airway clear  Neck: Supple; non tender; no masses  Respiratory: No wheezes, rales or rhonchi  Cardiovascular: Regular rate and rhythm. S1 and S2 Normal; No murmurs, gallops or rubs  Gastrointestinal: Soft non-tender non-distended; Normal bowel sounds  Genitourinary: No costovertebral angle tenderness  Extremities: Normal range of motion, No clubbing, cyanosis or edema  Vascular: Peripheral pulses palpable 2+ bilaterally  Neurological: Alert and oriented x4  Skin: Warm and dry. No acute rash  Lymph Nodes: No acute cervical adenopathy  Musculoskeletal: Normal gait, tone, without deformities  Psychiatric: Cooperative and appropriate    LABS:                        10.0   6.04  )-----------( 233      ( 02 Nov 2022 05:21 )             32.3     11-02    142  |  109<H>  |  21.5<H>  ----------------------------<  144<H>  5.2   |  21.0<L>  |  1.81<H>    Ca    9.0      02 Nov 2022 05:21  Phos  4.4     11-01  Mg     2.1     11-01                Culture - Urine (collected 30 Oct 2022 19:52)  Source: Clean Catch Clean Catch (Midstream)  Final Report (31 Oct 2022 22:33):    <10,000 CFU/mL Normal Urogenital Reena      CAPILLARY BLOOD GLUCOSE      POCT Blood Glucose.: 185 mg/dL (02 Nov 2022 11:40)  POCT Blood Glucose.: 139 mg/dL (02 Nov 2022 08:13)  POCT Blood Glucose.: 162 mg/dL (01 Nov 2022 21:52)  POCT Blood Glucose.: 161 mg/dL (01 Nov 2022 17:30)  POCT Blood Glucose.: 142 mg/dL (01 Nov 2022 12:45)        RADIOLOGY & ADDITIONAL TESTS:  Results Reviewed: y  Imaging Personally Reviewed: n  Electrocardiogram Personally Reviewed: danny

## 2022-11-03 ENCOUNTER — TRANSCRIPTION ENCOUNTER (OUTPATIENT)
Age: 72
End: 2022-11-03

## 2022-11-03 VITALS
HEART RATE: 103 BPM | OXYGEN SATURATION: 97 % | DIASTOLIC BLOOD PRESSURE: 74 MMHG | TEMPERATURE: 98 F | SYSTOLIC BLOOD PRESSURE: 121 MMHG | RESPIRATION RATE: 18 BRPM

## 2022-11-03 LAB
ANA TITR SER: NEGATIVE — SIGNIFICANT CHANGE UP
ANION GAP SERPL CALC-SCNC: 14 MMOL/L — SIGNIFICANT CHANGE UP (ref 5–17)
ANION GAP SERPL CALC-SCNC: 14 MMOL/L — SIGNIFICANT CHANGE UP (ref 5–17)
ANTI-RIBONUCLEAR PROTEIN: <0.2 AI — SIGNIFICANT CHANGE UP
BUN SERPL-MCNC: 19.9 MG/DL — SIGNIFICANT CHANGE UP (ref 8–20)
BUN SERPL-MCNC: 22.7 MG/DL — HIGH (ref 8–20)
CALCIUM SERPL-MCNC: 9.1 MG/DL — SIGNIFICANT CHANGE UP (ref 8.4–10.5)
CALCIUM SERPL-MCNC: 9.1 MG/DL — SIGNIFICANT CHANGE UP (ref 8.4–10.5)
CHLORIDE SERPL-SCNC: 109 MMOL/L — HIGH (ref 96–108)
CHLORIDE SERPL-SCNC: 110 MMOL/L — HIGH (ref 96–108)
CO2 SERPL-SCNC: 19 MMOL/L — LOW (ref 22–29)
CO2 SERPL-SCNC: 21 MMOL/L — LOW (ref 22–29)
CREAT SERPL-MCNC: 1.81 MG/DL — HIGH (ref 0.5–1.3)
CREAT SERPL-MCNC: 2.09 MG/DL — HIGH (ref 0.5–1.3)
DSDNA AB FLD-ACNC: <0.2 AI — SIGNIFICANT CHANGE UP
EGFR: 25 ML/MIN/1.73M2 — LOW
EGFR: 30 ML/MIN/1.73M2 — LOW
ENA SM AB FLD QL: <0.2 AI — SIGNIFICANT CHANGE UP
ENA SS-A AB FLD IA-ACNC: <0.2 AI — SIGNIFICANT CHANGE UP
GLUCOSE BLDC GLUCOMTR-MCNC: 120 MG/DL — HIGH (ref 70–99)
GLUCOSE BLDC GLUCOMTR-MCNC: 134 MG/DL — HIGH (ref 70–99)
GLUCOSE BLDC GLUCOMTR-MCNC: 195 MG/DL — HIGH (ref 70–99)
GLUCOSE SERPL-MCNC: 138 MG/DL — HIGH (ref 70–99)
GLUCOSE SERPL-MCNC: 152 MG/DL — HIGH (ref 70–99)
HCT VFR BLD CALC: 33.5 % — LOW (ref 34.5–45)
HGB BLD-MCNC: 10.4 G/DL — LOW (ref 11.5–15.5)
MCHC RBC-ENTMCNC: 27.7 PG — SIGNIFICANT CHANGE UP (ref 27–34)
MCHC RBC-ENTMCNC: 31 GM/DL — LOW (ref 32–36)
MCV RBC AUTO: 89.3 FL — SIGNIFICANT CHANGE UP (ref 80–100)
PLATELET # BLD AUTO: 218 K/UL — SIGNIFICANT CHANGE UP (ref 150–400)
POTASSIUM SERPL-MCNC: 5 MMOL/L — SIGNIFICANT CHANGE UP (ref 3.5–5.3)
POTASSIUM SERPL-MCNC: 5.2 MMOL/L — SIGNIFICANT CHANGE UP (ref 3.5–5.3)
POTASSIUM SERPL-SCNC: 5 MMOL/L — SIGNIFICANT CHANGE UP (ref 3.5–5.3)
POTASSIUM SERPL-SCNC: 5.2 MMOL/L — SIGNIFICANT CHANGE UP (ref 3.5–5.3)
RBC # BLD: 3.75 M/UL — LOW (ref 3.8–5.2)
RBC # FLD: 15.2 % — HIGH (ref 10.3–14.5)
SODIUM SERPL-SCNC: 142 MMOL/L — SIGNIFICANT CHANGE UP (ref 135–145)
SODIUM SERPL-SCNC: 144 MMOL/L — SIGNIFICANT CHANGE UP (ref 135–145)
WBC # BLD: 5.79 K/UL — SIGNIFICANT CHANGE UP (ref 3.8–10.5)
WBC # FLD AUTO: 5.79 K/UL — SIGNIFICANT CHANGE UP (ref 3.8–10.5)

## 2022-11-03 PROCEDURE — 83036 HEMOGLOBIN GLYCOSYLATED A1C: CPT

## 2022-11-03 PROCEDURE — 86036 ANCA SCREEN EACH ANTIBODY: CPT

## 2022-11-03 PROCEDURE — 86777 TOXOPLASMA ANTIBODY: CPT

## 2022-11-03 PROCEDURE — 99239 HOSP IP/OBS DSCHRG MGMT >30: CPT

## 2022-11-03 PROCEDURE — C8929: CPT

## 2022-11-03 PROCEDURE — 80053 COMPREHEN METABOLIC PANEL: CPT

## 2022-11-03 PROCEDURE — 85730 THROMBOPLASTIN TIME PARTIAL: CPT

## 2022-11-03 PROCEDURE — 85610 PROTHROMBIN TIME: CPT

## 2022-11-03 PROCEDURE — 70498 CT ANGIOGRAPHY NECK: CPT | Mod: MA

## 2022-11-03 PROCEDURE — 80048 BASIC METABOLIC PNL TOTAL CA: CPT

## 2022-11-03 PROCEDURE — 84484 ASSAY OF TROPONIN QUANT: CPT

## 2022-11-03 PROCEDURE — 86140 C-REACTIVE PROTEIN: CPT

## 2022-11-03 PROCEDURE — U0003: CPT

## 2022-11-03 PROCEDURE — 80061 LIPID PANEL: CPT

## 2022-11-03 PROCEDURE — 84480 ASSAY TRIIODOTHYRONINE (T3): CPT

## 2022-11-03 PROCEDURE — 99291 CRITICAL CARE FIRST HOUR: CPT

## 2022-11-03 PROCEDURE — 82962 GLUCOSE BLOOD TEST: CPT

## 2022-11-03 PROCEDURE — 86780 TREPONEMA PALLIDUM: CPT

## 2022-11-03 PROCEDURE — 95819 EEG AWAKE AND ASLEEP: CPT

## 2022-11-03 PROCEDURE — 86778 TOXOPLASMA ANTIBODY IGM: CPT

## 2022-11-03 PROCEDURE — 36415 COLL VENOUS BLD VENIPUNCTURE: CPT

## 2022-11-03 PROCEDURE — U0005: CPT

## 2022-11-03 PROCEDURE — 83735 ASSAY OF MAGNESIUM: CPT

## 2022-11-03 PROCEDURE — 70551 MRI BRAIN STEM W/O DYE: CPT

## 2022-11-03 PROCEDURE — 71045 X-RAY EXAM CHEST 1 VIEW: CPT

## 2022-11-03 PROCEDURE — 84443 ASSAY THYROID STIM HORMONE: CPT

## 2022-11-03 PROCEDURE — 93005 ELECTROCARDIOGRAM TRACING: CPT

## 2022-11-03 PROCEDURE — 84100 ASSAY OF PHOSPHORUS: CPT

## 2022-11-03 PROCEDURE — 85027 COMPLETE CBC AUTOMATED: CPT

## 2022-11-03 PROCEDURE — 72141 MRI NECK SPINE W/O DYE: CPT

## 2022-11-03 PROCEDURE — 81001 URINALYSIS AUTO W/SCOPE: CPT

## 2022-11-03 PROCEDURE — 92523 SPEECH SOUND LANG COMPREHEN: CPT

## 2022-11-03 PROCEDURE — 85025 COMPLETE CBC W/AUTO DIFF WBC: CPT

## 2022-11-03 PROCEDURE — 85576 BLOOD PLATELET AGGREGATION: CPT

## 2022-11-03 PROCEDURE — 70496 CT ANGIOGRAPHY HEAD: CPT | Mod: MA

## 2022-11-03 PROCEDURE — 87086 URINE CULTURE/COLONY COUNT: CPT

## 2022-11-03 PROCEDURE — 86038 ANTINUCLEAR ANTIBODIES: CPT

## 2022-11-03 PROCEDURE — 84436 ASSAY OF TOTAL THYROXINE: CPT

## 2022-11-03 PROCEDURE — 70450 CT HEAD/BRAIN W/O DYE: CPT | Mod: MA

## 2022-11-03 PROCEDURE — 86255 FLUORESCENT ANTIBODY SCREEN: CPT

## 2022-11-03 PROCEDURE — 0042T: CPT | Mod: MA

## 2022-11-03 PROCEDURE — 86235 NUCLEAR ANTIGEN ANTIBODY: CPT

## 2022-11-03 RX ORDER — ATORVASTATIN CALCIUM 80 MG/1
1 TABLET, FILM COATED ORAL
Qty: 30 | Refills: 0
Start: 2022-11-03 | End: 2022-12-02

## 2022-11-03 RX ORDER — ATORVASTATIN CALCIUM 80 MG/1
1 TABLET, FILM COATED ORAL
Qty: 0 | Refills: 0 | DISCHARGE

## 2022-11-03 RX ORDER — SODIUM CHLORIDE 9 MG/ML
500 INJECTION INTRAMUSCULAR; INTRAVENOUS; SUBCUTANEOUS
Refills: 0 | Status: COMPLETED | OUTPATIENT
Start: 2022-11-03 | End: 2022-11-03

## 2022-11-03 RX ORDER — BENAZEPRIL HYDROCHLORIDE 40 MG/1
1 TABLET ORAL
Qty: 0 | Refills: 0 | DISCHARGE

## 2022-11-03 RX ADMIN — DORZOLAMIDE HYDROCHLORIDE 1 DROP(S): 20 SOLUTION/ DROPS OPHTHALMIC at 13:38

## 2022-11-03 RX ADMIN — Medication 81 MILLIGRAM(S): at 12:03

## 2022-11-03 RX ADMIN — SODIUM CHLORIDE 150 MILLILITER(S): 9 INJECTION INTRAMUSCULAR; INTRAVENOUS; SUBCUTANEOUS at 10:09

## 2022-11-03 RX ADMIN — Medication 1: at 12:05

## 2022-11-03 RX ADMIN — HEPARIN SODIUM 5000 UNIT(S): 5000 INJECTION INTRAVENOUS; SUBCUTANEOUS at 05:28

## 2022-11-03 RX ADMIN — DORZOLAMIDE HYDROCHLORIDE 1 DROP(S): 20 SOLUTION/ DROPS OPHTHALMIC at 05:28

## 2022-11-03 RX ADMIN — CLOPIDOGREL BISULFATE 75 MILLIGRAM(S): 75 TABLET, FILM COATED ORAL at 12:03

## 2022-11-03 RX ADMIN — HEPARIN SODIUM 5000 UNIT(S): 5000 INJECTION INTRAVENOUS; SUBCUTANEOUS at 13:39

## 2022-11-03 RX ADMIN — PANTOPRAZOLE SODIUM 40 MILLIGRAM(S): 20 TABLET, DELAYED RELEASE ORAL at 05:28

## 2022-11-03 RX ADMIN — Medication 1 MILLIGRAM(S): at 12:04

## 2022-11-03 NOTE — DIETITIAN INITIAL EVALUATION ADULT - ORAL INTAKE PTA/DIET HISTORY
Pt eating well. No weight changes noted. No other issues noted. Provided pt with meal planning with plate model, Dash and sample meal plans to promote weight loss.

## 2022-11-03 NOTE — DISCHARGE NOTE PROVIDER - NSDCFUSCHEDAPPT_GEN_ALL_CORE_FT
[facility]  CARDIOLOGY [address]  Scheduled Appointment: 11/29/2022

## 2022-11-03 NOTE — DIETITIAN INITIAL EVALUATION ADULT - PERTINENT MEDS FT
MEDICATIONS  (STANDING):  aspirin  chewable 81 milliGRAM(s) Oral daily  atorvastatin 10 milliGRAM(s) Oral at bedtime  clopidogrel Tablet 75 milliGRAM(s) Oral daily  cyclobenzaprine 10 milliGRAM(s) Oral at bedtime  dextrose 5%. 1000 milliLiter(s) (50 mL/Hr) IV Continuous <Continuous>  dextrose 5%. 1000 milliLiter(s) (100 mL/Hr) IV Continuous <Continuous>  dextrose 50% Injectable 25 Gram(s) IV Push once  dextrose 50% Injectable 25 Gram(s) IV Push once  dextrose 50% Injectable 12.5 Gram(s) IV Push once  dorzolamide 2% Ophthalmic Solution 1 Drop(s) Both EYES three times a day  folic acid 1 milliGRAM(s) Oral daily  glucagon  Injectable 1 milliGRAM(s) IntraMuscular once  heparin   Injectable 5000 Unit(s) SubCutaneous every 8 hours  insulin glargine Injectable (LANTUS) 15 Unit(s) SubCutaneous at bedtime  insulin lispro (ADMELOG) corrective regimen sliding scale   SubCutaneous three times a day before meals  insulin lispro (ADMELOG) corrective regimen sliding scale   SubCutaneous at bedtime  latanoprost 0.005% Ophthalmic Solution 1 Drop(s) Both EYES at bedtime  pantoprazole    Tablet 40 milliGRAM(s) Oral before breakfast  senna 2 Tablet(s) Oral at bedtime    MEDICATIONS  (PRN):  acetaminophen     Tablet .. 650 milliGRAM(s) Oral every 6 hours PRN Temp greater or equal to 38C (100.4F), Mild Pain (1 - 3)  aluminum hydroxide/magnesium hydroxide/simethicone Suspension 30 milliLiter(s) Oral every 4 hours PRN Dyspepsia  dextrose Oral Gel 15 Gram(s) Oral once PRN Blood Glucose LESS THAN 70 milliGRAM(s)/deciliter  melatonin 3 milliGRAM(s) Oral at bedtime PRN Insomnia  ondansetron Injectable 4 milliGRAM(s) IV Push every 8 hours PRN Nausea and/or Vomiting  polyethylene glycol 3350 17 Gram(s) Oral daily PRN Constipation  senna 2 Tablet(s) Oral at bedtime PRN Constipation

## 2022-11-03 NOTE — PATIENT PROFILE ADULT - FUNCTIONAL SCREEN CURRENT LEVEL: COMMUNICATION, MLM
Speech Language Therapy Discharge Summary    Reason for therapy discharge:    Discharged to acute rehabilitation facility.    Progress towards therapy goal(s). See goals on Care Plan in [facility] electronic health record for goal details.  Goals partially met.  Barriers to achieving goals:   discharge from facility.    Therapy recommendation(s):    Continued therapy is recommended.  Rationale/Recommendations:  Continued ST for dysphagia.    Recommend continue regular diet and thin liquids with use of chin tuck strategy. Caregivers to encourage pt to increase intake of solids and thin liquids. Pt to use safe swallow strategies such as alternating between consistencies, being fully upright for all PO intake, take small sips/bites, slow pacing, and use of chin tuck and multiple swallows with all liquids   0 = understands/communicates without difficulty

## 2022-11-03 NOTE — DISCHARGE NOTE PROVIDER - CARE PROVIDERS DIRECT ADDRESSES
[email or web address]

## 2022-11-03 NOTE — DISCHARGE NOTE PROVIDER - NSDCMRMEDTOKEN_GEN_ALL_CORE_FT
aspirin 81 mg oral tablet: 1 tab(s) orally once a day  azelastine 137 mcg/inh (0.1%) nasal spray: 2 spray(s) nasal 2 times a day  clopidogrel 75 mg oral tablet: 1 tab(s) orally once a day  Combigan 0.2%-0.5% ophthalmic solution: 1 drop(s) in the right eye once a day (at bedtime)  cyclobenzaprine 10 mg oral tablet: 1 tab(s) orally 3 times a day   dorzolamide 2% ophthalmic solution: 1 drop(s) in each eye 3 times a day  folic acid 1 mg oral tablet: 1 tab(s) orally once a day  Lipitor 40 mg oral tablet: 1 tab(s) orally once a day   Lumigan 0.01% ophthalmic solution: 1 drop(s) in each eye once a day (in the evening)  metFORMIN 500 mg oral tablet: 2 tab(s) orally 2 times a day    metoprolol succinate 100 mg oral tablet, extended release: 1 tab(s) orally once a day  omeprazole 40 mg oral delayed release capsule: 1 cap(s) orally once a day  Restasis 0.05% ophthalmic emulsion: 1 drop(s) to each affected eye every 12 hours  senna leaf extract oral tablet: 2 tab(s) orally once a day, As Needed -for constipation   Tresiba: 35 unit(s) subcutaneous once a day (at bedtime)

## 2022-11-03 NOTE — DIETITIAN INITIAL EVALUATION ADULT - OTHER INFO
73 y/o female with PMH of HTN, DM-2, CAD s/p stents, CKD-3, chronic back pain came to the ED complaining of difficulty speaking. Patient reported she was shaking when she woke up this morning, checked her FS, glucose was fine. Later she noted HA and difficulty articulating words. Her words are not slurred but hard time getting them out; also noted stuttering. CT head/CT angio head/neck: no acute finding. Patient was a code stroke in the ED.

## 2022-11-03 NOTE — DISCHARGE NOTE PROVIDER - HOSPITAL COURSE
Patient is a 73 y/o female with PMH of HTN, DM-2, CAD s/p stents, CKD-3, chronic back pain came to the ED complaining of difficulty speaking. Patient reported she was shaking when she woke up this morning, checked her FS, glucose was fine. Later she noted HA and difficulty articulating words. Her words are not slurred but hard time getting them out; also noted stuttering. CT head/CT angio head/neck: no acute finding. Patient was a code stroke in the ED. MRI brain was negative for a CVA but revealed white matter disease concerning for MS. MRI c-spine was negative for any further white matter lesions and patient was cleared for discharge per neurology.     Expressive aphasia due to TIA  CT head no acute intracranial finding   MRI;   Several focal lesions of increased FLAIR signal   hyperintensity LEFT periventricular white matter, RIGHT parietal   subcortical white matter and posterior limb of RIGHT internal capsule   which are nonspecific but may reflect demyelinating disease such as   multiple sclerosis. No acute infarction is seen  MRI c-spine negative for white matter lesions; mild degenerative changes  continue aspirin, plavix and high intensity statin  TTE with preserved EF and no wall motion abnormality  cleared for discharge per neuro with outpatient follow up    HTN  SBP 140s  Resume metoprolol  hold Benazepril due to MUNA    CAD  -no anginal symptoms  -TTE as above  -continue DAPT, metoprolol and statin    MUNA on CKD-3   -rise in creatinine likely due to contrast induced nephropathy  -creatinine improved with IV hydration  -avoid nephrotoxic agents and adhere to a renal diet  -patient reports she follows with a nephrologist as outpatient    DM-2   resume home meds on discharge  lifestyle modifications    Patient is medically stable for discharge home.

## 2022-11-03 NOTE — DIETITIAN INITIAL EVALUATION ADULT - PERTINENT LABORATORY DATA
11-03    144  |  109<H>  |  22.7<H>  ----------------------------<  152<H>  5.0   |  21.0<L>  |  2.09<H>    Ca    9.1      03 Nov 2022 04:45    POCT Blood Glucose.: 120 mg/dL (11-03-22 @ 07:59)  A1C with Estimated Average Glucose Result: 7.1 % (10-31-22 @ 05:55)  A1C with Estimated Average Glucose Result: 7.3 % (08-29-22 @ 06:47)

## 2022-11-03 NOTE — DISCHARGE NOTE PROVIDER - CARE PROVIDER_API CALL
Diaz Becerra; PhD)  Neurology; Vascular Neurology  [address], [address]  [place] [address]  Phone: [phone]  Fax: [phone]  Follow Up Time:     Cole Galindo (DO)  Medicine  [address], [place] [address]  Phone: [phone]  Fax: [phone]  Follow Up Time:

## 2022-11-03 NOTE — DISCHARGE NOTE PROVIDER - NS AS DC PROVIDER CONTACT Y/N MULTI
[0] : 2) Feeling down, depressed, or hopeless: Not at all (0) [PHQ-2 Negative - No further assessment needed] : PHQ-2 Negative - No further assessment needed [Patient reported mammogram was normal] : Patient reported mammogram was normal [] : No [MammogramDate] : 10/2020 [PapSmearDate] : 4/2021 Yes

## 2022-11-03 NOTE — DISCHARGE NOTE PROVIDER - NSDCCPCAREPLAN_GEN_ALL_CORE_FT
PRINCIPAL DISCHARGE DIAGNOSIS  Diagnosis: Brain TIA  Assessment and Plan of Treatment: expressive aphasia resolved  please continue aspirin and plavix  increase lipitor to 40 mg at bedtime  please follow up with Dr. Becerra within 1 week      SECONDARY DISCHARGE DIAGNOSES  Diagnosis: Acute kidney injury superimposed on CKD  Assessment and Plan of Treatment: your kidney function has improved close to baseline  please ensure adequate hydration  avoid nephrotoxic agents including NSAIDS  repeat labs with your PCP within 1 week  follow up with your nephrologist within 2 weeks    Diagnosis: Diabetes mellitus  Assessment and Plan of Treatment: resume home medications  repeat labs within 1 week to ensure your renal function remains stable  your metformin may need to be discontinued if your EGR is less than 30  follow up with your PCP within 1 week    Diagnosis: Essential tremor  Assessment and Plan of Treatment: follow up with neurology within  1 week for further work up and treatment    Diagnosis: Hypertension  Assessment and Plan of Treatment: only take metoprolol and follow up with your PCP within 1 week  keep a log with your BP measurements to take to your PCP appointment to see if further medicaitons need to be added    Diagnosis: White matter disease  Assessment and Plan of Treatment: MRI with white matter lesions; follow up with Dr. Becerra for further work up and management  MRI c spine was negative therefore concerns for MS are less likely per neurology

## 2022-11-03 NOTE — DISCHARGE NOTE PROVIDER - ATTENDING DISCHARGE PHYSICAL EXAMINATION:
General: elderly female, sitting in chair, NAD  Eyes: PERRLA, EOMI; conjunctiva and sclera clear  Head: Normocephalic; atraumatic  Neck: Supple  Respiratory: bilateral breath sounds  Cardiovascular: Regular rate and rhythm. S1 and S2 Normal   Gastrointestinal: Soft non-tender, obese; Normal bowel sounds  Neurological: Alert and oriented x4, strength 5/5 throughout, no sensory deficits  Skin: Warm and dry  Musculoskeletal: no pedal edema

## 2022-11-04 LAB — DSDNA AB SER QL CLIF: NEGATIVE — SIGNIFICANT CHANGE UP

## 2022-11-05 ENCOUNTER — EMERGENCY (EMERGENCY)
Facility: HOSPITAL | Age: 72
LOS: 1 days | Discharge: HOME | End: 2022-11-05
Attending: EMERGENCY MEDICINE
Payer: MEDICARE

## 2022-11-05 VITALS
HEIGHT: 62 IN | RESPIRATION RATE: 16 BRPM | OXYGEN SATURATION: 98 % | TEMPERATURE: 98 F | SYSTOLIC BLOOD PRESSURE: 106 MMHG | DIASTOLIC BLOOD PRESSURE: 62 MMHG | WEIGHT: 233.91 LBS | HEART RATE: 75 BPM

## 2022-11-05 DIAGNOSIS — H26.40 UNSPECIFIED SECONDARY CATARACT: Chronic | ICD-10-CM

## 2022-11-05 LAB
ALBUMIN SERPL ELPH-MCNC: 3.7 G/DL — SIGNIFICANT CHANGE UP (ref 3.3–5.2)
ALP SERPL-CCNC: 126 U/L — HIGH (ref 40–120)
ALT FLD-CCNC: 30 U/L — SIGNIFICANT CHANGE UP
ANION GAP SERPL CALC-SCNC: 12 MMOL/L — SIGNIFICANT CHANGE UP (ref 5–17)
APTT BLD: 20.8 SEC — LOW (ref 27.5–35.5)
AST SERPL-CCNC: 33 U/L — HIGH
BILIRUB SERPL-MCNC: 0.3 MG/DL — LOW (ref 0.4–2)
BLD GP AB SCN SERPL QL: SIGNIFICANT CHANGE UP
BUN SERPL-MCNC: 20.3 MG/DL — HIGH (ref 8–20)
CALCIUM SERPL-MCNC: 8.7 MG/DL — SIGNIFICANT CHANGE UP (ref 8.4–10.5)
CHLORIDE SERPL-SCNC: 106 MMOL/L — SIGNIFICANT CHANGE UP (ref 96–108)
CO2 SERPL-SCNC: 19 MMOL/L — LOW (ref 22–29)
CREAT SERPL-MCNC: 1.89 MG/DL — HIGH (ref 0.5–1.3)
EGFR: 28 ML/MIN/1.73M2 — LOW
GLUCOSE SERPL-MCNC: 169 MG/DL — HIGH (ref 70–99)
HCT VFR BLD CALC: 31.7 % — LOW (ref 34.5–45)
HGB BLD-MCNC: 9.6 G/DL — LOW (ref 11.5–15.5)
INR BLD: 1.02 RATIO — SIGNIFICANT CHANGE UP (ref 0.88–1.16)
LIDOCAIN IGE QN: 35 U/L — SIGNIFICANT CHANGE UP (ref 22–51)
MCHC RBC-ENTMCNC: 27 PG — SIGNIFICANT CHANGE UP (ref 27–34)
MCHC RBC-ENTMCNC: 30.3 GM/DL — LOW (ref 32–36)
MCV RBC AUTO: 89 FL — SIGNIFICANT CHANGE UP (ref 80–100)
PLATELET # BLD AUTO: 194 K/UL — SIGNIFICANT CHANGE UP (ref 150–400)
POTASSIUM SERPL-MCNC: 5 MMOL/L — SIGNIFICANT CHANGE UP (ref 3.5–5.3)
POTASSIUM SERPL-SCNC: 5 MMOL/L — SIGNIFICANT CHANGE UP (ref 3.5–5.3)
PROT SERPL-MCNC: 6.9 G/DL — SIGNIFICANT CHANGE UP (ref 6.6–8.7)
PROTHROM AB SERPL-ACNC: 11.8 SEC — SIGNIFICANT CHANGE UP (ref 10.5–13.4)
RBC # BLD: 3.56 M/UL — LOW (ref 3.8–5.2)
RBC # FLD: 15 % — HIGH (ref 10.3–14.5)
SODIUM SERPL-SCNC: 137 MMOL/L — SIGNIFICANT CHANGE UP (ref 135–145)
WBC # BLD: 6.59 K/UL — SIGNIFICANT CHANGE UP (ref 3.8–10.5)
WBC # FLD AUTO: 6.59 K/UL — SIGNIFICANT CHANGE UP (ref 3.8–10.5)

## 2022-11-05 PROCEDURE — 36415 COLL VENOUS BLD VENIPUNCTURE: CPT

## 2022-11-05 PROCEDURE — 74176 CT ABD & PELVIS W/O CONTRAST: CPT | Mod: 26,MA

## 2022-11-05 PROCEDURE — 85730 THROMBOPLASTIN TIME PARTIAL: CPT

## 2022-11-05 PROCEDURE — 85027 COMPLETE CBC AUTOMATED: CPT

## 2022-11-05 PROCEDURE — 99284 EMERGENCY DEPT VISIT MOD MDM: CPT

## 2022-11-05 PROCEDURE — 86901 BLOOD TYPING SEROLOGIC RH(D): CPT

## 2022-11-05 PROCEDURE — 86900 BLOOD TYPING SEROLOGIC ABO: CPT

## 2022-11-05 PROCEDURE — 80053 COMPREHEN METABOLIC PANEL: CPT

## 2022-11-05 PROCEDURE — 74176 CT ABD & PELVIS W/O CONTRAST: CPT | Mod: MA

## 2022-11-05 PROCEDURE — 85610 PROTHROMBIN TIME: CPT

## 2022-11-05 PROCEDURE — 86850 RBC ANTIBODY SCREEN: CPT

## 2022-11-05 PROCEDURE — 83690 ASSAY OF LIPASE: CPT

## 2022-11-05 PROCEDURE — 99284 EMERGENCY DEPT VISIT MOD MDM: CPT | Mod: 25

## 2022-11-05 RX ORDER — FAMOTIDINE 10 MG/ML
20 INJECTION INTRAVENOUS ONCE
Refills: 0 | Status: DISCONTINUED | OUTPATIENT
Start: 2022-11-05 | End: 2022-11-05

## 2022-11-05 RX ORDER — FAMOTIDINE 10 MG/ML
20 INJECTION INTRAVENOUS ONCE
Refills: 0 | Status: COMPLETED | OUTPATIENT
Start: 2022-11-05 | End: 2022-11-05

## 2022-11-05 RX ORDER — ACETAMINOPHEN 500 MG
650 TABLET ORAL ONCE
Refills: 0 | Status: COMPLETED | OUTPATIENT
Start: 2022-11-05 | End: 2022-11-05

## 2022-11-05 RX ADMIN — Medication 30 MILLILITER(S): at 15:34

## 2022-11-05 RX ADMIN — Medication 650 MILLIGRAM(S): at 15:34

## 2022-11-05 NOTE — ED ADULT TRIAGE NOTE - CHIEF COMPLAINT QUOTE
Pt reports she ate some bad food and is having lower abd discomfort shortly after. Pt denies nausea, vomiting, diarrhea.

## 2022-11-05 NOTE — ED PROVIDER NOTE - PROGRESS NOTE DETAILS
Pt labs wnl (pt has CKD- Cr consistent with previous labs). CT scan negative for acute pathology. All results reviewed with patient and given copy. Pts abd remains soft/NT/ND on re-exam and admits to moderate relief of pain with medication. Pt stable for d/c. Pt labs wnl (pt has CKD- Cr consistent with previous labs). CT scan negative for acute pathology. All results reviewed with patient and given copy. Pts abd remains soft/NT/ND on re-exam and admits to moderate relief of pain with medication. Admits to one episode of diarrhea while in the ED. Pt refuses urinary symptoms, states that she does not want to give urine for UA or wait any longer, would like to go home. Pt stable for d/c.

## 2022-11-05 NOTE — ED PROVIDER NOTE - OBJECTIVE STATEMENT
72F presents to the ED c/o diffuse abdominal "cramping" x 2-3 hours. Pt states that she ate some food and approx 30 min later began experiencing her symptoms. Pt states that she felt like she had to have a bowel movement but didn't go. Pt otherwise denies fever/chills, c/p, sob, n/v/c/d, dysuria, numbness/tingling/weakness and has no other complaints at this time. 72 year old F presents to the ED c/o diffuse abdominal "cramping" x 2-3 hours. Pt states that she ate some food and approx 30 min later began experiencing her symptoms. Pt states that she felt like she had to have a bowel movement but didn't go. Pt otherwise denies fever/chills, c/p, sob, n/v/c/d, dysuria, numbness/tingling/weakness and has no other complaints at this time.

## 2022-11-05 NOTE — ED ADULT NURSE NOTE - OBJECTIVE STATEMENT
pt received in [facility] c/o abdominal pain x  3hours. Pt ate food from a store and began to have abdominal cramping.  Denies N/V/D.  NAD noted, respirations even and unlabored.  Safety precautions in place.  Plan of care explained, pt verbalized understanding.

## 2022-11-05 NOTE — ED PROVIDER NOTE - PATIENT PORTAL LINK FT
You can access the FollowMyHealth Patient Portal offered by [facility] by registering at the following website: [email or web address]. By joining [employer]’s FollowMyHealth portal, you will also be able to view your health information using other applications (apps) compatible with our system.

## 2022-11-10 NOTE — ED ADULT NURSE NOTE - NS ED NURSE LEVEL OF CONSCIOUSNESS MENTAL STATUS
Outpatient Follow-up Visit    This was a virtual (video/audio visit) in lieu of in-person visit due to the coronavirus emergency.     Originating Site Participant: Dr.Shanthi AMY Saini MD  Originating Site Location: [facility]  Distant Site: Participant: Heber and her mom  Distant Site Location: Patient's home  Start time: . 3:30 PM   End time: 3:45 PM    I certify that this visit was done via secure two-way simultaneous audio and video transmission ([employer]) with informed consent of the patient and/or guardian. Over 50% of the time was counseling or coordinating care.    Chief Complaint:  Chief Complaint   Patient presents with     Video Visit     Follow up       HPI:    I had the pleasure of seeing Heber Cornelius in the Pediatric Nephrology Clinic today for a follow-up visit. Heebr is accompanied by her mother.      Recently seen by Endocrinology at [facility] - Dr. Wilburn for prediabetes, obesity and mixed dyslipiedemia currently on treatment with metformin. Previously followed by weight management clinic there and was on topamax.      She was referred to nephrology for elevated blood pressures - BP at her recent endocrinology visit on 12/2 was 128/90, review of records from previous encounters: 9/20: 114/68, 7/20: 120/76, 5/7: 128/82, 4/13: 126/82  BP from last year 07/20 - normal at 112/76    Workup done so far - normal TSH, normal duplex kidney ultrasound with normal doppler velocities and mild left sided hydronephrosis. Creat normal at 0.81 on 3/10/21. Dyslipidemia - cholesterol 221, non-HDL cholesterol 186 (7/20/21)    Echocardiogram showed mild/moderate LVH with LVMI 48 with increased relative wall thickness 0.54., and lisinopril was started at 5mg daily, and subsequently increased to 7.5mg daily    Interval history:  Blood pressures have improved and most measurements at home are under 130/90  Participated in marching band over the summer, however not getting much physical activity at the 
"start of school year  Reports excellent medication adherence  Continues on Topamax for weight loss    Review of external notes as documented above   Review of the result(s) of each unique test - as documented  Assessment requiring an independent historian(s) - family - mother    Active Medications:  Current Outpatient Medications   Medication Sig Dispense Refill     albuterol (PROVENTIL) (2.5 MG/3ML) 0.083% neb solution Inhale 2.5 mg into the lungs       atorvastatin (LIPITOR) 10 MG tablet Take 10 mg by mouth daily       cetirizine (ZYRTEC) 10 MG tablet        clindamycin (CLEOCIN T) 1 % external lotion        dexamethasone (DECADRON) 6 MG tablet        glycopyrrolate (ROBINUL) 1 MG tablet        ipratropium - albuterol 0.5 mg/2.5 mg/3 mL (DUONEB) 0.5-2.5 (3) MG/3ML neb solution INHALE 1 VIAL VIA NEBULIZER EVERY 6 TO 8 HOURS AS NEEDED.       lisinopril (ZESTRIL) 5 MG tablet Take 1.5 tablets (7.5 mg) by mouth daily 45 tablet 11     topiramate (TOPAMAX) 25 MG tablet Take 75 mg by mouth       tretinoin (RETIN-A) 0.05 % external cream APPLY SPARINGLY TO THE FACE NIGHTLY AT BEDTIME FOR ACNE       VITAMIN D, CHOLECALCIFEROL, PO Take 2,000 Units by mouth daily       metFORMIN (GLUCOPHAGE-XR) 500 MG 24 hr tablet One tablet by mouth at dinner for 1 week, then increase to 2 tablet by mouth at dinner. Always take with food.          PMHx:  No past medical history on file.    PSHx:    No past surgical history on file.    FHx:  No family history on file.    SHx:  Social History     Tobacco Use     Smoking status: Never     Smokeless tobacco: Never     Social History     Social History Narrative     Not on file       Physical Exam:    Ht 1.702 m (5' 7\")   Wt 118.8 kg (262 lb)   BMI 41.04 kg/m     Blood pressure checked at home at time of video visit: 126/86  Exam:  General: No apparent distress. Awake, alert, well-appearing.   HEENT:  Normocephalic and atraumatic. Mucous membranes are moist. No periorbital edema. Facial muscles "
move symmetrically.   Neck: Neck is symmetrical with trachea midline.   Eyes: Conjunctiva and eyelids normal bilaterally. Pupils equal and round bilaterally.   Respiratory: breathing unlabored, no tachypnea. Lungs clear to auscultation  Cardiovascular: No edema, no pallor, no cyanosis. Normal heart sounds, no murmurs  Abdomen: Non-distended. No CVA tenderness  Skin: No concerning rash or lesions observed on exposed skin.   Extremities: Wide range of motion observed. No peripheral edema.   Neuro: Mood and behavior appropriate for age.   Musculoskeletal: Symmetric and appropriate movements of extremities.    Labs and Imaging:  No results found for any visits on 11/09/22.    I personally reviewed results of laboratory evaluation, imaging studies and past medical records that were available during this outpatient visit.      Assessment and Plan:    Heber is a 17 year old 6 month old with morbid obesity, dyslipidemia and hypertension  Etiology of hypotension likely related to obesity and metabolic syndrome, work-up for endocrine causes of hypertension negative so far.  Normal kidney functions and no microalbuminuria.  Started on lisinopril for findings of LVH on echocardiogram.  We discussed the importance of weight loss and lifestyle modifications to improve blood pressures and avoid endorgan damage from hypertension.       ICD-10-CM    1. Benign essential hypertension  I10       2. Severe obesity due to excess calories without serious comorbidity with body mass index (BMI) greater than 99th percentile for age in pediatric patient (H)  E66.01     Z68.54       3. Dyslipidemia  E78.5           Hypertension:  -Continue Lisinopril 7.5mg daily  -Appreciate ongoing Endocrinology care -recently started on topiramate  -Dash diet  -Regular and consistent physical activity for at least 20 minutes a day 5 days a week  -Check blood pressures at home once a week, monthly check-in   -Annual eye exam - not done yet  -Annual 
echocardiogram -due in January 2023      Patient Education: During this visit I discussed in detail the patient s symptoms, physical exam and evaluation results findings, tentative diagnosis as well as the treatment plan (Including but not limited to possible side effects and complications related to the disease, treatment modalities and intervention(s). Family expressed understanding and consent. Family was receptive and ready to learn; no apparent learning barriers were identified.    Follow up: Return in about 6 months (around 5/9/2023). Please return sooner should Heber become symptomatic.          Sincerely,    Ct Saini MD   Pediatric Nephrology    CC:   OSEI OSPINA    Copy to patient  Cate Cornelius   [id number] [address] [address]  
Statement Selected
Awake/Alert/Cooperative

## 2022-11-16 ENCOUNTER — TRANSCRIPTION ENCOUNTER (OUTPATIENT)
Age: 72
End: 2022-11-16

## 2022-11-16 ENCOUNTER — APPOINTMENT (OUTPATIENT)
Dept: CARDIOLOGY | Facility: CLINIC | Age: 72
End: 2022-11-16

## 2022-11-16 ENCOUNTER — NON-APPOINTMENT (OUTPATIENT)
Age: 72
End: 2022-11-16

## 2022-11-16 ENCOUNTER — EMERGENCY (EMERGENCY)
Facility: HOSPITAL | Age: 72
LOS: 1 days | Discharge: HOME | End: 2022-11-16
Attending: EMERGENCY MEDICINE
Payer: MEDICARE

## 2022-11-16 VITALS
DIASTOLIC BLOOD PRESSURE: 84 MMHG | SYSTOLIC BLOOD PRESSURE: 146 MMHG | OXYGEN SATURATION: 97 % | HEIGHT: 62 IN | RESPIRATION RATE: 16 BRPM | HEART RATE: 75 BPM | TEMPERATURE: 98 F

## 2022-11-16 VITALS
DIASTOLIC BLOOD PRESSURE: 72 MMHG | OXYGEN SATURATION: 98 % | HEART RATE: 81 BPM | SYSTOLIC BLOOD PRESSURE: 188 MMHG | TEMPERATURE: 98 F | RESPIRATION RATE: 18 BRPM

## 2022-11-16 DIAGNOSIS — H26.40 UNSPECIFIED SECONDARY CATARACT: Chronic | ICD-10-CM

## 2022-11-16 PROCEDURE — 96365 THER/PROPH/DIAG IV INF INIT: CPT

## 2022-11-16 PROCEDURE — 82962 GLUCOSE BLOOD TEST: CPT

## 2022-11-16 PROCEDURE — 99284 EMERGENCY DEPT VISIT MOD MDM: CPT | Mod: 25

## 2022-11-16 PROCEDURE — 96375 TX/PRO/DX INJ NEW DRUG ADDON: CPT

## 2022-11-16 PROCEDURE — 99053 MED SERV 10PM-8AM 24 HR FAC: CPT

## 2022-11-16 PROCEDURE — 99284 EMERGENCY DEPT VISIT MOD MDM: CPT

## 2022-11-16 PROCEDURE — 96366 THER/PROPH/DIAG IV INF ADDON: CPT

## 2022-11-16 RX ORDER — ACETAMINOPHEN 500 MG
1000 TABLET ORAL ONCE
Refills: 0 | Status: DISCONTINUED | OUTPATIENT
Start: 2022-11-16 | End: 2022-11-23

## 2022-11-16 RX ORDER — OXYCODONE AND ACETAMINOPHEN 5; 325 MG/1; MG/1
1 TABLET ORAL
Qty: 8 | Refills: 0
Start: 2022-11-16 | End: 2022-11-17

## 2022-11-16 RX ORDER — CYCLOBENZAPRINE HYDROCHLORIDE 10 MG/1
1 TABLET, FILM COATED ORAL
Qty: 9 | Refills: 0
Start: 2022-11-16 | End: 2022-11-18

## 2022-11-16 RX ORDER — DIAZEPAM 5 MG
2 TABLET ORAL ONCE
Refills: 0 | Status: DISCONTINUED | OUTPATIENT
Start: 2022-11-16 | End: 2022-11-16

## 2022-11-16 RX ORDER — METHOCARBAMOL 500 MG/1
1000 TABLET, FILM COATED ORAL ONCE
Refills: 0 | Status: COMPLETED | OUTPATIENT
Start: 2022-11-16 | End: 2022-11-16

## 2022-11-16 RX ORDER — CYCLOBENZAPRINE HYDROCHLORIDE 10 MG/1
10 TABLET, FILM COATED ORAL THREE TIMES A DAY
Refills: 0 | Status: DISCONTINUED | OUTPATIENT
Start: 2022-11-16 | End: 2022-11-23

## 2022-11-16 RX ORDER — OXYCODONE AND ACETAMINOPHEN 5; 325 MG/1; MG/1
1 TABLET ORAL EVERY 6 HOURS
Refills: 0 | Status: DISCONTINUED | OUTPATIENT
Start: 2022-11-16 | End: 2022-11-16

## 2022-11-16 RX ORDER — LIDOCAINE 4 G/100G
1 CREAM TOPICAL ONCE
Refills: 0 | Status: COMPLETED | OUTPATIENT
Start: 2022-11-16 | End: 2022-11-16

## 2022-11-16 RX ORDER — SODIUM CHLORIDE 9 MG/ML
3 INJECTION INTRAMUSCULAR; INTRAVENOUS; SUBCUTANEOUS ONCE
Refills: 0 | Status: COMPLETED | OUTPATIENT
Start: 2022-11-16 | End: 2022-11-16

## 2022-11-16 RX ADMIN — Medication 1000 MILLIGRAM(S): at 07:30

## 2022-11-16 RX ADMIN — LIDOCAINE 1 PATCH: 4 CREAM TOPICAL at 04:47

## 2022-11-16 RX ADMIN — SODIUM CHLORIDE 3 MILLILITER(S): 9 INJECTION INTRAMUSCULAR; INTRAVENOUS; SUBCUTANEOUS at 04:48

## 2022-11-16 RX ADMIN — Medication 400 MILLIGRAM(S): at 04:55

## 2022-11-16 RX ADMIN — OXYCODONE AND ACETAMINOPHEN 1 TABLET(S): 5; 325 TABLET ORAL at 11:59

## 2022-11-16 RX ADMIN — Medication 2 MILLIGRAM(S): at 04:47

## 2022-11-16 RX ADMIN — METHOCARBAMOL 220 MILLIGRAM(S): 500 TABLET, FILM COATED ORAL at 04:48

## 2022-11-16 RX ADMIN — CYCLOBENZAPRINE HYDROCHLORIDE 10 MILLIGRAM(S): 10 TABLET, FILM COATED ORAL at 11:59

## 2022-11-16 NOTE — ED PROVIDER NOTE - PROGRESS NOTE DETAILS
Edward PEACOCK-  Pt seen by pain management and looked into options to get injection tmrw in the hospital but was no availability of anesthesia, pt insisted to stay until tmrw but now willing to go home with same oral meds and will f/u with her scheduled procedure

## 2022-11-16 NOTE — ED PROVIDER NOTE - MUSCULOSKELETAL, MLM
Spine appears normal, + R T/L paraspinal tenderness to palp, no m/l tenderness bony stepoff or deformity

## 2022-11-16 NOTE — CONSULT NOTE ADULT - ASSESSMENT
71 yo F with hx of chronic R sided thoracolumbar pain presents to ED c/o worsening pain at home.  Pt scheduled for intralaminar injection by Pain Management/Dr. Melendrez and was supposed to go for cardiac clearance, but pain became so severe.  Pt states pain is sharp and shooting and  radiates from back to R groin area.    Due for FAIZA tomorrow  unable to proceed with FAIZA today due to plavix  plavix will be held for 7 days tomorrow  [facility]  recommended that patient proceed with her appt tomorrow    if patient remains admitted:  cont perocet 5 q6h prn sev pain  cont flexeril 10mg TID

## 2022-11-16 NOTE — ED PROVIDER NOTE - OBJECTIVE STATEMENT
73 yo F with hx of chronic R sided thoracolumbar pain presents to ED c/o worsening pain at home.  Pt scheduled for intralaminar injection by Pain Management/Dr. Melendrez and was supposed to go for cardiac clearance, but pain became so severe.  Pt states pain is sharp and shooting and  radiates from back to R groin area.  Pt denies any assoc CP, SOB, abd pain or urinary s/s

## 2022-11-16 NOTE — ED ADULT NURSE REASSESSMENT NOTE - NS ED NURSE REASSESS COMMENT FT1
Pt received @ 0730, A&OX3, sitting in chair, states she is still having pain at this time but tolerable.  Pt moving all ext well.  VSS.  Will continue to monitor.

## 2022-11-16 NOTE — ED ADULT TRIAGE NOTE - CHIEF COMPLAINT QUOTE
Pt. BIBA for back pain. Pt. states shes had this back pain radiating to her groin since the end of September, been seen here multiple times for it. Pt. states she was supposed to go for clearance today, for an epidural tomorrow but could not make it. No changes in the pain.

## 2022-11-16 NOTE — CONSULT NOTE ADULT - SUBJECTIVE AND OBJECTIVE BOX
CC: back pain    HPI: per chart review:  · HPI Objective Statement: 73 yo F with hx of chronic R sided thoracolumbar pain presents to ED c/o worsening pain at home.  Pt scheduled for intralaminar injection by Pain Management/Dr. Melendrez and was supposed to go for cardiac clearance, but pain became so severe.  Pt states pain is sharp and shooting and  radiates from back to R groin area.  Pt denies any assoc CP, SOB, abd pain or urinary s/s      Interval Hx:  Patient seen during rounds  sees Dr Melendrez  due for FAIZA tomorrow  pt on plavix, is holding for 6 days, will be 7 days tomorrow  Patient reports pain to be mod controlled on current medications  Patient denies sedation with medications     Analgesic Dosing for past 24 hours reviewed as below:  acetaminophen   IVPB ..   400 mL/Hr IV Intermittent (11-16-22 @ 04:55)    cyclobenzaprine   10 milliGRAM(s) Oral (11-16-22 @ 11:59)    diazepam  Injectable   2 milliGRAM(s) IV Push (11-16-22 @ 04:47)    methocarbamol IVPB   220 mL/Hr IV Intermittent (11-16-22 @ 04:48)    oxycodone    5 mG/acetaminophen 325 mG   1 Tablet(s) Oral (11-16-22 @ 11:59)          T(C): 36.9 (11-16-22 @ 06:52), Max: 36.9 (11-16-22 @ 06:52)  HR: 69 (11-16-22 @ 11:42) (69 - 78)  BP: 175/95 (11-16-22 @ 11:42) (140/78 - 175/95)  RR: 18 (11-16-22 @ 11:42) (16 - 18)  SpO2: 99% (11-16-22 @ 11:42) (97% - 99%)        acetaminophen   IVPB .. 1000 milliGRAM(s) IV Intermittent once  cyclobenzaprine 10 milliGRAM(s) Oral three times a day  oxycodone    5 mG/acetaminophen 325 mG 1 Tablet(s) Oral every 6 hours PRN                  Pain Service   [phone]

## 2022-11-16 NOTE — ED PROVIDER NOTE - CONSTITUTIONAL, MLM
normal... Well appearing, awake, alert, oriented to person, place, time/situation, sitting in chair, appears uncomfortable

## 2022-11-16 NOTE — ED PROVIDER NOTE - NSFOLLOWUPINSTRUCTIONS_ED_ALL_ED_FT
1. follow up with your pain management appointment tmrw for procedure  2. take flexeril and percocet for pain for next 2 days and don't drive while on it  3. return promptly in c/o worsening symptoms

## 2022-11-16 NOTE — ED PROVIDER NOTE - PATIENT PORTAL LINK FT
You can access the FollowMyHealth Patient Portal offered by [facility] by registering at the following website: [email or web address]. By joining [employer]’s FollowMyHealth portal, you will also be able to view your health information using other applications (apps) compatible with our system.

## 2022-11-16 NOTE — ED PROVIDER NOTE - EYES NEGATIVE STATEMENT, MLM
1. I have been Able to laugh and see the funny side of things         Not quite so much now  2. I have looked forward with enjoyment to things        Rather less than I used to  3. I have blamed myself unnecessarily when things went wrong        Yes, some of the time  4. I have been anxious or worried for no good reason        Yes, Sometimes  5. I have felt scared or panicky for no very good reason        Yes, sometimes  6. Things have been getting on top of me        Yes, sometimes I haven't been coping as well as usual  7. I have been so unhappy that I have had difficulty sleeping         Not, very often   8. I have felt sad or miserable         Not, very often   9. I have been so unhappy that I have been crying        Only occasionally   10. The thought of harming myself has occurred to me         Never    
no discharge, no irritation, no pain, no redness, and no visual changes.

## 2022-11-17 ENCOUNTER — OUTPATIENT (OUTPATIENT)
Dept: OUTPATIENT SERVICES | Facility: HOSPITAL | Age: 72
LOS: 1 days | End: 2022-11-17
Payer: MEDICARE

## 2022-11-17 DIAGNOSIS — M54.16 RADICULOPATHY, LUMBAR REGION: ICD-10-CM

## 2022-11-17 DIAGNOSIS — H26.40 UNSPECIFIED SECONDARY CATARACT: Chronic | ICD-10-CM

## 2022-11-17 LAB — GLUCOSE BLDC GLUCOMTR-MCNC: 147 MG/DL — HIGH (ref 70–99)

## 2022-11-17 PROCEDURE — 62323 NJX INTERLAMINAR LMBR/SAC: CPT

## 2022-11-17 PROCEDURE — 77003 FLUOROGUIDE FOR SPINE INJECT: CPT

## 2022-11-17 PROCEDURE — 82962 GLUCOSE BLOOD TEST: CPT

## 2022-12-01 ENCOUNTER — APPOINTMENT (OUTPATIENT)
Dept: NEPHROLOGY | Facility: CLINIC | Age: 72
End: 2022-12-01

## 2022-12-01 ENCOUNTER — NON-APPOINTMENT (OUTPATIENT)
Age: 72
End: 2022-12-01

## 2022-12-01 VITALS
OXYGEN SATURATION: 96 % | BODY MASS INDEX: 38.41 KG/M2 | SYSTOLIC BLOOD PRESSURE: 142 MMHG | HEIGHT: 64 IN | WEIGHT: 225 LBS | DIASTOLIC BLOOD PRESSURE: 78 MMHG | HEART RATE: 83 BPM

## 2022-12-01 PROCEDURE — 99205 OFFICE O/P NEW HI 60 MIN: CPT

## 2022-12-01 PROCEDURE — 99215 OFFICE O/P EST HI 40 MIN: CPT

## 2022-12-01 NOTE — ASSESSMENT
[FreeTextEntry1] : CKD stage III\par labs reviewed- notable for CKD since at least 2019\par Likely has diabetic kidney disease + hypertensive nephrosclerosis\par Baseline creatinine appears to be ~1.7-2.0mg/dL\par UA with 15 protein-\par CT a/p without IV contrast is negative for obstruction\par repeat UA, Al/cr ratio\par Obtain Renal US\par \par HTN- BP above goal \par on Metoprolol \par Benazepril was stopped on d/c\par Will repeat BMP today and resume ACE-I if SCr stable\par \par CAD sp stent on ASA and plavix- stable\par \par Obesity- weight loss encouraged as modifiable risk factor for kidney disease\par \par DM; on Metformin and Tresiba\par Tight glycemic control encouraged to halt progression of kidney disease\par \par GERD on Omeprazole\par \par Vitamin D def; continue high dose vitamin D\par \par metabolic acidosis- she has not been taking sodium bicarbonate \par repeat labs today and resume medication if needed\par \par RTC in 6-8 weeks\par

## 2022-12-01 NOTE — PHYSICAL EXAM
[General Appearance - Alert] : alert [General Appearance - In No Acute Distress] : in no acute distress [PERRL With Normal Accommodation] : pupils were equal in size, round, and reactive to light [Auscultation Breath Sounds / Voice Sounds] : lungs were clear to auscultation bilaterally [Heart Sounds] : normal S1 and S2 [Edema] : there was no peripheral edema [Bowel Sounds] : normal bowel sounds [No CVA Tenderness] : no ~M costovertebral angle tenderness [Abnormal Walk] : normal gait [] : no rash [No Focal Deficits] : no focal deficits [Oriented To Time, Place, And Person] : oriented to person, place, and time [Impaired Insight] : insight and judgment were intact [Affect] : the affect was normal [Mood] : the mood was normal

## 2022-12-01 NOTE — HISTORY OF PRESENT ILLNESS
[FreeTextEntry1] : 72 year old female with PMH of morbid obesity, DM, HTN, BONNY,fatty liver, CKD 3b/4 hernia presents for evaluation of CKD. Pt has h/o chronic back pain requiring multiple hospital visits during the past few months. She had a CT scan without IV contrast showed L adnexal cyst. CT lumbar spine showed grade 1 subluxation of L4 -L5, multilevel degenerative changes and no acute fracture. She saw pain management Dr. Melendrez and got intralaminar injection last month.\par \par Ptt seen and examined\par Feels well\par No acute complaint\par \par \par Dx with DM in 1998\par Sugars are 'pretty good' \par Endorses cataract and glaucoma history- not sure if she has diabetic involvement\par + neuropathy\par \par Denies prior knowledge of kidney disease before recent hospitalization\par Denies hematuria, reports occasional foamy urine\par \par Family hx of ESRD in brother (etiology of ESRD is unclear).\par \par Takes Tylenol for pain and percocet prn\par Denies NSAID use\par \par \par \par

## 2022-12-09 ENCOUNTER — APPOINTMENT (OUTPATIENT)
Dept: CARDIOLOGY | Facility: CLINIC | Age: 72
End: 2022-12-09

## 2022-12-12 NOTE — H&P ADULT - RESPIRATORY
clear to auscultation bilaterally/no respiratory distress/airway patent/breath sounds equal/good air movement/respirations non-labored yes

## 2022-12-13 NOTE — ED PROVIDER NOTE - RELIEVING FACTORS
MEDICARE WELLNESS VISIT + NOTE    CHIEF COMPLAINT:  Yu Cummings presents for her Subsequent Annual Medicare Wellness Visit.   Her additional complaints or concerns are addressed below.     Patient Care Team:  Abraham Dudley MD as PCP - General (Internal Medicine)  Eileen Walters MD as Referring Provider (Orthopedic Surgery)  Scooter Cantu MD (Female Pelvic Medicine and Reconstructive Surgery)  Dia Moore MD (Ophthalmology)        Patient Active Problem List   Diagnosis   • Essential hypertension   • Mild persistent asthma   • Hypercholesterolemia   • GERD (gastroesophageal reflux disease)   • Family history of colon cancer   • Hx of colonic polyps   • Increased urinary frequency   • Status post right hip replacement   • Chronic pain of left knee   • Risk factors for obstructive sleep apnea   • Osteoarthritis   • Obesity, morbid, BMI 50 or higher (CMS/[facility])   • Age-related osteoporosis without current pathological fracture   • Nodule of right lung   • History of smoking   • Need for hepatitis C screening test   • Breast cancer screening by mammogram   • Adjustment disorder with anxious mood         Past Medical History:   Diagnosis Date   • Blood loss anemia 2019   • COVID     2022.  Used Paxlovid.   • Osteoarthritis of right hip 2019         Past Surgical History:   Procedure Laterality Date   •  delivery only      x2   • Colonoscopy  2014    2 polyps removed - benign   • Joint replacement Right     right hip         Social History     Tobacco Use   • Smoking status: Former Smoker     Packs/day: 1.50     Years: 32.00     Pack years: 48.00     Start date:      Quit date: 10/4/2012     Years since quitting: 10.1   • Smokeless tobacco: Former User     Quit date:    Vaping Use   • Vaping Use: never used   Substance Use Topics   • Alcohol use: Yes     Comment: socially, rarely   • Drug use: No     Family History   Problem Relation Age of Onset   • Cancer, Colon Mother          unknown age of diagnosis;  at 69   • COPD Father    • Stroke Father    • Pneumonia Father         cause of death at age 86   • Cancer, Ovarian Sister 65   • Asthma Daughter    • Asthma Daughter          Current Outpatient Medications   Medication Sig Dispense Refill   • traMADol (ULTRAM) 50 MG tablet TAKE 1 TABLET BY MOUTH EVERY 6 HOURS AS NEEDED FOR PAIN 30 tablet 0   • hydroCHLOROthiazide (HYDRODIURIL) 12.5 MG tablet TAKE 1 TABLET BY MOUTH DAILY 90 tablet 3   • Ventolin  (90 Base) MCG/ACT inhaler INHALE 2 PUFFS INTO THE LUNGS EVERY 4 HOURS AS NEEDED FOR SHORTNESS OF BREATH OR WHEEZING 18 g 1   • naproxen (NAPROSYN) 500 MG tablet Take 1 tablet by mouth 2 times daily as needed for Pain. 30 tablet 5   • hydroCORTisone (ANUSOL-HC) 2.5 % rectal cream APPLY RECTALLY TO THE AFFECTED AREA TWICE DAILY 30 g 0   • losartan (COZAAR) 50 MG tablet Take 1 tablet by mouth daily. 90 tablet 3   • pravastatin (PRAVACHOL) 40 MG tablet Take 1 tablet by mouth daily. 90 tablet 3   • Multiple Vitamins-Minerals (adult vitamin- multivitamin w/minerals) CHEWABLE tablet Chew 2 tablets by mouth daily. 100 tablet 0   • alendronate (FOSAMAX) 70 MG tablet Take 1 tablet by mouth every 7 days. 12 tablet 3   • esomeprazole (NexIUM) 20 MG capsule TAKE 1 CAPSULE BY MOUTH DAILY BEFORE BREAKFAST 90 capsule 3   • fluticasone-vilanterol (BREO ELLIPTA) 200-25 MCG/INH inhaler Inhale 1 puff into the lungs daily. 1 each 11   • Multiple Vitamin (MULTI-DAY PO) Take 1 tablet by mouth daily.     • cholecalciferol (VITAMIN D3) 1000 UNITS tablet Take 5,000 Units by mouth daily.     • aspirin 81 MG chewable tablet Chew 81 mg by mouth daily.       No current facility-administered medications for this visit.        The following items on the Medicare Health Risk Assessment were found to be positive  5.) Do you do moderate to strenuous exercise (brisk walk) for about 20 minutes for 3 or more days per week?: No, I usually do not exercise this much      11a.) Bladder Control problems (urine leaking): Often         Vision and Hearing screens: follows with ophth for cataract; no hearing symptoms    Advance care planning documents on file - yes    Cognitive/Functional Status: no evidence of cognitive dysfunction by direct observation    Opioid Review: uY is not taking opioid medications.    Recent PHQ 2/9 Score:    PHQ 2:  Date Adult PHQ 2 Score Adult PHQ 2 Interpretation   12/13/2022 0 No further screening needed       PHQ 9:       DEPRESSION ASSESSMENT/PLAN:  Depression screening is negative no further plan needed.     Body mass index is 46.52 kg/m².    BMI ASSESSMENT/PLAN:  Patient is overweight.    Caloric restriction        See Patient Instructions section.   Return in about 6 months (around 6/13/2023) for Follow up.      OUTPATIENT PROGRESS NOTE    Subjective   Chief Complaint Medicare Wellness Visit and Office Visit    HPI     She has no new complaints today.    She is adherent to her weekly dose of alendronate for management of osteoporosis.  However, alendronate has been on hold because she had oral surgery to extract a left lower molar last week.  Oral surgeon has instructed her to resume alendronate in the near future.  She had a DEXA scan 10/7/ 2021 and will be due for another DEXA scan 10/7/23.    Medications  Medications were reviewed and updated today.    Histories  I have personally reviewed and updated the patient's past medical, past surgical, family and social histories during today's visit.    Review of Systems:     Negative, except as documented.    Objective   Visit Vitals  /68   Pulse 78   Temp 97.3 °F (36.3 °C) (Tympanic)   Ht 5' 6\" (1.676 m)   Wt 130.7 kg (288 lb 3.2 oz)   LMP  (LMP Unknown)   BMI 46.52 kg/m²     Physical Exam    Physical Exam  Constitutional:       Appearance: She is obese. She is not ill-appearing.   HENT:      Right Ear: Ear canal normal.      Left Ear: Ear canal normal.      Ears:      Comments: Quiet speech is  heard well.     Mouth/Throat:      Comments: Her gum is well-healed at the site of her molar tooth extraction last week, left mandible.  Eyes:      Conjunctiva/sclera: Conjunctivae normal.   Cardiovascular:      Rate and Rhythm: Normal rate and regular rhythm.      Heart sounds: Normal heart sounds. No murmur heard.  Pulmonary:      Effort: Pulmonary effort is normal.      Breath sounds: Normal breath sounds.   Abdominal:      General: Abdomen is flat. There is no distension.      Palpations: Abdomen is soft.      Tenderness: There is no abdominal tenderness.   Musculoskeletal:      Right lower leg: No edema.      Left lower leg: No edema.   Skin:     General: Skin is warm and dry.      Findings: No rash.   Neurological:      General: No focal deficit present.      Mental Status: She is alert. Mental status is at baseline.   Psychiatric:         Mood and Affect: Mood normal.         Behavior: Behavior normal.         Thought Content: Thought content normal.         Judgment: Judgment normal.         Laboratory  I have reviewed the pertinent laboratory tests. These are the pertinent findings:  Results of May 7, 2022 are satisfactory.    Imaging  I have reviewed the pertinent imaging study reports. These are the pertinent findings:  The patient declines a PET scan which could have been done as part of her lung cancer screening program.  In addition, she declines a repeat chest CT scan which could be done next month as part of lung cancer screening.    Routine mammography was satisfactory, May 10, 2022.    Assessment & Plan   Diagnoses and associated orders for this visit:  1. Routine general medical examination at a health care facility  2. Essential hypertension  3. Hypercholesterolemia  4. Obesity, morbid, BMI 50 or higher (CMS/HCC)    I reassured the patient that today's exam does not reveal any concerning findings.  I asked her to go to a pharmacy to receive a 10-year Tdap booster immunization.  I did not alter  her maintenance pharmacotherapy, including antihypertensive medication and tramadol for chronic pain due to osteoarthritis..  Patient education given, and all questions answered.  Revisit requested in 6 months.     none

## 2022-12-27 LAB
ALBUMIN SERPL ELPH-MCNC: 3.7 G/DL
ANION GAP SERPL CALC-SCNC: 10 MMOL/L
APPEARANCE: CLEAR
BACTERIA: NEGATIVE
BASOPHILS # BLD AUTO: 0.02 K/UL
BASOPHILS NFR BLD AUTO: 0.4 %
BILIRUBIN URINE: NEGATIVE
BLOOD URINE: NEGATIVE
BUN SERPL-MCNC: 21 MG/DL
CALCIUM SERPL-MCNC: 9 MG/DL
CHLORIDE SERPL-SCNC: 109 MMOL/L
CO2 SERPL-SCNC: 22 MMOL/L
COLOR: NORMAL
CREAT SERPL-MCNC: 1.81 MG/DL
CREAT SPEC-SCNC: 95 MG/DL
EGFR: 30 ML/MIN/1.73M2
EOSINOPHIL # BLD AUTO: 0.3 K/UL
EOSINOPHIL NFR BLD AUTO: 5.5 %
GLUCOSE QUALITATIVE U: NEGATIVE
GLUCOSE SERPL-MCNC: 162 MG/DL
HCT VFR BLD CALC: 33.6 %
HGB BLD-MCNC: 9.8 G/DL
HYALINE CASTS: 0 /LPF
IMM GRANULOCYTES NFR BLD AUTO: 0.2 %
KETONES URINE: NEGATIVE
LEUKOCYTE ESTERASE URINE: NEGATIVE
LYMPHOCYTES # BLD AUTO: 1.52 K/UL
LYMPHOCYTES NFR BLD AUTO: 28.1 %
MAN DIFF?: NORMAL
MCHC RBC-ENTMCNC: 27.1 PG
MCHC RBC-ENTMCNC: 29.2 GM/DL
MCV RBC AUTO: 92.8 FL
MICROALBUMIN 24H UR DL<=1MG/L-MCNC: 17 MG/DL
MICROALBUMIN/CREAT 24H UR-RTO: 178 MG/G
MICROSCOPIC-UA: NORMAL
MONOCYTES # BLD AUTO: 0.41 K/UL
MONOCYTES NFR BLD AUTO: 7.6 %
NEUTROPHILS # BLD AUTO: 3.15 K/UL
NEUTROPHILS NFR BLD AUTO: 58.2 %
NITRITE URINE: NEGATIVE
PH URINE: 6
PHOSPHATE SERPL-MCNC: 4.2 MG/DL
PLATELET # BLD AUTO: 232 K/UL
POTASSIUM SERPL-SCNC: 5.9 MMOL/L
PROTEIN URINE: ABNORMAL
RBC # BLD: 3.62 M/UL
RBC # FLD: 15.3 %
RED BLOOD CELLS URINE: 1 /HPF
SODIUM SERPL-SCNC: 141 MMOL/L
SPECIFIC GRAVITY URINE: 1.02
SQUAMOUS EPITHELIAL CELLS: 1 /HPF
UROBILINOGEN URINE: NORMAL
WBC # FLD AUTO: 5.41 K/UL
WHITE BLOOD CELLS URINE: 0 /HPF

## 2023-01-01 NOTE — ED ADULT TRIAGE NOTE - NS ED NURSE DIRECT TO ROOM YN
Patient Education       Well Child Exam 1 Month   About this topic   Your baby's 1-month well child exam is a visit with the doctor to check your baby's health. The doctor measures your child's weight, height, and head size. The doctor plots these numbers on a growth curve. The growth curve gives a picture of your baby's growth at each visit. The doctor may listen to your baby's heart, lungs, and belly. Your doctor will do a full exam of your baby from the head to the toes.  Your baby may also need shots or blood tests during this visit.  General   Growth and Development   Your doctor will ask you how your baby is developing. The doctor will focus on the skills that most children your child's age are expected to do. During the first month of your child's life, here are some things you can expect.  Movement - Your baby may:  Start to be more alert and respond to you.  Move arms and legs more smoothly.  Start to put a closed hand to the mouth or in front of the face.  Have problems holding their head up, but can lift their head up briefly while laying on their stomach  Hearing and seeing - Your baby will likely:  Turn to the sound of your voice.  See best about 8 to 12 inches (20 to 30 cm) away from the face.  Want to look at your face or a black and white pattern.  Still have their eyes cross or wander from time to time.  Feeding - Your baby needs:  Breast milk or formula for all of their nutrition. Your baby should not be given juice, water, cow's milk, rice cereal, or solid food at this age.  To eat every 2 to 3 hours, based on if you are breast or bottle feeding.  babies should eat about 8 to 12 times per day. Formula fed babies typically eat about 24 ounces total each day. Look for signs your baby is hungry like:  Smacking or licking the lips  Sucking on fingers, hands, tongue, or lips  Opening and closing mouth  Rooting and moving the head from side to side  To be burped often if having problems with  spitting up.  Your baby may turn away, close the mouth, or relax the arms when full. Do not overfeed your baby.  Always hold your baby when feeding. Do not prop a bottle. Propping the bottle makes it easier for your baby to choke and get ear infections.  Sleep - Your child:  Sleeps for about 2 to 4 hours at a time  Is likely sleeping about 14 to 17 hours total out of each day, with 4 to 5 daytime naps.  May sleep better when swaddled. Monitor your baby when swaddled. Check to make sure your baby has not rolled over. Also, make sure the swaddle blanket has not come loose. Keep the swaddle blanket loose around your baby's hips. Stop swaddling your baby before your baby starts to roll over. Most times, you will need to stop swaddling your baby by 2 months of age.  Should always sleep on the back, in your child's own bed, on a firm mattress  May soothe to sleep better sucking on a pacifier.  Help for Parents   Play with your baby.  Use tummy time to help your baby grow strong neck muscles. Shake a small rattle to encourage your baby to turn their head to the side.  Talk or sing to your baby often. Let your baby look at your face. Show your baby pictures.  Gently move your baby's arms and legs. Give your baby a gentle massage.  Here are some things you can do to help keep your baby safe and healthy.  Learn CPR and basic first aid. Learn how to take your baby's temperature.  Do not allow anyone to smoke in your home or around your baby. Second hand smoke can harm your baby.  Have the right size car seat for your baby and use it every time your baby is in the car. Your baby should be rear facing until 2 years of age. Check with a local car seat safety inspection station to be sure it is properly installed.  Always place your baby on the back for sleep. Keep soft bedding, bumpers, loose blankets, and toys out of your baby's bed.  Keep one hand on the baby whenever you are changing their diaper or clothes to prevent  falls.  Keep small toys and objects away from your baby.  Never leave your baby alone in the bath.  Keep your baby in the shade, rather than in the sun. Doctors dont recommend sunscreen until children are 6 months and older.  Parents need to think about:  A plan for going back to work or school.  A reliable  or  provider  How to handle bouts of crying or colic. It is normal for your baby to have times when they are hard to console. You need a plan for what to do if you are frustrated because it is never OK to shake a baby.  The next well child visit will most likely be when your baby is 2 months old. At this visit your doctor may:  Do a full check up on your baby  Talk about how your baby is sleeping, if your baby has colic or long periods of crying, and how well you are coping with your baby  Give your baby the next set of shots       When do I need to call the doctor?   Fever of 100.4°F (38°C) or higher  Having a hard time breathing  Doesnt have a wet diaper for more than 8 hours  Problems eating or spits up a lot  Legs and arms are very loose or floppy all the time  Legs and arms are very stiff  Won't stop crying  Doesn't blink or startle with loud sounds  Where can I learn more?   American Academy of Pediatrics  https://www.healthychildren.org/English/ages-stages/baby/Pages/Hearing-and-Making-Sounds.aspx   American Academy of Pediatrics  https://www.healthychildren.org/English/ages-stages/toddler/Pages/Milestones-During-The-First-2-Years.aspx   Centers for Disease Control and Prevention  https://www.cdc.gov/ncbddd/actearly/milestones/   KidsHealth  https://kidshealth.org/en/parents/checkup-1mo.html?ref=search   Last Reviewed Date   2021-05-06  Consumer Information Use and Disclaimer   This information is not specific medical advice and does not replace information you receive from your health care provider. This is only a brief summary of general information. It does NOT include all  information about conditions, illnesses, injuries, tests, procedures, treatments, therapies, discharge instructions or life-style choices that may apply to you. You must talk with your health care provider for complete information about your health and treatment options. This information should not be used to decide whether or not to accept your health care providers advice, instructions or recommendations. Only your health care provider has the knowledge and training to provide advice that is right for you.  Copyright   Copyright © 2021 UpToDate, Inc. and its affiliates and/or licensors. All rights reserved.    Children under the age of 2 years will be restrained in a rear facing child safety seat.   If you have an active [facility] account, please look for your well child questionnaire to come to your [facility] account before your next well child visit.   Yes

## 2023-01-09 ENCOUNTER — NON-APPOINTMENT (OUTPATIENT)
Age: 73
End: 2023-01-09

## 2023-01-09 ENCOUNTER — APPOINTMENT (OUTPATIENT)
Dept: CARDIOLOGY | Facility: CLINIC | Age: 73
End: 2023-01-09
Payer: MEDICARE

## 2023-01-09 VITALS
BODY MASS INDEX: 38.58 KG/M2 | SYSTOLIC BLOOD PRESSURE: 160 MMHG | WEIGHT: 226 LBS | HEIGHT: 64 IN | RESPIRATION RATE: 16 BRPM | DIASTOLIC BLOOD PRESSURE: 94 MMHG | HEART RATE: 78 BPM

## 2023-01-09 DIAGNOSIS — Z95.5 PRESENCE OF CORONARY ANGIOPLASTY IMPLANT AND GRAFT: ICD-10-CM

## 2023-01-09 DIAGNOSIS — N18.1 CHRONIC KIDNEY DISEASE, STAGE 1: ICD-10-CM

## 2023-01-09 DIAGNOSIS — R07.9 CHEST PAIN, UNSPECIFIED: ICD-10-CM

## 2023-01-09 DIAGNOSIS — R53.83 OTHER FATIGUE: ICD-10-CM

## 2023-01-09 PROCEDURE — 93000 ELECTROCARDIOGRAM COMPLETE: CPT

## 2023-01-09 PROCEDURE — 99215 OFFICE O/P EST HI 40 MIN: CPT

## 2023-01-09 NOTE — HISTORY OF PRESENT ILLNESS
[FreeTextEntry1] : She was brought to the emergency department at the hospital in August of 2020; she states she did not recall having any injury to the chest wall or strain as a possible cause of this fleeting pain. There is no associated nausea vomiting or diaphoresis; she was "ruled out for MI", and subsequent EKGs and exam was unremarkable and she was recommended for outpatient follow-up and possible stress test vs. Cardiac Cath;\par \par During that time, cardiac cath revealed mild to moderate coronary artery disease and medical therapy was recommended;\par \par More recently, she again went to [facility] ER with what was described as "expressive aphasia".  Patient was having difficulty speaking, stuttering, had complained of HA and she was shaking.  She was ruled out for having CVA, but MRI did reveal white matter disease concerning for MS.  It was also later determined that she may have had a TIA;\par \par During this most recent hospital visit, her Creatinine levels and Potassium levels were elevated and she was taken off Benazepril 20 mg QD.  Follow up with Nephrologist (Dr. Maya Hewitt) repeated labs still revealing abnormal Creatinine and Potassium.  Advised to stay off ACEI until further notice;\par \par Unfortunately, her blood pressures are elevated today in the 160s over 90s and she continues to be symptomatic with exertional chest discomfort with shortness of breath and fatigue; \par \par

## 2023-01-09 NOTE — REASON FOR VISIT
[FreeTextEntry1] : JUANY AGUILAR is being seen for arrhythmia/ECG abnormalities, structural heart and valve disease, hyperlipidemia, hypertension and coronary artery disease. \par \par The patient is a very pleasant 72-year-old  woman with long-standing history of obesity, ischemic heart disease and prior PCI/stent (2017);\par \par She presents back to the office today in need of cardiac clearance regarding an upcoming epidural and sacroiliac injections with Dr. Rickey Melendrez, scheduled for 1/19 and 1/26/23 respectively;\par \par She is still experiencing chest pain that is sometimes exertional related and sometimes while at rest.  This is mostly located mid-sternal to upper right chest.  There are associated symptoms of exertional shortness of breath and fatigue;\par \par She denies any significant palpitations, dizziness or syncope;

## 2023-01-09 NOTE — ASSESSMENT
[FreeTextEntry1] : EKG 1/9/2023:  The EKG illustrates sinus rhythm, rate of 78 bpm, left atrial enlargement pattern, abnormal T waves.\par \par In summary, this 72-year-old woman has a history for CAD and remote history for PCI/stent. She has had a stable cardiac pattern and normal myocardial perfusion stress test in June 2021;\par \par She's continues to suffer from exertional upper chest discomfort which appears to have worsened these last few days, and now has associated shortness of breath and fatigue as well. Blood pressures are not well controlled and patient has multiple comorbidities which increase her risk factors;\par \par Following with Nephrologist (Dr. Hewitt) regarding CKD and hyperkalemia.  Remains off ACEI for now;\par \par Patient also needs cardiac clearance regarding an upcoming epidural and sacroiliac joint injections tentatively scheduled later this month;\par \par \par \par Plan:\par \par Strongly recommended she undergo a left heart catheterization to further delineate patients exertional chest pain with SOB and fatigue.  This is to rule out significant signs for CAD with possibility for PCI / CABG;\par \par Will remain off ACEI for now, advised she begin Hydralazine 50 mg daily in hopes of more optimal blood pressure control.  All other cardiac meds remain the same;\par \par Immediately go to the emergency department and/or report any untoward symptoms to our office; \par \par Hold off for any orthopedic treatments until further notice;\par \par Follow up with Nephrologist ASAP to discuss most recent blood work and management for CKD / hyperkalemia;\par \par Follow up with our office within 1-2 weeks s/p cardiac cath;

## 2023-01-09 NOTE — PHYSICAL EXAM
[No Acute Distress] : no acute distress [Obese] : obese [Normal Conjunctiva] : normal conjunctiva [Normal Venous Pressure] : normal venous pressure [Carotid Bruit] : carotid bruit [Normal S1, S2] : normal S1, S2 [No Rub] : no rub [No Gallop] : no gallop [Murmur] : murmur [Clear Lung Fields] : clear lung fields [Good Air Entry] : good air entry [No Respiratory Distress] : no respiratory distress  [Soft] : abdomen soft [Non Tender] : non-tender [No Masses/organomegaly] : no masses/organomegaly [Normal Gait] : normal gait [No Edema] : no edema [No Cyanosis] : no cyanosis [No Clubbing] : no clubbing [No Varicosities] : no varicosities [No Rash] : no rash [No Skin Lesions] : no skin lesions [Moves all extremities] : moves all extremities [No Focal Deficits] : no focal deficits [Normal Speech] : normal speech [Alert and Oriented] : alert and oriented [Normal memory] : normal memory [de-identified] : heard on left [de-identified] : Grade I/VI systolic murmur

## 2023-01-11 ENCOUNTER — NON-APPOINTMENT (OUTPATIENT)
Age: 73
End: 2023-01-11

## 2023-01-11 LAB
ALBUMIN SERPL ELPH-MCNC: 3.7 G/DL
ANION GAP SERPL CALC-SCNC: 11 MMOL/L
BASOPHILS # BLD AUTO: 0.03 K/UL
BASOPHILS NFR BLD AUTO: 0.5 %
BUN SERPL-MCNC: 20 MG/DL
CALCIUM SERPL-MCNC: 8.9 MG/DL
CHLORIDE SERPL-SCNC: 110 MMOL/L
CO2 SERPL-SCNC: 20 MMOL/L
CREAT SERPL-MCNC: 1.61 MG/DL
EGFR: 34 ML/MIN/1.73M2
EOSINOPHIL # BLD AUTO: 0.47 K/UL
EOSINOPHIL NFR BLD AUTO: 7.4 %
GLUCOSE SERPL-MCNC: 164 MG/DL
HCT VFR BLD CALC: 31.5 %
HGB BLD-MCNC: 9.7 G/DL
IMM GRANULOCYTES NFR BLD AUTO: 0.5 %
LYMPHOCYTES # BLD AUTO: 1.85 K/UL
LYMPHOCYTES NFR BLD AUTO: 29.2 %
MAN DIFF?: NORMAL
MCHC RBC-ENTMCNC: 26.6 PG
MCHC RBC-ENTMCNC: 30.8 GM/DL
MCV RBC AUTO: 86.5 FL
MONOCYTES # BLD AUTO: 0.5 K/UL
MONOCYTES NFR BLD AUTO: 7.9 %
NEUTROPHILS # BLD AUTO: 3.46 K/UL
NEUTROPHILS NFR BLD AUTO: 54.5 %
PHOSPHATE SERPL-MCNC: 3.8 MG/DL
PLATELET # BLD AUTO: 210 K/UL
POTASSIUM SERPL-SCNC: 5.7 MMOL/L
RBC # BLD: 3.64 M/UL
RBC # FLD: 15.2 %
SODIUM SERPL-SCNC: 142 MMOL/L
WBC # FLD AUTO: 6.34 K/UL

## 2023-01-17 ENCOUNTER — OFFICE (OUTPATIENT)
Dept: URBAN - METROPOLITAN AREA CLINIC 112 | Facility: CLINIC | Age: 73
Setting detail: OPHTHALMOLOGY
End: 2023-01-17
Payer: COMMERCIAL

## 2023-01-17 DIAGNOSIS — H04.123: ICD-10-CM

## 2023-01-17 DIAGNOSIS — H16.223: ICD-10-CM

## 2023-01-17 DIAGNOSIS — H35.033: ICD-10-CM

## 2023-01-17 DIAGNOSIS — H40.1113: ICD-10-CM

## 2023-01-17 PROCEDURE — 83861 MICROFLUID ANALY TEARS: CPT | Performed by: OPHTHALMOLOGY

## 2023-01-17 PROCEDURE — 92014 COMPRE OPH EXAM EST PT 1/>: CPT | Performed by: OPHTHALMOLOGY

## 2023-01-17 PROCEDURE — 92250 FUNDUS PHOTOGRAPHY W/I&R: CPT | Performed by: OPHTHALMOLOGY

## 2023-01-17 ASSESSMENT — REFRACTION_MANIFEST
OS_CYLINDER: -0.75
OD_CYLINDER: -0.75
OS_ADD: +2.50
OS_AXIS: 055
OS_AXIS: 023
OD_AXIS: 020
OD_CYLINDER: -0.75
OS_SPHERE: -0.25
OD_VA2: 20/20
OD_SPHERE: -0.50
OS_VA2: 20/20
OS_ADD: +2.00
OS_CYLINDER: -0.50
OS_SPHERE: -0.25
OD_VA1: 20/150
OS_VA1: 20/20
OD_ADD: +2.00
OS_VA1: 20/20
OD_VA1: 20/20
OD_AXIS: 125
OD_SPHERE: -0.25
OD_ADD: +2.50

## 2023-01-17 ASSESSMENT — PACHYMETRY
OD_CT_CORRECTION: 4
OS_CT_CORRECTION: 4
OD_CT_UM: 482
OS_CT_UM: 480

## 2023-01-17 ASSESSMENT — SPHEQUIV_DERIVED
OD_SPHEQUIV: -0.125
OD_SPHEQUIV: -0.625
OD_SPHEQUIV: -0.875
OS_SPHEQUIV: -0.625
OS_SPHEQUIV: -0.625
OS_SPHEQUIV: -0.5

## 2023-01-17 ASSESSMENT — REFRACTION_CURRENTRX
OD_AXIS: 140
OS_CYLINDER: -0.50
OS_SPHERE: +2.25
OD_SPHERE: +2.00
OS_VPRISM_DIRECTION: SV
OS_OVR_VA: 20/
OS_AXIS: 031
OD_VPRISM_DIRECTION: SV
OD_CYLINDER: -0.50
OD_OVR_VA: 20/

## 2023-01-17 ASSESSMENT — VISUAL ACUITY
OS_BCVA: 20/100-1
OD_BCVA: 20/25

## 2023-01-17 ASSESSMENT — REFRACTION_AUTOREFRACTION
OS_CYLINDER: -0.75
OD_SPHERE: +0.25
OD_AXIS: 018
OS_SPHERE: -0.25
OS_AXIS: 054
OD_CYLINDER: -0.75

## 2023-01-17 ASSESSMENT — AXIALLENGTH_DERIVED
OS_AL: 23.2884
OS_AL: 23.241
OS_AL: 23.2884
OD_AL: 23.4743
OD_AL: 23.1885
OD_AL: 23.3782

## 2023-01-17 ASSESSMENT — KERATOMETRY
OD_AXISANGLE_DEGREES: 103
OD_K1POWER_DIOPTERS: 43.50
METHOD_AUTO_MANUAL: AUTO
OS_K1POWER_DIOPTERS: 44.50
OS_AXISANGLE_DEGREES: 136
OS_K2POWER_DIOPTERS: 45.50
OD_K2POWER_DIOPTERS: 46.00

## 2023-01-17 ASSESSMENT — CONFRONTATIONAL VISUAL FIELD TEST (CVF)
OD_FINDINGS: FULL
OS_FINDINGS: FULL

## 2023-01-17 ASSESSMENT — SUPERFICIAL PUNCTATE KERATITIS (SPK)
OD_SPK: 1+
OS_SPK: 1+

## 2023-01-17 ASSESSMENT — TONOMETRY: OS_IOP_MMHG: 12

## 2023-02-10 RX ORDER — CHLORHEXIDINE GLUCONATE 213 G/1000ML
1 SOLUTION TOPICAL ONCE
Refills: 0 | Status: DISCONTINUED | OUTPATIENT
Start: 2023-02-13 | End: 2023-02-28

## 2023-02-10 NOTE — H&P PST ADULT - NSICDXPASTSURGICALHX_GEN_ALL_CORE_FT
PAST SURGICAL HISTORY:  After cataract, bilateral      PAST SURGICAL HISTORY:  After cataract, bilateral     S/P coronary artery stent placement

## 2023-02-10 NOTE — H&P PST ADULT - ASSESSMENT
71 y/o female with PMH of HTN, DM-2, CAD s/p stents, CKD-3, chronic back pain she needs cardiac clearance for epidural and sacroliac injections, but she has c/o chest pain that is sometimes exertional related and sometimes while at rest. Now presents for [facility].    NPO for procedure  Consent to be obtained by MD GARCIA--IVF hydration pre/post procedure as per protocol to prevent KERVIN  ASA 81mg PO preprocedure

## 2023-02-10 NOTE — H&P PST ADULT - HISTORY OF PRESENT ILLNESS
Narrative:   71 y/o female with PMH of HTN, DM-2, CAD s/p stents, CKD-3, chronic back pain she needs cardiac clearance for epidural and sacroliac injections, but she has c/o chest pain that is sometimes exertional related and sometimes while at rest. Now presents for [facility].    Symptoms:        Angina (Class):        Ischemic Symptoms:     Heart Failure:        Systolic/Diastolic/Combined:        NYHA Class (within 2 weeks):     Assessment of LVEF:       EF: 70-75%       Assessed by: TTE       Date: 10/2022    Noninvasive Testing:   Stress Test: Date:   < from: Nuclear Stress Test-Pharmacologic (06.25.21 @ 09:04) >  IMPRESSIONS:  * No evidence of ischemia or infarct in the setting of  artifact as noted.  * There is a small, mild to moderate defect in  anteroapical, apical walls that is partially reversible  consistent with artifact from differential breast  positioning.  * Perfusion defects are in part the result of breast  attenuation.  * Normal LV size.  * Normal LV function.  Calculated LVEF 69%.    ------------------------------------------------------------------------      ------------------------------------------------------------------------    Confirmed on  6/25/2021 - 11:27:31 by Ever Burns MD    < end of copied text >    Prior Cardiac Interventions:       PCI's: < from: Cardiac Cath Lab - Adult (08.31.20 @ 09:52) >  CORONARY VESSELS: The coronary circulation is right dominant.  LM:   --  LM:Normal.  LAD:   --  Proximal LAD: There was a 50 % stenosis. iFR 0.97 , negative for  ischemia  --  Mid LAD: Patent stent  CX:   --  Circumflex: Normal.  RCA:   --  RCA: The vessel was medium sized. Angiography showed mild  atherosclerosis with no flow limiting lesions.  COMPLICATIONS: No complications occurred during the cath lab visit.  DIAGNOSTIC IMPRESSIONS: Non obstructive CAD Proximal LAD 50% ( iFR 0.97  negative for ischemia)  Normal LV systolic function  DIAGNOSTIC RECOMMENDATIONS: continue medical therapy for CAD  Prepared and signed by  Dion Chavez MD    < end of copied text >                  Echo: < from: TTE Echo Complete w/ Contrast w/ Doppler (10.31.22 @ 11:36) >  Summary:   1. Endocardial visualization was enhanced with intravenous echo contrast.   2. Left ventricular ejection fraction, by visual estimation, is 70 to   75%.   3. Hyperdynamic global left ventricular systolic function.   4. Spectral Doppler shows impaired relaxation pattern of left   ventricular myocardial filling (Grade I diastolic dysfunction).   5. Normal left ventricular internal cavity size.   6. There is mild concentric left ventricular hypertrophy.   7. Normal right ventricular size and function.   8. The left atrium is normal in size.   9. The right atrium is normal in size.  10. Moderate mitral annular calcification.  11. Mild thickening of the anterior and posterior mitral valve leaflets.  12. Trace mitral valve regurgitation.  13. Sclerotic aortic valve with normal opening.  14. There is no evidence of pericardial effusion.    MD Sheron Electronically signed on 10/31/2022 at 12:36:01 PM    < end of copied text >      Antianginal Therapies:        Beta Blockers:  Metoprolol       Calcium Channel Blockers:        Long Acting Nitrates:        Ranexa:     Associated Risk Factors:        Cerebrovascular Disease: N/A       Chronic Lung Disease: N/A       Peripheral Arterial Disease: N/A       Chronic Kidney Disease (if yes, what is GFR): N/A       Uncontrolled Diabetes (if yes, what is HgbA1C or FBS): N/A       Poorly Controlled Hypertension (if yes, what is SBP): N/A       Morbid Obesity (if yes, what is BMI): N/A       History of Recent Ventricular Arrhythmia: N/A       Inability to Ambulate Safely: N/A       Need for Therapeutic Anticoagulation: N/A       Antiplatelet or Contrast Allergy: N/A Narrative:   71 y/o female with PMH of HTN, DM-2, CAD s/p stents, CKD-3, chronic back pain she needs cardiac clearance for epidural and sacroliac injections, but she has c/o chest pain that is sometimes exertional related and sometimes while at rest. Now presents for [facility].    Symptoms:        Angina (Class): 3       Ischemic Symptoms: chest pain at rest    Heart Failure: NONE         Assessment of LVEF:       EF: 70-75%       Assessed by: TTE       Date: 10/2022    Noninvasive Testing:   Stress Test: Date:   < from: Nuclear Stress Test-Pharmacologic (06.25.21 @ 09:04) >  IMPRESSIONS:  * No evidence of ischemia or infarct in the setting of  artifact as noted.  * There is a small, mild to moderate defect in  anteroapical, apical walls that is partially reversible  consistent with artifact from differential breast  positioning.  * Perfusion defects are in part the result of breast  attenuation.  * Normal LV size.  * Normal LV function.  Calculated LVEF 69%.    ------------------------------------------------------------------------      ------------------------------------------------------------------------    Confirmed on  6/25/2021 - 11:27:31 by Ever Burns MD    < end of copied text >    Prior Cardiac Interventions:       PCI's: < from: Cardiac Cath Lab - Adult (08.31.20 @ 09:52) >  CORONARY VESSELS: The coronary circulation is right dominant.  LM:   --  LM:Normal.  LAD:   --  Proximal LAD: There was a 50 % stenosis. iFR 0.97 , negative for  ischemia  --  Mid LAD: Patent stent  CX:   --  Circumflex: Normal.  RCA:   --  RCA: The vessel was medium sized. Angiography showed mild  atherosclerosis with no flow limiting lesions.  COMPLICATIONS: No complications occurred during the cath lab visit.  DIAGNOSTIC IMPRESSIONS: Non obstructive CAD Proximal LAD 50% ( iFR 0.97  negative for ischemia)  Normal LV systolic function  DIAGNOSTIC RECOMMENDATIONS: continue medical therapy for CAD  Prepared and signed by  Dion Chavez MD    < end of copied text >          Echo: < from: TTE Echo Complete w/ Contrast w/ Doppler (10.31.22 @ 11:36) >  Summary:   1. Endocardial visualization was enhanced with intravenous echo contrast.   2. Left ventricular ejection fraction, by visual estimation, is 70 to   75%.   3. Hyperdynamic global left ventricular systolic function.   4. Spectral Doppler shows impaired relaxation pattern of left   ventricular myocardial filling (Grade I diastolic dysfunction).   5. Normal left ventricular internal cavity size.   6. There is mild concentric left ventricular hypertrophy.   7. Normal right ventricular size and function.   8. The left atrium is normal in size.   9. The right atrium is normal in size.  10. Moderate mitral annular calcification.  11. Mild thickening of the anterior and posterior mitral valve leaflets.  12. Trace mitral valve regurgitation.  13. Sclerotic aortic valve with normal opening.  14. There is no evidence of pericardial effusion.    MD Sheron Electronically signed on 10/31/2022 at 12:36:01 PM    < end of copied text >    Antianginal Therapies:        Beta Blockers:  Metoprolol         Associated Risk Factors:        Cerebrovascular Disease: N/A       Chronic Lung Disease: N/A       Peripheral Arterial Disease: N/A       Chronic Kidney Disease (if yes, what is GFR): N/A       Uncontrolled Diabetes (if yes, what is HgbA1C or FBS): N/A       Poorly Controlled Hypertension (if yes, what is SBP): N/A       Morbid Obesity (if yes, what is BMI): YES, BMI 44       History of Recent Ventricular Arrhythmia: N/A       Inability to Ambulate Safely: N/A       Need for Therapeutic Anticoagulation: N/A       Antiplatelet or Contrast Allergy: N/A

## 2023-02-10 NOTE — H&P PST ADULT - NSICDXPASTMEDICALHX_GEN_ALL_CORE_FT
PAST MEDICAL HISTORY:  Anemia     Arthritis     Chest pain     DJD (degenerative joint disease), lumbar     DM (diabetes mellitus)     Fatty liver     GERD (gastroesophageal reflux disease)     Hiatal hernia     HTN (hypertension)     BONNY (obstructive sleep apnea)      PAST MEDICAL HISTORY:  Anemia     Arthritis     CAD (coronary artery disease)     Chest pain     DJD (degenerative joint disease), lumbar     DM (diabetes mellitus)     Fatty liver     GERD (gastroesophageal reflux disease)     Hiatal hernia     HTN (hypertension)     BONNY (obstructive sleep apnea)     Stage 3 chronic kidney disease

## 2023-02-13 ENCOUNTER — TRANSCRIPTION ENCOUNTER (OUTPATIENT)
Age: 73
End: 2023-02-13

## 2023-02-13 ENCOUNTER — OUTPATIENT (OUTPATIENT)
Dept: OUTPATIENT SERVICES | Facility: HOSPITAL | Age: 73
LOS: 1 days | Discharge: HOME | End: 2023-02-13
Payer: MEDICARE

## 2023-02-13 VITALS
HEART RATE: 78 BPM | SYSTOLIC BLOOD PRESSURE: 146 MMHG | RESPIRATION RATE: 18 BRPM | OXYGEN SATURATION: 97 % | DIASTOLIC BLOOD PRESSURE: 70 MMHG

## 2023-02-13 VITALS
RESPIRATION RATE: 20 BRPM | SYSTOLIC BLOOD PRESSURE: 174 MMHG | HEART RATE: 100 BPM | OXYGEN SATURATION: 99 % | DIASTOLIC BLOOD PRESSURE: 83 MMHG | TEMPERATURE: 98 F

## 2023-02-13 DIAGNOSIS — R07.9 CHEST PAIN, UNSPECIFIED: ICD-10-CM

## 2023-02-13 DIAGNOSIS — Z95.5 PRESENCE OF CORONARY ANGIOPLASTY IMPLANT AND GRAFT: Chronic | ICD-10-CM

## 2023-02-13 DIAGNOSIS — H26.40 UNSPECIFIED SECONDARY CATARACT: Chronic | ICD-10-CM

## 2023-02-13 LAB
ANION GAP SERPL CALC-SCNC: 12 MMOL/L — SIGNIFICANT CHANGE UP (ref 5–17)
BASOPHILS # BLD AUTO: 0.02 K/UL — SIGNIFICANT CHANGE UP (ref 0–0.2)
BASOPHILS NFR BLD AUTO: 0.3 % — SIGNIFICANT CHANGE UP (ref 0–2)
BUN SERPL-MCNC: 29 MG/DL — HIGH (ref 8–20)
CALCIUM SERPL-MCNC: 9.1 MG/DL — SIGNIFICANT CHANGE UP (ref 8.4–10.5)
CHLORIDE SERPL-SCNC: 110 MMOL/L — HIGH (ref 96–108)
CO2 SERPL-SCNC: 19 MMOL/L — LOW (ref 22–29)
CREAT SERPL-MCNC: 1.85 MG/DL — HIGH (ref 0.5–1.3)
EGFR: 29 ML/MIN/1.73M2 — LOW
EOSINOPHIL # BLD AUTO: 0.53 K/UL — HIGH (ref 0–0.5)
EOSINOPHIL NFR BLD AUTO: 9.2 % — HIGH (ref 0–6)
GLUCOSE SERPL-MCNC: 111 MG/DL — HIGH (ref 70–99)
HCT VFR BLD CALC: 34.5 % — SIGNIFICANT CHANGE UP (ref 34.5–45)
HGB BLD-MCNC: 10.8 G/DL — LOW (ref 11.5–15.5)
IMM GRANULOCYTES NFR BLD AUTO: 0.3 % — SIGNIFICANT CHANGE UP (ref 0–0.9)
LYMPHOCYTES # BLD AUTO: 1.61 K/UL — SIGNIFICANT CHANGE UP (ref 1–3.3)
LYMPHOCYTES # BLD AUTO: 27.9 % — SIGNIFICANT CHANGE UP (ref 13–44)
MCHC RBC-ENTMCNC: 26.3 PG — LOW (ref 27–34)
MCHC RBC-ENTMCNC: 31.3 GM/DL — LOW (ref 32–36)
MCV RBC AUTO: 84.1 FL — SIGNIFICANT CHANGE UP (ref 80–100)
MONOCYTES # BLD AUTO: 0.54 K/UL — SIGNIFICANT CHANGE UP (ref 0–0.9)
MONOCYTES NFR BLD AUTO: 9.3 % — SIGNIFICANT CHANGE UP (ref 2–14)
NEUTROPHILS # BLD AUTO: 3.06 K/UL — SIGNIFICANT CHANGE UP (ref 1.8–7.4)
NEUTROPHILS NFR BLD AUTO: 53 % — SIGNIFICANT CHANGE UP (ref 43–77)
PLATELET # BLD AUTO: 228 K/UL — SIGNIFICANT CHANGE UP (ref 150–400)
POTASSIUM SERPL-MCNC: 5 MMOL/L — SIGNIFICANT CHANGE UP (ref 3.5–5.3)
POTASSIUM SERPL-SCNC: 5 MMOL/L — SIGNIFICANT CHANGE UP (ref 3.5–5.3)
RBC # BLD: 4.1 M/UL — SIGNIFICANT CHANGE UP (ref 3.8–5.2)
RBC # FLD: 14.7 % — HIGH (ref 10.3–14.5)
SARS-COV-2 RNA SPEC QL NAA+PROBE: SIGNIFICANT CHANGE UP
SODIUM SERPL-SCNC: 141 MMOL/L — SIGNIFICANT CHANGE UP (ref 135–145)
WBC # BLD: 5.78 K/UL — SIGNIFICANT CHANGE UP (ref 3.8–10.5)
WBC # FLD AUTO: 5.78 K/UL — SIGNIFICANT CHANGE UP (ref 3.8–10.5)

## 2023-02-13 PROCEDURE — 93010 ELECTROCARDIOGRAM REPORT: CPT

## 2023-02-13 PROCEDURE — 93005 ELECTROCARDIOGRAM TRACING: CPT

## 2023-02-13 PROCEDURE — 36415 COLL VENOUS BLD VENIPUNCTURE: CPT

## 2023-02-13 PROCEDURE — 85025 COMPLETE CBC W/AUTO DIFF WBC: CPT

## 2023-02-13 PROCEDURE — 80048 BASIC METABOLIC PNL TOTAL CA: CPT

## 2023-02-13 PROCEDURE — C1769: CPT

## 2023-02-13 PROCEDURE — 93458 L HRT ARTERY/VENTRICLE ANGIO: CPT

## 2023-02-13 PROCEDURE — U0005: CPT

## 2023-02-13 PROCEDURE — U0003: CPT

## 2023-02-13 PROCEDURE — C1887: CPT

## 2023-02-13 PROCEDURE — 99152 MOD SED SAME PHYS/QHP 5/>YRS: CPT

## 2023-02-13 PROCEDURE — 93458 L HRT ARTERY/VENTRICLE ANGIO: CPT | Mod: 26

## 2023-02-13 PROCEDURE — C1894: CPT

## 2023-02-13 RX ORDER — OMEPRAZOLE 10 MG/1
1 CAPSULE, DELAYED RELEASE ORAL
Qty: 0 | Refills: 0 | DISCHARGE

## 2023-02-13 RX ORDER — ASPIRIN/CALCIUM CARB/MAGNESIUM 324 MG
1 TABLET ORAL
Qty: 0 | Refills: 0 | DISCHARGE

## 2023-02-13 RX ORDER — METOPROLOL TARTRATE 50 MG
100 TABLET ORAL ONCE
Refills: 0 | Status: COMPLETED | OUTPATIENT
Start: 2023-02-13 | End: 2023-02-13

## 2023-02-13 RX ORDER — METFORMIN HYDROCHLORIDE 850 MG/1
2 TABLET ORAL
Qty: 0 | Refills: 0 | DISCHARGE

## 2023-02-13 RX ORDER — ATORVASTATIN CALCIUM 80 MG/1
1 TABLET, FILM COATED ORAL
Qty: 0 | Refills: 0 | DISCHARGE

## 2023-02-13 RX ORDER — CYCLOSPORINE 0.5 MG/ML
1 EMULSION OPHTHALMIC
Qty: 0 | Refills: 0 | DISCHARGE

## 2023-02-13 RX ORDER — SODIUM CHLORIDE 9 MG/ML
250 INJECTION INTRAMUSCULAR; INTRAVENOUS; SUBCUTANEOUS
Refills: 0 | Status: DISCONTINUED | OUTPATIENT
Start: 2023-02-13 | End: 2023-02-28

## 2023-02-13 RX ORDER — METOPROLOL TARTRATE 50 MG
1 TABLET ORAL
Qty: 0 | Refills: 0 | DISCHARGE

## 2023-02-13 RX ORDER — AZELASTINE 137 UG/1
2 SPRAY, METERED NASAL
Qty: 0 | Refills: 0 | DISCHARGE

## 2023-02-13 RX ORDER — BIMATOPROST 0.3 MG/ML
1 SOLUTION/ DROPS OPHTHALMIC
Qty: 0 | Refills: 0 | DISCHARGE

## 2023-02-13 RX ORDER — FOLIC ACID 0.8 MG
1 TABLET ORAL
Qty: 0 | Refills: 0 | DISCHARGE

## 2023-02-13 RX ORDER — ERGOCALCIFEROL 1.25 MG/1
1 CAPSULE ORAL
Qty: 0 | Refills: 0 | DISCHARGE

## 2023-02-13 RX ORDER — INSULIN DEGLUDEC 100 U/ML
35 INJECTION, SOLUTION SUBCUTANEOUS
Qty: 0 | Refills: 0 | DISCHARGE

## 2023-02-13 RX ORDER — BRIMONIDINE TARTRATE, TIMOLOL MALEATE 2; 5 MG/ML; MG/ML
1 SOLUTION/ DROPS OPHTHALMIC
Qty: 0 | Refills: 0 | DISCHARGE

## 2023-02-13 RX ORDER — CLOPIDOGREL BISULFATE 75 MG/1
1 TABLET, FILM COATED ORAL
Qty: 0 | Refills: 0 | DISCHARGE

## 2023-02-13 RX ORDER — DORZOLAMIDE HYDROCHLORIDE 20 MG/ML
1 SOLUTION/ DROPS OPHTHALMIC
Qty: 0 | Refills: 0 | DISCHARGE

## 2023-02-13 RX ORDER — ASPIRIN/CALCIUM CARB/MAGNESIUM 324 MG
81 TABLET ORAL ONCE
Refills: 0 | Status: COMPLETED | OUTPATIENT
Start: 2023-02-13 | End: 2023-02-13

## 2023-02-13 RX ADMIN — SODIUM CHLORIDE 250 MILLILITER(S): 9 INJECTION INTRAMUSCULAR; INTRAVENOUS; SUBCUTANEOUS at 14:25

## 2023-02-13 RX ADMIN — Medication 81 MILLIGRAM(S): at 14:45

## 2023-02-13 RX ADMIN — Medication 100 MILLIGRAM(S): at 14:44

## 2023-02-13 NOTE — DISCHARGE NOTE NURSING/CASE MANAGEMENT/SOCIAL WORK - PATIENT PORTAL LINK FT
You can access the FollowMyHealth Patient Portal offered by [facility] by registering at the following website: [email or web address]. By joining [employer]’s FollowMyHealth portal, you will also be able to view your health information using other applications (apps) compatible with our system.

## 2023-02-13 NOTE — DISCHARGE NOTE PROVIDER - CARE PROVIDER_API CALL
Glynn Roa)  Internal Medicine  [facility] [place] [address]  Phone: [phone]  Fax: [phone]  Established Patient  Follow Up Time: 2 weeks

## 2023-02-13 NOTE — DISCHARGE NOTE PROVIDER - HOSPITAL COURSE
73 y/o female with PMH of HTN, DM-2, CAD s/p stents, CKD-3, chronic back pain she needs cardiac clearance for epidural and sacroliac injections, but she has c/o chest pain that is sometimes exertional related and sometimes while at rest. Now presents for LHC.  Now s/p LHC via RRA with Dr. Valle: unchanged from previous LHC 8/2020: patent mLAD stent, 40-50% pLAD; normal LCX; mild RCA disease (prelim report; official report to follow)  -Bedrest x 1 hour post procedure then OOB at 1700  -Remove radial band one hour post procedure at 1700   -If remains stable, discharge to home 2 hours post procedure at 1800  -Continue current med regimen  -Hold Metformin x 48 hours then restart  -Follow up with Dr. Roa in one to two weeks  -Activity instructions discussed with patient verbal understanding  -Discussed with Dr. Valle

## 2023-02-13 NOTE — PROGRESS NOTE ADULT - SUBJECTIVE AND OBJECTIVE BOX
Interventional Cardiology post procedure note:     -s/p LHC via RRA with Dr. Valle: unchanged from previous [facility] 8/2020: patent mLAD stent, 40-50% pLAD; normal LCX; mild RCA disease (prelim report; official report to follow)    TELE: NSR    MEDICATIONS  (STANDING):  chlorhexidine 4% Liquid 1 Application(s) Topical Once  sodium chloride 0.9%. 250 milliLiter(s) (250 mL/Hr) IV Continuous <Continuous>      Allergies:  latex (Urticaria)  oxycodone (Urticaria)      PAST MEDICAL & SURGICAL HISTORY:  HTN (hypertension)      DM (diabetes mellitus)      Chest pain      BONNY (obstructive sleep apnea)      Hiatal hernia      GERD (gastroesophageal reflux disease)      DJD (degenerative joint disease), lumbar      Fatty liver      Arthritis      Anemia      CAD (coronary artery disease)      Stage 3 chronic kidney disease      After cataract, bilateral      S/P coronary artery stent placement          Vital Signs Last 24 Hrs  T(C): 36.7 (13 Feb 2023 13:23), Max: 36.7 (13 Feb 2023 13:23)  T(F): 98 (13 Feb 2023 13:23), Max: 98 (13 Feb 2023 13:23)  HR: 100 (13 Feb 2023 13:23) (100 - 100)  BP: 174/83 (13 Feb 2023 13:23) (174/83 - 174/83)  BP(mean): 119 (13 Feb 2023 13:23) (119 - 119)  RR: 20 (13 Feb 2023 13:23) (20 - 20)  SpO2: 99% (13 Feb 2023 13:23) (99% - 99%)    Parameters below as of 13 Feb 2023 13:23  Patient On (Oxygen Delivery Method): room air        Physical Exam:  Constitutional: NAD, AAOx3  Cardiovascular: +S1S2 RRR  Pulmonary: CTA b/l, unlabored  GI: soft NTND +BS  Extremities: no pedal edema, +distal pulses b/l  Neuro: non focal, CROCKETT x4  Procedure site: Right radial band in place; site benign without hematoma/bleeding; RUE warm/mobile/acyanotic; + right ulnar pulse    LABS:                        10.8   5.78  )-----------( 228      ( 13 Feb 2023 13:05 )             34.5     02-13    141  |  110<H>  |  29.0<H>  ----------------------------<  111<H>  5.0   |  19.0<L>  |  1.85<H>    Ca    9.1      13 Feb 2023 13:05            RADIOLOGY & ADDITIONAL TESTS:

## 2023-02-13 NOTE — DISCHARGE NOTE PROVIDER - NSDCMRMEDTOKEN_GEN_ALL_CORE_FT
aspirin 81 mg oral tablet: 1 tab(s) orally once a day  atorvastatin 10 mg oral tablet: 1 tab(s) orally once a day (at bedtime)  azelastine 137 mcg/inh (0.1%) nasal spray: 2 spray(s) nasal 2 times a day  clopidogrel 75 mg oral tablet: 1 tab(s) orally once a day  Combigan 0.2%-0.5% ophthalmic solution: 1 drop(s) in the right eye once a day (at bedtime)  dorzolamide 2% ophthalmic solution: 1 drop(s) in each eye 3 times a day  folic acid 1 mg oral tablet: 1 tab(s) orally once a day  Lumigan 0.01% ophthalmic solution: 1 drop(s) in each eye once a day (in the evening)  metFORMIN 500 mg oral tablet: 2 tab(s) orally 2 times a day    Hold for 48 hours then restart Thursday 2/16/23 morning    metoprolol succinate 100 mg oral tablet, extended release: 1 tab(s) orally once a day  omeprazole 40 mg oral delayed release capsule: 1 cap(s) orally once a day  Restasis 0.05% ophthalmic emulsion: 1 drop(s) to each affected eye every 12 hours  Tresiba: 35 unit(s) subcutaneous once a day (at bedtime)  Vitamin D2 1.25 mg (50,000 intl units) oral capsule: 1 cap(s) orally once a week

## 2023-02-13 NOTE — PROGRESS NOTE ADULT - ASSESSMENT
A/P: 73 y/o female with PMH of HTN, DM-2, CAD s/p stents, CKD-3, chronic back pain she needs cardiac clearance for epidural and sacroliac injections, but she has c/o chest pain that is sometimes exertional related and sometimes while at rest. Now presents for C.  Now s/p LHC via RRA with Dr. Valle: unchanged from previous C 8/2020: patent mLAD stent, 40-50% pLAD; normal LCX; mild RCA disease (prelim report; official report to follow)  -Bedrest x 1 hour post procedure then OOB at 1700  -Remove radial band one hour post procedure at 1700   -If remains stable, discharge to home 2 hours post procedure at 1800  -Follow up with Dr. Roa in one to two weeks  -Activity instructions discussed with patient verbal understanding  -Discussed with Dr. Valle  A/P: 71 y/o female with PMH of HTN, DM-2, CAD s/p stents, CKD-3, chronic back pain she needs cardiac clearance for epidural and sacroliac injections, but she has c/o chest pain that is sometimes exertional related and sometimes while at rest. Now presents for C.  Now s/p LHC via RRA with Dr. Valle: unchanged from previous C 8/2020: patent mLAD stent, 40-50% pLAD; normal LCX; mild RCA disease (prelim report; official report to follow)  -Bedrest x 1 hour post procedure then OOB at 1700  -Remove radial band one hour post procedure at 1700   -If remains stable, discharge to home 2 hours post procedure at 1800  -Continue current med regimen  -Hold Metformin x 48 hours then restart  -Follow up with Dr. Roa in one to two weeks  -Activity instructions discussed with patient verbal understanding  -Discussed with Dr. Valle  A/P: 71 y/o female with PMH of HTN, DM-2, CAD s/p stents, CKD-3, chronic back pain she needs cardiac clearance for epidural and sacroliac injections, but she has c/o chest pain that is sometimes exertional related and sometimes while at rest. Now presents for LHC.  Now s/p LHC via RRA with Dr. Valle: unchanged from previous LHC 8/2020: patent mLAD stent, 40-50% pLAD; normal LCX; mild RCA disease (prelim report; official report to follow)  -Bedrest x 1 hour post procedure then OOB at 1700  -Remove radial band one hour post procedure at 1700   -If remains stable, discharge to home 2 hours post procedure at 1800  -Continue current med regimen  -Hold Metformin x 48 hours then restart  -IVF hydration post procedure as per protocol to prevent KERVIN  -Follow up with Dr. Roa in one to two weeks  -Activity instructions discussed with patient verbal understanding  -Discussed with Dr. Valle

## 2023-02-13 NOTE — DISCHARGE NOTE NURSING/CASE MANAGEMENT/SOCIAL WORK - NSDCPEFALRISK_GEN_ALL_CORE
For information on Fall & Injury Prevention, visit: [email or web address] OR  [email or web address] OR  https://www.cdc.gov/steadi/patient.html

## 2023-02-13 NOTE — DISCHARGE NOTE PROVIDER - NSDCCPCAREPLAN_GEN_ALL_CORE_FT
PRINCIPAL DISCHARGE DIAGNOSIS  Diagnosis: CAD (coronary artery disease)  Assessment and Plan of Treatment: Medical management

## 2023-02-15 DIAGNOSIS — I25.10 ATHEROSCLEROTIC HEART DISEASE OF NATIVE CORONARY ARTERY WITHOUT ANGINA PECTORIS: ICD-10-CM

## 2023-02-15 PROBLEM — N18.30 CHRONIC KIDNEY DISEASE, STAGE 3 UNSPECIFIED: Chronic | Status: ACTIVE | Noted: 2023-02-13

## 2023-02-17 ENCOUNTER — OFFICE (OUTPATIENT)
Dept: URBAN - METROPOLITAN AREA CLINIC 94 | Facility: CLINIC | Age: 73
Setting detail: OPHTHALMOLOGY
End: 2023-02-17
Payer: COMMERCIAL

## 2023-02-17 DIAGNOSIS — H35.033: ICD-10-CM

## 2023-02-17 DIAGNOSIS — H16.223: ICD-10-CM

## 2023-02-17 DIAGNOSIS — H04.123: ICD-10-CM

## 2023-02-17 DIAGNOSIS — Z96.1: ICD-10-CM

## 2023-02-17 DIAGNOSIS — H40.1113: ICD-10-CM

## 2023-02-17 PROCEDURE — 92020 GONIOSCOPY: CPT | Performed by: OPHTHALMOLOGY

## 2023-02-17 PROCEDURE — 92250 FUNDUS PHOTOGRAPHY W/I&R: CPT | Performed by: OPHTHALMOLOGY

## 2023-02-17 PROCEDURE — 92012 INTRM OPH EXAM EST PATIENT: CPT | Performed by: OPHTHALMOLOGY

## 2023-02-17 ASSESSMENT — REFRACTION_AUTOREFRACTION
OD_CYLINDER: -0.50
OD_AXIS: 090
OS_CYLINDER: -0.75
OS_SPHERE: -0.25
OD_SPHERE: 0.00
OS_AXIS: 059

## 2023-02-17 ASSESSMENT — REFRACTION_MANIFEST
OS_CYLINDER: -0.75
OD_SPHERE: -0.50
OD_CYLINDER: -0.75
OS_ADD: +2.00
OD_VA2: 20/20
OS_ADD: +2.50
OD_SPHERE: -0.25
OD_VA1: 20/150
OS_AXIS: 023
OD_ADD: +2.50
OD_VA1: 20/20
OS_SPHERE: -0.25
OS_VA1: 20/20
OS_SPHERE: -0.25
OS_CYLINDER: -0.50
OD_AXIS: 020
OS_AXIS: 055
OD_AXIS: 125
OS_VA2: 20/20
OD_CYLINDER: -0.75
OD_ADD: +2.00
OS_VA1: 20/20

## 2023-02-17 ASSESSMENT — REFRACTION_CURRENTRX
OS_CYLINDER: -0.50
OS_VPRISM_DIRECTION: SV
OS_OVR_VA: 20/
OD_SPHERE: +2.00
OD_VPRISM_DIRECTION: SV
OS_SPHERE: +2.25
OD_CYLINDER: -0.50
OD_OVR_VA: 20/
OS_AXIS: 031
OD_AXIS: 140

## 2023-02-17 ASSESSMENT — CONFRONTATIONAL VISUAL FIELD TEST (CVF)
OD_FINDINGS: FULL
OS_FINDINGS: FULL

## 2023-02-17 ASSESSMENT — KERATOMETRY
OS_K1POWER_DIOPTERS: 45.00
OD_AXISANGLE_DEGREES: 109
OS_AXISANGLE_DEGREES: 122
METHOD_AUTO_MANUAL: AUTO
OS_K2POWER_DIOPTERS: 45.75
OD_K1POWER_DIOPTERS: 43.75
OD_K2POWER_DIOPTERS: 44.75

## 2023-02-17 ASSESSMENT — PACHYMETRY
OS_CT_UM: 480
OD_CT_CORRECTION: 4
OS_CT_CORRECTION: 4
OD_CT_UM: 482

## 2023-02-17 ASSESSMENT — SPHEQUIV_DERIVED
OD_SPHEQUIV: -0.625
OS_SPHEQUIV: -0.625
OD_SPHEQUIV: -0.875
OS_SPHEQUIV: -0.5
OD_SPHEQUIV: -0.25
OS_SPHEQUIV: -0.625

## 2023-02-17 ASSESSMENT — AXIALLENGTH_DERIVED
OS_AL: 23.1549
OS_AL: 23.1081
OD_AL: 23.4152
OD_AL: 23.6575
OS_AL: 23.1549
OD_AL: 23.56

## 2023-02-17 ASSESSMENT — TONOMETRY: OD_IOP_MMHG: 11

## 2023-02-17 ASSESSMENT — SUPERFICIAL PUNCTATE KERATITIS (SPK)
OD_SPK: 1+
OS_SPK: 1+

## 2023-02-17 ASSESSMENT — VISUAL ACUITY
OS_BCVA: 20/250-1
OD_BCVA: 20/20-1

## 2023-02-22 ENCOUNTER — APPOINTMENT (OUTPATIENT)
Dept: CARDIOLOGY | Facility: CLINIC | Age: 73
End: 2023-02-22
Payer: MEDICARE

## 2023-02-22 ENCOUNTER — NON-APPOINTMENT (OUTPATIENT)
Age: 73
End: 2023-02-22

## 2023-02-22 VITALS
SYSTOLIC BLOOD PRESSURE: 150 MMHG | HEIGHT: 64 IN | HEART RATE: 67 BPM | WEIGHT: 226 LBS | BODY MASS INDEX: 38.58 KG/M2 | DIASTOLIC BLOOD PRESSURE: 74 MMHG | RESPIRATION RATE: 16 BRPM

## 2023-02-22 DIAGNOSIS — R09.89 OTHER SPECIFIED SYMPTOMS AND SIGNS INVOLVING THE CIRCULATORY AND RESPIRATORY SYSTEMS: ICD-10-CM

## 2023-02-22 PROCEDURE — 99215 OFFICE O/P EST HI 40 MIN: CPT

## 2023-02-22 PROCEDURE — 93000 ELECTROCARDIOGRAM COMPLETE: CPT

## 2023-03-17 ENCOUNTER — EMERGENCY (EMERGENCY)
Facility: HOSPITAL | Age: 73
LOS: 1 days | Discharge: HOME | End: 2023-03-17
Attending: EMERGENCY MEDICINE
Payer: MEDICARE

## 2023-03-17 VITALS
WEIGHT: 225.97 LBS | DIASTOLIC BLOOD PRESSURE: 68 MMHG | HEART RATE: 91 BPM | OXYGEN SATURATION: 97 % | RESPIRATION RATE: 18 BRPM | TEMPERATURE: 98 F | SYSTOLIC BLOOD PRESSURE: 163 MMHG

## 2023-03-17 DIAGNOSIS — Z95.5 PRESENCE OF CORONARY ANGIOPLASTY IMPLANT AND GRAFT: Chronic | ICD-10-CM

## 2023-03-17 DIAGNOSIS — H26.40 UNSPECIFIED SECONDARY CATARACT: Chronic | ICD-10-CM

## 2023-03-17 PROCEDURE — 73030 X-RAY EXAM OF SHOULDER: CPT

## 2023-03-17 PROCEDURE — 99283 EMERGENCY DEPT VISIT LOW MDM: CPT

## 2023-03-17 PROCEDURE — 73030 X-RAY EXAM OF SHOULDER: CPT | Mod: 26,LT

## 2023-03-17 PROCEDURE — 99284 EMERGENCY DEPT VISIT MOD MDM: CPT

## 2023-03-17 RX ORDER — METHOCARBAMOL 500 MG/1
1 TABLET, FILM COATED ORAL
Qty: 12 | Refills: 0
Start: 2023-03-17 | End: 2023-03-20

## 2023-03-17 RX ORDER — METHOCARBAMOL 500 MG/1
750 TABLET, FILM COATED ORAL ONCE
Refills: 0 | Status: COMPLETED | OUTPATIENT
Start: 2023-03-17 | End: 2023-03-17

## 2023-03-17 RX ADMIN — METHOCARBAMOL 750 MILLIGRAM(S): 500 TABLET, FILM COATED ORAL at 11:58

## 2023-03-17 NOTE — ED ADULT TRIAGE NOTE - CHIEF COMPLAINT QUOTE
Pt states for the last 3 days is having posterior neck pains and now pain radiates down both R and L arm to her fingers.

## 2023-03-17 NOTE — ED ADULT NURSE NOTE - OBJECTIVE STATEMENT
pt. presented to ED with posterior neck pain that radiates to both arms x3 days. pt. reported she was not doing anything when the pain started. patient is a&ox4, no signs of acute distress, afebrile, denies n/v.

## 2023-03-17 NOTE — ED PROVIDER NOTE - OBJECTIVE STATEMENT
72-year-old female presents to ED complaining of left shoulder pain x1 week patient explained that she has pain in the left shoulder that radiates to her left knee.  Patient denies any trauma, falls or paresthesia.  Patient admits that when she Abduct her left shoulder at 90 degrees pains thoughts.  Patient denies any pain at rest patient denies any chest pain, shortness of breath or pressure in her chest patient admits to history of arthritis for which she is being treated for years now.  Patient also admits to history of hypertension, diabetes, hypercholesterolemia and stent placement in 2017. Pt also had a normal cardiac cath x 1 month ago. Pt admits to having a normal cath e Patient explained that she was seen by cardiologist x1 week ago and was cleared for any coronary artery disease.

## 2023-03-17 NOTE — ED PROVIDER NOTE - ATTENDING APP SHARED VISIT CONTRIBUTION OF CARE
Patient presenting with left-sided shoulder pain.  Pain worse with movement and palpation.  Patient with recent coronary catheterization which was unremarkable.  Denies any recent trauma, fevers, chills, nausea, vomiting, diarrhea.  No shortness of breath, lightheadedness or syncope.  On exam pain is reproducible with palpation and range of motion of left shoulder.  Otherwise neurovascularly intact.  X-ray unremarkable for acute issues and stable for discharge home with outpatient Ortho follow-up

## 2023-03-17 NOTE — ED ADULT NURSE NOTE - CADM POA URETHRAL CATHETER
No Immunohistochemistry ([id number] and [id number]) billing is not performed here. Please use the Immunohistochemistry Stain Billing plan to accomplish this. Immunohistochemistry ([id number] and [id number]) billing is not performed here. Please use the Immunohistochemistry Stain Billing plan to accomplish this.

## 2023-03-17 NOTE — ED PROVIDER NOTE - PROGRESS NOTE DETAILS
Wrist x-ray negative for fracture.  Joint space and reduce.  Patient will follow-up with orthopedic as discussed.

## 2023-03-17 NOTE — ED PROVIDER NOTE - PATIENT PORTAL LINK FT
You can access the FollowMyHealth Patient Portal offered by [facility] by registering at the following website: [email or web address]. By joining [employer]’s FollowMyHealth portal, you will also be able to view your health information using other applications (apps) compatible with our system.
18-Jan-2021 11:34
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
## 2023-03-17 NOTE — ED PROVIDER NOTE - CONSTITUTIONAL, MLM
Well appearing, awake, alert, oriented to person, place, time/situation and in no apparent distress. Physical exam:  General: elderly female in no acute distress, resting comfortably  Cardio: S1/S2 +ve, regular rate and rhythm, no M/G/R  Resp: clear to ausculation bilaterally, no rales or wheezes  GI: abdomen soft, nontender, non distended, no guarding, BS +ve x 4  Ext: no significant pedal edema normal...

## 2023-03-17 NOTE — ED PROVIDER NOTE - NSFOLLOWUPCLINICS_GEN_ALL_ED_FT
[facility] - [facility]  Ortho/Spine  [address] [place] [id number]  Phone: [phone]  Fax:   Follow Up Time: 4-6 Days

## 2023-03-24 ENCOUNTER — OFFICE (OUTPATIENT)
Dept: URBAN - METROPOLITAN AREA CLINIC 94 | Facility: CLINIC | Age: 73
Setting detail: OPHTHALMOLOGY
End: 2023-03-24
Payer: COMMERCIAL

## 2023-03-24 DIAGNOSIS — H35.033: ICD-10-CM

## 2023-03-24 DIAGNOSIS — Z96.1: ICD-10-CM

## 2023-03-24 DIAGNOSIS — H40.1113: ICD-10-CM

## 2023-03-24 DIAGNOSIS — H04.123: ICD-10-CM

## 2023-03-24 PROCEDURE — 92012 INTRM OPH EXAM EST PATIENT: CPT | Performed by: OPHTHALMOLOGY

## 2023-03-24 ASSESSMENT — SPHEQUIV_DERIVED
OD_SPHEQUIV: -0.625
OS_SPHEQUIV: -0.625
OS_SPHEQUIV: -0.625
OD_SPHEQUIV: -0.875
OS_SPHEQUIV: -0.5
OD_SPHEQUIV: -0.5

## 2023-03-24 ASSESSMENT — SUPERFICIAL PUNCTATE KERATITIS (SPK)
OD_SPK: 1+
OS_SPK: 1+

## 2023-03-24 ASSESSMENT — REFRACTION_MANIFEST
OD_SPHERE: -0.50
OD_ADD: +2.50
OS_CYLINDER: -0.50
OS_CYLINDER: -0.75
OD_VA2: 20/20
OD_ADD: +2.00
OS_SPHERE: -0.25
OD_CYLINDER: -0.75
OS_SPHERE: -0.25
OS_VA2: 20/20
OD_VA1: 20/20
OS_ADD: +2.50
OD_AXIS: 020
OD_SPHERE: -0.25
OS_ADD: +2.00
OS_VA1: 20/20
OS_VA1: 20/20
OS_AXIS: 023
OD_CYLINDER: -0.75
OS_AXIS: 055
OD_VA1: 20/150
OD_AXIS: 125

## 2023-03-24 ASSESSMENT — AXIALLENGTH_DERIVED
OS_AL: 23.2437
OS_AL: 23.1966
OS_AL: 23.2437
OD_AL: 23.466
OD_AL: 23.5143
OD_AL: 23.6114

## 2023-03-24 ASSESSMENT — REFRACTION_CURRENTRX
OD_AXIS: 140
OD_CYLINDER: -0.50
OD_OVR_VA: 20/
OD_VPRISM_DIRECTION: SV
OS_VPRISM_DIRECTION: SV
OD_SPHERE: +2.00
OS_SPHERE: +2.25
OS_CYLINDER: -0.50
OS_OVR_VA: 20/
OS_AXIS: 031

## 2023-03-24 ASSESSMENT — REFRACTION_AUTOREFRACTION
OS_SPHERE: -0.25
OD_AXIS: 095
OS_CYLINDER: -0.75
OD_SPHERE: -0.25
OS_AXIS: 039
OD_CYLINDER: -0.50

## 2023-03-24 ASSESSMENT — CONFRONTATIONAL VISUAL FIELD TEST (CVF)
OS_FINDINGS: FULL
OD_FINDINGS: FULL

## 2023-03-24 ASSESSMENT — PACHYMETRY
OS_CT_CORRECTION: 4
OD_CT_UM: 482
OS_CT_UM: 480
OD_CT_CORRECTION: 4

## 2023-03-24 ASSESSMENT — KERATOMETRY
OD_K1POWER_DIOPTERS: 43.75
METHOD_AUTO_MANUAL: AUTO
OS_K2POWER_DIOPTERS: 45.50
OS_AXISANGLE_DEGREES: 135
OS_K1POWER_DIOPTERS: 44.75
OD_K2POWER_DIOPTERS: 45.00
OD_AXISANGLE_DEGREES: 095

## 2023-03-24 ASSESSMENT — VISUAL ACUITY
OD_BCVA: 20/20
OS_BCVA: 20/350

## 2023-03-24 ASSESSMENT — TONOMETRY: OD_IOP_MMHG: 11

## 2023-04-03 ENCOUNTER — RX RENEWAL (OUTPATIENT)
Age: 73
End: 2023-04-03

## 2023-04-04 ENCOUNTER — APPOINTMENT (OUTPATIENT)
Dept: ORTHOPEDIC SURGERY | Facility: CLINIC | Age: 73
End: 2023-04-04
Payer: MEDICARE

## 2023-04-04 PROCEDURE — 99203 OFFICE O/P NEW LOW 30 MIN: CPT

## 2023-04-04 PROCEDURE — 73030 X-RAY EXAM OF SHOULDER: CPT | Mod: RT

## 2023-04-04 NOTE — HISTORY OF PRESENT ILLNESS
[de-identified] : 4/4/2023: Albertina is a 72-year-old right-hand-dominant female who presents to the office today for evaluation of bilateral shoulder pain.  The patient states that ever since she had COVID in November 2022 she has had worsening pain in her shoulders as well as other areas of her body.  She is seeing pain management who is treating her with epidural injections for her lower back.  She also is having some more pain and clicking in her right knee.  She denies any history of trauma.  The shoulder pain is primarily activity related relieved by rest.  Hurts to reach overhead or behind her.  She recently went to [facility] emergency room where x-rays were taken and demonstrated evidence of rotator cuff arthropathy.  She was referred to me for specialist opinion.  She has had no formal treatment for her shoulders before.  She denies any fevers, chills, sweats, numbness, tingling, or pain elsewhere.

## 2023-04-04 NOTE — PHYSICAL EXAM
[de-identified] : General:\par Awake, alert, no acute distress, Patient was cooperative and appropriate during the examination.\par \par The patient is obese for height and age.\par \par Walks without an antalgic gait.\par \par Full, painless range of motion of the neck and back.\par \par Exam of the bilateral lower extremities is intact and symmetric with regards to dermatologic, vascular, and neurologic exam. Bilateral lower extremity sensation is grossly intact to light touch in the DP/SP/T/S/S nerve distributions. Intact DF/PF/EHL. BIlateral lower extremities warm and well-perfused with brisk capillary refill.\par \par \par Pulmonary:\par Regular, nonlabored breathing\par \par Abdomen:\par Soft, nontender, nondistended.\par \par Lymphatic:\par No evidence of axillary lymphadenopathy\par \par Bilateral Shoulder Exam:\par Physical exam of the shoulders demonstrates normal skin without signs of skin changes or abnormalities. No erythema, warmth, or joint effusion appreciated. \par \par Sensation intact light touch C5-T1 bilaterally\par Palpable radial pulses\par Radial/ulnar/median/axillary/musculocutaneous/AIN/PIN nerves grossly intact\par \par Range of motion:\par Forward Flexion: 160 on the right, 110 on the left\par Abduction: 140 on the right, 90 on the left\par External Rotation: 45 on the right, 30 on the left\par Internal Rotation: L3 bilaterally\par \par Palpation:\par Mildly tender to palpation over the glenohumeral joints\par Not tender palpation over the rotator cuff insertion on the greater tuberosities\par Not tender to palpation over the AC joints\par Mildly tender to palpation of the biceps tendon/bicipital grooves\par \par Strength testing:\par Supraspinatus: 5/5 on the right, 4/5 on the left\par Infraspinatus: 5/5 on the right, 4+/5 on the left\par Subscapularis: 5/5 bilaterally\par \par Special test:\par Empty can test positive bilaterally\par Christianson impingement test positive bilaterally\par Speeds test negative bilaterally\par Troupsburg's test negative bilaterally\par Lift-off test negative bilaterally\par Bell-press test negative bilaterally\par Cross-arm adduction test negative bilaterally [de-identified] : X-rays including 4 views of the right shoulder were obtained in the office today on 4/4/2023 and reviewed the patient.  There is no acute fracture or dislocation.  There are mild degenerative changes the acromioclavicular glenohumeral joint.\par \par X-rays including multiple views of the left shoulder from [facility] were also reviewed today.  There is a high riding humeral head which is contacting the undersurface of the acromion with acetabular rotation.  Findings are consistent with rotator cuff arthropathy.

## 2023-04-04 NOTE — DISCUSSION/SUMMARY
[de-identified] : Assessment: 72-year-old female with left shoulder pain secondary to rotator cuff arthropathy, right shoulder pain secondary to rotator cuff tendinopathy\par \par Plan: I had a long discussion with the patient today regarding the nature of their diagnosis and treatment plan. We discussed the risks and benefits of no treatment as well as nonoperative and operative treatments.  I reviewed the patient's x-rays today with her in the office which do not demonstrate any acute pathology in the right shoulder but do show evidence of rotator cuff arthropathy in the left shoulder.  At this time I recommend conservative treatment of the patient's condition with modalities including rest, ice, heat, anti-inflammatory medications, activity modifications, and home stretching and strengthening exercises daily. I discussed with the patient the risks and benefits associated with NSAID use.  She may also follow-up with her pain management doctor for further medications which may be recommended for her multiple complaints.  A referral for physical therapy was provided to begin working on exercises for the shoulders to improve her pain and function.  She deferred any cortisone injections at this time.  Recommend follow-up in 8 weeks for repeat assessment.  If the patient continues have pain at the time we will consider cortisone injection then.\par \par The patient verbalizes their understanding and agrees with the plan.  All questions were answered to their satisfaction.

## 2023-04-10 ENCOUNTER — APPOINTMENT (OUTPATIENT)
Dept: CARDIOLOGY | Facility: CLINIC | Age: 73
End: 2023-04-10
Payer: MEDICARE

## 2023-04-10 PROCEDURE — 93880 EXTRACRANIAL BILAT STUDY: CPT

## 2023-04-10 NOTE — PHYSICAL EXAM
[No Acute Distress] : no acute distress [Obese] : obese [Normal Conjunctiva] : normal conjunctiva [Normal Venous Pressure] : normal venous pressure [Carotid Bruit] : carotid bruit [Normal S1, S2] : normal S1, S2 [No Rub] : no rub [No Gallop] : no gallop [Murmur] : murmur [Clear Lung Fields] : clear lung fields [Good Air Entry] : good air entry [No Respiratory Distress] : no respiratory distress  [Soft] : abdomen soft [Non Tender] : non-tender [No Masses/organomegaly] : no masses/organomegaly [Normal Gait] : normal gait [No Edema] : no edema [No Cyanosis] : no cyanosis [No Clubbing] : no clubbing [No Varicosities] : no varicosities [No Rash] : no rash [No Skin Lesions] : no skin lesions [Moves all extremities] : moves all extremities [No Focal Deficits] : no focal deficits [Normal Speech] : normal speech [Alert and Oriented] : alert and oriented [Normal memory] : normal memory [de-identified] : heard on left [de-identified] : Grade I/VI systolic murmur

## 2023-04-10 NOTE — ASSESSMENT
[FreeTextEntry1] : EKG 02/22/2023: The EKG illustrates sinus rhythm, rate of 67 bpm, left atrial enlargement pattern, abnormal T waves.  Essentially unchanged. \par \par In summary, this 72-year-old woman has a history for CAD and remote history for PCI/stent. She has had a stable cardiac pattern and normal myocardial perfusion stress test in June 2021;\par \par She had previously reported suffering from exertional upper chest discomfort which appeared to have worsened and had associated shortness of breath and fatigue as well. Blood pressures were not well controlled and patient had multiple comorbidities which increase her risk factors;\par \par Patient was recommended to undergo another LHC with Dr. Valle, which fortunately revealed only mild CAD with patent LAD stent.  This was mostly unchanged from her prior LHC dated August 2020;\par \par Her prior symptoms have now completely resolved and she reports feeling very good.  She's not sure if these symptoms may have been stress related;\par \par Followed up with Nephrologist (Dr. Hewitt) regarding CKD and hyperkalemia. Remains off ACEI for now.  She was recommended to increase Hydralazine 50 mg QD to taking BID dosing.  She has not yet done this, but her blood pressures have slightly improved with the addition of Hydralazine;\par \par Patient may need cardiac clearance in the future regarding possible epidural and sacroiliac joint injections but these are not yet scheduled;\par \par \par \par Plan:\par \par Patient reassured her cardiac catheterization ruled out significant signs for CAD;\par \par Will remain off ACEI for now, advised she increase Hydralazine 50 mg QD to BID dosing in hopes of more optimal blood pressure control. All other cardiac meds remain the same;\par \par Follow up with Nephrologist (Dr. Hewitt) ASAP to discuss most recent blood work and management for CKD / hyperkalemia.  Have blood pressures checked to discuss management;\par \par Recommend she complete a Carotid Doppler prior to follow up to assess extent of carotid plaquing stenosis;\par \par Diet and lifestyle modification discussed including low sodium, low fat and low carbohydrate weight reducing diet.  Patient is to implement aerobic exercise regimen few days per week as tolerated;\par \par Recommend patient report any untoward symptoms;\par \par Follow up with Dr. Roa in 3-4 months or PRN;.

## 2023-04-10 NOTE — ADDENDUM
Please call the patient let her know we will DC the alprazolam and Flexeril  Please pend Valium 5 mg 3 times daily as needed for muscle spasms and anxiety    Number [phone] no refill [FreeTextEntry1] : Most recent Carotid Doppler (04/10/2023):  There is no significant atherosclerosis on the left and only mild on the right without stenosis  The left and right vertebral arteries demonstrate antegrade flow;\par \par Mrs. Frances is now in need of cardiac clearance regarding spinal epidural injections scheduled to be done on April 27th with Dr. Rickey Melendrez at [facility];\par \par 1).  Based upon Mrs. Albertina Frances's otherwise stable cardiac pattern, there is no absolute cardiac contraindication for her to undergo the proposed pain management procedure.\par \par She may hold the aspirin and Plavix five to seven days prior to the procedure and restart thereafter if you deem it safe.\par \par If I may be of additional assistance, please do not hesitate to call.

## 2023-04-10 NOTE — HISTORY OF PRESENT ILLNESS
[FreeTextEntry1] : She had previously came to our office in need of cardiac clearance regarding an epidural and sacroiliac injections with Dr. Rickey Melendrez, at that time they were scheduled for sometime in January 2023.  These procedures have since been cancelled while awaiting the [facility], but she's still interested in undergoing this in the future but they have  not yet been re-scheduled;\par \par During her last office visit with us her blood pressures were elevated and we started her on Hydralazine 50 mg daily.  Two days later, her Nephrologist (Dr. Hewitt) had wanted to increase this to Hydralazine 50 mg BID dosing but for whatever reason, the patient was confused since her bottle said "take 1 tab daily".  She has only been taking Hydralazine 50 mg 1 tab daily along with her Metoprolol Succinate 100 mg every night.  Her blood pressure has improved but still slightly elevated today at (150/74);\par \par Her Nephrologist (Dr. Hewitt) evaluated her in early December and noted her Creatinine levels appear to be running in the 1.7 to 2.0 range.  Dr. Hewitt wanted her to repeat labs and follow up in 6-8 weeks but the patient appears confused and never scheduled this follow up;\par \par Last October 2022, she again went to [facility] ER with what was described as "expressive aphasia". Patient was having difficulty speaking, stuttering, had complained of HA and she was shaking. She was ruled out for having CVA, but MRI did reveal white matter disease concerning for MS. It was also later determined that she may have had a TIA;\par \par Most recent Transthoracic Echocardiogram (10/31/2022):  Hyperdynamic LV systolic function with an LVEF of 70 to 75%.  Grade I diastolic dysfunction. There is mild concentric LVH. The left and right atria normal in size and structure. Trace MR. There was no evidence for pericardial effusion;

## 2023-04-10 NOTE — REASON FOR VISIT
[FreeTextEntry1] : JUANY AGUILAR is being seen for arrhythmia/ECG abnormalities, structural heart and valve disease, hyperlipidemia, hypertension and coronary artery disease.  There has also been a history for atypical chest pain syndrome. \par \par The patient is a very pleasant 72-year-old  woman with long-standing history of obesity, ischemic heart disease and prior PCI/stent (2017);\par \par She presents back to the office today status post undergoing left heart catheterization for some rather atypical chest pain/discomfort she had been experiencing over the last few months.  This was similar to her prior atypical chest pain syndrome, but was concerning with her prior history for coronary disease;\par \par Fortunately, her LHC (done by Dr. Valle) on 02/13/2023 showed a patent mid-LAD stent with only some additional mild disease. Proximal LAD 40%, Proximal RCA 10-20%, mid-RCA 20%.  It was reported that this study was unchanged from previous LHC in August 2020;\par \par She now reports her prior symptoms of chest discomfort, shortness of breath, and fatigue have since completely resolved since undergoing the LHC.  Patient is not sure what may have brought on her prior symptoms, but she is now considering maybe stress related;\par \par Patient reports feeling overall well and without acute cardiac complaints. She denies any CP, SOB, TY, palpitations, dizziness or syncope;

## 2023-04-12 NOTE — DISCHARGE NOTE PROVIDER - DISCHARGE DATE
TC to patient to inquire about her Toviaz 4mg rx as we have received a fax from [facility] asking for a response for request for information regarding [facility] rx. No answer on home or cell phone. Phones just ring, no VM. Will keep attempting to reach patient. Pt has follow up appt w/ 6/7/2023.
13-Feb-2023

## 2023-05-04 NOTE — ED ADULT TRIAGE NOTE - GLASGOW COMA SCALE: SCORE, MLM
Scheduled date of colonoscopy (as of today):  6/16/23  Physician performing colonoscopy: Dr Desouza  Location of colonoscopy: [facility]  Bowel prep reviewed with patient: To be reviewed by office     Instructions reviewed with patient by:  Clearances: 15

## 2023-05-24 ENCOUNTER — APPOINTMENT (OUTPATIENT)
Dept: CARDIOLOGY | Facility: CLINIC | Age: 73
End: 2023-05-24

## 2023-06-13 ENCOUNTER — FORM ENCOUNTER (OUTPATIENT)
Age: 73
End: 2023-06-13

## 2023-06-20 ENCOUNTER — APPOINTMENT (OUTPATIENT)
Dept: NEPHROLOGY | Facility: CLINIC | Age: 73
End: 2023-06-20
Payer: MEDICARE

## 2023-06-20 VITALS
WEIGHT: 236 LBS | DIASTOLIC BLOOD PRESSURE: 66 MMHG | BODY MASS INDEX: 40.29 KG/M2 | SYSTOLIC BLOOD PRESSURE: 124 MMHG | HEIGHT: 64 IN | HEART RATE: 52 BPM | OXYGEN SATURATION: 95 %

## 2023-06-20 DIAGNOSIS — N18.9 CHRONIC KIDNEY DISEASE, UNSPECIFIED: ICD-10-CM

## 2023-06-20 PROCEDURE — 99215 OFFICE O/P EST HI 40 MIN: CPT | Mod: 25

## 2023-06-20 PROCEDURE — 36415 COLL VENOUS BLD VENIPUNCTURE: CPT

## 2023-06-20 RX ORDER — BIMATOPROST 0.1 MG/ML
SOLUTION/ DROPS OPHTHALMIC
Refills: 0 | Status: DISCONTINUED | COMMUNITY
End: 2023-06-20

## 2023-06-20 RX ORDER — OMEPRAZOLE 40 MG/1
40 CAPSULE, DELAYED RELEASE ORAL
Refills: 0 | Status: DISCONTINUED | COMMUNITY
End: 2023-06-20

## 2023-06-20 RX ORDER — CYCLOSPORINE 0.5 MG/ML
0.05 EMULSION OPHTHALMIC
Refills: 0 | Status: DISCONTINUED | COMMUNITY
End: 2023-06-20

## 2023-06-20 RX ORDER — DORZOLAMIDE HYDROCHLORIDE 20 MG/ML
2 SOLUTION OPHTHALMIC
Qty: 10 | Refills: 0 | Status: DISCONTINUED | COMMUNITY
Start: 2018-04-20 | End: 2023-06-20

## 2023-06-20 RX ORDER — METOPROLOL TARTRATE 50 MG/1
50 TABLET, FILM COATED ORAL DAILY
Refills: 0 | Status: ACTIVE | COMMUNITY

## 2023-06-20 RX ORDER — BRIMONIDINE TARTRATE, TIMOLOL MALEATE 2; 5 MG/ML; MG/ML
0.2-0.5 SOLUTION/ DROPS OPHTHALMIC
Refills: 0 | Status: DISCONTINUED | COMMUNITY
End: 2023-06-20

## 2023-06-20 RX ORDER — OMEPRAZOLE 40 MG/1
40 CAPSULE, DELAYED RELEASE ORAL DAILY
Refills: 0 | Status: ACTIVE | COMMUNITY

## 2023-06-20 RX ORDER — BLOOD-GLUCOSE METER
KIT MISCELLANEOUS
Qty: 1 | Refills: 0 | Status: DISCONTINUED | COMMUNITY
Start: 2018-04-17 | End: 2023-06-20

## 2023-06-20 RX ORDER — AZELASTINE HYDROCHLORIDE 137 UG/1
0.1 SPRAY, METERED NASAL TWICE DAILY
Qty: 1 | Refills: 5 | Status: DISCONTINUED | COMMUNITY
Start: 2020-03-09 | End: 2023-06-20

## 2023-06-20 NOTE — PHYSICAL EXAM
[General Appearance - Alert] : alert [General Appearance - In No Acute Distress] : in no acute distress [General Appearance - Well Nourished] : well nourished [General Appearance - Well Developed] : well developed [FreeTextEntry1] : Obese [Sclera] : the sclera and conjunctiva were normal [PERRL With Normal Accommodation] : pupils were equal in size, round, and reactive to light [Extraocular Movements] : extraocular movements were intact [Strabismus] : no strabismus was seen [Outer Ear] : the ears and nose were normal in appearance [Oropharynx] : the oropharynx was normal [Neck Appearance] : the appearance of the neck was normal [Neck Cervical Mass (___cm)] : no neck mass was observed [Jugular Venous Distention Increased] : there was no jugular-venous distention [Thyroid Diffuse Enlargement] : the thyroid was not enlarged [Thyroid Nodule] : there were no palpable thyroid nodules [Auscultation Breath Sounds / Voice Sounds] : lungs were clear to auscultation bilaterally [Heart Rate And Rhythm] : heart rate was normal and rhythm regular [Heart Sounds] : normal S1 and S2 [Heart Sounds Gallop] : no gallops [Murmurs] : no murmurs [Heart Sounds Pericardial Friction Rub] : no pericardial rub [Full Pulse] : the pedal pulses are present [Edema] : there was no peripheral edema [Bowel Sounds] : normal bowel sounds [Abdomen Soft] : soft [Abdomen Tenderness] : non-tender [Abdomen Mass (___ Cm)] : no abdominal mass palpated [Cervical Lymph Nodes Enlarged Posterior Bilaterally] : posterior cervical [Cervical Lymph Nodes Enlarged Anterior Bilaterally] : anterior cervical [Supraclavicular Lymph Nodes Enlarged Bilaterally] : supraclavicular [Axillary Lymph Nodes Enlarged Bilaterally] : axillary [Femoral Lymph Nodes Enlarged Bilaterally] : femoral [Inguinal Lymph Nodes Enlarged Bilaterally] : inguinal [No CVA Tenderness] : no ~M costovertebral angle tenderness [No Spinal Tenderness] : no spinal tenderness [Abnormal Walk] : normal gait [Nail Clubbing] : no clubbing  or cyanosis of the fingernails [Musculoskeletal - Swelling] : no joint swelling seen [Motor Tone] : muscle strength and tone were normal [Skin Color & Pigmentation] : normal skin color and pigmentation [Skin Turgor] : normal skin turgor [] : no rash [Deep Tendon Reflexes (DTR)] : deep tendon reflexes were 2+ and symmetric [Sensation] : the sensory exam was normal to light touch and pinprick [No Focal Deficits] : no focal deficits [Oriented To Time, Place, And Person] : oriented to person, place, and time [Impaired Insight] : insight and judgment were intact [Affect] : the affect was normal

## 2023-06-20 NOTE — HISTORY OF PRESENT ILLNESS
[FreeTextEntry1] : The patient is a very pleasant 72-year-old  woman with long-standing history of obesity, ischemic heart disease and prior PCI/stent (2017);  She presents back to the office today status post undergoing left heart catheterization for some rather atypical chest pain/discomfort she had been experiencing over the last few months. This was similar to her prior atypical chest pain syndrome, but was concerning with her prior history for coronary disease;  Fortunately, her LHC (done by Dr. Valle) on 02/13/2023 showed a patent mid-LAD stent with only some additional mild disease. Proximal LAD 40%, Proximal RCA 10-20%, mid-RCA 20%. It was reported that this study was unchanged from previous LHC in August 2020;  She now reports her prior symptoms of chest discomfort, shortness of breath, and fatigue have since completely resolved since undergoing the LHC. Patient is not sure what may have brought on her prior symptoms, but she is now considering maybe stress related;  Patient reports feeling overall well and without acute cardiac complaints. She denies any CP, SOB, TY, palpitations, dizziness or syncope;    History of Present Illness She had previously came to our office in need of cardiac clearance regarding an epidural and sacroiliac injections with Dr. Rickey Melendrez, at that time they were scheduled for sometime in January 2023. These procedures have since been cancelled while awaiting the LHC, but she's still interested in undergoing this in the future but they have not yet been re-scheduled;  During her last office visit with us her blood pressures were elevated and we started her on Hydralazine 50 mg daily. Two days later, her Nephrologist (Dr. Hewitt) had wanted to increase this to Hydralazine 50 mg BID dosing but for whatever reason, the patient was confused since her bottle said "take 1 tab daily". She has only been taking Hydralazine 50 mg 1 tab daily along with her Metoprolol Succinate 100 mg every night. Her blood pressure has improved but still slightly elevated today at (150/74);  Her Nephrologist (Dr. Hewitt) evaluated her in early December and noted her Creatinine levels appear to be running in the 1.7 to 2.0 range. Dr. Hewitt wanted her to repeat labs and follow up in 6-8 weeks but the patient appears confused and never scheduled this follow up;  Last October 2022, she again went to [facility] ER with what was described as "expressive aphasia". Patient was having difficulty speaking, stuttering, had complained of HA and she was shaking. She was ruled out for having CVA, but MRI did reveal white matter disease concerning for MS. It was also later determined that she may have had a TIA;  Most recent Transthoracic Echocardiogram (10/31/2022): Hyperdynamic LV systolic function with an LVEF of 70 to 75%. Grade I diastolic dysfunction. There is mild concentric LVH. The left and right atria normal in size and structure. Trace MR. There was no evidence for pericardial effusion;

## 2023-06-20 NOTE — ASSESSMENT
[FreeTextEntry1] : DN, \par \par CKD - 3 b, \par \par HX : Hyperkalemia, CKD stage III\par labs reviewed- notable for CKD since at least 2019\par Likely has diabetic kidney disease + hypertensive nephrosclerosis\par Baseline creatinine appears to be ~1.7-2.0mg/dL\par UA with 15 protein-\par CT a/p without IV contrast is negative for obstruction\par repeat UA, Al/cr ratio\par Obtain Renal US\par \par HTN- BP above goal \par on Metoprolol \par Benazepril was stopped on d/c\par \par CAD sp stent on ASA and Plavix- stable\par \par Obesity- weight loss encouraged as modifiable risk factor for kidney disease\par \par DM; on Metformin and Tresiba\par Tight glycemic control encouraged to halt progression of kidney disease\par \par GERD on Omeprazole\par \par Vitamin D def; continue high dose vitamin D\par \par metabolic acidosis- she has not been taking sodium bicarbonate \par repeat labs today and resume medication if needed\par \par

## 2023-06-21 LAB
ALBUMIN SERPL ELPH-MCNC: 3.8 G/DL
ANION GAP SERPL CALC-SCNC: 13 MMOL/L
APPEARANCE: CLEAR
BACTERIA: NEGATIVE /HPF
BILIRUBIN URINE: NEGATIVE
BLOOD URINE: NEGATIVE
BUN SERPL-MCNC: 21 MG/DL
CALCIUM SERPL-MCNC: 8.8 MG/DL
CALCIUM SERPL-MCNC: 8.8 MG/DL
CAST: 1 /LPF
CHLORIDE SERPL-SCNC: 111 MMOL/L
CHOLEST SERPL-MCNC: 133 MG/DL
CO2 SERPL-SCNC: 17 MMOL/L
COLOR: YELLOW
CREAT SERPL-MCNC: 1.93 MG/DL
CREAT SPEC-SCNC: 115 MG/DL
CREAT SPEC-SCNC: 115 MG/DL
CREAT/PROT UR: 0.6 RATIO
EGFR: 27 ML/MIN/1.73M2
EPITHELIAL CELLS: 4 /HPF
ESTIMATED AVERAGE GLUCOSE: 166 MG/DL
GLUCOSE QUALITATIVE U: NEGATIVE MG/DL
GLUCOSE SERPL-MCNC: 178 MG/DL
HBA1C MFR BLD HPLC: 7.4 %
HDLC SERPL-MCNC: 55 MG/DL
KETONES URINE: NEGATIVE MG/DL
LDLC SERPL CALC-MCNC: 54 MG/DL
LEUKOCYTE ESTERASE URINE: NEGATIVE
MICROALBUMIN 24H UR DL<=1MG/L-MCNC: 32.4 MG/DL
MICROALBUMIN/CREAT 24H UR-RTO: 280 MG/G
MICROSCOPIC-UA: NORMAL
NITRITE URINE: NEGATIVE
NONHDLC SERPL-MCNC: 79 MG/DL
PARATHYROID HORMONE INTACT: 93 PG/ML
PH URINE: 6
PHOSPHATE SERPL-MCNC: 3.5 MG/DL
POTASSIUM SERPL-SCNC: 5.5 MMOL/L
PROT UR-MCNC: 65 MG/DL
PROTEIN URINE: 100 MG/DL
RED BLOOD CELLS URINE: 1 /HPF
SODIUM SERPL-SCNC: 141 MMOL/L
SPECIFIC GRAVITY URINE: 1.02
TRIGL SERPL-MCNC: 122 MG/DL
URATE SERPL-MCNC: 6.6 MG/DL
UROBILINOGEN URINE: 0.2 MG/DL
WHITE BLOOD CELLS URINE: 0 /HPF

## 2023-06-21 RX ORDER — HYDRALAZINE HYDROCHLORIDE 50 MG/1
50 TABLET ORAL
Qty: 90 | Refills: 1 | Status: DISCONTINUED | COMMUNITY
Start: 2023-01-09 | End: 2023-06-21

## 2023-06-21 RX ORDER — CHLORHEXIDINE GLUCONATE 4 %
325 (65 FE) LIQUID (ML) TOPICAL
Qty: 270 | Refills: 1 | Status: ACTIVE | COMMUNITY
Start: 2023-06-21 | End: 1900-01-01

## 2023-06-22 ENCOUNTER — NON-APPOINTMENT (OUTPATIENT)
Age: 73
End: 2023-06-22

## 2023-06-23 ENCOUNTER — NON-APPOINTMENT (OUTPATIENT)
Age: 73
End: 2023-06-23

## 2023-07-05 RX ORDER — ERYTHROPOIETIN 40000 [IU]/ML
40000 INJECTION, SOLUTION INTRAVENOUS; SUBCUTANEOUS
Qty: 3 | Refills: 3 | Status: ACTIVE | COMMUNITY
Start: 2023-06-21

## 2023-07-11 ENCOUNTER — NON-APPOINTMENT (OUTPATIENT)
Age: 73
End: 2023-07-11

## 2023-07-18 ENCOUNTER — APPOINTMENT (OUTPATIENT)
Dept: NEPHROLOGY | Facility: CLINIC | Age: 73
End: 2023-07-18
Payer: MEDICARE

## 2023-07-18 VITALS
DIASTOLIC BLOOD PRESSURE: 74 MMHG | HEIGHT: 64 IN | BODY MASS INDEX: 40.29 KG/M2 | WEIGHT: 236 LBS | OXYGEN SATURATION: 95 % | HEART RATE: 71 BPM | SYSTOLIC BLOOD PRESSURE: 156 MMHG

## 2023-07-18 PROCEDURE — 96372 THER/PROPH/DIAG INJ SC/IM: CPT

## 2023-07-18 PROCEDURE — 99215 OFFICE O/P EST HI 40 MIN: CPT | Mod: 25

## 2023-07-18 NOTE — HISTORY OF PRESENT ILLNESS
[FreeTextEntry1] : HPI: Patient is a 73 year old female with HTN, HLD, DM, CAD here for F/u visit for discussion of labs and evaluation of renal function. \par \par -DM since 1998\par -Long standing HTN\par -CAD w/ PCI in 2018\par -Arthritis + but denies NSAID\par \par Denies dysuria, gross hematuria, SOB, CP, cough, expectoration, fever, chills, palpitation, syncope, urgency, hesitancy, swelling on feet, joint pain. No history of chronic NSAID use, nephrolithiasis or renal diseases in the family. \par \par REVIEW OF SYSTEM:  All systems were reviewed in detail.  Pertinent positive and negatives have been mentioned in history of present illness.  The rest are negative.\par \par \par \par

## 2023-07-18 NOTE — ASSESSMENT
[FreeTextEntry1] :  73 year old female with HTN, HLD, DM, CAD here for F/u visit for discussion of labs and evaluation of renal function. \par \par -DM since 1998\par -Long standing HTN\par -CAD w/ PCI in 2018\par -Arthritis + but denies NSAID\par \par 1. Chronic Kidney Disease Stage IV \par -ETIO: likely multifactorial 2/2 diabetic nephropathy, hypertensive nephrosclerosis and renal atherosclerotic disease.\par -SCr:  1.9 mg/dl for an eGFR 27-30\par -Proteinuria: no significant proteinuria\par -Renal US: right renal cyst, no stone or HDN (CT 6/29/22)\par -Discussed goal HTN < 140/90, LDL <100, A1c <7.\par \par COMPLICATIONS OF CKD:\par -BMD w/ CKD: PTH within range for level of CKD, will check vitamin D\par -Anemia w/ CKD: will check iron profile, she is on ferrous sulphate and getting Procrit today.\par -Edema: none\par -Hyperuricemia: will check labs\par \par 2. HTN: BP high today in clinic. She is on hydralazine and metoprolol. Not on RAAS inhibitor for hyperK. maintain log will add another medication (CCB) if stays high. \par 3. Hyperlipidemia: controlled on current medications.LDL 79\par 4. DM: A1c 7.4, patient still on metformin. recommend discontinuation of metformin given GFR < 30. Patient was advised to call Dr Ivy's office today (she manages her DM).\par 5. Metabolic acidosis: recommend discontinuing metformin (lactic acidosis). Continue sodium bicarbonate.\par Repeat lab in 2weeks.\par 6. Hyperkalemia: correct acidosis as above, low K diet. repeat labs in 2 weeks, \par \par

## 2023-07-19 RX ORDER — ERYTHROPOIETIN 40000 [IU]/ML
40000 INJECTION, SOLUTION INTRAVENOUS; SUBCUTANEOUS
Qty: 1 | Refills: 0 | Status: COMPLETED | OUTPATIENT
Start: 2023-07-19

## 2023-07-19 RX ADMIN — ERYTHROPOIETIN 1 UNIT/ML: 40000 INJECTION, SOLUTION INTRAVENOUS; SUBCUTANEOUS at 00:00

## 2023-07-21 ENCOUNTER — NON-APPOINTMENT (OUTPATIENT)
Age: 73
End: 2023-07-21

## 2023-07-29 NOTE — ED ADULT NURSE NOTE - NSFALLRSKUNASSIST_ED_ALL_ED
patient is a 48 y/o male c/c of chest pain, SOB, and L sided abdominal pain, onset 3 days ago. pt reports recent travel to [place]. no pertinent past medical history. L A/C 20G inserted, orthostats taken, 12 lead ekg completed in triage. dark brown urine noted (sent to lab). pt denies nausea, vomiting, dizziness, and fevers.
no

## 2023-08-30 ENCOUNTER — APPOINTMENT (OUTPATIENT)
Dept: CARDIOLOGY | Facility: CLINIC | Age: 73
End: 2023-08-30
Payer: MEDICARE

## 2023-08-30 ENCOUNTER — NON-APPOINTMENT (OUTPATIENT)
Age: 73
End: 2023-08-30

## 2023-08-30 VITALS
WEIGHT: 234 LBS | DIASTOLIC BLOOD PRESSURE: 72 MMHG | BODY MASS INDEX: 39.95 KG/M2 | HEIGHT: 64 IN | SYSTOLIC BLOOD PRESSURE: 122 MMHG | HEART RATE: 63 BPM | RESPIRATION RATE: 16 BRPM

## 2023-08-30 DIAGNOSIS — I25.9 CHRONIC ISCHEMIC HEART DISEASE, UNSPECIFIED: ICD-10-CM

## 2023-08-30 DIAGNOSIS — I38 ENDOCARDITIS, VALVE UNSPECIFIED: ICD-10-CM

## 2023-08-30 PROCEDURE — 93000 ELECTROCARDIOGRAM COMPLETE: CPT

## 2023-08-30 PROCEDURE — 99214 OFFICE O/P EST MOD 30 MIN: CPT

## 2023-08-30 RX ORDER — METFORMIN HYDROCHLORIDE 1000 MG/1
1000 TABLET, COATED ORAL
Refills: 0 | Status: DISCONTINUED | COMMUNITY
End: 2023-08-30

## 2023-08-30 NOTE — ADDENDUM
[FreeTextEntry1] : Most recent Carotid Doppler (04/10/2023):  There is no significant atherosclerosis on the left and only mild on the right without stenosis  The left and right vertebral arteries demonstrate antegrade flow;\par  \par  Mrs. Frances is now in need of cardiac clearance regarding spinal epidural injections scheduled to be done on April 27th with Dr. Rickey Melendrez at [facility];\par  \par  1).  Based upon Mrs. Albertina Frances's otherwise stable cardiac pattern, there is no absolute cardiac contraindication for her to undergo the proposed pain management procedure.\par  \par  She may hold the aspirin and Plavix five to seven days prior to the procedure and restart thereafter if you deem it safe.\par  \par  If I may be of additional assistance, please do not hesitate to call.

## 2023-08-30 NOTE — HISTORY OF PRESENT ILLNESS
[FreeTextEntry1] : Last year, her blood pressures were elevated and we started her on Hydralazine 50 mg daily.  Two days later, her Nephrologist (Dr. Hewitt) had wanted to increase this to Hydralazine 50 mg BID dosing but for whatever reason, the patient was confused since her bottle said "take 1 tab daily".  She has only been taking Hydralazine 50 mg 1 tab daily along with her Metoprolol Succinate 100 mg every night.  Her blood pressure had improved but were still slightly elevated earlier this year.  We emphasized the importance of taking Hydralazine 50 mg TWICE daily and her blood pressures have been much more optimally controlled.  However, she now reports her Nephrologist (Dr. Neves) had changed her Metoprolol Succinate 100 mg daily to taking Metoprolol Tartrate 50 mg in the P.M. but patient appears confused and she's not quite sure about this.  She will be following up with their office tomorrow to discuss further.   She reports she's been recently diagnosed with Stage IV renal disease and has associated anemia for which she's now taking Procrit as well as supplemental Ferrous Sulfate.    Last October 2022, she again went to [facility] ER with what was described as "expressive aphasia". Patient was having difficulty speaking, stuttering, had complained of HA and she was shaking. She was ruled out for having CVA, but MRI did reveal white matter disease concerning for MS. It was also later determined that she may have had a TIA;  Most recent Carotid Doppler (04/10/2023): There is no significant atherosclerosis on the left and only mild on the right without stenosis The left and right vertebral arteries demonstrate antegrade flow;   Most recent Transthoracic Echocardiogram (10/31/2022):  Hyperdynamic LV systolic function with an LVEF of 70 to 75%.  Grade I diastolic dysfunction. There is mild concentric LVH. The left and right atria normal in size and structure. Trace MR. There was no evidence for pericardial effusion;

## 2023-08-30 NOTE — PHYSICAL EXAM
[No Acute Distress] : no acute distress [Obese] : obese [Normal Conjunctiva] : normal conjunctiva [Normal Venous Pressure] : normal venous pressure [Carotid Bruit] : carotid bruit [Normal S1, S2] : normal S1, S2 [No Rub] : no rub [No Gallop] : no gallop [Murmur] : murmur [Clear Lung Fields] : clear lung fields [Good Air Entry] : good air entry [No Respiratory Distress] : no respiratory distress  [Soft] : abdomen soft [Non Tender] : non-tender [No Masses/organomegaly] : no masses/organomegaly [Normal Gait] : normal gait [No Edema] : no edema [No Cyanosis] : no cyanosis [No Clubbing] : no clubbing [No Varicosities] : no varicosities [No Rash] : no rash [No Skin Lesions] : no skin lesions [Moves all extremities] : moves all extremities [No Focal Deficits] : no focal deficits [Normal Speech] : normal speech [Alert and Oriented] : alert and oriented [Normal memory] : normal memory [de-identified] : heard on left [de-identified] : Grade I/VI systolic murmur

## 2023-08-30 NOTE — REASON FOR VISIT
[FreeTextEntry1] : JUANY AGUILAR is being seen for arrhythmia/ECG abnormalities, structural heart and valve disease, hyperlipidemia, hypertension and coronary artery disease.  There has also been a history for atypical chest pain syndrome.   The patient is a very pleasant 73-year-old  woman with long-standing history of obesity, ischemic heart disease and prior PCI/stent (2017);  She presents back to the office today for general cardiac checkup.  She reports feeling overall good but has noticed some occasional shortness of breath from time to time that can occur sometimes at rest and other times on exertion.    She did undergo left heart catheterization earlier this year on (2/13/2023) for some rather atypical chest pain/discomfort she had been experiencing over the a few months.  This was similar to her prior atypical chest pain syndrome, but was concerning with her prior history for coronary disease;  Fortunately, her LHC (done by Dr. Valle) showed a patent mid-LAD stent with only some additional mild disease. Proximal LAD 40%, Proximal RCA 10-20%, mid-RCA 20%.  It was reported that this study was unchanged from previous LHC in August 2020;  She now reports her prior symptoms of chest discomfort and fatigue have since completely resolved since undergoing the LHC.  Patient is not sure what may have brought on her prior symptoms, but she is now considering maybe stress related;  Patient reports feeling overall well and without acute cardiac complaints. She denies any CP, palpitations, dizziness or syncope;

## 2023-08-30 NOTE — ASSESSMENT
[FreeTextEntry1] : EKG 8/30/2023: The EKG illustrates sinus rhythm with wandering baseline artifact, rate of 63 bpm, left atrial enlargement pattern, abnormal T waves.  Essentially unchanged.   In summary, this 73-year-old woman has a history for CAD and remote history for PCI/stent. She has had a stable cardiac pattern and normal myocardial perfusion stress test in June 2021;  Patient recently completed another LHC with Dr. Valle earlier this year in February 2023, which fortunately revealed only mild CAD with patent LAD stent.  This was mostly unchanged from her prior LHC dated August 2020;  Most of her prior symptoms had completely resolved and she reports feeling very good, however, she's again noticing some shortness of breath that can occur either at rest or during exertion.   She reports that her Nephrologist (Dr. Neves) had changed her Metoprolol Succinate 100 mg daily to taking Metoprolol Tartrate 50 mg in the P.M. but patient appears confused and she's not quite sure about this.  She will be following up with their office tomorrow to discuss further.   She reports she's been recently diagnosed with Stage IV renal disease and has associated anemia for which she's now taking Procrit as well as supplemental Ferrous Sulfate.       Plan:  Patient reassured her cardiac catheterization ruled out significant signs for CAD.  Continue cardiac medications daily as directed.   Recommend she follow up with Transthoracic Echocardiogram prior to follow up to assess overall cardiac function and valvulopathy.    Follow up with Nephrologist (Dr. Neves and Dr. Nava) tomorrow to discuss most recent blood work and management for CKD / hyperkalemia.  Have blood pressures checked to discuss management. Discuss medical management including which beta blocker she's taking and during what time of day she's taking it.   Diet and lifestyle modification discussed including low sodium, low fat and low carbohydrate weight reducing diet.  Patient is to implement aerobic exercise regimen few days per week as tolerated.  She will be following up with Dietician in near future.   Recommend patient report any untoward symptoms.  Follow up with Dr. Roa in 4 to 5 months or PRN.

## 2023-08-31 ENCOUNTER — APPOINTMENT (OUTPATIENT)
Dept: NEPHROLOGY | Facility: CLINIC | Age: 73
End: 2023-08-31
Payer: MEDICARE

## 2023-08-31 VITALS
OXYGEN SATURATION: 96 % | SYSTOLIC BLOOD PRESSURE: 120 MMHG | BODY MASS INDEX: 39.27 KG/M2 | HEIGHT: 64 IN | HEART RATE: 72 BPM | WEIGHT: 230 LBS | DIASTOLIC BLOOD PRESSURE: 64 MMHG

## 2023-08-31 PROCEDURE — 99215 OFFICE O/P EST HI 40 MIN: CPT

## 2023-08-31 RX ADMIN — EPOETIN ALFA-EPBX 1 UNIT/ML: 40000 INJECTION, SOLUTION INTRAVENOUS; SUBCUTANEOUS at 00:00

## 2023-08-31 NOTE — ASSESSMENT
[FreeTextEntry1] : 73-year-old F w/ HTN, HLD, DM, CAD here for F/u visit for discussion of labs and evaluation of renal function.  -DM since 1998 -Long standing HTN -CAD w/ PCI in 2018 -Arthritis + but denies NSAID  1. Chronic Kidney Disease Stage IV  -ETIO:  likely multifactorial 2/2 diabetic nephropathy, hypertensive nephrosclerosis and renal atherosclerotic disease. -SCr:  1.9 mg/dl for an eGFR 27 ml/min, will check labs today -Proteinuria: UACR 280 mg/g, will repeat today  -Renal US: right renal cyst, no stone or HDN (CT 6/29/22) -Discussed goal HTN < 140/90, LDL <100, A1c <7.  COMPLICATIONS OF CKD: -BMD w/ CKD: will check labs -Anemia w/ CKD: will check iron profile, she is on ferrous sulphate and getting Procrit today. -Edema: none -Hyperuricemia: will check labs  2. HTN: BP high today in clinic. She is on hydralazine and metoprolol. Not on RAAS inhibitor for hyperK. maintain log will add another medication (CCB) if stays high.  3. Hyperlipidemia: controlled on current medications.LDL 79 4. DM: A1c 7.4, patient still on metformin. recommend discontinuation of metformin given GFR < 30. Patient was advised to call Dr Ivy's office today (she manages her DM). 5. Metabolic acidosis: Continue sodium bicarbonate. will check labs. 6. Hyperkalemia: correct acidosis as above, low K diet. Hold MVI. Will check labs again today and recommend further.

## 2023-08-31 NOTE — HISTORY OF PRESENT ILLNESS
[FreeTextEntry1] : 73-year-old female with HTN, HLD, DM, CAD here for F/u visit for discussion of labs and evaluation of renal function.  -DM since 1998 -Long standing HTN -CAD w/ PCI in 2018 -Arthritis + but denies NSAID  -------------------------------------------------------------------------- 8/31/23 Patient reports no health-related adverse event since last clinic visit.  NO ER/Hospitalization/urgent care visit. Denies any cardiac or urinary complaints. No dysuria, gross hematuria, SOB, LUTS. Reports BP and blood sugars has been well controlled. C/o of chr back pain, going for epidural tomorrow.  Denies dysuria, gross hematuria, SOB, CP, cough, expectoration, fever, chills, palpitation, syncope, urgency, hesitancy, swelling on feet, joint pain. No history of chronic NSAID use, nephrolithiasis or renal diseases in the family.   REVIEW OF SYSTEM:  All systems were reviewed in detail.  Pertinent positive and negatives have been mentioned in history of present illness.  The rest are negative.

## 2023-09-01 ENCOUNTER — NON-APPOINTMENT (OUTPATIENT)
Age: 73
End: 2023-09-01

## 2023-09-01 ENCOUNTER — EMERGENCY (EMERGENCY)
Facility: HOSPITAL | Age: 73
LOS: 1 days | Discharge: HOME | End: 2023-09-01
Attending: STUDENT IN AN ORGANIZED HEALTH CARE EDUCATION/TRAINING PROGRAM
Payer: MEDICARE

## 2023-09-01 VITALS
HEART RATE: 82 BPM | OXYGEN SATURATION: 96 % | DIASTOLIC BLOOD PRESSURE: 86 MMHG | RESPIRATION RATE: 18 BRPM | TEMPERATURE: 98 F | SYSTOLIC BLOOD PRESSURE: 151 MMHG

## 2023-09-01 VITALS
TEMPERATURE: 98 F | DIASTOLIC BLOOD PRESSURE: 71 MMHG | HEIGHT: 64 IN | OXYGEN SATURATION: 98 % | SYSTOLIC BLOOD PRESSURE: 145 MMHG | HEART RATE: 77 BPM | WEIGHT: 229.94 LBS | RESPIRATION RATE: 18 BRPM

## 2023-09-01 DIAGNOSIS — H26.40 UNSPECIFIED SECONDARY CATARACT: Chronic | ICD-10-CM

## 2023-09-01 DIAGNOSIS — Z95.5 PRESENCE OF CORONARY ANGIOPLASTY IMPLANT AND GRAFT: Chronic | ICD-10-CM

## 2023-09-01 LAB
25(OH)D3 SERPL-MCNC: 60.6 NG/ML
ALBUMIN SERPL ELPH-MCNC: 4 G/DL
ALBUMIN SERPL ELPH-MCNC: 4.1 G/DL — SIGNIFICANT CHANGE UP (ref 3.3–5.2)
ALP BLD-CCNC: 117 U/L
ALP SERPL-CCNC: 124 U/L — HIGH (ref 40–120)
ALT FLD-CCNC: 16 U/L — SIGNIFICANT CHANGE UP
ALT SERPL-CCNC: 16 U/L
ANION GAP SERPL CALC-SCNC: 10 MMOL/L
ANION GAP SERPL CALC-SCNC: 10 MMOL/L — SIGNIFICANT CHANGE UP (ref 5–17)
ANION GAP SERPL CALC-SCNC: 11 MMOL/L — SIGNIFICANT CHANGE UP (ref 5–17)
ANION GAP SERPL CALC-SCNC: 13 MMOL/L — SIGNIFICANT CHANGE UP (ref 5–17)
APPEARANCE: CLEAR
AST SERPL-CCNC: 17 U/L
AST SERPL-CCNC: 27 U/L — SIGNIFICANT CHANGE UP
BACTERIA: NEGATIVE /HPF
BASE EXCESS BLDV CALC-SCNC: -6 MMOL/L — LOW (ref -2–3)
BASE EXCESS BLDV CALC-SCNC: -7.3 MMOL/L — LOW (ref -2–3)
BASOPHILS # BLD AUTO: 0.02 K/UL
BASOPHILS # BLD AUTO: 0.04 K/UL — SIGNIFICANT CHANGE UP (ref 0–0.2)
BASOPHILS NFR BLD AUTO: 0.3 %
BASOPHILS NFR BLD AUTO: 0.6 % — SIGNIFICANT CHANGE UP (ref 0–2)
BILIRUB SERPL-MCNC: 0.2 MG/DL
BILIRUB SERPL-MCNC: <0.2 MG/DL — LOW (ref 0.4–2)
BILIRUBIN URINE: NEGATIVE
BLOOD URINE: NEGATIVE
BUN SERPL-MCNC: 25.4 MG/DL — HIGH (ref 8–20)
BUN SERPL-MCNC: 26.8 MG/DL — HIGH (ref 8–20)
BUN SERPL-MCNC: 27 MG/DL
BUN SERPL-MCNC: 27.9 MG/DL — HIGH (ref 8–20)
CA-I SERPL-SCNC: 1.17 MMOL/L — SIGNIFICANT CHANGE UP (ref 1.15–1.33)
CALCIUM SERPL-MCNC: 8.5 MG/DL — SIGNIFICANT CHANGE UP (ref 8.4–10.5)
CALCIUM SERPL-MCNC: 8.9 MG/DL
CALCIUM SERPL-MCNC: 8.9 MG/DL
CALCIUM SERPL-MCNC: 9 MG/DL — SIGNIFICANT CHANGE UP (ref 8.4–10.5)
CALCIUM SERPL-MCNC: 9 MG/DL — SIGNIFICANT CHANGE UP (ref 8.4–10.5)
CAST: 0 /LPF
CHLORIDE BLDV-SCNC: 115 MMOL/L — HIGH (ref 96–108)
CHLORIDE SERPL-SCNC: 110 MMOL/L — HIGH (ref 96–108)
CHLORIDE SERPL-SCNC: 111 MMOL/L — HIGH (ref 96–108)
CHLORIDE SERPL-SCNC: 111 MMOL/L — HIGH (ref 96–108)
CHLORIDE SERPL-SCNC: 115 MMOL/L
CO2 SERPL-SCNC: 17 MMOL/L — LOW (ref 22–29)
CO2 SERPL-SCNC: 19 MMOL/L
CO2 SERPL-SCNC: 19 MMOL/L — LOW (ref 22–29)
CO2 SERPL-SCNC: 21 MMOL/L — LOW (ref 22–29)
COLOR: YELLOW
CREAT SERPL-MCNC: 2.03 MG/DL — HIGH (ref 0.5–1.3)
CREAT SERPL-MCNC: 2.08 MG/DL — HIGH (ref 0.5–1.3)
CREAT SERPL-MCNC: 2.09 MG/DL — HIGH (ref 0.5–1.3)
CREAT SERPL-MCNC: 2.11 MG/DL
CREAT SPEC-SCNC: 67 MG/DL
EGFR: 25 ML/MIN/1.73M2
EGFR: 25 ML/MIN/1.73M2 — LOW
EGFR: 25 ML/MIN/1.73M2 — LOW
EGFR: 26 ML/MIN/1.73M2 — LOW
EOSINOPHIL # BLD AUTO: 0.39 K/UL — SIGNIFICANT CHANGE UP (ref 0–0.5)
EOSINOPHIL # BLD AUTO: 0.49 K/UL
EOSINOPHIL NFR BLD AUTO: 5.8 % — SIGNIFICANT CHANGE UP (ref 0–6)
EOSINOPHIL NFR BLD AUTO: 8.5 %
EPITHELIAL CELLS: 1 /HPF
FERRITIN SERPL-MCNC: 48 NG/ML
FOLATE SERPL-MCNC: >20 NG/ML
GAS PNL BLDV: 138 MMOL/L — SIGNIFICANT CHANGE UP (ref 136–145)
GAS PNL BLDV: SIGNIFICANT CHANGE UP
GAS PNL BLDV: SIGNIFICANT CHANGE UP
GLUCOSE BLDV-MCNC: 103 MG/DL — HIGH (ref 70–99)
GLUCOSE QUALITATIVE U: >=1000 MG/DL
GLUCOSE SERPL-MCNC: 181 MG/DL — HIGH (ref 70–99)
GLUCOSE SERPL-MCNC: 254 MG/DL
GLUCOSE SERPL-MCNC: 55 MG/DL — LOW (ref 70–99)
GLUCOSE SERPL-MCNC: 99 MG/DL — SIGNIFICANT CHANGE UP (ref 70–99)
HCO3 BLDV-SCNC: 20 MMOL/L — LOW (ref 22–29)
HCO3 BLDV-SCNC: 21 MMOL/L — LOW (ref 22–29)
HCT VFR BLD CALC: 36.2 %
HCT VFR BLD CALC: 36.5 % — SIGNIFICANT CHANGE UP (ref 34.5–45)
HCT VFR BLDA CALC: 36 % — SIGNIFICANT CHANGE UP
HGB BLD CALC-MCNC: 11.9 G/DL — SIGNIFICANT CHANGE UP (ref 11.7–16.1)
HGB BLD-MCNC: 10.7 G/DL
HGB BLD-MCNC: 11.1 G/DL — LOW (ref 11.5–15.5)
IMM GRANULOCYTES NFR BLD AUTO: 0.3 %
IMM GRANULOCYTES NFR BLD AUTO: 1.2 % — HIGH (ref 0–0.9)
IRON SATN MFR SERPL: 19 %
IRON SERPL-MCNC: 48 UG/DL
KETONES URINE: NEGATIVE MG/DL
LACTATE BLDV-MCNC: 0.6 MMOL/L — SIGNIFICANT CHANGE UP (ref 0.5–2)
LEUKOCYTE ESTERASE URINE: NEGATIVE
LYMPHOCYTES # BLD AUTO: 1.26 K/UL
LYMPHOCYTES # BLD AUTO: 1.75 K/UL — SIGNIFICANT CHANGE UP (ref 1–3.3)
LYMPHOCYTES # BLD AUTO: 25.8 % — SIGNIFICANT CHANGE UP (ref 13–44)
LYMPHOCYTES NFR BLD AUTO: 21.8 %
MAN DIFF?: NORMAL
MCHC RBC-ENTMCNC: 26.3 PG — LOW (ref 27–34)
MCHC RBC-ENTMCNC: 26.6 PG
MCHC RBC-ENTMCNC: 29.6 GM/DL
MCHC RBC-ENTMCNC: 30.4 GM/DL — LOW (ref 32–36)
MCV RBC AUTO: 86.5 FL — SIGNIFICANT CHANGE UP (ref 80–100)
MCV RBC AUTO: 90 FL
MICROALBUMIN 24H UR DL<=1MG/L-MCNC: 11.8 MG/DL
MICROALBUMIN/CREAT 24H UR-RTO: 175 MG/G
MICROSCOPIC-UA: NORMAL
MONOCYTES # BLD AUTO: 0.4 K/UL
MONOCYTES # BLD AUTO: 0.5 K/UL — SIGNIFICANT CHANGE UP (ref 0–0.9)
MONOCYTES NFR BLD AUTO: 6.9 %
MONOCYTES NFR BLD AUTO: 7.4 % — SIGNIFICANT CHANGE UP (ref 2–14)
NEUTROPHILS # BLD AUTO: 3.59 K/UL
NEUTROPHILS # BLD AUTO: 4.02 K/UL — SIGNIFICANT CHANGE UP (ref 1.8–7.4)
NEUTROPHILS NFR BLD AUTO: 59.2 % — SIGNIFICANT CHANGE UP (ref 43–77)
NEUTROPHILS NFR BLD AUTO: 62.2 %
NITRITE URINE: NEGATIVE
PARATHYROID HORMONE INTACT: 105 PG/ML
PCO2 BLDV: 47 MMHG — HIGH (ref 39–42)
PCO2 BLDV: 50 MMHG — HIGH (ref 39–42)
PH BLDV: 7.24 — LOW (ref 7.32–7.43)
PH BLDV: 7.24 — LOW (ref 7.32–7.43)
PH URINE: 6.5
PHOSPHATE SERPL-MCNC: 4.3 MG/DL
PLATELET # BLD AUTO: 186 K/UL
PLATELET # BLD AUTO: 217 K/UL — SIGNIFICANT CHANGE UP (ref 150–400)
PO2 BLDV: 42 MMHG — SIGNIFICANT CHANGE UP (ref 25–45)
PO2 BLDV: 66 MMHG — HIGH (ref 25–45)
POTASSIUM BLDV-SCNC: 6.6 MMOL/L — CRITICAL HIGH (ref 3.5–5.1)
POTASSIUM SERPL-MCNC: 4.6 MMOL/L — SIGNIFICANT CHANGE UP (ref 3.5–5.3)
POTASSIUM SERPL-MCNC: 5.8 MMOL/L — HIGH (ref 3.5–5.3)
POTASSIUM SERPL-MCNC: 5.9 MMOL/L — HIGH (ref 3.5–5.3)
POTASSIUM SERPL-SCNC: 4.6 MMOL/L — SIGNIFICANT CHANGE UP (ref 3.5–5.3)
POTASSIUM SERPL-SCNC: 5.8 MMOL/L — HIGH (ref 3.5–5.3)
POTASSIUM SERPL-SCNC: 5.9 MMOL/L — HIGH (ref 3.5–5.3)
POTASSIUM SERPL-SCNC: 6.4 MMOL/L
PROT SERPL-MCNC: 6.2 G/DL
PROT SERPL-MCNC: 7.4 G/DL — SIGNIFICANT CHANGE UP (ref 6.6–8.7)
PROTEIN URINE: 30 MG/DL
RBC # BLD: 4.02 M/UL
RBC # BLD: 4.22 M/UL — SIGNIFICANT CHANGE UP (ref 3.8–5.2)
RBC # FLD: 16.8 % — HIGH (ref 10.3–14.5)
RBC # FLD: 17.5 %
RED BLOOD CELLS URINE: 0 /HPF
SAO2 % BLDV: 70.7 % — SIGNIFICANT CHANGE UP
SAO2 % BLDV: 92.5 % — SIGNIFICANT CHANGE UP
SODIUM SERPL-SCNC: 141 MMOL/L — SIGNIFICANT CHANGE UP (ref 135–145)
SODIUM SERPL-SCNC: 144 MMOL/L
SPECIFIC GRAVITY URINE: 1.02
TIBC SERPL-MCNC: 260 UG/DL
UIBC SERPL-MCNC: 211 UG/DL
URATE SERPL-MCNC: 5.8 MG/DL
UROBILINOGEN URINE: 0.2 MG/DL
VIT B12 SERPL-MCNC: 839 PG/ML
WBC # BLD: 6.78 K/UL — SIGNIFICANT CHANGE UP (ref 3.8–10.5)
WBC # FLD AUTO: 5.78 K/UL
WBC # FLD AUTO: 6.78 K/UL — SIGNIFICANT CHANGE UP (ref 3.8–10.5)
WHITE BLOOD CELLS URINE: 0 /HPF

## 2023-09-01 PROCEDURE — 99284 EMERGENCY DEPT VISIT MOD MDM: CPT | Mod: 25

## 2023-09-01 PROCEDURE — 82330 ASSAY OF CALCIUM: CPT

## 2023-09-01 PROCEDURE — 82803 BLOOD GASES ANY COMBINATION: CPT

## 2023-09-01 PROCEDURE — 99285 EMERGENCY DEPT VISIT HI MDM: CPT

## 2023-09-01 PROCEDURE — 85014 HEMATOCRIT: CPT

## 2023-09-01 PROCEDURE — 36415 COLL VENOUS BLD VENIPUNCTURE: CPT

## 2023-09-01 PROCEDURE — 93010 ELECTROCARDIOGRAM REPORT: CPT

## 2023-09-01 PROCEDURE — 84295 ASSAY OF SERUM SODIUM: CPT

## 2023-09-01 PROCEDURE — 82435 ASSAY OF BLOOD CHLORIDE: CPT

## 2023-09-01 PROCEDURE — 84132 ASSAY OF SERUM POTASSIUM: CPT

## 2023-09-01 PROCEDURE — 85018 HEMOGLOBIN: CPT

## 2023-09-01 PROCEDURE — 96374 THER/PROPH/DIAG INJ IV PUSH: CPT

## 2023-09-01 PROCEDURE — 83605 ASSAY OF LACTIC ACID: CPT

## 2023-09-01 PROCEDURE — 93005 ELECTROCARDIOGRAM TRACING: CPT

## 2023-09-01 PROCEDURE — 82947 ASSAY GLUCOSE BLOOD QUANT: CPT

## 2023-09-01 PROCEDURE — 80048 BASIC METABOLIC PNL TOTAL CA: CPT

## 2023-09-01 PROCEDURE — 96375 TX/PRO/DX INJ NEW DRUG ADDON: CPT

## 2023-09-01 PROCEDURE — 85025 COMPLETE CBC W/AUTO DIFF WBC: CPT

## 2023-09-01 PROCEDURE — 80053 COMPREHEN METABOLIC PANEL: CPT

## 2023-09-01 RX ORDER — FUROSEMIDE 40 MG
80 TABLET ORAL ONCE
Refills: 0 | Status: COMPLETED | OUTPATIENT
Start: 2023-09-01 | End: 2023-09-01

## 2023-09-01 RX ORDER — INSULIN HUMAN 100 [IU]/ML
10 INJECTION, SOLUTION SUBCUTANEOUS ONCE
Refills: 0 | Status: COMPLETED | OUTPATIENT
Start: 2023-09-01 | End: 2023-09-01

## 2023-09-01 RX ORDER — SODIUM ZIRCONIUM CYCLOSILICATE 10 G/10G
10 POWDER, FOR SUSPENSION ORAL ONCE
Refills: 0 | Status: COMPLETED | OUTPATIENT
Start: 2023-09-01 | End: 2023-09-01

## 2023-09-01 RX ORDER — DEXTROSE 50 % IN WATER 50 %
50 SYRINGE (ML) INTRAVENOUS ONCE
Refills: 0 | Status: COMPLETED | OUTPATIENT
Start: 2023-09-01 | End: 2023-09-01

## 2023-09-01 RX ORDER — HYDRALAZINE HYDROCHLORIDE 50 MG/1
50 TABLET ORAL TWICE DAILY
Qty: 180 | Refills: 0 | Status: DISCONTINUED | COMMUNITY
Start: 1900-01-01 | End: 2023-09-01

## 2023-09-01 RX ADMIN — Medication 80 MILLIGRAM(S): at 15:31

## 2023-09-01 RX ADMIN — Medication 50 MILLILITER(S): at 14:00

## 2023-09-01 RX ADMIN — INSULIN HUMAN 10 UNIT(S): 100 INJECTION, SOLUTION SUBCUTANEOUS at 14:01

## 2023-09-01 RX ADMIN — SODIUM ZIRCONIUM CYCLOSILICATE 10 GRAM(S): 10 POWDER, FOR SUSPENSION ORAL at 14:00

## 2023-09-01 NOTE — CONSULT NOTE ADULT - ASSESSMENT
The patient is a 73 year old female with PMH of morbid obesity, DM, HTN, BONNY, fatty liver, CKD 3b/4 (baseline creatinine ~1.7-2.0mg/dL), GERD and hiatal hernia was sent in by her outpatient nephrologist due to outpatient labs showing hyperkalemia at 6.4. Repeat labs here showed potassium 5.9. Nephrology was consulted for management of hyperkalemia.    Hyperkalemia  -Repeat labs here showed potassium 5.9  -Thus far received D50 + insulin + Lokelma   -Will add lasix 80mg IV x 1  -Recommend repeating BMP 3 hours post administration of IV lasix - if improvement to hyperkalemia, then outpatient follow up with her outpatient nephrologist (Dr Salinas)  -Of note, patient reported that she has had intermittent episodes of hyperkalemia in the past - unable to recall her home medications  -Per office records, not on ACEi/ARB/K+ sparing diuretics -> will start chlorthalidone 12.5mg PO daily     HTN  -BP suboptimal   -Currently on metoprolol 50mg daily and hydralazine 50mg BID (as per outpatient records)  -Will add chlorthalidone (see above)     CKD 3b/4  -Baseline creatinine~1.7-2.0mg/dL  -Renal function remains at baseline at this time    Marci Mueller DO  Nephrology    The patient is a 73 year old female with PMH of morbid obesity, DM, HTN, BONNY, fatty liver, CKD 3b/4 (baseline creatinine ~1.7-2.0mg/dL), GERD and hiatal hernia was sent in by her outpatient nephrologist due to outpatient labs showing hyperkalemia at 6.4. Repeat labs here showed potassium 5.9. Nephrology was consulted for management of hyperkalemia.    Hyperkalemia  -Repeat labs here showed potassium 5.9  -Thus far received D50 + insulin + Lokelma   -Will add lasix 80mg IV x 1  -Recommend repeating BMP 3 hours post administration of IV lasix - if improvement to hyperkalemia, then outpatient follow up with her outpatient nephrologist (Dr Salinas)  -Of note, patient reported that she has had intermittent episodes of hyperkalemia in the past - unable to recall her home medications  -Per office records, not on ACEi/ARB/K+ sparing diuretics -> will start chlorthalidone 12.5mg PO daily     HTN  -BP suboptimal   -Currently on metoprolol 50mg daily and hydralazine 50mg BID (as per outpatient records)  -Will add chlorthalidone (see above) and reduce hydralazine to 25mg BID (patient expressed concern for possible hypotension)    CKD 3b/4  -Baseline creatinine~1.7-2.0mg/dL  -Renal function remains at baseline at this time    Marci Mueller DO  Nephrology    The patient is a 73 year old female with PMH of morbid obesity, DM, HTN, BONNY, fatty liver, CKD 3b/4 (baseline creatinine ~1.7-2.0mg/dL), GERD and hiatal hernia was sent in by her outpatient nephrologist due to outpatient labs showing hyperkalemia at 6.4. Repeat labs here showed potassium 5.9. Nephrology was consulted for management of hyperkalemia.    Hyperkalemia  -Repeat labs here showed potassium 5.9  -Thus far received D50 + insulin + Lokelma   -Will add lasix 80mg IV x 1  -Recommend repeating BMP 3 hours post administration of IV lasix - if improvement to hyperkalemia, then outpatient follow up with her outpatient nephrologist (Dr Salinas)  -Of note, patient reported that she has had intermittent episodes of hyperkalemia in the past - unable to recall her home medications  -Per office records, not on ACEi/ARB/K+ sparing diuretics -> will start chlorthalidone 25mg PO daily     HTN  -BP suboptimal   -Currently on metoprolol 50mg daily and hydralazine 50mg BID (as per outpatient records)  -Will add chlorthalidone (see above) and reduce hydralazine to 25mg BID (as patient expressed concern for possible hypotension)    CKD 3b/4  -Baseline creatinine~1.7-2.0mg/dL  -Renal function remains at baseline at this time    Marci Mueller DO  Nephrology    The patient is a 73 year old female with PMH of morbid obesity, DM, HTN, BONNY, fatty liver, CKD 3b/4 (baseline creatinine ~1.7-2.0mg/dL), GERD and hiatal hernia was sent in by her outpatient nephrologist due to outpatient labs showing hyperkalemia at 6.4. Repeat labs here showed potassium 5.9. Nephrology was consulted for management of hyperkalemia.    Hyperkalemia  -Repeat labs here showed potassium 5.9  -Thus far received D50 + insulin + Lokelma   -Will add lasix 80mg IV x 1  -Recommend repeating BMP 3 hours post administration of IV lasix - if improvement to hyperkalemia, then outpatient follow up with her outpatient nephrologist (Dr Salinas)  -Of note, patient reported that she has had intermittent episodes of hyperkalemia in the past - unable to recall her home medications  -Per office records, not on ACEi/ARB/K+ sparing diuretics -> will start chlorthalidone 25mg PO daily     HTN  -BP suboptimal   -Currently on metoprolol 50mg daily and hydralazine 50mg BID (as per outpatient records)  -Will add chlorthalidone (see above) and reduce hydralazine to 25mg BID (as patient expressed concern for possible hypotension)  -Electronic prescription for hydralazine 25mg BID and chlorthalidone 25mg daily have been sent to her pharmacy     CKD 3b/4  -Baseline creatinine~1.7-2.0mg/dL  -Renal function remains at baseline at this time    Marci Mueller DO  Nephrology

## 2023-09-01 NOTE — CONSULT NOTE ADULT - SUBJECTIVE AND OBJECTIVE BOX
The patient is a 73 year old female with PMH of morbid obesity, DM, HTN, BONNY, fatty liver, CKD 3b/4 (baseline creatinine ~1.7-2.0mg/dL), GERD and hiatal hernia was sent in by her outpatient nephrologist due to outpatient labs showing hyperkalemia at 6.4. Repeat labs here showed potassium 5.9. Nephrology was consulted for management of hyperkalemia.     Hyperkalemia  -Repeat labs here showed potassium 5.9  -Thus far received D50 + insulin + Lokelma   -Will add lasix 80mg IV x 1  -Recommend repeating labs 3 hours post administration of IV lasix - if improvement to hyperkalemia, then outpatient follow up  -Of note, patient reported that she has had intermittent episodes of hyperkalemia in the past - unable to recall her home medications  -    CKD 3b/4  -Baseline creatinine appears to be ~1.7-2.0mg/dL  -Renal function remains at baseline at this time    Marci Mueller DO  Nephrology        The patient is a 73 year old female with PMH of morbid obesity, DM, HTN, BONNY, fatty liver, CKD 3b/4 (baseline creatinine ~1.7-2.0mg/dL), GERD and hiatal hernia was sent in by her outpatient nephrologist due to outpatient labs showing hyperkalemia at 6.4. Repeat labs here showed potassium 5.9. Nephrology was consulted for management of hyperkalemia.     Hyperkalemia  -Repeat labs here showed potassium 5.9  -Thus far received D50 + insulin + Lokelma   -Will add lasix 80mg IV x 1  -Recommend repeating BMP 3 hours post administration of IV lasix - if improvement to hyperkalemia, then outpatient follow up with her outpatient nephrologist (Dr Salinas)  -Of note, patient reported that she has had intermittent episodes of hyperkalemia in the past - unable to recall her home medications  -Per office records, not on ACEi/ARB/K+ sparing diuretics -> will start chlorthalidone 12.5mg PO daily     HTN  -BP suboptimal   -Currently on metoprolol 50mg daily and hydralazine 50mg BID (as per outpatient records)  -See above     CKD 3b/4  -Baseline creatinine~1.7-2.0mg/dL  -Renal function remains at baseline at this time    Marci Mueller DO  Nephrology        The patient is a 73 year old female with PMH of morbid obesity, DM, HTN, BONNY, fatty liver, CKD 3b/4 (baseline creatinine ~1.7-2.0mg/dL), GERD and hiatal hernia was sent in by her outpatient nephrologist due to outpatient labs showing hyperkalemia at 6.4. Repeat labs here showed potassium 5.9. Nephrology was consulted for management of hyperkalemia.    PAST MEDICAL & SURGICAL HISTORY:  HTN (hypertension)      DM (diabetes mellitus)      Chest pain      BONNY (obstructive sleep apnea)      Hiatal hernia      GERD (gastroesophageal reflux disease)      DJD (degenerative joint disease), lumbar      Fatty liver      Arthritis      Anemia      CAD (coronary artery disease)      Stage 3 chronic kidney disease      After cataract, bilateral      S/P coronary artery stent placement    Allergies    latex (Urticaria)  oxycodone (Urticaria)    Intolerances    FAMILY HISTORY:  Family history of diabetes mellitus (DM) (Sibling)    Family history of hypertension (Sibling)    Social History:     ROS: I feel tired    Vital Signs Last 24 Hrs  T(C): 36.8 (01 Sep 2023 11:21), Max: 36.8 (01 Sep 2023 11:21)  T(F): 98.3 (01 Sep 2023 11:21), Max: 98.3 (01 Sep 2023 11:21)  HR: 77 (01 Sep 2023 11:21) (77 - 77)  BP: 145/71 (01 Sep 2023 11:21) (145/71 - 145/71)  BP(mean): --  RR: 18 (01 Sep 2023 11:21) (18 - 18)  SpO2: 98% (01 Sep 2023 11:21) (98% - 98%)    Parameters below as of 01 Sep 2023 11:21  Patient On (Oxygen Delivery Method): room air    I&O's Summary    Physical Exam  General: Morbidly obese female in NAD; on room air   Cardiac: S1S2 RRR  Respiratory: CTAB  Abdomen: Obese, soft  Extremities: No edema  Neuro: AAOx3  Psych: Calm     09-01    141  |  110<H>  |  27.9<H>  ----------------------------<  99  5.9<H>   |  21.0<L>  |  2.09<H>    Ca    9.0      01 Sep 2023 12:28    TPro  7.4  /  Alb  4.1  /  TBili  <0.2<L>  /  DBili  x   /  AST  27  /  ALT  16  /  AlkPhos  124<H>  09-01                        11.1   6.78  )-----------( 217      ( 01 Sep 2023 12:45 )             36.5     MEDICATIONS  (STANDING):    MEDICATIONS  (PRN):

## 2023-09-01 NOTE — ED PROVIDER NOTE - CLINICAL SUMMARY MEDICAL DECISION MAKING FREE TEXT BOX
72 y/o female hx htn, dm, ckd sent for hyperkalemia on labs performed yesterday. no widening of qrs/peaked t waves. currently without any symptomatology. will check labs, cardiac monitor, renal consult/meds prn. reassess 74 y/o female hx htn, dm, ckd sent for hyperkalemia on labs performed yesterday. no widening of qrs/peaked t waves. currently without any symptomatology. will check labs, cardiac monitor, renal consult/meds prn. reassess    A.M. Noam: repeat BMP with improvement in potassium, stable for outpt follow up with return precautions

## 2023-09-01 NOTE — ED ADULT NURSE REASSESSMENT NOTE - NS ED NURSE REASSESS COMMENT FT1
Report received from Magda EVERETT.  Patient resting comfortably in stretcher, respirations even and unlabored, no complaints at this time.  Will continue to monitor. Report received from Magda EVERETT.  Patient resting comfortably in stretcher, respirations even and unlabored.  Repeat BNP sent to lab.  Will continue to monitor.

## 2023-09-01 NOTE — ED PROVIDER NOTE - NSTIMEPROVIDERCAREINITIATE_GEN_ER

## 2023-09-01 NOTE — ED ADULT NURSE NOTE - OBJECTIVE STATEMENT
patient was called by her pcp to go to the er for eval of her potassium level which was high as claimed.

## 2023-09-01 NOTE — ED PROVIDER NOTE - NSFOLLOWUPINSTRUCTIONS_ED_ALL_ED_FT
Cut your hydralazine down from 50mg twice daily to 25mg twice daily. Start taking Chlorthialidone that was prescribed to you once daily. Follow up with nephrologist.   Please follow up with your Primary Care Doctor in 1 - 2 days. If you cannot follow-up with your primary care doctor please return to the Emergency Department for any urgent issues.  - Seek immediate medical care for any new, worsening or concerning signs or symptoms.     - If you have difficulty following up, please call: [phone] or go to [email or web address] to obtain a [facility] doctor or specialist who takes your insurance in your area.    Continue all previously prescribed medications as directed. You can use Tylenol 650 mg every 4 hours for pain/fever. Follow up with your primary care physician in 48-72 hours- bring copies of your results.  Return to the emergency department for chest pain, shortness of breath, dizziness, or worsening, concerning, or persistent symptoms.

## 2023-09-01 NOTE — ED PROVIDER NOTE - CARE PROVIDER_API CALL
Roland Nava  Nephrology  [address] [place] [id number]  Phone: [phone]  Fax: [phone]  Established Patient  Follow Up Time: 1-3 Days

## 2023-09-01 NOTE — ED ADULT NURSE NOTE - CAS ELECT INFOMATION PROVIDED
patient verbalizes understanding of discharge instructions.  VS stable, patient ambulatory./DC instructions

## 2023-09-01 NOTE — ED ADULT NURSE NOTE - NSFALLUNIVINTERV_ED_ALL_ED
Bed/Stretcher in lowest position, wheels locked, appropriate side rails in place/Call bell, personal items and telephone in reach/Instruct patient to call for assistance before getting out of bed/chair/stretcher/Non-slip footwear applied when patient is off stretcher/[place] to call system/Physically safe environment - no spills, clutter or unnecessary equipment/Purposeful proactive rounding/Room/bathroom lighting operational, light cord in reach

## 2023-09-01 NOTE — ED PROVIDER NOTE - OBJECTIVE STATEMENT
72 y/o female hx dm, htn, ckd ( cr ~1.8) sent by nephrologist after labs done yesterday (resulted today) showed potassium of 6.4. pt denies any other acute symptoms. was sgetting procrit shot. no palpitations, muscle aches, lighhteadedness. no cp/sob.

## 2023-09-01 NOTE — ED PROVIDER NOTE - PATIENT PORTAL LINK FT
You can access the FollowMyHealth Patient Portal offered by [facility] by registering at the following website: [email or web address]. By joining [employer]’s FollowMyHealth portal, you will also be able to view your health information using other applications (apps) compatible with our system.

## 2023-09-05 ENCOUNTER — NON-APPOINTMENT (OUTPATIENT)
Age: 73
End: 2023-09-05

## 2023-09-08 ENCOUNTER — APPOINTMENT (OUTPATIENT)
Dept: NEPHROLOGY | Facility: CLINIC | Age: 73
End: 2023-09-08
Payer: MEDICARE

## 2023-09-08 VITALS
OXYGEN SATURATION: 97 % | WEIGHT: 231 LBS | DIASTOLIC BLOOD PRESSURE: 76 MMHG | BODY MASS INDEX: 39.65 KG/M2 | HEART RATE: 102 BPM | SYSTOLIC BLOOD PRESSURE: 140 MMHG

## 2023-09-08 DIAGNOSIS — R45.89 OTHER SYMPTOMS AND SIGNS INVOLVING EMOTIONAL STATE: ICD-10-CM

## 2023-09-08 DIAGNOSIS — I10 ESSENTIAL (PRIMARY) HYPERTENSION: ICD-10-CM

## 2023-09-08 DIAGNOSIS — Z09 ENCOUNTER FOR FOLLOW-UP EXAMINATION AFTER COMPLETED TREATMENT FOR CONDITIONS OTHER THAN MALIGNANT NEOPLASM: ICD-10-CM

## 2023-09-08 DIAGNOSIS — E87.5 HYPERKALEMIA: ICD-10-CM

## 2023-09-08 PROCEDURE — 99213 OFFICE O/P EST LOW 20 MIN: CPT

## 2023-09-09 PROBLEM — R45.89 EMOTIONAL UPSET: Status: ACTIVE | Noted: 2023-09-09

## 2023-09-09 PROBLEM — E87.5 HYPERKALEMIA: Status: ACTIVE | Noted: 2022-12-27

## 2023-09-09 PROBLEM — Z09 HOSPITAL DISCHARGE FOLLOW-UP: Status: ACTIVE | Noted: 2023-09-09

## 2023-09-09 LAB
ALBUMIN SERPL ELPH-MCNC: 4 G/DL
ANION GAP SERPL CALC-SCNC: 14 MMOL/L
BUN SERPL-MCNC: 30 MG/DL
CALCIUM SERPL-MCNC: 8.7 MG/DL
CHLORIDE SERPL-SCNC: 112 MMOL/L
CO2 SERPL-SCNC: 17 MMOL/L
CREAT SERPL-MCNC: 2.24 MG/DL
EGFR: 23 ML/MIN/1.73M2
GLUCOSE SERPL-MCNC: 95 MG/DL
PHOSPHATE SERPL-MCNC: 4.1 MG/DL
POTASSIUM SERPL-SCNC: 4.6 MMOL/L
SODIUM SERPL-SCNC: 143 MMOL/L

## 2023-09-09 RX ORDER — SODIUM BICARBONATE 650 MG/1
650 TABLET ORAL
Qty: 180 | Refills: 1 | Status: ACTIVE | COMMUNITY
Start: 1900-01-01 | End: 1900-01-01

## 2023-09-09 NOTE — REVIEW OF SYSTEMS
[Eyesight Problems] : eyesight problems [Palpitations] : palpitations [Emotional Problems] : emotional problems [Shortness Of Breath] : no shortness of breath [Suicidal] : not suicidal

## 2023-09-09 NOTE — ASSESSMENT
[FreeTextEntry1] :  73 year old female with PMH of morbid obesity, DM, HTN, BONNY, fatty liver, CKD 3b/4 (baseline creatinine ~1.7-2.0mg/dL), GERD and hiatal hernia presents for hospital follow up for hyperkalemia. Reports that she feels overwhelmed at the moment with various medical conditions including new onset vision impairment, palpitations and hyperkalemia. Reports that she has follow up appointments with Ophthalmology and Cardiology. Asking for help with "meal prepping" in light of hyperkalemia. Tearful during today's office visit. Confides that she does not have family nor friends that can provide her with emotional support. Denied suicidal and homicidal ideations.   Hyperkalemia -Not on ACEi / K+ sparing diuretic  -Started on chlorthalidone 25mg daily post hospitalization in light of hyperkalemia (and suboptimal HTN) -Hydralazine lowered from 50mg BID to 25mg BID as patient expressed concern of possible hypotension  -Provided education in the office re: low potassium diet -Also provided dietician referral as patient requires additional support as she stated that she "needs help with meal prepping with the appropriate foods"  -Will check renal panel today   HTN -BP suboptimal however patient was tearful and anxious during today's office visit -Will send script for hydralazine 25mg BID to pharmacy  -Will recheck BP in next office visit   CKD 4 -Patient reports that she was prescribe meloxicam which she takes as needed -Advised patient against NSAID use as she has advanced CKD 4 -Recommend that she speak to her prescribing doctor for an alternative regimen  -Patient return verbalized understanding  Emotional Distress -Patient reported that she does not have emotional support from friends and family  -Denied suicidal and homicidal ideations   To discuss results over the phone. Return to office in ~1 month.

## 2023-09-09 NOTE — PHYSICAL EXAM
[General Appearance - Alert] : alert [General Appearance - Well Nourished] : well nourished [General Appearance - Well Developed] : well developed [] : no respiratory distress [Exaggerated Use Of Accessory Muscles For Inspiration] : no accessory muscle use [Respiration, Rhythm And Depth] : normal respiratory rhythm and effort [Auscultation Breath Sounds / Voice Sounds] : lungs were clear to auscultation bilaterally [Heart Rate And Rhythm] : heart rate was normal and rhythm regular [Heart Sounds] : normal S1 and S2 [Edema] : there was no peripheral edema [Bowel Sounds] : normal bowel sounds [Abdomen Soft] : soft [Abdomen Tenderness] : non-tender [Skin Color & Pigmentation] : normal skin color and pigmentation [Oriented To Time, Place, And Person] : oriented to person, place, and time [FreeTextEntry1] : Tearful

## 2023-09-09 NOTE — PHYSICAL EXAM
[General Appearance - Alert] : alert [General Appearance - Well Nourished] : well nourished [General Appearance - Well Developed] : well developed [] : no respiratory distress [Respiration, Rhythm And Depth] : normal respiratory rhythm and effort [Exaggerated Use Of Accessory Muscles For Inspiration] : no accessory muscle use [Auscultation Breath Sounds / Voice Sounds] : lungs were clear to auscultation bilaterally [Heart Rate And Rhythm] : heart rate was normal and rhythm regular [Edema] : there was no peripheral edema [Heart Sounds] : normal S1 and S2 [Bowel Sounds] : normal bowel sounds [Abdomen Soft] : soft [Abdomen Tenderness] : non-tender [Skin Color & Pigmentation] : normal skin color and pigmentation [Oriented To Time, Place, And Person] : oriented to person, place, and time [FreeTextEntry1] : Tearful

## 2023-09-09 NOTE — HISTORY OF PRESENT ILLNESS
[FreeTextEntry1] :  73 year old female with PMH of morbid obesity, DM, HTN, BONNY, fatty liver, CKD 3b/4 (baseline creatinine ~1.7-2.0mg/dL), GERD and hiatal hernia presents for hospital follow up for hyperkalemia. Reports that she feels overwhelmed at the moment with various medical conditions including new onset vision impairment, palpitations and hyperkalemia. Reports that she has follow up appointments with Ophthalmology and Cardiology. Asking for help with "meal prepping" in light of hyperkalemia. Tearful during today's office visit. Confides that she does not have family nor friends that can provide her with emotional support. Denied suicidal and homicidal ideations.

## 2023-09-12 NOTE — ED PROVIDER NOTE - CHIEF COMPLAINT
Session ID: [id number]  Language: Swahili   ID: #[id number]   Name: Meredith Mcallister
Shortness of breath

## 2023-09-22 ENCOUNTER — APPOINTMENT (OUTPATIENT)
Dept: ORTHOPEDIC SURGERY | Facility: CLINIC | Age: 73
End: 2023-09-22
Payer: MEDICARE

## 2023-09-22 VITALS
DIASTOLIC BLOOD PRESSURE: 71 MMHG | WEIGHT: 231 LBS | HEIGHT: 64 IN | BODY MASS INDEX: 39.44 KG/M2 | HEART RATE: 66 BPM | SYSTOLIC BLOOD PRESSURE: 131 MMHG

## 2023-09-22 DIAGNOSIS — M75.81 OTHER SHOULDER LESIONS, RIGHT SHOULDER: ICD-10-CM

## 2023-09-22 DIAGNOSIS — M19.012 PRIMARY OSTEOARTHRITIS, LEFT SHOULDER: ICD-10-CM

## 2023-09-22 DIAGNOSIS — M25.512 PAIN IN RIGHT SHOULDER: ICD-10-CM

## 2023-09-22 DIAGNOSIS — M25.511 PAIN IN RIGHT SHOULDER: ICD-10-CM

## 2023-09-22 PROCEDURE — 99213 OFFICE O/P EST LOW 20 MIN: CPT

## 2023-10-16 ENCOUNTER — OFFICE (OUTPATIENT)
Dept: URBAN - METROPOLITAN AREA CLINIC 112 | Facility: CLINIC | Age: 73
Setting detail: OPHTHALMOLOGY
End: 2023-10-16
Payer: MEDICARE

## 2023-10-16 DIAGNOSIS — H40.1113: ICD-10-CM

## 2023-10-16 DIAGNOSIS — H16.223: ICD-10-CM

## 2023-10-16 DIAGNOSIS — H35.033: ICD-10-CM

## 2023-10-16 DIAGNOSIS — Z96.1: ICD-10-CM

## 2023-10-16 DIAGNOSIS — H04.123: ICD-10-CM

## 2023-10-16 PROCEDURE — 92014 COMPRE OPH EXAM EST PT 1/>: CPT | Performed by: OPHTHALMOLOGY

## 2023-10-16 PROCEDURE — 92133 CPTRZD OPH DX IMG PST SGM ON: CPT | Performed by: OPHTHALMOLOGY

## 2023-10-16 ASSESSMENT — REFRACTION_MANIFEST
OS_VA1: 20/20
OD_CYLINDER: -0.75
OS_CYLINDER: -0.50
OD_ADD: +2.50
OS_VA2: 20/20
OD_CYLINDER: -0.75
OD_AXIS: 020
OD_VA1: 20/20
OS_CYLINDER: -0.75
OD_ADD: +2.00
OD_SPHERE: -0.50
OS_ADD: +2.00
OS_VA1: 20/20
OS_SPHERE: -0.25
OS_SPHERE: -0.25
OS_ADD: +2.50
OS_AXIS: 055
OD_VA1: 20/150
OD_SPHERE: -0.25
OS_AXIS: 023
OD_VA2: 20/20
OD_AXIS: 125

## 2023-10-16 ASSESSMENT — AXIALLENGTH_DERIVED
OS_AL: 23.5572
OS_AL: 23.6058
OD_AL: 23.6575
OD_AL: 23.56
OD_AL: 23.56
OS_AL: 23.5572

## 2023-10-16 ASSESSMENT — REFRACTION_CURRENTRX
OD_CYLINDER: -0.50
OD_AXIS: 140
OD_SPHERE: +2.00
OS_SPHERE: +2.25
OS_VPRISM_DIRECTION: SV
OD_VPRISM_DIRECTION: SV
OS_AXIS: 031
OS_CYLINDER: -0.50
OD_OVR_VA: 20/
OS_OVR_VA: 20/

## 2023-10-16 ASSESSMENT — TONOMETRY: OS_IOP_MMHG: 13

## 2023-10-16 ASSESSMENT — KERATOMETRY
OD_K1POWER_DIOPTERS: 43.75
OS_K1POWER_DIOPTERS: 43.00
OS_AXISANGLE_DEGREES: 013
OD_AXISANGLE_DEGREES: 104
METHOD_AUTO_MANUAL: AUTO
OS_K2POWER_DIOPTERS: 45.25
OD_K2POWER_DIOPTERS: 44.75

## 2023-10-16 ASSESSMENT — SUPERFICIAL PUNCTATE KERATITIS (SPK)
OS_SPK: 1+
OD_SPK: 1+

## 2023-10-16 ASSESSMENT — PACHYMETRY
OS_CT_UM: 480
OS_CT_CORRECTION: 4
OD_CT_UM: 482
OD_CT_CORRECTION: 4

## 2023-10-16 ASSESSMENT — CONFRONTATIONAL VISUAL FIELD TEST (CVF)
OS_FINDINGS: FULL
OD_FINDINGS: FULL

## 2023-10-16 ASSESSMENT — REFRACTION_AUTOREFRACTION
OS_SPHERE: 0.00
OD_SPHERE: -0.25
OD_CYLINDER: -0.75
OD_AXIS: 086
OS_AXIS: 058
OS_CYLINDER: -1.00

## 2023-10-16 ASSESSMENT — SPHEQUIV_DERIVED
OS_SPHEQUIV: -0.5
OD_SPHEQUIV: -0.625
OS_SPHEQUIV: -0.625
OD_SPHEQUIV: -0.875
OS_SPHEQUIV: -0.5
OD_SPHEQUIV: -0.625

## 2023-10-16 ASSESSMENT — VISUAL ACUITY
OS_BCVA: 20/250
OD_BCVA: 20/20

## 2023-10-19 ENCOUNTER — APPOINTMENT (OUTPATIENT)
Dept: NEPHROLOGY | Facility: CLINIC | Age: 73
End: 2023-10-19
Payer: MEDICARE

## 2023-10-19 VITALS
SYSTOLIC BLOOD PRESSURE: 142 MMHG | DIASTOLIC BLOOD PRESSURE: 62 MMHG | OXYGEN SATURATION: 94 % | BODY MASS INDEX: 38.76 KG/M2 | HEART RATE: 68 BPM | HEIGHT: 64 IN | WEIGHT: 227 LBS

## 2023-10-19 VITALS — DIASTOLIC BLOOD PRESSURE: 80 MMHG | SYSTOLIC BLOOD PRESSURE: 122 MMHG

## 2023-10-19 DIAGNOSIS — N18.32 CHRONIC KIDNEY DISEASE, STAGE 3B: ICD-10-CM

## 2023-10-19 DIAGNOSIS — N17.9 ACUTE KIDNEY FAILURE, UNSPECIFIED: ICD-10-CM

## 2023-10-19 DIAGNOSIS — K21.9 GASTRO-ESOPHAGEAL REFLUX DISEASE W/OUT ESOPHAGITIS: ICD-10-CM

## 2023-10-19 PROCEDURE — 99213 OFFICE O/P EST LOW 20 MIN: CPT

## 2023-10-20 LAB
ALBUMIN SERPL ELPH-MCNC: 4 G/DL
ANION GAP SERPL CALC-SCNC: 13 MMOL/L
APPEARANCE: CLEAR
BACTERIA: NEGATIVE /HPF
BILIRUBIN URINE: NEGATIVE
BLOOD URINE: NEGATIVE
BUN SERPL-MCNC: 34 MG/DL
CALCIUM SERPL-MCNC: 8.9 MG/DL
CAST: 0 /LPF
CHLORIDE SERPL-SCNC: 104 MMOL/L
CO2 SERPL-SCNC: 23 MMOL/L
COLOR: YELLOW
CREAT SERPL-MCNC: 2.46 MG/DL
CREAT SPEC-SCNC: 83 MG/DL
CREAT SPEC-SCNC: 83 MG/DL
CREAT/PROT UR: 0.2 RATIO
EGFR: 20 ML/MIN/1.73M2
EPITHELIAL CELLS: 6 /HPF
GLUCOSE QUALITATIVE U: >=1000 MG/DL
GLUCOSE SERPL-MCNC: 294 MG/DL
KETONES URINE: NEGATIVE MG/DL
LEUKOCYTE ESTERASE URINE: NEGATIVE
MICROALBUMIN 24H UR DL<=1MG/L-MCNC: 7.5 MG/DL
MICROALBUMIN/CREAT 24H UR-RTO: 91 MG/G
MICROSCOPIC-UA: NORMAL
NITRITE URINE: NEGATIVE
PH URINE: 6.5
PHOSPHATE SERPL-MCNC: 3.8 MG/DL
POTASSIUM SERPL-SCNC: 4.5 MMOL/L
PROT UR-MCNC: 18 MG/DL
PROTEIN URINE: NORMAL MG/DL
RED BLOOD CELLS URINE: 0 /HPF
SODIUM SERPL-SCNC: 139 MMOL/L
SPECIFIC GRAVITY URINE: 1.02
UROBILINOGEN URINE: 0.2 MG/DL
WHITE BLOOD CELLS URINE: 0 /HPF

## 2023-10-20 NOTE — ED ADULT NURSE REASSESSMENT NOTE - PERIPHERAL PULSES
[facility] Quality Flow/Interdisciplinary Rounds Progress Note        Quality Flow Rounds held on October 20, 2023    Disciplines Attending:  Bedside Nurse, , , and Nursing Unit Leadership    Stella Baldwin was admitted on 10/18/2023 10:13 AM    Anticipated Discharge Date:       Disposition:    Pravin Score:  Pravin Scale Score: 21    Readmission Risk              Risk of Unplanned Readmission:  5           Discussed patient goal for the day, patient clinical progression, and barriers to discharge.   The following Goal(s) of the Day/Commitment(s) have been identified:  Diagnostics - Report Results and Labs - Report Results, DC planning      Bertrand Trevino RN  October 20, 2023 equal bilaterally

## 2023-10-25 ENCOUNTER — TRANSCRIPTION ENCOUNTER (OUTPATIENT)
Age: 73
End: 2023-10-25

## 2023-10-26 ENCOUNTER — OUTPATIENT (OUTPATIENT)
Dept: OUTPATIENT SERVICES | Facility: HOSPITAL | Age: 73
LOS: 1 days | End: 2023-10-26
Payer: MEDICARE

## 2023-10-26 DIAGNOSIS — Z95.5 PRESENCE OF CORONARY ANGIOPLASTY IMPLANT AND GRAFT: Chronic | ICD-10-CM

## 2023-10-26 DIAGNOSIS — M54.16 RADICULOPATHY, LUMBAR REGION: ICD-10-CM

## 2023-10-26 DIAGNOSIS — H26.40 UNSPECIFIED SECONDARY CATARACT: Chronic | ICD-10-CM

## 2023-10-26 LAB
GLUCOSE BLDC GLUCOMTR-MCNC: 136 MG/DL — HIGH (ref 70–99)
GLUCOSE BLDC GLUCOMTR-MCNC: 136 MG/DL — HIGH (ref 70–99)

## 2023-10-26 PROCEDURE — 82962 GLUCOSE BLOOD TEST: CPT

## 2023-10-26 PROCEDURE — 62323 NJX INTERLAMINAR LMBR/SAC: CPT

## 2023-10-26 PROCEDURE — 77003 FLUOROGUIDE FOR SPINE INJECT: CPT

## 2023-10-29 ENCOUNTER — EMERGENCY (EMERGENCY)
Facility: HOSPITAL | Age: 73
LOS: 1 days | Discharge: HOME | End: 2023-10-29
Attending: EMERGENCY MEDICINE
Payer: MEDICARE

## 2023-10-29 VITALS
SYSTOLIC BLOOD PRESSURE: 130 MMHG | DIASTOLIC BLOOD PRESSURE: 68 MMHG | HEART RATE: 67 BPM | TEMPERATURE: 98 F | OXYGEN SATURATION: 98 % | RESPIRATION RATE: 17 BRPM

## 2023-10-29 VITALS
TEMPERATURE: 99 F | DIASTOLIC BLOOD PRESSURE: 94 MMHG | HEART RATE: 74 BPM | HEIGHT: 64 IN | OXYGEN SATURATION: 95 % | SYSTOLIC BLOOD PRESSURE: 175 MMHG | RESPIRATION RATE: 18 BRPM | WEIGHT: 225.09 LBS

## 2023-10-29 DIAGNOSIS — H26.40 UNSPECIFIED SECONDARY CATARACT: Chronic | ICD-10-CM

## 2023-10-29 DIAGNOSIS — Z95.5 PRESENCE OF CORONARY ANGIOPLASTY IMPLANT AND GRAFT: Chronic | ICD-10-CM

## 2023-10-29 LAB
ALBUMIN SERPL ELPH-MCNC: 3.5 G/DL — SIGNIFICANT CHANGE UP (ref 3.3–5.2)
ALBUMIN SERPL ELPH-MCNC: 3.5 G/DL — SIGNIFICANT CHANGE UP (ref 3.3–5.2)
ALP SERPL-CCNC: 104 U/L — SIGNIFICANT CHANGE UP (ref 40–120)
ALP SERPL-CCNC: 104 U/L — SIGNIFICANT CHANGE UP (ref 40–120)
ALT FLD-CCNC: 12 U/L — SIGNIFICANT CHANGE UP
ALT FLD-CCNC: 12 U/L — SIGNIFICANT CHANGE UP
ANION GAP SERPL CALC-SCNC: 12 MMOL/L — SIGNIFICANT CHANGE UP (ref 5–17)
ANION GAP SERPL CALC-SCNC: 12 MMOL/L — SIGNIFICANT CHANGE UP (ref 5–17)
APTT BLD: 26.5 SEC — SIGNIFICANT CHANGE UP (ref 24.5–35.6)
APTT BLD: 26.5 SEC — SIGNIFICANT CHANGE UP (ref 24.5–35.6)
AST SERPL-CCNC: 19 U/L — SIGNIFICANT CHANGE UP
AST SERPL-CCNC: 19 U/L — SIGNIFICANT CHANGE UP
BASE EXCESS BLDV CALC-SCNC: -4.4 MMOL/L — LOW (ref -2–3)
BASE EXCESS BLDV CALC-SCNC: -4.4 MMOL/L — LOW (ref -2–3)
BASOPHILS # BLD AUTO: 0.03 K/UL — SIGNIFICANT CHANGE UP (ref 0–0.2)
BASOPHILS # BLD AUTO: 0.03 K/UL — SIGNIFICANT CHANGE UP (ref 0–0.2)
BASOPHILS NFR BLD AUTO: 0.5 % — SIGNIFICANT CHANGE UP (ref 0–2)
BASOPHILS NFR BLD AUTO: 0.5 % — SIGNIFICANT CHANGE UP (ref 0–2)
BILIRUB SERPL-MCNC: 0.3 MG/DL — LOW (ref 0.4–2)
BILIRUB SERPL-MCNC: 0.3 MG/DL — LOW (ref 0.4–2)
BUN SERPL-MCNC: 34.8 MG/DL — HIGH (ref 8–20)
BUN SERPL-MCNC: 34.8 MG/DL — HIGH (ref 8–20)
CA-I SERPL-SCNC: 0.97 MMOL/L — LOW (ref 1.15–1.33)
CA-I SERPL-SCNC: 0.97 MMOL/L — LOW (ref 1.15–1.33)
CALCIUM SERPL-MCNC: 8.7 MG/DL — SIGNIFICANT CHANGE UP (ref 8.4–10.5)
CALCIUM SERPL-MCNC: 8.7 MG/DL — SIGNIFICANT CHANGE UP (ref 8.4–10.5)
CHLORIDE BLDV-SCNC: 111 MMOL/L — HIGH (ref 96–108)
CHLORIDE BLDV-SCNC: 111 MMOL/L — HIGH (ref 96–108)
CHLORIDE SERPL-SCNC: 107 MMOL/L — SIGNIFICANT CHANGE UP (ref 96–108)
CHLORIDE SERPL-SCNC: 107 MMOL/L — SIGNIFICANT CHANGE UP (ref 96–108)
CK MB CFR SERPL CALC: 2.7 NG/ML — SIGNIFICANT CHANGE UP (ref 0–6.7)
CK MB CFR SERPL CALC: 2.7 NG/ML — SIGNIFICANT CHANGE UP (ref 0–6.7)
CK SERPL-CCNC: 163 U/L — SIGNIFICANT CHANGE UP (ref 25–170)
CK SERPL-CCNC: 163 U/L — SIGNIFICANT CHANGE UP (ref 25–170)
CO2 SERPL-SCNC: 23 MMOL/L — SIGNIFICANT CHANGE UP (ref 22–29)
CO2 SERPL-SCNC: 23 MMOL/L — SIGNIFICANT CHANGE UP (ref 22–29)
CREAT SERPL-MCNC: 2.41 MG/DL — HIGH (ref 0.5–1.3)
CREAT SERPL-MCNC: 2.41 MG/DL — HIGH (ref 0.5–1.3)
EGFR: 21 ML/MIN/1.73M2 — LOW
EGFR: 21 ML/MIN/1.73M2 — LOW
EOSINOPHIL # BLD AUTO: 0.3 K/UL — SIGNIFICANT CHANGE UP (ref 0–0.5)
EOSINOPHIL # BLD AUTO: 0.3 K/UL — SIGNIFICANT CHANGE UP (ref 0–0.5)
EOSINOPHIL NFR BLD AUTO: 4.9 % — SIGNIFICANT CHANGE UP (ref 0–6)
EOSINOPHIL NFR BLD AUTO: 4.9 % — SIGNIFICANT CHANGE UP (ref 0–6)
GAS PNL BLDV: 137 MMOL/L — SIGNIFICANT CHANGE UP (ref 136–145)
GAS PNL BLDV: 137 MMOL/L — SIGNIFICANT CHANGE UP (ref 136–145)
GAS PNL BLDV: SIGNIFICANT CHANGE UP
GLUCOSE BLDV-MCNC: 112 MG/DL — HIGH (ref 70–99)
GLUCOSE BLDV-MCNC: 112 MG/DL — HIGH (ref 70–99)
GLUCOSE SERPL-MCNC: 116 MG/DL — HIGH (ref 70–99)
GLUCOSE SERPL-MCNC: 116 MG/DL — HIGH (ref 70–99)
HCO3 BLDV-SCNC: 20 MMOL/L — LOW (ref 22–29)
HCO3 BLDV-SCNC: 20 MMOL/L — LOW (ref 22–29)
HCT VFR BLD CALC: 38.8 % — SIGNIFICANT CHANGE UP (ref 34.5–45)
HCT VFR BLD CALC: 38.8 % — SIGNIFICANT CHANGE UP (ref 34.5–45)
HCT VFR BLDA CALC: 39 % — SIGNIFICANT CHANGE UP
HCT VFR BLDA CALC: 39 % — SIGNIFICANT CHANGE UP
HGB BLD CALC-MCNC: 13 G/DL — SIGNIFICANT CHANGE UP (ref 11.7–16.1)
HGB BLD CALC-MCNC: 13 G/DL — SIGNIFICANT CHANGE UP (ref 11.7–16.1)
HGB BLD-MCNC: 12.1 G/DL — SIGNIFICANT CHANGE UP (ref 11.5–15.5)
HGB BLD-MCNC: 12.1 G/DL — SIGNIFICANT CHANGE UP (ref 11.5–15.5)
IMM GRANULOCYTES NFR BLD AUTO: 0.2 % — SIGNIFICANT CHANGE UP (ref 0–0.9)
IMM GRANULOCYTES NFR BLD AUTO: 0.2 % — SIGNIFICANT CHANGE UP (ref 0–0.9)
INR BLD: 0.99 RATIO — SIGNIFICANT CHANGE UP (ref 0.85–1.18)
INR BLD: 0.99 RATIO — SIGNIFICANT CHANGE UP (ref 0.85–1.18)
LACTATE BLDV-MCNC: 1.3 MMOL/L — SIGNIFICANT CHANGE UP (ref 0.5–2)
LACTATE BLDV-MCNC: 1.3 MMOL/L — SIGNIFICANT CHANGE UP (ref 0.5–2)
LYMPHOCYTES # BLD AUTO: 1.39 K/UL — SIGNIFICANT CHANGE UP (ref 1–3.3)
LYMPHOCYTES # BLD AUTO: 1.39 K/UL — SIGNIFICANT CHANGE UP (ref 1–3.3)
LYMPHOCYTES # BLD AUTO: 22.6 % — SIGNIFICANT CHANGE UP (ref 13–44)
LYMPHOCYTES # BLD AUTO: 22.6 % — SIGNIFICANT CHANGE UP (ref 13–44)
MCHC RBC-ENTMCNC: 26.5 PG — LOW (ref 27–34)
MCHC RBC-ENTMCNC: 26.5 PG — LOW (ref 27–34)
MCHC RBC-ENTMCNC: 31.2 GM/DL — LOW (ref 32–36)
MCHC RBC-ENTMCNC: 31.2 GM/DL — LOW (ref 32–36)
MCV RBC AUTO: 84.9 FL — SIGNIFICANT CHANGE UP (ref 80–100)
MCV RBC AUTO: 84.9 FL — SIGNIFICANT CHANGE UP (ref 80–100)
MONOCYTES # BLD AUTO: 0.45 K/UL — SIGNIFICANT CHANGE UP (ref 0–0.9)
MONOCYTES # BLD AUTO: 0.45 K/UL — SIGNIFICANT CHANGE UP (ref 0–0.9)
MONOCYTES NFR BLD AUTO: 7.3 % — SIGNIFICANT CHANGE UP (ref 2–14)
MONOCYTES NFR BLD AUTO: 7.3 % — SIGNIFICANT CHANGE UP (ref 2–14)
NEUTROPHILS # BLD AUTO: 3.98 K/UL — SIGNIFICANT CHANGE UP (ref 1.8–7.4)
NEUTROPHILS # BLD AUTO: 3.98 K/UL — SIGNIFICANT CHANGE UP (ref 1.8–7.4)
NEUTROPHILS NFR BLD AUTO: 64.5 % — SIGNIFICANT CHANGE UP (ref 43–77)
NEUTROPHILS NFR BLD AUTO: 64.5 % — SIGNIFICANT CHANGE UP (ref 43–77)
PCO2 BLDV: 30 MMHG — LOW (ref 39–42)
PCO2 BLDV: 30 MMHG — LOW (ref 39–42)
PH BLDV: 7.43 — SIGNIFICANT CHANGE UP (ref 7.32–7.43)
PH BLDV: 7.43 — SIGNIFICANT CHANGE UP (ref 7.32–7.43)
PLATELET # BLD AUTO: 185 K/UL — SIGNIFICANT CHANGE UP (ref 150–400)
PLATELET # BLD AUTO: 185 K/UL — SIGNIFICANT CHANGE UP (ref 150–400)
PO2 BLDV: 161 MMHG — HIGH (ref 25–45)
PO2 BLDV: 161 MMHG — HIGH (ref 25–45)
POTASSIUM BLDV-SCNC: 4.9 MMOL/L — SIGNIFICANT CHANGE UP (ref 3.5–5.1)
POTASSIUM BLDV-SCNC: 4.9 MMOL/L — SIGNIFICANT CHANGE UP (ref 3.5–5.1)
POTASSIUM SERPL-MCNC: 4.8 MMOL/L — SIGNIFICANT CHANGE UP (ref 3.5–5.3)
POTASSIUM SERPL-MCNC: 4.8 MMOL/L — SIGNIFICANT CHANGE UP (ref 3.5–5.3)
POTASSIUM SERPL-SCNC: 4.8 MMOL/L — SIGNIFICANT CHANGE UP (ref 3.5–5.3)
POTASSIUM SERPL-SCNC: 4.8 MMOL/L — SIGNIFICANT CHANGE UP (ref 3.5–5.3)
PROT SERPL-MCNC: 6.8 G/DL — SIGNIFICANT CHANGE UP (ref 6.6–8.7)
PROT SERPL-MCNC: 6.8 G/DL — SIGNIFICANT CHANGE UP (ref 6.6–8.7)
PROTHROM AB SERPL-ACNC: 11 SEC — SIGNIFICANT CHANGE UP (ref 9.5–13)
PROTHROM AB SERPL-ACNC: 11 SEC — SIGNIFICANT CHANGE UP (ref 9.5–13)
RBC # BLD: 4.57 M/UL — SIGNIFICANT CHANGE UP (ref 3.8–5.2)
RBC # BLD: 4.57 M/UL — SIGNIFICANT CHANGE UP (ref 3.8–5.2)
RBC # FLD: 15.5 % — HIGH (ref 10.3–14.5)
RBC # FLD: 15.5 % — HIGH (ref 10.3–14.5)
SAO2 % BLDV: 99.6 % — SIGNIFICANT CHANGE UP
SAO2 % BLDV: 99.6 % — SIGNIFICANT CHANGE UP
SODIUM SERPL-SCNC: 142 MMOL/L — SIGNIFICANT CHANGE UP (ref 135–145)
SODIUM SERPL-SCNC: 142 MMOL/L — SIGNIFICANT CHANGE UP (ref 135–145)
TROPONIN T SERPL-MCNC: <0.01 NG/ML — SIGNIFICANT CHANGE UP (ref 0–0.06)
WBC # BLD: 6.16 K/UL — SIGNIFICANT CHANGE UP (ref 3.8–10.5)
WBC # BLD: 6.16 K/UL — SIGNIFICANT CHANGE UP (ref 3.8–10.5)
WBC # FLD AUTO: 6.16 K/UL — SIGNIFICANT CHANGE UP (ref 3.8–10.5)
WBC # FLD AUTO: 6.16 K/UL — SIGNIFICANT CHANGE UP (ref 3.8–10.5)

## 2023-10-29 PROCEDURE — 82550 ASSAY OF CK (CPK): CPT

## 2023-10-29 PROCEDURE — 36415 COLL VENOUS BLD VENIPUNCTURE: CPT

## 2023-10-29 PROCEDURE — 82553 CREATINE MB FRACTION: CPT

## 2023-10-29 PROCEDURE — 99285 EMERGENCY DEPT VISIT HI MDM: CPT

## 2023-10-29 PROCEDURE — 80053 COMPREHEN METABOLIC PANEL: CPT

## 2023-10-29 PROCEDURE — 85018 HEMOGLOBIN: CPT

## 2023-10-29 PROCEDURE — 84484 ASSAY OF TROPONIN QUANT: CPT

## 2023-10-29 PROCEDURE — 85610 PROTHROMBIN TIME: CPT

## 2023-10-29 PROCEDURE — 85014 HEMATOCRIT: CPT

## 2023-10-29 PROCEDURE — 85025 COMPLETE CBC W/AUTO DIFF WBC: CPT

## 2023-10-29 PROCEDURE — 84132 ASSAY OF SERUM POTASSIUM: CPT

## 2023-10-29 PROCEDURE — 82330 ASSAY OF CALCIUM: CPT

## 2023-10-29 PROCEDURE — 71045 X-RAY EXAM CHEST 1 VIEW: CPT | Mod: 26

## 2023-10-29 PROCEDURE — 99285 EMERGENCY DEPT VISIT HI MDM: CPT | Mod: 25

## 2023-10-29 PROCEDURE — 71045 X-RAY EXAM CHEST 1 VIEW: CPT

## 2023-10-29 PROCEDURE — 93005 ELECTROCARDIOGRAM TRACING: CPT

## 2023-10-29 PROCEDURE — 82803 BLOOD GASES ANY COMBINATION: CPT

## 2023-10-29 PROCEDURE — 93010 ELECTROCARDIOGRAM REPORT: CPT

## 2023-10-29 PROCEDURE — 84295 ASSAY OF SERUM SODIUM: CPT

## 2023-10-29 PROCEDURE — 85730 THROMBOPLASTIN TIME PARTIAL: CPT

## 2023-10-29 PROCEDURE — 83605 ASSAY OF LACTIC ACID: CPT

## 2023-10-29 PROCEDURE — 82435 ASSAY OF BLOOD CHLORIDE: CPT

## 2023-10-29 PROCEDURE — 82947 ASSAY GLUCOSE BLOOD QUANT: CPT

## 2023-10-29 RX ORDER — ASPIRIN/CALCIUM CARB/MAGNESIUM 324 MG
324 TABLET ORAL ONCE
Refills: 0 | Status: COMPLETED | OUTPATIENT
Start: 2023-10-29 | End: 2023-10-29

## 2023-10-29 RX ORDER — NITROGLYCERIN 6.5 MG
1 CAPSULE, EXTENDED RELEASE ORAL
Qty: 1 | Refills: 0
Start: 2023-10-29 | End: 2023-11-02

## 2023-10-29 RX ADMIN — Medication 324 MILLIGRAM(S): at 09:20

## 2023-10-29 NOTE — ED PROVIDER NOTE - NSFOLLOWUPINSTRUCTIONS_ED_ALL_ED_FT
take nitroglycerin in case of recurrent chest pain  Return to the ED in case of persistent pain  Follow-up with your cardiologist in 1 week  Return promptly in case of worsening symptoms

## 2023-10-29 NOTE — ED PROVIDER NOTE - OBJECTIVE STATEMENT
73-year-old female with history of hypertension hyperlipidemia diabetes CAD with 1 stent in 2018 on aspirin Plavix presents with sudden onset of chest pain around 4:30 AM lasting 35 seconds followed by recurrent substernal chest pain around 530 which is very similar to when she got the stents.  No episode of sweating with the pain no syncope.  Patient states that she cannot explain it but it is sharp and makes her normal.  Patient pain does not radiate in the back no fever chills cough or relation to breathing or eating. Dr. Roa is cardiologist

## 2023-10-29 NOTE — ED PROVIDER NOTE - NSICDXPASTMEDICALHX_GEN_ALL_CORE_FT
PAST MEDICAL HISTORY:  Anemia     Arthritis     CAD (coronary artery disease)     Chest pain     DJD (degenerative joint disease), lumbar     DM (diabetes mellitus)     Fatty liver     GERD (gastroesophageal reflux disease)     Hiatal hernia     HTN (hypertension)     BONNY (obstructive sleep apnea)     Stage 3 chronic kidney disease

## 2023-10-29 NOTE — ED PROVIDER NOTE - CLINICAL SUMMARY MEDICAL DECISION MAKING FREE TEXT BOX
Patient has acute chest pain which is like her previous episode of ACS , will r/o ACS, call cardiology consult chest x-ray and give her aspirin.

## 2023-10-29 NOTE — ED PROVIDER NOTE - PATIENT PORTAL LINK FT
You can access the FollowMyHealth Patient Portal offered by [facility] by registering at the following website: [email or web address]. By joining [employer]’s FollowMyHealth portal, you will also be able to view your health information using other applications (apps) compatible with our system.

## 2023-10-29 NOTE — ED PROVIDER NOTE - PROGRESS NOTE DETAILS
Patient was seen by cardiology and has 2 sets of negative troponin.  Patient sounds like he has anginal symptoms and was discharged with nitroglycerin tablets and advised outpatient follow-up with Dr. Roa.  I discussed with cardiology about need for any further testing in the ED.  At this time cardiology recommended no further testing or echo

## 2023-10-29 NOTE — ED ADULT NURSE NOTE - OBJECTIVE STATEMENT
Pt presents to ED for intermittent chest pain starting 0230 this am while she was sleeping, Pt states she has had stents placed in the past and this pain feels like the episode leading up to her needing stents. Pt reports 3 episode of sharp pain chest pain, pain is located across chest. Pt states she took omprezole prior to ED arrival, no aspirin. Pt denies SOB, N/V, diaphoresis at the time of pain.   Pt reports having one cardiac stents placed possibly in 2018.

## 2023-10-29 NOTE — ED PROVIDER NOTE - CARE PROVIDER_API CALL
Glynn Roa  Cardiology  [facility] [place] [address]  Phone: [phone]  Fax: [phone]  Follow Up Time: 1-3 Days

## 2023-10-29 NOTE — CONSULT NOTE ADULT - SUBJECTIVE AND OBJECTIVE BOX
Larissa Adan M.D., F.A.C.C.                                                                                            [facility]                                                                                                   [address] [place] [address]                                                                                                     [phone]  COVERING FOR [facility]    The patient is a 73y Female whose cardiac risk factors include hypertension,DM,  obesity, and hyperlipidemia.   Hx of CAD/prior stenting in 2018   Hx of renal insufficiency  Pt has had CP in the past similar to todays presentation.  The patient now presents complaining of chest pain   Awakened from sleep with CP -- 30 seconds and resolved. Later recurred twice (short period of time)  No associated sxs.  Feels OK now but scared.    Allergies    latex (Urticaria)  oxycodone (Urticaria)    Intolerances      MEDICATIONS  (STANDING):    ASA  81 mg  a day   Plavix  75 mg a day  Lipitor 10 mg a day  Folic Acid 1 mg a day  Hydralzine 50 mg bid  MetoprololXL 50 mg a day  Restasis   Sodium bicarb 650 mg bid  Jardiance 10 mg a day  Treseba  Chlorthalidone 25 mg a day     MEDICATIONS  (PRN):      Acute Infectious Diseases:  No history of rheumatic fever or TB.    PAST MEDICAL & SURGICAL HISTORY:  After cataract, bilateral    S/P coronary artery stent placement      PAST MEDICAL & SURGICAL HISTORY:  HTN (hypertension)    DM (diabetes mellitus)    Chest pain    BONNY (obstructive sleep apnea)    Hiatal hernia    GERD (gastroesophageal reflux disease)    DJD (degenerative joint disease), lumbar    Fatty liver    Arthritis    Anemia    CAD (coronary artery disease)    Stage 3 chronic kidney disease        Habits:  [   ] Cigarettes:  [   ] Alcohol:  [   ] Drugs:    Social History:  [   ]    [   ]  Single  [   ]  /Separate  [   ]  Children  [   ]   Job    Family History of Heart Disease:    Vital Signs Last 24 Hrs  T(C): 37.1 (29 Oct 2023 08:32), Max: 37.1 (29 Oct 2023 08:32)  T(F): 98.8 (29 Oct 2023 08:32), Max: 98.8 (29 Oct 2023 08:32)  HR: 74 (29 Oct 2023 08:32) (74 - 74)  BP: 175/94 (29 Oct 2023 08:32) (175/94 - 175/94)  BP(mean): --  RR: 18 (29 Oct 2023 08:32) (18 - 18)  SpO2: 95% (29 Oct 2023 08:32) (95% - 95%)    Parameters below as of 29 Oct 2023 08:32  Patient On (Oxygen Delivery Method): room air        I&O's Detail      Constitutional:    NC/AT: Normal     HEENT: Normal     Neck:  No JVD, bruits or thyromegaly    Respiratory:  Clear without rales or rhonchi    Cardiovascular:  RR without murmur, rub or gallop.    Gastrointestinal: Soft without hepatosplenomegaly.    Extremities: without cyanosis, clubbing or edema.    Neurological:  Oriented   x  3     . No gross sensory or motor defects.    Skin: Normal     Psychiatric: Normal affect.        EKG: NSR. T wave inversion in L                              12.1   6.16  )-----------( 185      ( 29 Oct 2023 09:25 )             38.8     10-29    142  |  107  |  34.8<H>  ----------------------------<  116<H>  4.8   |  23.0  |  2.41<H>    Ca    8.7      29 Oct 2023 09:25    TPro  6.8  /  Alb  3.5  /  TBili  0.3<L>  /  DBili  x   /  AST  19  /  ALT  12  /  AlkPhos  104  10-29    PT/INR - ( 29 Oct 2023 10:15 )   PT: 11.0 sec;   INR: 0.99 ratio         PTT - ( 29 Oct 2023 10:15 )  PTT:26.5 sec  CARDIAC MARKERS ( 29 Oct 2023 09:25 )  x     / <0.01 ng/mL / 163 U/L / x     / 2.7 ng/mL      CXR WNL      IMPRESSION:    Chest Pain  Hx of stenting  Hypertension  DM  Hyperlipidemia    At present the pts sxs were extremely short/self limited  Trop negative     I would recommend checking a second trop-- if OK could be discharged home with NTG SL with out pt fu with Dr Roa (has renal   insufficiency and not anxious to give dye as first line evaluation).

## 2023-11-03 ENCOUNTER — RX RENEWAL (OUTPATIENT)
Age: 73
End: 2023-11-03

## 2023-11-07 ENCOUNTER — EMERGENCY (EMERGENCY)
Facility: HOSPITAL | Age: 73
LOS: 1 days | Discharge: HOME | End: 2023-11-07
Attending: EMERGENCY MEDICINE
Payer: MEDICARE

## 2023-11-07 VITALS
DIASTOLIC BLOOD PRESSURE: 60 MMHG | HEART RATE: 88 BPM | SYSTOLIC BLOOD PRESSURE: 148 MMHG | WEIGHT: 235.01 LBS | RESPIRATION RATE: 18 BRPM | TEMPERATURE: 98 F | HEIGHT: 64 IN | OXYGEN SATURATION: 96 %

## 2023-11-07 VITALS
RESPIRATION RATE: 16 BRPM | HEART RATE: 71 BPM | SYSTOLIC BLOOD PRESSURE: 118 MMHG | TEMPERATURE: 98 F | DIASTOLIC BLOOD PRESSURE: 69 MMHG | OXYGEN SATURATION: 99 %

## 2023-11-07 DIAGNOSIS — H26.40 UNSPECIFIED SECONDARY CATARACT: Chronic | ICD-10-CM

## 2023-11-07 DIAGNOSIS — Z95.5 PRESENCE OF CORONARY ANGIOPLASTY IMPLANT AND GRAFT: Chronic | ICD-10-CM

## 2023-11-07 LAB
ALBUMIN SERPL ELPH-MCNC: 3.7 G/DL — SIGNIFICANT CHANGE UP (ref 3.3–5.2)
ALBUMIN SERPL ELPH-MCNC: 3.7 G/DL — SIGNIFICANT CHANGE UP (ref 3.3–5.2)
ALP SERPL-CCNC: 126 U/L — HIGH (ref 40–120)
ALP SERPL-CCNC: 126 U/L — HIGH (ref 40–120)
ALT FLD-CCNC: 12 U/L — SIGNIFICANT CHANGE UP
ALT FLD-CCNC: 12 U/L — SIGNIFICANT CHANGE UP
ANION GAP SERPL CALC-SCNC: 14 MMOL/L — SIGNIFICANT CHANGE UP (ref 5–17)
ANION GAP SERPL CALC-SCNC: 14 MMOL/L — SIGNIFICANT CHANGE UP (ref 5–17)
AST SERPL-CCNC: 21 U/L — SIGNIFICANT CHANGE UP
AST SERPL-CCNC: 21 U/L — SIGNIFICANT CHANGE UP
BILIRUB SERPL-MCNC: 0.3 MG/DL — LOW (ref 0.4–2)
BILIRUB SERPL-MCNC: 0.3 MG/DL — LOW (ref 0.4–2)
BUN SERPL-MCNC: 25.8 MG/DL — HIGH (ref 8–20)
BUN SERPL-MCNC: 25.8 MG/DL — HIGH (ref 8–20)
CALCIUM SERPL-MCNC: 8.8 MG/DL — SIGNIFICANT CHANGE UP (ref 8.4–10.5)
CALCIUM SERPL-MCNC: 8.8 MG/DL — SIGNIFICANT CHANGE UP (ref 8.4–10.5)
CHLORIDE SERPL-SCNC: 104 MMOL/L — SIGNIFICANT CHANGE UP (ref 96–108)
CHLORIDE SERPL-SCNC: 104 MMOL/L — SIGNIFICANT CHANGE UP (ref 96–108)
CO2 SERPL-SCNC: 22 MMOL/L — SIGNIFICANT CHANGE UP (ref 22–29)
CO2 SERPL-SCNC: 22 MMOL/L — SIGNIFICANT CHANGE UP (ref 22–29)
CREAT SERPL-MCNC: 2.6 MG/DL — HIGH (ref 0.5–1.3)
CREAT SERPL-MCNC: 2.6 MG/DL — HIGH (ref 0.5–1.3)
EGFR: 19 ML/MIN/1.73M2 — LOW
EGFR: 19 ML/MIN/1.73M2 — LOW
GLUCOSE SERPL-MCNC: 183 MG/DL — HIGH (ref 70–99)
GLUCOSE SERPL-MCNC: 183 MG/DL — HIGH (ref 70–99)
HCT VFR BLD CALC: 38.6 % — SIGNIFICANT CHANGE UP (ref 34.5–45)
HCT VFR BLD CALC: 38.6 % — SIGNIFICANT CHANGE UP (ref 34.5–45)
HGB BLD-MCNC: 12 G/DL — SIGNIFICANT CHANGE UP (ref 11.5–15.5)
HGB BLD-MCNC: 12 G/DL — SIGNIFICANT CHANGE UP (ref 11.5–15.5)
LIDOCAIN IGE QN: 55 U/L — HIGH (ref 22–51)
LIDOCAIN IGE QN: 55 U/L — HIGH (ref 22–51)
MCHC RBC-ENTMCNC: 25.8 PG — LOW (ref 27–34)
MCHC RBC-ENTMCNC: 25.8 PG — LOW (ref 27–34)
MCHC RBC-ENTMCNC: 31.1 GM/DL — LOW (ref 32–36)
MCHC RBC-ENTMCNC: 31.1 GM/DL — LOW (ref 32–36)
MCV RBC AUTO: 83 FL — SIGNIFICANT CHANGE UP (ref 80–100)
MCV RBC AUTO: 83 FL — SIGNIFICANT CHANGE UP (ref 80–100)
PLATELET # BLD AUTO: 208 K/UL — SIGNIFICANT CHANGE UP (ref 150–400)
PLATELET # BLD AUTO: 208 K/UL — SIGNIFICANT CHANGE UP (ref 150–400)
POTASSIUM SERPL-MCNC: 4.5 MMOL/L — SIGNIFICANT CHANGE UP (ref 3.5–5.3)
POTASSIUM SERPL-MCNC: 4.5 MMOL/L — SIGNIFICANT CHANGE UP (ref 3.5–5.3)
POTASSIUM SERPL-SCNC: 4.5 MMOL/L — SIGNIFICANT CHANGE UP (ref 3.5–5.3)
POTASSIUM SERPL-SCNC: 4.5 MMOL/L — SIGNIFICANT CHANGE UP (ref 3.5–5.3)
PROT SERPL-MCNC: 7.3 G/DL — SIGNIFICANT CHANGE UP (ref 6.6–8.7)
PROT SERPL-MCNC: 7.3 G/DL — SIGNIFICANT CHANGE UP (ref 6.6–8.7)
RBC # BLD: 4.65 M/UL — SIGNIFICANT CHANGE UP (ref 3.8–5.2)
RBC # BLD: 4.65 M/UL — SIGNIFICANT CHANGE UP (ref 3.8–5.2)
RBC # FLD: 15.6 % — HIGH (ref 10.3–14.5)
RBC # FLD: 15.6 % — HIGH (ref 10.3–14.5)
SODIUM SERPL-SCNC: 140 MMOL/L — SIGNIFICANT CHANGE UP (ref 135–145)
SODIUM SERPL-SCNC: 140 MMOL/L — SIGNIFICANT CHANGE UP (ref 135–145)
WBC # BLD: 8.85 K/UL — SIGNIFICANT CHANGE UP (ref 3.8–10.5)
WBC # BLD: 8.85 K/UL — SIGNIFICANT CHANGE UP (ref 3.8–10.5)
WBC # FLD AUTO: 8.85 K/UL — SIGNIFICANT CHANGE UP (ref 3.8–10.5)
WBC # FLD AUTO: 8.85 K/UL — SIGNIFICANT CHANGE UP (ref 3.8–10.5)

## 2023-11-07 PROCEDURE — 93010 ELECTROCARDIOGRAM REPORT: CPT

## 2023-11-07 PROCEDURE — 96374 THER/PROPH/DIAG INJ IV PUSH: CPT

## 2023-11-07 PROCEDURE — 83690 ASSAY OF LIPASE: CPT

## 2023-11-07 PROCEDURE — 80053 COMPREHEN METABOLIC PANEL: CPT

## 2023-11-07 PROCEDURE — 36415 COLL VENOUS BLD VENIPUNCTURE: CPT

## 2023-11-07 PROCEDURE — 85027 COMPLETE CBC AUTOMATED: CPT

## 2023-11-07 PROCEDURE — 93005 ELECTROCARDIOGRAM TRACING: CPT

## 2023-11-07 PROCEDURE — 99284 EMERGENCY DEPT VISIT MOD MDM: CPT

## 2023-11-07 PROCEDURE — 99284 EMERGENCY DEPT VISIT MOD MDM: CPT | Mod: 25

## 2023-11-07 RX ORDER — SODIUM CHLORIDE 9 MG/ML
1000 INJECTION INTRAMUSCULAR; INTRAVENOUS; SUBCUTANEOUS ONCE
Refills: 0 | Status: COMPLETED | OUTPATIENT
Start: 2023-11-07 | End: 2023-11-07

## 2023-11-07 RX ORDER — FAMOTIDINE 10 MG/ML
20 INJECTION INTRAVENOUS ONCE
Refills: 0 | Status: COMPLETED | OUTPATIENT
Start: 2023-11-07 | End: 2023-11-07

## 2023-11-07 RX ADMIN — SODIUM CHLORIDE 1000 MILLILITER(S): 9 INJECTION INTRAMUSCULAR; INTRAVENOUS; SUBCUTANEOUS at 15:54

## 2023-11-07 RX ADMIN — SODIUM CHLORIDE 1000 MILLILITER(S): 9 INJECTION INTRAMUSCULAR; INTRAVENOUS; SUBCUTANEOUS at 16:06

## 2023-11-07 RX ADMIN — Medication 20 MILLIGRAM(S): at 15:42

## 2023-11-07 RX ADMIN — FAMOTIDINE 20 MILLIGRAM(S): 10 INJECTION INTRAVENOUS at 15:56

## 2023-11-07 RX ADMIN — Medication 30 MILLILITER(S): at 15:43

## 2023-11-07 NOTE — ED ADULT NURSE NOTE - NS ED NURSE LEVEL OF CONSCIOUSNESS MENTAL STATUS
Verbal/written post procedure instructions were given to patient/caregiver./Elevate the injured extremity as instructed./Keep the cast/splint/dressing clean and dry.
Awake/Alert

## 2023-11-07 NOTE — ED PROVIDER NOTE - NS ED ROS FT
General: No fever, chills.  Respiratory: No SOB  Cardiac: No chest pain  GI: see hpi  Neuro: No headache  MSK: No muscle pain, joint pain, back pain.  Psych: No known mental health issues.  Endocrine: No heat/cold intolerance, no polyuria/polydipsia.  Heme: No easy bruising or bleeding.  Allergic: No pruritis, dermatitis, or environmental allergies.

## 2023-11-07 NOTE — ED PROVIDER NOTE - CLINICAL SUMMARY MEDICAL DECISION MAKING FREE TEXT BOX
Patient is a 74yo F with PMHx of DM, HTN, HLD, CAD s/p 1 stent who presents to the ED complaining of abdominal pain. Likely food poisoning. Will treat symptomatically and reassess.

## 2023-11-07 NOTE — ED PROVIDER NOTE - OBJECTIVE STATEMENT
Patient is a 74yo F with PMHx of DM, HTN, HLD, CAD s/p 1 stent who presents to the ED complaining of abdominal pain. Patient states she ate some silva this morning which had been opened in the fridge for a week. Shortly afterwards, she developed R sided crampy abdominal pain, felt sweaty, clammy, lightheaded, like she was going to pass out. Had two episodes of diarrhea. Abdominal pain has improved a little bit now, still 8/10 pain. No meds taken PTA. Denies nausea, vomiting, fever, chest pain, shortness of breath.

## 2023-11-07 NOTE — ED PROVIDER NOTE - PATIENT PORTAL LINK FT
You can access the FollowMyHealth Patient Portal offered by [facility] by registering at the following website: [email or web address]. By joining [employer]’s FollowMyHealth portal, you will also be able to view your health information using other applications (apps) compatible with our system.

## 2023-11-07 NOTE — ED PROVIDER NOTE - ATTENDING CONTRIBUTION TO CARE
The patient seen with resident'    Acute community acquired GI infection    I, Sharan Restrepo, performed the initial face to face bedside interview with this patient regarding history of present illness, review of symptoms and relevant past medical, social and family history.  I completed an independent physical examination.  I was the initial provider who evaluated this patient. I have signed out the follow up of any pending tests (i.e. labs, radiological studies) to the resident.  I have communicated the patient’s plan of care and disposition with the resident

## 2023-11-07 NOTE — ED ADULT NURSE NOTE - OBJECTIVE STATEMENT
Pt is AxOx4 c/o of abdominal pain that started after she ate breakfast. Reports she "almost passed out 3x" had 3x diarrhea along with cramping. Denies N/V, SOB, dizziness. Pt aware of plan of care.

## 2023-11-07 NOTE — ED ADULT TRIAGE NOTE - CHIEF COMPLAINT QUOTE
pt BIBA from home for reports of abd pain after eating silva this morning. pt reporting a few hours after eating she began having diarrhea and cramping and became sweaty, c.o epigastric abd pain as well hx HTN and DM.

## 2023-11-07 NOTE — ED PROVIDER NOTE - NSFOLLOWUPINSTRUCTIONS_ED_ALL_ED_FT
- Take Augmentin twice a day for 10 days.  - Take bentyl only as needed for severe abdominal pain.   - Follow up with your primary care doctor.    Abdominal Pain    Many things can cause abdominal pain. Many times, abdominal pain is not caused by a disease and will improve without treatment. Your health care provider will do a physical exam to determine if there is a dangerous cause of your pain; blood tests and imaging may help determine the cause of your pain. However, in many cases, no cause may be found and you may need further testing as an outpatient. Monitor your abdominal pain for any changes.     SEEK IMMEDIATE MEDICAL CARE IF YOU HAVE ANY OF THE FOLLOWING SYMPTOMS: worsening abdominal pain, uncontrollable vomiting, profuse diarrhea, inability to have bowel movements or pass gas, black or bloody stools, fever accompanying chest pain or back pain, or fainting. These symptoms may represent a serious problem that is an emergency. Do not wait to see if the symptoms will go away. Get medical help right away. Call 911 and do not drive yourself to the hospital.

## 2023-11-08 ENCOUNTER — NON-APPOINTMENT (OUTPATIENT)
Age: 73
End: 2023-11-08

## 2023-11-10 ENCOUNTER — NON-APPOINTMENT (OUTPATIENT)
Age: 73
End: 2023-11-10

## 2023-11-10 ENCOUNTER — EMERGENCY (EMERGENCY)
Facility: HOSPITAL | Age: 73
LOS: 1 days | Discharge: HOME | End: 2023-11-10
Attending: EMERGENCY MEDICINE
Payer: MEDICARE

## 2023-11-10 VITALS
OXYGEN SATURATION: 93 % | WEIGHT: 229.94 LBS | HEART RATE: 96 BPM | RESPIRATION RATE: 20 BRPM | TEMPERATURE: 98 F | DIASTOLIC BLOOD PRESSURE: 83 MMHG | SYSTOLIC BLOOD PRESSURE: 131 MMHG | HEIGHT: 64 IN

## 2023-11-10 VITALS
RESPIRATION RATE: 18 BRPM | DIASTOLIC BLOOD PRESSURE: 84 MMHG | SYSTOLIC BLOOD PRESSURE: 145 MMHG | HEART RATE: 87 BPM | OXYGEN SATURATION: 98 % | TEMPERATURE: 98 F

## 2023-11-10 DIAGNOSIS — H26.40 UNSPECIFIED SECONDARY CATARACT: Chronic | ICD-10-CM

## 2023-11-10 DIAGNOSIS — Z95.5 PRESENCE OF CORONARY ANGIOPLASTY IMPLANT AND GRAFT: Chronic | ICD-10-CM

## 2023-11-10 LAB
ALBUMIN SERPL ELPH-MCNC: 3.8 G/DL — SIGNIFICANT CHANGE UP (ref 3.3–5.2)
ALBUMIN SERPL ELPH-MCNC: 3.8 G/DL — SIGNIFICANT CHANGE UP (ref 3.3–5.2)
ALP SERPL-CCNC: 119 U/L — SIGNIFICANT CHANGE UP (ref 40–120)
ALP SERPL-CCNC: 119 U/L — SIGNIFICANT CHANGE UP (ref 40–120)
ALT FLD-CCNC: 11 U/L — SIGNIFICANT CHANGE UP
ALT FLD-CCNC: 11 U/L — SIGNIFICANT CHANGE UP
ANION GAP SERPL CALC-SCNC: 15 MMOL/L — SIGNIFICANT CHANGE UP (ref 5–17)
ANION GAP SERPL CALC-SCNC: 15 MMOL/L — SIGNIFICANT CHANGE UP (ref 5–17)
APPEARANCE UR: CLEAR — SIGNIFICANT CHANGE UP
APPEARANCE UR: CLEAR — SIGNIFICANT CHANGE UP
AST SERPL-CCNC: 19 U/L — SIGNIFICANT CHANGE UP
AST SERPL-CCNC: 19 U/L — SIGNIFICANT CHANGE UP
BACTERIA # UR AUTO: NEGATIVE /HPF — SIGNIFICANT CHANGE UP
BACTERIA # UR AUTO: NEGATIVE /HPF — SIGNIFICANT CHANGE UP
BASOPHILS # BLD AUTO: 0.02 K/UL — SIGNIFICANT CHANGE UP (ref 0–0.2)
BASOPHILS # BLD AUTO: 0.02 K/UL — SIGNIFICANT CHANGE UP (ref 0–0.2)
BASOPHILS NFR BLD AUTO: 0.4 % — SIGNIFICANT CHANGE UP (ref 0–2)
BASOPHILS NFR BLD AUTO: 0.4 % — SIGNIFICANT CHANGE UP (ref 0–2)
BILIRUB SERPL-MCNC: 0.4 MG/DL — SIGNIFICANT CHANGE UP (ref 0.4–2)
BILIRUB SERPL-MCNC: 0.4 MG/DL — SIGNIFICANT CHANGE UP (ref 0.4–2)
BILIRUB UR-MCNC: NEGATIVE — SIGNIFICANT CHANGE UP
BILIRUB UR-MCNC: NEGATIVE — SIGNIFICANT CHANGE UP
BUN SERPL-MCNC: 21.5 MG/DL — HIGH (ref 8–20)
BUN SERPL-MCNC: 21.5 MG/DL — HIGH (ref 8–20)
CALCIUM SERPL-MCNC: 9.1 MG/DL — SIGNIFICANT CHANGE UP (ref 8.4–10.5)
CALCIUM SERPL-MCNC: 9.1 MG/DL — SIGNIFICANT CHANGE UP (ref 8.4–10.5)
CAST: 1 /LPF — SIGNIFICANT CHANGE UP (ref 0–4)
CAST: 1 /LPF — SIGNIFICANT CHANGE UP (ref 0–4)
CHLORIDE SERPL-SCNC: 106 MMOL/L — SIGNIFICANT CHANGE UP (ref 96–108)
CHLORIDE SERPL-SCNC: 106 MMOL/L — SIGNIFICANT CHANGE UP (ref 96–108)
CO2 SERPL-SCNC: 21 MMOL/L — LOW (ref 22–29)
CO2 SERPL-SCNC: 21 MMOL/L — LOW (ref 22–29)
COLOR SPEC: YELLOW — SIGNIFICANT CHANGE UP
COLOR SPEC: YELLOW — SIGNIFICANT CHANGE UP
CREAT SERPL-MCNC: 2.08 MG/DL — HIGH (ref 0.5–1.3)
CREAT SERPL-MCNC: 2.08 MG/DL — HIGH (ref 0.5–1.3)
DIFF PNL FLD: NEGATIVE — SIGNIFICANT CHANGE UP
DIFF PNL FLD: NEGATIVE — SIGNIFICANT CHANGE UP
EGFR: 25 ML/MIN/1.73M2 — LOW
EGFR: 25 ML/MIN/1.73M2 — LOW
EOSINOPHIL # BLD AUTO: 0.31 K/UL — SIGNIFICANT CHANGE UP (ref 0–0.5)
EOSINOPHIL # BLD AUTO: 0.31 K/UL — SIGNIFICANT CHANGE UP (ref 0–0.5)
EOSINOPHIL NFR BLD AUTO: 5.8 % — SIGNIFICANT CHANGE UP (ref 0–6)
EOSINOPHIL NFR BLD AUTO: 5.8 % — SIGNIFICANT CHANGE UP (ref 0–6)
GLUCOSE SERPL-MCNC: 92 MG/DL — SIGNIFICANT CHANGE UP (ref 70–99)
GLUCOSE SERPL-MCNC: 92 MG/DL — SIGNIFICANT CHANGE UP (ref 70–99)
GLUCOSE UR QL: >=1000 MG/DL
GLUCOSE UR QL: >=1000 MG/DL
HCT VFR BLD CALC: 39.6 % — SIGNIFICANT CHANGE UP (ref 34.5–45)
HCT VFR BLD CALC: 39.6 % — SIGNIFICANT CHANGE UP (ref 34.5–45)
HGB BLD-MCNC: 12.6 G/DL — SIGNIFICANT CHANGE UP (ref 11.5–15.5)
HGB BLD-MCNC: 12.6 G/DL — SIGNIFICANT CHANGE UP (ref 11.5–15.5)
IMM GRANULOCYTES NFR BLD AUTO: 0.4 % — SIGNIFICANT CHANGE UP (ref 0–0.9)
IMM GRANULOCYTES NFR BLD AUTO: 0.4 % — SIGNIFICANT CHANGE UP (ref 0–0.9)
KETONES UR-MCNC: NEGATIVE MG/DL — SIGNIFICANT CHANGE UP
KETONES UR-MCNC: NEGATIVE MG/DL — SIGNIFICANT CHANGE UP
LEUKOCYTE ESTERASE UR-ACNC: NEGATIVE — SIGNIFICANT CHANGE UP
LEUKOCYTE ESTERASE UR-ACNC: NEGATIVE — SIGNIFICANT CHANGE UP
LYMPHOCYTES # BLD AUTO: 1.2 K/UL — SIGNIFICANT CHANGE UP (ref 1–3.3)
LYMPHOCYTES # BLD AUTO: 1.2 K/UL — SIGNIFICANT CHANGE UP (ref 1–3.3)
LYMPHOCYTES # BLD AUTO: 22.3 % — SIGNIFICANT CHANGE UP (ref 13–44)
LYMPHOCYTES # BLD AUTO: 22.3 % — SIGNIFICANT CHANGE UP (ref 13–44)
MAGNESIUM SERPL-MCNC: 2.6 MG/DL — SIGNIFICANT CHANGE UP (ref 1.6–2.6)
MAGNESIUM SERPL-MCNC: 2.6 MG/DL — SIGNIFICANT CHANGE UP (ref 1.6–2.6)
MCHC RBC-ENTMCNC: 26.6 PG — LOW (ref 27–34)
MCHC RBC-ENTMCNC: 26.6 PG — LOW (ref 27–34)
MCHC RBC-ENTMCNC: 31.8 GM/DL — LOW (ref 32–36)
MCHC RBC-ENTMCNC: 31.8 GM/DL — LOW (ref 32–36)
MCV RBC AUTO: 83.5 FL — SIGNIFICANT CHANGE UP (ref 80–100)
MCV RBC AUTO: 83.5 FL — SIGNIFICANT CHANGE UP (ref 80–100)
MONOCYTES # BLD AUTO: 0.37 K/UL — SIGNIFICANT CHANGE UP (ref 0–0.9)
MONOCYTES # BLD AUTO: 0.37 K/UL — SIGNIFICANT CHANGE UP (ref 0–0.9)
MONOCYTES NFR BLD AUTO: 6.9 % — SIGNIFICANT CHANGE UP (ref 2–14)
MONOCYTES NFR BLD AUTO: 6.9 % — SIGNIFICANT CHANGE UP (ref 2–14)
NEUTROPHILS # BLD AUTO: 3.47 K/UL — SIGNIFICANT CHANGE UP (ref 1.8–7.4)
NEUTROPHILS # BLD AUTO: 3.47 K/UL — SIGNIFICANT CHANGE UP (ref 1.8–7.4)
NEUTROPHILS NFR BLD AUTO: 64.2 % — SIGNIFICANT CHANGE UP (ref 43–77)
NEUTROPHILS NFR BLD AUTO: 64.2 % — SIGNIFICANT CHANGE UP (ref 43–77)
NITRITE UR-MCNC: NEGATIVE — SIGNIFICANT CHANGE UP
NITRITE UR-MCNC: NEGATIVE — SIGNIFICANT CHANGE UP
NT-PROBNP SERPL-SCNC: 228 PG/ML — SIGNIFICANT CHANGE UP (ref 0–300)
NT-PROBNP SERPL-SCNC: 228 PG/ML — SIGNIFICANT CHANGE UP (ref 0–300)
PH UR: 6.5 — SIGNIFICANT CHANGE UP (ref 5–8)
PH UR: 6.5 — SIGNIFICANT CHANGE UP (ref 5–8)
PLATELET # BLD AUTO: 214 K/UL — SIGNIFICANT CHANGE UP (ref 150–400)
PLATELET # BLD AUTO: 214 K/UL — SIGNIFICANT CHANGE UP (ref 150–400)
POTASSIUM SERPL-MCNC: 4.6 MMOL/L — SIGNIFICANT CHANGE UP (ref 3.5–5.3)
POTASSIUM SERPL-MCNC: 4.6 MMOL/L — SIGNIFICANT CHANGE UP (ref 3.5–5.3)
POTASSIUM SERPL-SCNC: 4.6 MMOL/L — SIGNIFICANT CHANGE UP (ref 3.5–5.3)
POTASSIUM SERPL-SCNC: 4.6 MMOL/L — SIGNIFICANT CHANGE UP (ref 3.5–5.3)
PROT SERPL-MCNC: 7.4 G/DL — SIGNIFICANT CHANGE UP (ref 6.6–8.7)
PROT SERPL-MCNC: 7.4 G/DL — SIGNIFICANT CHANGE UP (ref 6.6–8.7)
PROT UR-MCNC: 30 MG/DL
PROT UR-MCNC: 30 MG/DL
RBC # BLD: 4.74 M/UL — SIGNIFICANT CHANGE UP (ref 3.8–5.2)
RBC # BLD: 4.74 M/UL — SIGNIFICANT CHANGE UP (ref 3.8–5.2)
RBC # FLD: 15.6 % — HIGH (ref 10.3–14.5)
RBC # FLD: 15.6 % — HIGH (ref 10.3–14.5)
RBC CASTS # UR COMP ASSIST: 2 /HPF — SIGNIFICANT CHANGE UP (ref 0–4)
RBC CASTS # UR COMP ASSIST: 2 /HPF — SIGNIFICANT CHANGE UP (ref 0–4)
SODIUM SERPL-SCNC: 142 MMOL/L — SIGNIFICANT CHANGE UP (ref 135–145)
SODIUM SERPL-SCNC: 142 MMOL/L — SIGNIFICANT CHANGE UP (ref 135–145)
SP GR SPEC: 1.01 — SIGNIFICANT CHANGE UP (ref 1–1.03)
SP GR SPEC: 1.01 — SIGNIFICANT CHANGE UP (ref 1–1.03)
SQUAMOUS # UR AUTO: 13 /HPF — HIGH (ref 0–5)
SQUAMOUS # UR AUTO: 13 /HPF — HIGH (ref 0–5)
TROPONIN T, HIGH SENSITIVITY RESULT: 20 NG/L — SIGNIFICANT CHANGE UP (ref 0–51)
TROPONIN T, HIGH SENSITIVITY RESULT: 20 NG/L — SIGNIFICANT CHANGE UP (ref 0–51)
UROBILINOGEN FLD QL: 0.2 MG/DL — SIGNIFICANT CHANGE UP (ref 0.2–1)
UROBILINOGEN FLD QL: 0.2 MG/DL — SIGNIFICANT CHANGE UP (ref 0.2–1)
WBC # BLD: 5.39 K/UL — SIGNIFICANT CHANGE UP (ref 3.8–10.5)
WBC # BLD: 5.39 K/UL — SIGNIFICANT CHANGE UP (ref 3.8–10.5)
WBC # FLD AUTO: 5.39 K/UL — SIGNIFICANT CHANGE UP (ref 3.8–10.5)
WBC # FLD AUTO: 5.39 K/UL — SIGNIFICANT CHANGE UP (ref 3.8–10.5)
WBC UR QL: 1 /HPF — SIGNIFICANT CHANGE UP (ref 0–5)
WBC UR QL: 1 /HPF — SIGNIFICANT CHANGE UP (ref 0–5)

## 2023-11-10 PROCEDURE — 83880 ASSAY OF NATRIURETIC PEPTIDE: CPT

## 2023-11-10 PROCEDURE — 71045 X-RAY EXAM CHEST 1 VIEW: CPT | Mod: 26

## 2023-11-10 PROCEDURE — 93005 ELECTROCARDIOGRAM TRACING: CPT

## 2023-11-10 PROCEDURE — 82962 GLUCOSE BLOOD TEST: CPT

## 2023-11-10 PROCEDURE — 99285 EMERGENCY DEPT VISIT HI MDM: CPT

## 2023-11-10 PROCEDURE — 71045 X-RAY EXAM CHEST 1 VIEW: CPT

## 2023-11-10 PROCEDURE — 70450 CT HEAD/BRAIN W/O DYE: CPT | Mod: MA

## 2023-11-10 PROCEDURE — 81001 URINALYSIS AUTO W/SCOPE: CPT

## 2023-11-10 PROCEDURE — 84484 ASSAY OF TROPONIN QUANT: CPT

## 2023-11-10 PROCEDURE — 70450 CT HEAD/BRAIN W/O DYE: CPT | Mod: 26,MA

## 2023-11-10 PROCEDURE — 83735 ASSAY OF MAGNESIUM: CPT

## 2023-11-10 PROCEDURE — 93010 ELECTROCARDIOGRAM REPORT: CPT

## 2023-11-10 PROCEDURE — 80053 COMPREHEN METABOLIC PANEL: CPT

## 2023-11-10 PROCEDURE — 85025 COMPLETE CBC W/AUTO DIFF WBC: CPT

## 2023-11-10 PROCEDURE — 99285 EMERGENCY DEPT VISIT HI MDM: CPT | Mod: 25

## 2023-11-10 PROCEDURE — 36415 COLL VENOUS BLD VENIPUNCTURE: CPT

## 2023-11-10 RX ORDER — MECLIZINE HCL 12.5 MG
1 TABLET ORAL
Qty: 21 | Refills: 0
Start: 2023-11-10 | End: 2023-11-16

## 2023-11-10 RX ORDER — SODIUM CHLORIDE 9 MG/ML
500 INJECTION INTRAMUSCULAR; INTRAVENOUS; SUBCUTANEOUS ONCE
Refills: 0 | Status: COMPLETED | OUTPATIENT
Start: 2023-11-10 | End: 2023-11-10

## 2023-11-10 RX ADMIN — SODIUM CHLORIDE 500 MILLILITER(S): 9 INJECTION INTRAMUSCULAR; INTRAVENOUS; SUBCUTANEOUS at 11:28

## 2023-11-10 NOTE — ED PROVIDER NOTE - CARE PLAN
1 Principal Discharge DX:	Lightheadedness   Principal Discharge DX:	Lightheadedness  Secondary Diagnosis:	Chronic kidney disease, unspecified CKD stage  Secondary Diagnosis:	Glucosuria

## 2023-11-10 NOTE — ED PROVIDER NOTE - OBJECTIVE STATEMENT
72 yo F PMH DM, HTN, ckd, anemia, gerd, alka p/w feeling of lightheadedness this morning x2 episodes. it happened after urinating while seated on the toilet and pt states she felt like she was going to pass out. she had a similar feeling 2 days ago on 11/7/23 for which she came to [facility] ED and had /u for this as well as abd cramping, was dc'ed home, states it was something she ate. denies cough, cp, recent travel, fever, dysuria.

## 2023-11-10 NOTE — ED ADULT NURSE NOTE - OBJECTIVE STATEMENT
Pt c/o feeling lightheaded this morning. States she felt like she was going to pass out. Pt had blurred vision and feels shaky. States she had a similar episode last week. PMH diabetes and stage 4 kidney disease. Denies fevers, cp, chills, N/V/D. Awaiting further plan of care.

## 2023-11-10 NOTE — ED ADULT NURSE NOTE - NSFALLUNIVINTERV_ED_ALL_ED
Bed/Stretcher in lowest position, wheels locked, appropriate side rails in place/Call bell, personal items and telephone in reach/Instruct patient to call for assistance before getting out of bed/chair/stretcher/Non-slip footwear applied when patient is off stretcher/Todd to call system/Physically safe environment - no spills, clutter or unnecessary equipment/Purposeful proactive rounding/Room/bathroom lighting operational, light cord in reach

## 2023-11-10 NOTE — ED PROVIDER NOTE - PATIENT PORTAL LINK FT
You can access the FollowMyHealth Patient Portal offered by [facility] by registering at the following website: [email or web address]. By joining [employer]’s FollowMyHealth portal, you will also be able to view your health information using other applications (apps) compatible with our system.

## 2023-11-10 NOTE — ED PROVIDER NOTE - CARE PROVIDER_API CALL
Diaz Becerra  Neurology  [address] [place] [address], [facility] [address]  [place] [address]  Phone: [phone]  Fax: [phone]  Follow Up Time: 7-10 Days

## 2023-11-10 NOTE — ED PROVIDER NOTE - CROS ED CONS ALL NEG
Pt arrives to hospitals for Prolia injection.  Pt denies recent/planned dental procedures.  Pt denies fever or cough.   Pt does not take calcium due to CKD.  Pt takes vitamin D daily.  ISTAT calcium and creatinine drawn via 25g butterfly to R-AC.  Labs reviewed and ok to receive Prolia per pharmacy.  Prolia given SC to back of E.  Band-Aid placed over site.  Spoke to scheduling dept to set up next appt.  Pt informed of appt date/time.  Pt dc home to self care.     negative...

## 2023-11-10 NOTE — ED PROVIDER NOTE - NSFOLLOWUPINSTRUCTIONS_ED_ALL_ED_FT
Follow with your primary care doctor in 1 week.    Lightheadedness    WHAT YOU NEED TO KNOW:    Lightheadedness is the feeling that you may faint, but you do not. Your heartbeat may be fast or feel like it flutters. Lightheadedness may occur when you take certain medicines, such as medicine to lower your blood pressure. Dehydration, low sodium, low blood sugar, an abnormal heart rhythm, and anxiety are other common causes.    DISCHARGE INSTRUCTIONS:    Return to the emergency department if:    You have sudden chest pain.    You have trouble breathing or shortness of breath.    You have vision changes, are sweating, and have nausea while you are sitting or lying down.    You feel flushed and your heart is fluttering.    You faint.  Contact your healthcare provider if:    You feel lightheaded often.    Your heart beats faster or slower than usual.    You have questions or concerns about your condition or care.  Follow up with your healthcare provider as directed: You may need more tests to help find the cause of your lightheadedness. The tests will help healthcare providers plan the best treatment for you. Write down your questions so you remember to ask them during your visits.    Self-care: Talk with your healthcare provider about these and other ways to manage your symptoms:    Lie down when you feel lightheaded, your throat gets tight, or your vision changes. Raise your legs above the level of your heart.    Stand up slowly. Sit on the side of the bed or couch for a few minutes before you stand up.    Take slow, deep breaths when you feel lightheaded. This can help decrease the feeling that you might faint.    Ask if you need to avoid hot baths and saunas. These may make your symptoms worse.  Watch for signs of low blood sugar: These include hunger, nervousness, sweating, and fast or fluttery heartbeats. Talk with your healthcare provider about ways to keep your blood sugar level steady.    Check your blood pressure often: You should do this especially if you take medicine to lower your blood pressure. Check your blood pressure when you are lying down and when you are standing. Ask how often to check during the day. Keep a record of your blood pressure numbers. Your healthcare provider may use the record to help plan your treatment.    Keep a record of your lightheadedness episodes: Include your symptoms and your activity before and after the episode. The record can help your healthcare provider find the cause of your lightheadedness and help you manage episodes.    © [employer] 1973, 2023

## 2023-11-10 NOTE — ED PROVIDER NOTE - PROGRESS NOTE DETAILS
labs grossly unremarkable. ckd (known). glucosuria. Reevaluated patient at bedside.  Patient feeling well. Discussed the results of all diagnostic testing in ED and copies of all reports given. Possibly dehydration.  An opportunity to ask questions was given.  Discussed the importance of prompt, close medical follow-up.  Patient will return with any changes, concerns or persistent / worsening symptoms.  Understanding of all instructions verbalized. dc w pmd f/u  Based on the H&P, I do not suspect any life- / limb- threatening pathology that requires further intervention at this time. labs grossly unremarkable. ckd (known). glucosuria. Reevaluated patient at bedside.  Patient feeling well. Discussed the results of all diagnostic testing in ED and copies of all reports given. Possibly dehydration, bppv.  An opportunity to ask questions was given.  Discussed the importance of prompt, close medical follow-up.  Patient will return with any changes, concerns or persistent / worsening symptoms.  Understanding of all instructions verbalized. dc w pmd & neuro f/u  Based on the H&P, I do not suspect any life- / limb- threatening pathology that requires further intervention at this time. labs grossly unremarkable for acute pathology req emergent intervention. ckd (known). glucosuria (h/o DM known). Reevaluated patient at bedside.  Patient feeling well. Discussed the results of all diagnostic testing in ED and copies of all reports given. Possibly dehydration, bppv; meclizine rx; advised pt to take only if feeling dizzy. Also advised po hydration.  An opportunity to ask questions was given.  Discussed the importance of prompt, close medical follow-up.  Patient will return with any changes, concerns or persistent / worsening symptoms.  Understanding of all instructions verbalized. dc w pmd & neuro f/u  Based on the H&P, I do not suspect any life- / limb- threatening pathology that requires further intervention at this time.

## 2023-11-10 NOTE — ED PROVIDER NOTE - CLINICAL SUMMARY MEDICAL DECISION MAKING FREE TEXT BOX
72 yo F PMH DM, HTN, ckd, anemia, gerd, alka p/w feeling of lightheadedness, near syncope this morning when getting ready after waking up, while she was sitting on toilet post void urination  vss  no fnd    low susp acs, cva, ich  consider dehydration, anemia, electrolyte abnl    will get ekg, labs, ct head, cxr, ns bolus, orthostatics, reassess 74 yo F PMH DM, HTN, ckd, anemia, gerd, alka p/w feeling of lightheadedness, near syncope this morning when getting ready after waking up, while she was sitting on toilet post void urination  vss  no fnd    low susp acs, cva, ich  consider dehydration, anemia, electrolyte abnl, bppv    will get ekg, labs, ct head, cxr, ns bolus, orthostatics, reassess

## 2023-11-10 NOTE — ED ADULT TRIAGE NOTE - CHIEF COMPLAINT QUOTE
" I woke up and went int o the shower then I felt hot all over and a bit lightheaded, then it went away, then I took my sugar and it came again, this happened before and they said it was food poisoning" pt denies dizziness, unsteady gait, N.V. states symptoms have resolved. Denies syncope

## 2023-11-10 NOTE — ED PROVIDER NOTE - PHYSICAL EXAMINATION
Constitutional: Awake, Alert. NAD. Well appearing, well nourished. Cooperative. VSS.  HEAD: NC/AT; no signs of trauma   EYES: Conjunctiva and sclera are clear bilaterally. EOMI. No nystagmus. PERRL.  ENT: MMM. No rhinorrhea, normal pharynx, oropharynx patent, no tonsillar exudates or enlargement, no erythema, no drooling or stridor.   NECK: FROM. Supple. No nuchal rigidity. No midline or paraspinal TTP.  CARDIOVASCULAR: RRR, Normal S1, S2. No audible murmurs. No chest wall ttp. Radial pulses +2 B/L.  RESPIRATORY: Speaking full sentences. Normal respiratory effort; breath sounds CTAB, No WRR. No accessory muscle use.   ABDOMEN: Soft; NTND. No CVAT B/L. +BS x4 quadrants.  EXTREMITIES: Full active ROM in all extremities; no deformities; non-tender to palpation; no edema, no crepitus or step off;  distal pulses palpable and symmetric.  SKIN: Warm, dry; good skin turgor, no apparent lesions or rashes, no ecchymosis, brisk capillary refill.  NEURO: AAOx3 follows commands. No facial droop. CN 2-12 grossly intact. No truncal ataxia. Steady gait. Muscle strength 5/5 UE and LE B/L. Sensation intact B/L. No dysmetria (good finger-to-nose).

## 2023-11-10 NOTE — ED PROVIDER NOTE - MDM ORDERS SUBMITTED SELECTION
unable to perform/Patient reporting left hip pain with movement and wanting to remain in bed. GERALD collins.
Labs/EKG/Imaging Studies/Medications

## 2023-11-17 ENCOUNTER — APPOINTMENT (OUTPATIENT)
Dept: CARDIOLOGY | Facility: CLINIC | Age: 73
End: 2023-11-17
Payer: MEDICARE

## 2023-11-17 ENCOUNTER — NON-APPOINTMENT (OUTPATIENT)
Age: 73
End: 2023-11-17

## 2023-11-17 VITALS
SYSTOLIC BLOOD PRESSURE: 120 MMHG | HEIGHT: 64 IN | WEIGHT: 224 LBS | BODY MASS INDEX: 38.24 KG/M2 | DIASTOLIC BLOOD PRESSURE: 70 MMHG | RESPIRATION RATE: 16 BRPM | HEART RATE: 68 BPM

## 2023-11-17 PROCEDURE — 99214 OFFICE O/P EST MOD 30 MIN: CPT

## 2023-11-17 PROCEDURE — 93000 ELECTROCARDIOGRAM COMPLETE: CPT

## 2023-12-14 ENCOUNTER — APPOINTMENT (OUTPATIENT)
Dept: NEPHROLOGY | Facility: CLINIC | Age: 73
End: 2023-12-14
Payer: MEDICARE

## 2023-12-14 VITALS
HEART RATE: 65 BPM | SYSTOLIC BLOOD PRESSURE: 128 MMHG | HEIGHT: 64 IN | WEIGHT: 225 LBS | DIASTOLIC BLOOD PRESSURE: 64 MMHG | BODY MASS INDEX: 38.41 KG/M2 | OXYGEN SATURATION: 96 %

## 2023-12-14 PROCEDURE — 99214 OFFICE O/P EST MOD 30 MIN: CPT

## 2023-12-14 NOTE — HISTORY OF PRESENT ILLNESS
[FreeTextEntry1] : 73 year old female with PMH of morbid obesity, DM, HTN, BONNY, fatty liver, CKD 3b/4 (baseline creatinine ~1.7-2.0mg/dL), GERD and hiatal hernia presents for MUNA on CKD vs progressive CKD follow up; also here for BP management. Reports to be in close contact with dietician for renal diet education. Currently awaiting epidural injection + cortisone injection.  --------------------------------------------------  12/14/2023 Pt presents for CKD follow up pt seen and examined; c/o aches and pains- takes Tylenol arthritis  Also takes Gabapentin  Sugars are better controlled She was previously taken off metformin for worsening GFR-on Jardiance 10 mg daily since July.  Confirmed with her primary doctor, .  Does not check BP at home

## 2023-12-14 NOTE — ASSESSMENT
[FreeTextEntry1] :  Acute Kidney Injury on CKD stage 3 vs progressive CKD Stage 4 -Notable for upward trend in creatinine since 2021 01/2021 - Cr 1.49mg/dL  12/2022 - Cr 1.61mg/dL  06/2023 - Cr 1.93mg/dL  08/2023 - Cr 2.1mg/dL; UA 30 protein, > 1000 glucose; UACR 175mg/g   09/2023 - Cr 2.2mg/dL--> worsened to 2.5-> serum creatinine 2.2 at this time -CT a/p without IV contrast (2022) - no hydronephrosis -Not on ACEi/ARB due to hyperkalemia   -Currently on Jardiance ; monitor kidney function and hold medication if GFR continues to drop  HTN Bp well controlled -Continue current antihypertensive regimen  Metabolic Acidosis serum Co2  23 Continue sodium bicarb   Anemia  Hb 10.7 Continue po iron NAM for HB < 10   Mineral Bone Disease in setting of CKD  -Parameters acceptable   Hyperkalemia, resolved  -Continue chlorthalidone 25mg daily   GERD -Reports to have failed H2 blockers in the past -Currently on PPI   RTC in 3 months

## 2023-12-14 NOTE — REVIEW OF SYSTEMS
[Fever] : no fever [Chills] : no chills [Shortness Of Breath] : no shortness of breath [Orthopnea] : no orthopnea [Dizziness] : no dizziness

## 2024-01-10 ENCOUNTER — RX RENEWAL (OUTPATIENT)
Age: 74
End: 2024-01-10

## 2024-01-10 RX ORDER — HYDRALAZINE HYDROCHLORIDE 25 MG/1
25 TABLET ORAL TWICE DAILY
Qty: 60 | Refills: 1 | Status: ACTIVE | COMMUNITY
Start: 2023-09-08 | End: 1900-01-01

## 2024-01-12 ENCOUNTER — EMERGENCY (EMERGENCY)
Facility: HOSPITAL | Age: 74
LOS: 1 days | Discharge: HOME | End: 2024-01-12
Attending: EMERGENCY MEDICINE
Payer: MEDICARE

## 2024-01-12 VITALS
HEART RATE: 72 BPM | WEIGHT: 225.09 LBS | DIASTOLIC BLOOD PRESSURE: 86 MMHG | OXYGEN SATURATION: 97 % | SYSTOLIC BLOOD PRESSURE: 160 MMHG | TEMPERATURE: 98 F | RESPIRATION RATE: 20 BRPM

## 2024-01-12 DIAGNOSIS — Z95.5 PRESENCE OF CORONARY ANGIOPLASTY IMPLANT AND GRAFT: Chronic | ICD-10-CM

## 2024-01-12 DIAGNOSIS — H26.40 UNSPECIFIED SECONDARY CATARACT: Chronic | ICD-10-CM

## 2024-01-12 PROCEDURE — 99283 EMERGENCY DEPT VISIT LOW MDM: CPT

## 2024-01-12 PROCEDURE — 99284 EMERGENCY DEPT VISIT MOD MDM: CPT

## 2024-01-12 PROCEDURE — 99053 MED SERV 10PM-8AM 24 HR FAC: CPT

## 2024-01-12 RX ORDER — METOCLOPRAMIDE HCL 10 MG
1 TABLET ORAL
Qty: 15 | Refills: 0
Start: 2024-01-12 | End: 2024-01-16

## 2024-01-12 RX ORDER — METOCLOPRAMIDE HCL 10 MG
10 TABLET ORAL ONCE
Refills: 0 | Status: COMPLETED | OUTPATIENT
Start: 2024-01-12 | End: 2024-01-12

## 2024-01-12 RX ORDER — ACETAMINOPHEN 500 MG
650 TABLET ORAL ONCE
Refills: 0 | Status: COMPLETED | OUTPATIENT
Start: 2024-01-12 | End: 2024-01-12

## 2024-01-12 RX ADMIN — Medication 1 DROP(S): at 07:55

## 2024-01-12 RX ADMIN — Medication 650 MILLIGRAM(S): at 08:06

## 2024-01-12 RX ADMIN — Medication 10 MILLIGRAM(S): at 08:25

## 2024-01-12 NOTE — ED PROVIDER NOTE - CARE PROVIDERS DIRECT ADDRESSES
[email or web address]

## 2024-01-12 NOTE — ED ADULT NURSE NOTE - PATIENT'S PREFERRED PRONOUN
Swelling that gets worse/Pain not relieved by Medications/Fever greater than (need to indicate Fahrenheit or Celsius)/Wound/Surgical Site with redness, or foul smelling discharge or pus/Nausea and vomiting that does not stop Her/She

## 2024-01-12 NOTE — ED PROVIDER NOTE - CARE PROVIDER_API CALL
Azael Pena  Ophthalmology  [address] [place] [address], [address]  [place] [address]  Phone: [phone]  Fax: [phone]  Follow Up Time: Urgent   Azael Pena  Ophthalmology  [address] [place] [address], [address]  [place] [address]  Phone: [phone]  Fax: [phone]  Follow Up Time: Urgent

## 2024-01-12 NOTE — ED PROVIDER NOTE - CLINICAL SUMMARY MEDICAL DECISION MAKING FREE TEXT BOX
73-year-old female patient presents the ED complaint headache.  Visual fields intact, pupils equal, round, reactive to light.  No proptosis.  Symptoms likely secondary to tension headache.  Given patient's history of glaucoma, will check pressures, give Tylenol, if normal eye pressures patient safe for discharge with follow-up with ophthalmology 73-year-old female patient presents the ED complaint headache.  Visual fields intact, pupils equal, round, reactive to light.  No proptosis.  Symptoms likely secondary to tension headache.  Given patient's history of glaucoma, will check pressures, give Tylenol, if normal eye pressures patient safe for discharge with follow-up with ophthalmology    Patient left IOP 19.  Patient compliant with medicine.  Instructed patient to call site MD for faster follow-up with ophthalmology.  Referral also given for our ophthalmology service.  Patient to return to the ED for worsening headache, vision changes, blurry vision, numbness or weakness, eye pain.  Patient signs of Reglan and Tylenol for headache control.  Patient agrees to plan of care

## 2024-01-12 NOTE — ED PROVIDER NOTE - OBJECTIVE STATEMENT
A 73-year-old female patient with a history of glaucoma, right cataract surgery, stage IV renal disease presents to the ED complaining of headache.  Patient states that 2 days ago she began to develop  dull left frontal headache.  Patient was concerned because she has issues with her eyes and wanted to make sure that did not have to do with her vision or eyes.  Patient denies any change in vision, eye pain, nausea, vomiting, dizziness, difficulty ambulating.  Patient is not taking any medication for her symptoms.  Patient does take timolol drops for glaucoma, has been compliant with her medications.  She does have an appointment with her ophthalmologist in 1 month.  No further complaints at this time A 73-year-old female patient with a history of glaucoma, right cataract surgery, stage IV renal disease presents to the ED complaining of headache.  Patient states that 2 days ago she began to develop  dull left frontal headache.  Patient was concerned because she has issues with her eyes and wanted to make sure that did not have to do with her vision or eyes.  Patient denies any change in vision, eye pain, nausea, vomiting, dizziness, difficulty ambulating.  Patient is not taking any medication for her symptoms.  Patient does take multiple drops for glaucoma, has been compliant with her medications.  She does have an appointment with her ophthalmologist in 1 month.  No further complaints at this time

## 2024-01-12 NOTE — ED PROVIDER NOTE - ATTENDING CONTRIBUTION TO CARE
I personally saw the patient with the resident, and completed the key components of the history and physical exam. I then discussed the management plan with the resident.    72 y/o F with PMH glaucoma, right cataract surgery, currently taking medication for glaucoma presents for 2 days of dull, left frontal headache, sensitivity to light. Currently denies vision changes, eye pain, nausea, vomiting, neck pain, trauma.    NAD, well appearing, right eye with pressure of 19 (did not take her glaucoma medication this morning), EOMI, no TTP over frontal or maxillary sinus, neck TTP, full, painless ROM of neck, CN II-XII symmetrically intact, ambulates steadily, clear speech, normal coordination.    pressure in eye mildly elevated, but patient already on medication and did not take drops this morning - pain control for headache, neuro-intact - Rx sent to pharmacy, recommended prompt follow up to [facility] (not necessarily with her doctor). Patient in agreement and comfortable with plan.

## 2024-01-12 NOTE — ED PROVIDER NOTE - PROVIDER TOKENS
PROVIDER:[TOKEN:[[id number]],FOLLOWUP:[Urgent]] PROVIDER:[TOKEN:[[id number]],FOLLOWUP:[Urgent]]

## 2024-01-12 NOTE — ED PROVIDER NOTE - PATIENT PORTAL LINK FT
You can access the FollowMyHealth Patient Portal offered by [facility] by registering at the following website: [email or web address]. By joining [employer]’s FollowMyHealth portal, you will also be able to view your health information using other applications (apps) compatible with our system. You can access the FollowMyHealth Patient Portal offered by [facility] by registering at the following website: [email or web address]. By joining [employer]’s FollowMyHealth portal, you will also be able to view your health information using other applications (apps) compatible with our system.

## 2024-01-12 NOTE — ED PROVIDER NOTE - NSFOLLOWUPINSTRUCTIONS_ED_ALL_ED_FT
Please call [facility] to try to arrange an appointment sooner.  Please continue taking your eyedrops and return to the ED for any worsening symptoms or concerns      Glaucoma    Glaucoma happens when the fluid pressure in the eye is too high. If the pressure stays high for too long, the eye may get damaged. This can cause a loss of vision.    There are two types of glaucoma. They are:  Open-angle glaucoma. This is the most common type. It causes pressure in the eye to go up slowly. There may be no symptoms at first. Testing for this condition can help to find the condition before damage happens. Early treatment can often stop vision loss.  Acute angle-closure glaucoma. With this type, the pressure inside the eye rises suddenly to a very high level. This causes very bad pain and needs to be treated right away.  What are the causes?  In many cases, the cause is not known. Glaucoma can sometimes result from other diseases, such as infection, cataracts, or tumors.    What increases the risk?  Being older than age 40.  Having high blood pressure or diabetes.  Having a family history of glaucoma.  Having had an eye injury or eye surgery in the past.  Being farsighted.  Taking certain medicines.  What are the signs or symptoms?  Open-angle glaucoma often causes no symptoms early on. If it is not treated, the condition:  Will get worse and may cause a loss of side vision (peripheral vision).  Can advance to tunnel vision, which means that you are able to see straight ahead but you have a loss of side vision in all directions.  May lead to a total loss of vision.  Symptoms of acute angle-closure glaucoma develop suddenly and may include:  Cloudy vision.  Very bad pain in your affected eye.  A very bad headache in the area around your eye.  Feeling like you may vomit.  Vomiting.  How is this treated?  Eye drops.  Laser treatment.  Surgery.  Follow these instructions at home:  Medicines    Take over-the-counter and prescription medicines only as told by your doctor.  If you were given eye drops, use them exactly as told. You will likely need to use this medicine for the rest of your life.  General instructions    Exercise often. Talk with your doctor about which types of exercise are safe for you. Do not do exercises where you lean your head on the floor while you lift part of your body off the floor, such as a headstand.  Keep all follow-up visits.  Contact a doctor if:  Your symptoms get worse.  You have new symptoms.  Get help right away if:  You have very bad pain in your eye.  You have vision problems.  You have a bad headache in the area around your eye.  You feel like you may vomit (nauseous).  You vomit.  You start to have the same problems with your other eye.  Summary  Glaucoma happens when the fluid pressure in the eye is too high. If this is not treated, it can cause a loss of vision.  There may be no symptoms at first. Testing can help to find the condition before damage happens.  Early treatment can often stop vision loss.  This information is not intended to replace advice given to you by your health care provider. Make sure you discuss any questions you have with your health care provider.

## 2024-01-12 NOTE — ED PROVIDER NOTE - PHYSICAL EXAMINATION
Const: Pt is well appearing. Awake, alert and oriented to person, place, & time. In no acute distress.   HEENT: NC/AT. Moist mucous membranes.  Eyes: PERRLA. No scleral icterus. EOMI. Visual fields in tact.   Neck:. Soft and supple. Full ROM without pain. No cervical midline tenderness.   Cardiac: Regular rate and regular rhythm. +S1/S2. Peripheral pulses 2+ and symmetric. No LE edema.  Resp: Speaking in full sentences, breath sounds equal and clear bilaterally. No wheezes, rales or rhonchi.  Abd: Soft, non-tender, non-distended. Normal bowel sounds in all 4 quadrants. No guarding or rebound.  MSK: Spine midline and non-tender. No temporal tenderness   Skin: No rashes, abrasions or lacerations.  Neuro: Moves all extremities symmetrically. No motor or sensory deficits Const: Pt is well appearing. Awake, alert and oriented to person, place, & time. In no acute distress.   HEENT: NC/AT. Moist mucous membranes.  Eyes: PERRLA. No scleral icterus. EOMI. Visual fields in tact. IOP left eye: 19   Neck:. Soft and supple. Full ROM without pain. No cervical midline tenderness.   Cardiac: Regular rate and regular rhythm. +S1/S2. Peripheral pulses 2+ and symmetric. No LE edema.  Resp: Speaking in full sentences, breath sounds equal and clear bilaterally. No wheezes, rales or rhonchi.  Abd: Soft, non-tender, non-distended. Normal bowel sounds in all 4 quadrants. No guarding or rebound.  MSK: Spine midline and non-tender. No temporal tenderness   Skin: No rashes, abrasions or lacerations.  Neuro: Moves all extremities symmetrically. No motor or sensory deficits

## 2024-01-16 ENCOUNTER — EMERGENCY (EMERGENCY)
Facility: HOSPITAL | Age: 74
LOS: 1 days | Discharge: HOME | End: 2024-01-16
Attending: EMERGENCY MEDICINE
Payer: MEDICARE

## 2024-01-16 VITALS
TEMPERATURE: 98 F | RESPIRATION RATE: 16 BRPM | OXYGEN SATURATION: 97 % | SYSTOLIC BLOOD PRESSURE: 162 MMHG | HEART RATE: 91 BPM | DIASTOLIC BLOOD PRESSURE: 90 MMHG

## 2024-01-16 VITALS
SYSTOLIC BLOOD PRESSURE: 136 MMHG | HEART RATE: 81 BPM | OXYGEN SATURATION: 94 % | DIASTOLIC BLOOD PRESSURE: 64 MMHG | RESPIRATION RATE: 16 BRPM

## 2024-01-16 DIAGNOSIS — Z95.5 PRESENCE OF CORONARY ANGIOPLASTY IMPLANT AND GRAFT: Chronic | ICD-10-CM

## 2024-01-16 DIAGNOSIS — H26.40 UNSPECIFIED SECONDARY CATARACT: Chronic | ICD-10-CM

## 2024-01-16 LAB
ALBUMIN SERPL ELPH-MCNC: 3.7 G/DL — SIGNIFICANT CHANGE UP (ref 3.3–5.2)
ALP SERPL-CCNC: 143 U/L — HIGH (ref 40–120)
ALT FLD-CCNC: 11 U/L — SIGNIFICANT CHANGE UP
ANION GAP SERPL CALC-SCNC: 13 MMOL/L — SIGNIFICANT CHANGE UP (ref 5–17)
APTT BLD: 29.5 SEC — SIGNIFICANT CHANGE UP (ref 24.5–35.6)
AST SERPL-CCNC: 17 U/L — SIGNIFICANT CHANGE UP
BASOPHILS # BLD AUTO: 0.02 K/UL — SIGNIFICANT CHANGE UP (ref 0–0.2)
BASOPHILS NFR BLD AUTO: 0.4 % — SIGNIFICANT CHANGE UP (ref 0–2)
BILIRUB SERPL-MCNC: 0.3 MG/DL — LOW (ref 0.4–2)
BUN SERPL-MCNC: 32.2 MG/DL — HIGH (ref 8–20)
CALCIUM SERPL-MCNC: 8.9 MG/DL — SIGNIFICANT CHANGE UP (ref 8.4–10.5)
CHLORIDE SERPL-SCNC: 107 MMOL/L — SIGNIFICANT CHANGE UP (ref 96–108)
CK SERPL-CCNC: 132 U/L — SIGNIFICANT CHANGE UP (ref 25–170)
CO2 SERPL-SCNC: 20 MMOL/L — LOW (ref 22–29)
CREAT SERPL-MCNC: 2.1 MG/DL — HIGH (ref 0.5–1.3)
EGFR: 25 ML/MIN/1.73M2 — LOW
EOSINOPHIL # BLD AUTO: 0.44 K/UL — SIGNIFICANT CHANGE UP (ref 0–0.5)
EOSINOPHIL NFR BLD AUTO: 8.4 % — HIGH (ref 0–6)
GLUCOSE SERPL-MCNC: 129 MG/DL — HIGH (ref 70–99)
HCT VFR BLD CALC: 39.2 % — SIGNIFICANT CHANGE UP (ref 34.5–45)
HGB BLD-MCNC: 12 G/DL — SIGNIFICANT CHANGE UP (ref 11.5–15.5)
IMM GRANULOCYTES NFR BLD AUTO: 0.4 % — SIGNIFICANT CHANGE UP (ref 0–0.9)
INR BLD: 0.93 RATIO — SIGNIFICANT CHANGE UP (ref 0.85–1.18)
LYMPHOCYTES # BLD AUTO: 1.37 K/UL — SIGNIFICANT CHANGE UP (ref 1–3.3)
LYMPHOCYTES # BLD AUTO: 26.1 % — SIGNIFICANT CHANGE UP (ref 13–44)
MCHC RBC-ENTMCNC: 25.9 PG — LOW (ref 27–34)
MCHC RBC-ENTMCNC: 30.6 GM/DL — LOW (ref 32–36)
MCV RBC AUTO: 84.5 FL — SIGNIFICANT CHANGE UP (ref 80–100)
MONOCYTES # BLD AUTO: 0.39 K/UL — SIGNIFICANT CHANGE UP (ref 0–0.9)
MONOCYTES NFR BLD AUTO: 7.4 % — SIGNIFICANT CHANGE UP (ref 2–14)
NEUTROPHILS # BLD AUTO: 3.01 K/UL — SIGNIFICANT CHANGE UP (ref 1.8–7.4)
NEUTROPHILS NFR BLD AUTO: 57.3 % — SIGNIFICANT CHANGE UP (ref 43–77)
PLATELET # BLD AUTO: 201 K/UL — SIGNIFICANT CHANGE UP (ref 150–400)
POTASSIUM SERPL-MCNC: 4.6 MMOL/L — SIGNIFICANT CHANGE UP (ref 3.5–5.3)
POTASSIUM SERPL-SCNC: 4.6 MMOL/L — SIGNIFICANT CHANGE UP (ref 3.5–5.3)
PROT SERPL-MCNC: 7.3 G/DL — SIGNIFICANT CHANGE UP (ref 6.6–8.7)
PROTHROM AB SERPL-ACNC: 10.3 SEC — SIGNIFICANT CHANGE UP (ref 9.5–13)
RBC # BLD: 4.64 M/UL — SIGNIFICANT CHANGE UP (ref 3.8–5.2)
RBC # FLD: 15.9 % — HIGH (ref 10.3–14.5)
SODIUM SERPL-SCNC: 140 MMOL/L — SIGNIFICANT CHANGE UP (ref 135–145)
TROPONIN T, HIGH SENSITIVITY RESULT: 16 NG/L — SIGNIFICANT CHANGE UP (ref 0–51)
WBC # BLD: 5.25 K/UL — SIGNIFICANT CHANGE UP (ref 3.8–10.5)
WBC # FLD AUTO: 5.25 K/UL — SIGNIFICANT CHANGE UP (ref 3.8–10.5)

## 2024-01-16 PROCEDURE — 71045 X-RAY EXAM CHEST 1 VIEW: CPT | Mod: 26

## 2024-01-16 PROCEDURE — 70450 CT HEAD/BRAIN W/O DYE: CPT | Mod: MA

## 2024-01-16 PROCEDURE — 82550 ASSAY OF CK (CPK): CPT

## 2024-01-16 PROCEDURE — 99291 CRITICAL CARE FIRST HOUR: CPT

## 2024-01-16 PROCEDURE — 70496 CT ANGIOGRAPHY HEAD: CPT | Mod: MA

## 2024-01-16 PROCEDURE — 0042T: CPT | Mod: MA

## 2024-01-16 PROCEDURE — 85730 THROMBOPLASTIN TIME PARTIAL: CPT

## 2024-01-16 PROCEDURE — 70450 CT HEAD/BRAIN W/O DYE: CPT | Mod: 26,MA,XU

## 2024-01-16 PROCEDURE — 70496 CT ANGIOGRAPHY HEAD: CPT | Mod: 26,MA

## 2024-01-16 PROCEDURE — 70498 CT ANGIOGRAPHY NECK: CPT | Mod: 26,MA

## 2024-01-16 PROCEDURE — 70498 CT ANGIOGRAPHY NECK: CPT | Mod: MA

## 2024-01-16 PROCEDURE — 85025 COMPLETE CBC W/AUTO DIFF WBC: CPT

## 2024-01-16 PROCEDURE — 84484 ASSAY OF TROPONIN QUANT: CPT

## 2024-01-16 PROCEDURE — 85610 PROTHROMBIN TIME: CPT

## 2024-01-16 PROCEDURE — 36415 COLL VENOUS BLD VENIPUNCTURE: CPT

## 2024-01-16 PROCEDURE — 82962 GLUCOSE BLOOD TEST: CPT

## 2024-01-16 PROCEDURE — 80053 COMPREHEN METABOLIC PANEL: CPT

## 2024-01-16 PROCEDURE — 71045 X-RAY EXAM CHEST 1 VIEW: CPT

## 2024-01-16 RX ORDER — SODIUM CHLORIDE 9 MG/ML
1000 INJECTION INTRAMUSCULAR; INTRAVENOUS; SUBCUTANEOUS ONCE
Refills: 0 | Status: COMPLETED | OUTPATIENT
Start: 2024-01-16 | End: 2024-01-16

## 2024-01-16 RX ADMIN — SODIUM CHLORIDE 1000 MILLILITER(S): 9 INJECTION INTRAMUSCULAR; INTRAVENOUS; SUBCUTANEOUS at 11:35

## 2024-01-16 NOTE — ED PROVIDER NOTE - ATTENDING CONTRIBUTION TO CARE
I, Azael Healy MD, performed the initial face to face bedside interview with this patient regarding history of present illness, review of symptoms and relevant past medical, social and family history.  I completed an independent physical examination.  I was the initial provider who evaluated this patient. I have signed out the follow up of any pending tests (i.e. labs, radiological studies) to the resident.  I have communicated the patient’s plan of care and disposition with the resident.    I, Azael Healy MD, spent 35 minutes of critical care time with this patient. This does not include time spent on separately reported billable procedures.     I spoke with  I, Azael Healy MD, performed the initial face to face bedside interview with this patient regarding history of present illness, review of symptoms and relevant past medical, social and family history.  I completed an independent physical examination.  I was the initial provider who evaluated this patient. I have signed out the follow up of any pending tests (i.e. labs, radiological studies) to the resident.  I have communicated the patient’s plan of care and disposition with the resident.    I, Azael Healy MD, spent 35 minutes of critical care time with this patient. This does not include time spent on separately reported billable procedures.     I spoke with Dr. Lorraine Restrepo from radiology regarding CT results.

## 2024-01-16 NOTE — ED PROVIDER NOTE - OBJECTIVE STATEMENT
73y female w/ pmh of DM, TIA, stage IV renal disease, glaucoma, CAD, coronary stent presenting after a 20 minute episode of difficulty speaking that occurred at 0800 today. States she was having trouble forming her words. On arrival she states her symptoms have largely improved, note some residual hesitancy with speech. Denies any fever, HA, CP, neck pain, back pain, SOB, palpitations, weakness, numbness, vision changes. states she had a similar episode last year and was told she had a TIA.

## 2024-01-16 NOTE — ED ADULT NURSE NOTE - NSFALLUNIVINTERV_ED_ALL_ED
Bed/Stretcher in lowest position, wheels locked, appropriate side rails in place/Call bell, personal items and telephone in reach/Instruct patient to call for assistance before getting out of bed/chair/stretcher/Non-slip footwear applied when patient is off stretcher/[place] to call system/Physically safe environment - no spills, clutter or unnecessary equipment/Purposeful proactive rounding/Room/bathroom lighting operational, light cord in reach Bed/Stretcher in lowest position, wheels locked, appropriate side rails in place/Call bell, personal items and telephone in reach/Instruct patient to call for assistance before getting out of bed/chair/stretcher/Non-slip footwear applied when patient is off stretcher/[place] to call system/Physically safe environment - no spills, clutter or unnecessary equipment/Purposeful proactive rounding/Room/bathroom lighting operational, light cord in reach

## 2024-01-16 NOTE — ED ADULT TRIAGE NOTE - CHIEF COMPLAINT QUOTE
sudden aphasia at 0800 today. resolved after about 15 mins. pt has no complaints at this time. a and o x3. symptoms still resolved.

## 2024-01-16 NOTE — ED PROVIDER NOTE - CARE PROVIDER_API CALL
Diaz Becerra  Neurology  [address] [place] [address], [facility] [address]  [place] [address]  Phone: [phone]  Fax: [phone]  Follow Up Time:    Diaz Becerra  Neurology  [address] [place] [address], [facility] [address]  [place] [address]  Phone: [phone]  Fax: [phone]  Follow Up Time:

## 2024-01-16 NOTE — ED PROVIDER NOTE - PATIENT PORTAL LINK FT
You can access the FollowMyHealth Patient Portal offered by [facility] by registering at the following website: [email or web address]. By joining [employer]’s FollowMyHealth portal, you will also be able to view your health information using other applications (apps) compatible with our system. You can access the FollowMyHealth Patient Portal offered by [facility] by registering at the following website: [email or web address]. By joining [employer]’s FollowMyHealth portal, you will also be able to view your health information using other applications (apps) compatible with our system.

## 2024-01-16 NOTE — ED PROVIDER NOTE - CARE PROVIDERS DIRECT ADDRESSES
[email or web address]

## 2024-01-16 NOTE — ED ADULT TRIAGE NOTE - GLASGOW COMA SCALE: BEST VERBAL RESPONSE, MLM
[facility] ED NOTE    Patient : Stephanie Magana Age: 23 year old Sex: female   MRN: [id number] Encounter Date: 8/30/2023    History of Present Illness      Chief Complaint   Patient presents with   • Leg Pain     Patient is a 23-year-old female who presents for evaluation of left calf pain that has been going on over the past day.  Patient states she had left ACL surgery yesterday by Dr. Arthur.  Patient states she started having some left calf discomfort last night into this morning.  Patient denies any chest pain or shortness of breath.  No fevers.  Patient has had a mild pain in the left knee.  No pain in the left thigh.  No redness.  Patient is currently taking ibuprofen and hydrocodone for symptoms.  No OCPs.  No family history of DVTs.           Review of Systems   Constitutional:        All other systems reviewed and are negative unless mentioned in HPI.       Past Medical History     No Known Allergies    Past Medical History:   Diagnosis Date   • Anxiety        Past Surgical History:   Procedure Laterality Date   • ANTERIOR CRUCIATE LIGAMENT REPAIR Left        Family History   Problem Relation Age of Onset   • Rheumatoid Arthritis Mother    • Patient is unaware of any medical problems Father        Social History     Tobacco Use   • Smoking status: Never   • Smokeless tobacco: Never   Vaping Use   • Vaping Use: never used   Substance Use Topics   • Alcohol use: Yes     Comment: Occasionally   • Drug use: Never       Physical Exam     ED Triage Vitals [08/30/23 1334]   ED Triage Vitals Group      Temp 98.7 °F (37.1 °C)      Heart Rate 83      Resp 16      /66      SpO2 99 %      EtCO2 mmHg       Height 5' 7\" (1.702 m)      Weight 186 lb (84.4 kg)      Weight Scale Used ED Stated      BMI (Calculated) 29.13      IBW/kg (Calculated) 61.6     Pulse oximetry reviewed and is normal for patient.      Physical Exam  Vitals and nursing note reviewed.   Constitutional:       Appearance: Normal appearance.    HENT:      Head: Normocephalic and atraumatic.      Right Ear: External ear normal.      Left Ear: External ear normal.      Nose: Nose normal.      Neck: Normal range of motion.   Eyes:      Extraocular Movements: Extraocular movements intact.      Conjunctiva/sclera: Conjunctivae normal.   Pulmonary:      Effort: Pulmonary effort is normal. No tachypnea or respiratory distress.   Abdominal:      General: There is no distension.   Musculoskeletal:         General: Normal range of motion.      Right lower leg: No edema.      Left lower leg: No edema.        Legs:       Comments: Left lower extremity in knee immobilizer.  Ace wrap applied to the left knee.  2+ left DP and PT pulses.  Full dorsiflexion plantarflexion.  Negative Homans' sign.  No palpable cords.  Mild mid calf tenderness.  No erythema or warmth.  No lymphatic streaking.    Knee immobilizer and Ace wrap removed.  Incision on the left knee is not erythematous.  No warmth.  Area is healing very well.  Lower extremity compartments are soft.   Skin:     General: Skin is warm and dry.   Neurological:      General: No focal deficit present.      Mental Status: She is alert.   Psychiatric:         Mood and Affect: Mood normal.         Behavior: Behavior normal.         Thought Content: Thought content normal.         Judgment: Judgment normal.         ED Course          Procedures    ED Medication Orders (From admission, onward)    Ordered Start     Status Ordering Provider    08/30/23 1551 08/30/23 1600  HYDROcodone-acetaminophen (NORCO) 5-325 MG per tablet 1 tablet  ONCE         Last MAR action: Given STORMY MONSALVE          Vitals:    08/30/23 1334 08/30/23 1405 08/30/23 1555   BP: 103/66  110/68   Pulse: 83  78   Resp: 16  18   Temp: 98.7 °F (37.1 °C)     SpO2: 99% 99% 98%   Weight: 84.4 kg (186 lb)     Height: 5' 7\" (1.702 m)     LMP: 07/07/2023       Lab Results     No results found for this visit on 08/30/23.    EKG Results         Radiology  Results     Imaging Results          US VASC LOWER EXTREMITY VENOUS DUPLEX LEFT (Final result)  Result time 08/30/23 14:53:26   Procedure changed from US VASC EXTREMITY LOWER VENOUS DUPLEX     Final result                 Impression:    Normal venous ultrasound of the left lower extremity. There is  no evidence of acute or chronic deep vein thrombosis in the left lower  extremity.      Electronically Signed by: KETTY CHONG MD   Signed on: 8/30/2023 2:53 PM   Workstation ID: [id number]             Narrative:    EXAMINATION: Left lower extremity venous ultrasound with Doppler    CLINICAL INDICATION: 23-year-old woman with left calf pain following ACL  repair 1 day earlier    COMPARISON: None    FINDINGS:    Real-time compression, color flow, and Doppler augmentation evaluation of  the left lower extremity was performed for the deep venous system from the  calf to the groin.  There is normal color flow, Doppler augmentation, and  compressibility in the veins sampled.  There is no evidence of acute or  chronic deep venous thrombosis. The right common femoral vein is also  widely patent with normal color Doppler flow, normal duplex Doppler  waveform and normal compressibility.                                Medical Decision Making      Medical Decision Making  Infected surgical wound, knee effusion, DVT, arterial occlusion, septic knee, necrotizing soft tissue infection, compartment syndrome    Patient is a 23-year-old female history and PE as above.  Presents with left lower calf pain after having the left ACL repaired yesterday.  Patient's vital signs are stable.  Patient's knee appears to be healing very well.  Patients surgical wounds do not appear concerning for infection.  Patient has soft compartments in the left lower extremity and strong distal pulses.  No Homans' sign.  No palpable cords.  No lymphatic streaking.  Doppler ultrasound negative for DVT.  Patient was given dose of her home Norco while in  ED.  Advised patient to follow-up with her orthopedic specialist.  Patient comfortable with plan and management.    I explained to the patient that in the emergency department evaluation is not exhaustive it is important to follow-up with a primary care physician or specialist as discussed, and to return to the emergency department if symptoms are not improving or are worsening.  The patient verbalized understanding of these follow-up instructions and return precautions.        Limitations to history/exam/care: none  Co-morbidities impacting care: none/denies  Social factors impacting care: none known   External records reviewed: none available  Tests considered but not performed: N/A  Consults:       The patient was masked, and I was wearing a LPR mask, gloves, and face shield during entire patient encounter.    Clinical Impression     ED Diagnosis   1. Pain of left calf            Disposition        Discharge 8/30/2023  3:51 PM  Stephanie Magana discharge to home/self care.          Efrain Miranda PA-C   8/30/2023 1:37 PM          Efrain Miranda PA-C  08/30/23 1641     (V5) oriented

## 2024-01-16 NOTE — ED ADULT NURSE NOTE - OBJECTIVE STATEMENT
Pt to ED from home w/ reports of aphasia and "shaking" that lasted a few seconds Pt to ED from home w/ reports of aphasia and "shaking" that lasted a few seconds and then returned for a few seconds. Pt denies difficulty walking, weakness, headache, n/v. Pt A&Ox4 in NAD @ this time. RR even & unlabored. Pt NSR on cardiac monitor.

## 2024-01-16 NOTE — ED PROVIDER NOTE - NSFOLLOWUPINSTRUCTIONS_ED_ALL_ED_FT
Transient Ischemic Attack  A transient ischemic attack (TIA) happens when blood supply to the brain is blocked temporarily. A TIA causes stroke-like symptoms that go away quickly without causing any permanent damage. Having a TIA can be considered a warning sign for a stroke and should not be ignored. A person who has a TIA is at higher risk for a stroke.    What are the causes?  This condition is caused by a temporary blockage in an artery in the head or neck. This means the brain does not get the blood supply it needs. A blockage can be caused by:  Fatty buildup in an artery in the head or neck (atherosclerosis).  A blood clot traveling from the heart.  An artery tear (dissection).  Inflammation of an artery (vasculitis).  Sometimes the cause is not known.    What increases the risk?  Certain factors make you more likely to develop this condition. Some of these are things you can change, including:  Using products that contain nicotine or tobacco.  Being inactive.  Heavy alcohol use.  Drug use, especially cocaine and methamphetamine.  Medical conditions that may increase your risk include:  High blood pressure (hypertension).  High cholesterol.  Diabetes.  Heart disease (coronary artery disease).  An irregular heartbeat, also called atrial fibrillation (AFib).  Sickle cell disease.  Blood clotting disorders (hypercoagulable state).  Other risk factors include:  Being over the age of 60.  Being male.  Obesity.  Sleep problems such as sleep apnea.  Family history of stroke.  Previous history of blood clots, stroke, TIA, or heart attack.  What are the signs or symptoms?  A normal face next to a face that is drooping on one side due to weakness from a transient ischemic attack.  Symptoms of a TIA are the same as those of a stroke. The symptoms develop suddenly, and then go away quickly. They may include:  Dizziness, loss of balance and coordination, or trouble walking.  Vision changes, such as double vision, blurred vision, or loss of vision.  Weakness or numbness in your face, arm, or leg, especially on one side of your body.  Trouble speaking, understanding speech, or both (aphasia).  Nausea and vomiting.  Severe headache.  Confusion.  If possible, note what time your symptoms started. Tell your health care provider.    How is this diagnosed?  This condition may be diagnosed based on:  Your symptoms and medical history.  A physical exam.  Imaging tests, usually a CT scan or MRI of the brain.  Blood tests.  You may also have other tests, including:  Electrocardiogram (ECG).  Echocardiogram.  Continuous heart monitoring.  Carotid ultrasound.  A scan of blood circulation in the brain (CT angiogram or MR angiogram).  How is this treated?  The goal of treatment is to reduce the risk for a stroke. Stroke prevention therapies may include:  Changes to diet and lifestyle, such as being physically active and stopping smoking.  Treating other health conditions, such as diabetes or AFib.  Medicines to thin the blood (antiplatelets or anticoagulants).  Blood pressure medicines.  Medicines to reduce cholesterol.  If testing shows a narrowing in the arteries to your brain, your health care provider may recommend a procedure, such as:  Carotid endarterectomy. This is done to remove the blockage from your artery.  Carotid angioplasty and stenting. This uses a small mesh tube (stent) to open or widen an artery in the neck. The stent helps keep the artery open by supporting the artery walls.  Follow these instructions at home:  Medicines    Take over-the-counter and prescription medicines only as told by your health care provider.  If you were told to take a medicine to thin your blood, such as aspirin or an anticoagulant, use it exactly as told by your health care provider.  Taking too much blood-thinning medicine can cause bleeding. Taking too little will not protect you against a stroke and other problems.  Eating and drinking    A plate with examples of foods in a healthy diet.  Eat 5 or more servings of fruits and vegetables each day.  Follow guidelines from your health care provider about your diet. You may need to follow a certain diet to help manage risk factors for stroke. This may include:  Eating a low-fat, low-salt diet.  Choosing high-fiber foods.  Limiting carbohydrates and sugar.  If you drink alcohol:  Limit how much you have to:  0–1 drink a day for women who are not pregnant.  0–2 drinks a day for men.  Know how much alcohol is in your drink. In the [place] one drink equals one 12 oz bottle of beer (355 mL), one 5 oz glass of wine (148 mL), or one 1½ oz glass of hard liquor (44 mL).  General instructions    Maintain a healthy weight.  Try to get at least 30 minutes of exercise on most days.  Get treatment if you have sleep apnea.  Do not use any products that contain nicotine or tobacco. These products include cigarettes, chewing tobacco, and vaping devices, such as e-cigarettes. If you need help quitting, ask your health care provider.  Do not use illegal drugs.  Keep all follow-up visits. Your health care provider will want to know if you have any more symptoms and to check blood labs if any medicines were prescribed.  Where to find more information  American Stroke Association: stroke.org  Get help right away if:  You have chest pain.  You have fast or irregular heartbeats (palpitations).  You have any symptoms of a stroke. "BE FAST" is an easy way to remember the main warning signs of a stroke.  B - Balance. Signs are dizziness, sudden trouble walking, or loss of balance.  E - Eyes. Signs are trouble seeing or a sudden change in vision.  F - Face. Signs are sudden weakness or numbness of the face, or the face or eyelid drooping on one side.  A - Arms. Signs are weakness or numbness in an arm. This happens suddenly and usually on one side of the body.  S - Speech.Signs are sudden trouble speaking, slurred speech, or trouble understanding what people say.  T - Time. Time to call emergency services. Write down what time symptoms started.  You have other signs of a stroke, such as:  A sudden, severe headache with no known cause.  Nausea or vomiting.  Seizure.  These symptoms may be an emergency. Get help right away. Call 911.  Do not wait to see if the symptoms will go away.  Do not drive yourself to the hospital.  This information is not intended to replace advice given to you by your health care provider. Make sure you discuss any questions you have with your health care provider.

## 2024-01-16 NOTE — ED PROVIDER NOTE - CLINICAL SUMMARY MEDICAL DECISION MAKING FREE TEXT BOX
73y female w/ pmh of DM, TIA, stage IV renal disease, glaucoma, CAD, coronary stent presenting after a 20 minute episode of difficulty speaking that occurred at 0800 today. States she was having trouble forming her words. On arrival she states her symptoms have largely improved, note some residual hesitancy with speech. Denies any fever, HA, CP, neck pain, back pain, SOB, palpitations, weakness, numbness, vision changes. states she had a similar episode last year and was told she had a TIA. 73y female w/ pmh of DM, TIA, stage IV renal disease, glaucoma, CAD, coronary stent presenting after a 20 minute episode of difficulty speaking that occurred at 0800 today. States she was having trouble forming her words. On arrival she states her symptoms have largely improved, neurological exam intact. stroke workup unremarkable, brain imaging without bleed or perfusion deficit. Patient has remained asymptomatic in the ED, feels she is at her baseline, is able to ambulate without difficulty. Results explained to the patient and nature of TIA, return precautions given, neurology follow up provided and stressed importance of follow up.

## 2024-01-22 ENCOUNTER — APPOINTMENT (OUTPATIENT)
Dept: CARDIOLOGY | Facility: CLINIC | Age: 74
End: 2024-01-22
Payer: MEDICARE

## 2024-01-22 PROCEDURE — 93306 TTE W/DOPPLER COMPLETE: CPT

## 2024-01-22 RX ORDER — PERFLUTREN 6.52 MG/ML
6.52 INJECTION, SUSPENSION INTRAVENOUS
Qty: 2 | Refills: 0 | Status: COMPLETED | OUTPATIENT
Start: 2024-01-22

## 2024-01-22 RX ADMIN — PERFLUTREN MG/ML: 6.52 INJECTION, SUSPENSION INTRAVENOUS at 00:00

## 2024-01-24 ENCOUNTER — NON-APPOINTMENT (OUTPATIENT)
Age: 74
End: 2024-01-24

## 2024-01-24 ENCOUNTER — APPOINTMENT (OUTPATIENT)
Dept: CARDIOLOGY | Facility: CLINIC | Age: 74
End: 2024-01-24
Payer: MEDICARE

## 2024-01-24 VITALS
SYSTOLIC BLOOD PRESSURE: 116 MMHG | WEIGHT: 226 LBS | HEART RATE: 60 BPM | HEIGHT: 64 IN | RESPIRATION RATE: 16 BRPM | DIASTOLIC BLOOD PRESSURE: 72 MMHG | BODY MASS INDEX: 38.58 KG/M2 | OXYGEN SATURATION: 97 %

## 2024-01-24 DIAGNOSIS — E11.9 TYPE 2 DIABETES MELLITUS W/OUT COMPLICATIONS: ICD-10-CM

## 2024-01-24 DIAGNOSIS — E78.5 HYPERLIPIDEMIA, UNSPECIFIED: ICD-10-CM

## 2024-01-24 PROCEDURE — 99214 OFFICE O/P EST MOD 30 MIN: CPT

## 2024-01-24 PROCEDURE — 93000 ELECTROCARDIOGRAM COMPLETE: CPT

## 2024-01-24 NOTE — HISTORY OF PRESENT ILLNESS
[FreeTextEntry1] : Previously diagnosed with Stage IV renal disease and has associated anemia for which she's now taking Procrit as well as supplemental Ferrous Sulfate.  Continues to follow Nephrology office (Dr. Hewitt) who report patient is currently stable on most recent medication regimen.   Again, last October 2022, she again went to [facility] ER with what was described as "expressive aphasia". Patient was having difficulty speaking, stuttering, had complained of HA and she was shaking. She was ruled out for having CVA, but MRI did reveal white matter disease concerning for MS. It was also later determined that she may have had a TIA;  Most recent Transthoracic Echocardiogram (1/22/2024):  The LV cavity size is normal with only mild LVH.  The LV systolic and diastolic function is normal with LVEF of 60 to 65%.  There are no regional wall motion abnormalities.  The left and right atria are normal in size and structure. Mild MR. There is no evidence for pericardial effusion.  Most recent Carotid Doppler (04/10/2023): There is no significant atherosclerosis on the left and only mild on the right without stenosis The left and right vertebral arteries demonstrate antegrade flow;

## 2024-01-24 NOTE — REASON FOR VISIT
[FreeTextEntry1] : JUANY AGUILAR is being seen for arrhythmia/ECG abnormalities, structural heart and valve disease, hyperlipidemia, hypertension and coronary artery disease.  There has also been a history for atypical chest pain syndrome.   The patient is a very pleasant 73-year-old  woman with long-standing history of obesity, ischemic heart disease and prior PCI/stent (2017);  She came to our office last November after previously being seen in [facility] emergency department October 29, 2023 for recurrent somewhat atypical chest pain syndrome. Patient underwent cardiac enzymes, EKG, and checkup and was seen by cardiology (Dr. Hernandez)-but was ultimately discharged home and recommended for cardiac follow-up.  She was empirically given sublingual nitroglycerin tablets of which she had to use 1 or 2 times in the past or so but this did not seem to ameliorate her discomfort and took about 20 minutes to 1/2-hour; Symptoms were somewhat atypical and that it seemed to go from the left parasternal area to near the right breast area before it subsided.  She did not recall any strain or chest injury at home.  Although earlier in the month she did have a bout of "food poisoning" and GI distress for which she was also seen in the hospital emergency department;  Of note is that the patient did undergo left heart catheterization earlier this year on (2/13/2023) for some rather atypical chest pain/discomfort she had been experiencing over the a few months.     LHC (done by Dr. Valle) showed a patent mid-LAD stent with only some additional mild disease. Proximal LAD 40%, Proximal RCA 10-20%, mid-RCA 20%.  It was reported that this study was unchanged from previous LHC in August 2020;  Patient presents back today for general cardiac checkup.  She now tells me she again went to [facility] emergency department earlier this month on January 16th for what was described as "expressive aphasia". Patient was having difficulty speaking, stuttering, had complained of HA and she was shaking. She was ruled out for having CVA, but MRI did reveal white matter disease concerning for MS. It was also later determined that she may have had a TIA.  These symptoms were apparently similar to what she experienced in October 2022 when she went to [facility] ER.  Advised to follow up with Neurologist (Dr. Fish) to discuss management going forward-(has appointment with Dr. Fish on March 4, 2024).    Today, she states she is feeling better and without any significant chest discomfort, SOB, TY, palpitations, presyncope, syncope, edema.  Otherwise appears to be tolerating her medications without difficulty;

## 2024-01-24 NOTE — PHYSICAL EXAM
[No Acute Distress] : no acute distress [Obese] : obese [Normal Conjunctiva] : normal conjunctiva [Normal Venous Pressure] : normal venous pressure [Carotid Bruit] : carotid bruit [Normal S1, S2] : normal S1, S2 [No Rub] : no rub [No Gallop] : no gallop [Murmur] : murmur [Clear Lung Fields] : clear lung fields [Good Air Entry] : good air entry [No Respiratory Distress] : no respiratory distress  [Soft] : abdomen soft [Non Tender] : non-tender [No Masses/organomegaly] : no masses/organomegaly [Normal Gait] : normal gait [No Edema] : no edema [No Cyanosis] : no cyanosis [No Clubbing] : no clubbing [No Varicosities] : no varicosities [No Rash] : no rash [Moves all extremities] : moves all extremities [No Skin Lesions] : no skin lesions [No Focal Deficits] : no focal deficits [Normal Speech] : normal speech [Alert and Oriented] : alert and oriented [Normal memory] : normal memory [de-identified] : heard on left [de-identified] : Grade I/VI systolic murmur

## 2024-01-24 NOTE — ASSESSMENT
[FreeTextEntry1] : EKG shows normal sinus rhythm at a rate of 60 with borderline nonspecific T wave changes.  Otherwise no acute changes;  In summary, this 73-year-old woman has a history of coronary artery disease and remote history for PCI/stent.  She also has a history of now stage IV renal insufficiency and followed by nephrology.  Patient has had some prior atypical chest discomfort and was seen in the emergency department in late October 2023 but released after enzymes were negative and testing was unremarkable.  Earlier last year she had similar atypical chest pain which led to cardiac catheterization which demonstrated subcritical coronary artery disease and medical therapy was recommended.;  She again went to [facility] emergency department earlier this month on January 16th for what was described as "expressive aphasia". Patient was having difficulty speaking, stuttering, had complained of HA and she was shaking. She was ruled out for having CVA, but MRI did reveal white matter disease concerning for MS. It was also later determined that she may have had a TIA.  These symptoms were apparently similar to what she experienced in October 2022 when she went to [facility] ER.  Advised to follow up with Neurologist (Dr. Fish) to discuss management going forward-(has appointment with Dr. Fish on March 4, 2024).    Today, she states she is feeling better and without any significant chest discomfort, SOB, TY, palpitations, presyncope, syncope, edema.  Otherwise appears to be tolerating her medications without difficulty;  Recent TTE was unremarkable showing an overall normal cardiac function with only some mild LVH and mild MR.   Continues to follow Nephrology who recommended she continue current medical regimen.      Plan:  Patient reassured;  Considering some of her prior atypical chest symptoms appear more likely musculoskeletal as opposed to angina.;  Okay to take enteric-coated aspirin and Plavix daily or alternate the aspirin with Tylenol for atypical chest pain.  Continue all other cardiac medications the same including Statin.   Encouraged follow up with Neurologist consult in March (Dr. Fish).  Discuss management going forward for possible multiple TIA's.    Diet and lifestyle modification discussed including low sodium, low fat and low carbohydrate weight reducing diet.  Patient is to implement aerobic exercise regimen few days per week as tolerated.   Follow up with PCP (Dr. Ivy) regarding routine checkups and fasting blood work including lipid panel.  Forward all testing/lab work to our office.   Recommend patient report any untoward symptoms.   Follow up with Dr. Roa in 6 months or PRN.

## 2024-01-30 NOTE — ED ADULT NURSE NOTE - CINV DISCH MEDS REVIEWED YN
Patient is here today for US results due to heavier, irregular periods and pelvic pain.    Her last menses started unknown and she reports her cycles to be irregular.   Current form of birth control vasectomy and is satisfied.  Latex:  Patient denies allergy to latex.  Medications reviewed with patient.  Tobacco use verified.  Allergies verified.    Patient would like communication of their results via:     Cell Phone:   Telephone Information:   Mobile [phone]         Patient's current [facility] status: Active.     Chaperone present during sensitive exam: No, patient declined chaperone.   Yes

## 2024-02-05 ENCOUNTER — NON-APPOINTMENT (OUTPATIENT)
Age: 74
End: 2024-02-05

## 2024-02-05 ENCOUNTER — APPOINTMENT (OUTPATIENT)
Dept: OBGYN | Facility: CLINIC | Age: 74
End: 2024-02-05
Payer: MEDICARE

## 2024-02-05 VITALS
HEIGHT: 64 IN | DIASTOLIC BLOOD PRESSURE: 77 MMHG | SYSTOLIC BLOOD PRESSURE: 146 MMHG | WEIGHT: 224 LBS | BODY MASS INDEX: 38.24 KG/M2

## 2024-02-05 DIAGNOSIS — R10.32 RIGHT LOWER QUADRANT PAIN: ICD-10-CM

## 2024-02-05 DIAGNOSIS — E66.01 MORBID (SEVERE) OBESITY DUE TO EXCESS CALORIES: ICD-10-CM

## 2024-02-05 DIAGNOSIS — N89.8 OTHER SPECIFIED NONINFLAMMATORY DISORDERS OF VAGINA: ICD-10-CM

## 2024-02-05 DIAGNOSIS — B37.31 ACUTE CANDIDIASIS OF VULVA AND VAGINA: ICD-10-CM

## 2024-02-05 DIAGNOSIS — R10.31 RIGHT LOWER QUADRANT PAIN: ICD-10-CM

## 2024-02-05 PROCEDURE — 99213 OFFICE O/P EST LOW 20 MIN: CPT

## 2024-02-05 RX ORDER — TERCONAZOLE 8 MG/G
0.8 CREAM VAGINAL
Qty: 1 | Refills: 0 | Status: ACTIVE | COMMUNITY
Start: 2024-02-05 | End: 1900-01-01

## 2024-02-05 NOTE — PHYSICAL EXAM
[Chaperone Present] : A chaperone was present in the examining room during all aspects of the physical examination [FreeTextEntry1] : KF [Appropriately responsive] : appropriately responsive [Alert] : alert [No Acute Distress] : no acute distress [No Lymphadenopathy] : no lymphadenopathy [Regular Rate Rhythm] : regular rate rhythm [No Murmurs] : no murmurs [Clear to Auscultation B/L] : clear to auscultation bilaterally [Soft] : soft [Non-tender] : non-tender [Non-distended] : non-distended [No HSM] : No HSM [No Lesions] : no lesions [No Mass] : no mass [Oriented x3] : oriented x3 [FreeTextEntry7] : Morbid obesity [Examination Of The Breasts] : a normal appearance [No Masses] : no breast masses were palpable [Labia Majora] : normal [Labia Minora] : normal [Discharge] : a  ~M vaginal discharge was present [Moderate] : moderate [White] : white [Cheesy] : cheesy [Normal] : normal [Uterine Adnexae] : normal [Declined] : Patient declined rectal exam [FreeTextEntry6] : Limited exam secondary to body [FreeTextEntry8] : Limited pelvic exam secondary to body habitus nontender abdomen and pelvis

## 2024-02-05 NOTE — COUNSELING
[Nutrition/ Exercise/ Weight Management] : nutrition, exercise, weight management [Body Image] : body image [Vitamins/Supplements] : vitamins/supplements [Breast Self Exam] : breast self exam [Vaccines] : vaccines [Other ___] : [unfilled]

## 2024-02-05 NOTE — PLAN
[FreeTextEntry1] : Patient needs mammogram bone density study and colonoscopy with Dr. Moore.  She complains of chronic pelvic pain we will obtain abdominal pelvic sonogram I have given her referral to Dr. Porter endocrinology for better diabetes control and follow-up we talked about weight loss management possible gastric reduction surgery.  Patient will first see endocrinology to get her glucose in better control she will follow-up with family doctor as well.  Patient will be treated with terconazole No. 3 for monilial vaginitis she will return here in 6 months.

## 2024-02-05 NOTE — HISTORY OF PRESENT ILLNESS
[postmenopausal] : postmenopausal [Y] : Patient is sexually active [Currently Active] : currently active [Men] : men [Vaginal] : vaginal [No] : No [HIV test declined] : HIV test declined [Syphilis test declined] : Syphilis test declined [Gonorrhea test declined] : Gonorrhea test declined [Chlamydia test declined] : Chlamydia test declined [Trichomonas test declined] : Trichomonas test declined [HPV test declined] : HPV test declined [Hepatitis B test declined] : Hepatitis B test declined [Hepatitis C test declined] : Hepatitis C test declined [N] : Patient denies prior pregnancies [TextBox_4] : PT HERE FOR ANNUAL EXAM  LMP: 1995 PT C/O ABDOMINAL PAIN. PT STATES IT HAS BEEN THIS WAY FOR COUPLE OF MONTHS. [Mammogramdate] : 2021 [TextBox_19] : BR2 [PapSmeardate] : 2019 [TextBox_31] : NOT ELIGIBLE FOR PAP UNTIL 1/31/2025 [BoneDensityDate] : 2019 [TextBox_37] : WNL [ColonoscopyDate] : 2016 [TextBox_43] : WNL [LMPDate] : 1995 [TextBox_6] : 1995 [FreeTextEntry1] : 1995

## 2024-02-09 ENCOUNTER — OFFICE (OUTPATIENT)
Dept: URBAN - METROPOLITAN AREA CLINIC 112 | Facility: CLINIC | Age: 74
Setting detail: OPHTHALMOLOGY
End: 2024-02-09
Payer: MEDICARE

## 2024-02-09 DIAGNOSIS — H40.1113: ICD-10-CM

## 2024-02-09 DIAGNOSIS — Z96.1: ICD-10-CM

## 2024-02-09 DIAGNOSIS — H35.033: ICD-10-CM

## 2024-02-09 DIAGNOSIS — H04.123: ICD-10-CM

## 2024-02-09 PROBLEM — H40.1122 POAG; RIGHT EYE SEVERE, LEFT EYE MODERATE: Status: ACTIVE | Noted: 2024-02-09

## 2024-02-09 LAB
A VAGINAE DNA VAG QL NAA+PROBE: NORMAL
BVAB2 DNA VAG QL NAA+PROBE: NORMAL
C KRUSEI DNA VAG QL NAA+PROBE: NEGATIVE
C KRUSEI DNA VAG QL NAA+PROBE: POSITIVE
C TRACH RRNA SPEC QL NAA+PROBE: NEGATIVE
CANDIDA DNA VAG QL NAA+PROBE: NEGATIVE
MEGA1 DNA VAG QL NAA+PROBE: NORMAL
N GONORRHOEA RRNA SPEC QL NAA+PROBE: NEGATIVE
T VAGINALIS RRNA SPEC QL NAA+PROBE: NEGATIVE

## 2024-02-09 PROCEDURE — 92012 INTRM OPH EXAM EST PATIENT: CPT | Performed by: OPHTHALMOLOGY

## 2024-02-09 PROCEDURE — 92083 EXTENDED VISUAL FIELD XM: CPT | Performed by: OPHTHALMOLOGY

## 2024-02-09 PROCEDURE — 92250 FUNDUS PHOTOGRAPHY W/I&R: CPT | Performed by: OPHTHALMOLOGY

## 2024-02-09 ASSESSMENT — SUPERFICIAL PUNCTATE KERATITIS (SPK)
OS_SPK: 1+
OD_SPK: 1+

## 2024-02-09 ASSESSMENT — REFRACTION_CURRENTRX
OS_VPRISM_DIRECTION: SV
OS_SPHERE: +2.25
OS_CYLINDER: -0.50
OD_OVR_VA: 20/
OS_OVR_VA: 20/
OD_AXIS: 140
OD_CYLINDER: -0.50
OS_AXIS: 031
OD_SPHERE: +2.00
OD_VPRISM_DIRECTION: SV

## 2024-02-09 ASSESSMENT — SPHEQUIV_DERIVED
OD_SPHEQUIV: -0.625
OS_SPHEQUIV: -0.625
OS_SPHEQUIV: -0.5
OS_SPHEQUIV: -0.5
OD_SPHEQUIV: -0.875
OD_SPHEQUIV: 0

## 2024-02-09 ASSESSMENT — REFRACTION_MANIFEST
OS_ADD: +2.00
OS_AXIS: 055
OD_AXIS: 020
OD_CYLINDER: -0.75
OS_CYLINDER: -0.75
OD_SPHERE: -0.50
OS_VA1: 20/20
OS_SPHERE: -0.25
OS_VA2: 20/20
OD_ADD: +2.00
OD_CYLINDER: -0.75
OD_VA1: 20/150
OS_VA1: 20/20
OD_AXIS: 125
OD_VA1: 20/20
OS_ADD: +2.50
OD_VA2: 20/20
OD_ADD: +2.50
OS_CYLINDER: -0.50
OS_SPHERE: -0.25
OS_AXIS: 023
OD_SPHERE: -0.25

## 2024-02-09 ASSESSMENT — REFRACTION_AUTOREFRACTION
OD_AXIS: 085
OD_CYLINDER: -0.50
OS_AXIS: 071
OD_SPHERE: +0.25
OS_SPHERE: 0.00
OS_CYLINDER: -1.00

## 2024-02-09 ASSESSMENT — CONFRONTATIONAL VISUAL FIELD TEST (CVF)
OD_FINDINGS: FULL
OS_FINDINGS: FULL

## 2024-02-14 DIAGNOSIS — R10.2 PELVIC AND PERINEAL PAIN: ICD-10-CM

## 2024-02-14 RX ORDER — TERCONAZOLE 4 MG/G
0.4 CREAM VAGINAL DAILY
Qty: 1 | Refills: 0 | Status: ACTIVE | COMMUNITY
Start: 2024-02-14 | End: 1900-01-01

## 2024-03-03 ENCOUNTER — RX RENEWAL (OUTPATIENT)
Age: 74
End: 2024-03-03

## 2024-03-04 ENCOUNTER — APPOINTMENT (OUTPATIENT)
Dept: NEUROLOGY | Facility: CLINIC | Age: 74
End: 2024-03-04
Payer: MEDICARE

## 2024-03-04 VITALS
OXYGEN SATURATION: 98 % | DIASTOLIC BLOOD PRESSURE: 78 MMHG | SYSTOLIC BLOOD PRESSURE: 134 MMHG | BODY MASS INDEX: 39.95 KG/M2 | HEART RATE: 59 BPM | HEIGHT: 64 IN | WEIGHT: 234 LBS

## 2024-03-04 DIAGNOSIS — G45.9 TRANSIENT CEREBRAL ISCHEMIC ATTACK, UNSPECIFIED: ICD-10-CM

## 2024-03-04 PROCEDURE — 99215 OFFICE O/P EST HI 40 MIN: CPT

## 2024-03-04 RX ORDER — DICYCLOMINE HYDROCHLORIDE 10 MG/1
10 CAPSULE ORAL EVERY 6 HOURS
Refills: 0 | Status: ACTIVE | COMMUNITY

## 2024-03-04 RX ORDER — NITROGLYCERIN 0.4 MG/1
0.4 TABLET SUBLINGUAL
Refills: 0 | Status: ACTIVE | COMMUNITY

## 2024-03-04 NOTE — HISTORY OF PRESENT ILLNESS
[FreeTextEntry1] : Ms. Frances is a 74 yo female with history HTN, HLD, DM, Stage 4 kidney disease who presents today for initial evaluation. She presented to [facility] in 10/2022 and 1/2024 with difficulty expressing herself, lasting a few minutes before resolving. CTH and CTA were unremarkable. She is on DAPT, aspirin and Plavix, tolerating both well.  No signs of bleeding. Since discharge, she feels well overall. She reports issues with her vision in her right eye since 2022 with blurry vision of her right eye currently, likely due to her diabetes. Denies interval stroke/TIA symptoms.

## 2024-03-04 NOTE — PHYSICAL EXAM
[FreeTextEntry1] : GENERAL PHYSICAL EXAM: GEN: no distress, normal affect  NEUROLOGICAL EXAM: Mental Status Orientation: alert and oriented to person, place, time, and situation Language: clear and fluent, intact comprehension and repetition. No dysarthria.   Cranial Nerves II: visual fields full to confrontation III, IV, VI: PERRL, EOMI V, VII: facial sensation and movement intact and symmetric VIII: hearing intact IX, X: uvula midline, soft palate elevates normally XI: BL shoulder shrug intact XII: tongue midline   Motor Shoulder abduction: 5 (R), 5 (L) UE flexion: 5 (R), 5 (L) UE extension: 5 (R), 5 (L) Hand : 5 (R), 5 (L) Hip flexion: 5 (R), 5 (L) Knee flexion: 5 (R), 5 (L) Knee extension: 5 (R), 5 (L) Dorsiflexion: 5 (R), 5 (L) Plantar flexion: 5 (R), 5 (L)   Tone and bulk are normal in upper and lower limbs No pronator drift   Sensation Intact to light touch in all 4 EXTs   Reflex 2+ in BL biceps, patella   Coordination Normal FTN bilaterally Able to perform rapid, alternating movements   Gait Normal ambulation with no imbalance Tandem walk intact Negative Romberg

## 2024-03-04 NOTE — DISCUSSION/SUMMARY
[FreeTextEntry1] : Ms. Frances is a 72 yo female with history HTN, HLD, DM, Stage 4 kidney disease who presents today now 1.5 months s/p possible TIA of transiet expressive aphasia for several minutes. She had a similar episode about a year earlier, nothing since. I have personally reviewed available neuroradiological images. No significant stenosis. Her TTE in 10/2022 showed a normal LA size, so no need at this point for an ILR. Plan to continue ASA 81 mg, Plavix 75 mg daily, and lipid lowering therapy for secondary stroke prevention. Patient was educated about signs and symptoms of stroke and was counseled to contact 911 upon emergence of stroke-like symptoms. She will follow up in 3 months with ROLAND Suarez, or sooner if needed.  All of the patient's questions and concerns were addressed.

## 2024-03-13 NOTE — END OF VISIT
All refills are to be sent to [facility].   [FreeTextEntry3] : Chart reviewed. Patient not seen but spoke to on 5/1/21. Briefly, Albertina Frances is a 70 year old woman with past medical history of Coronary artery disease (s/p PCI 2017) and history of atypical chest pain who presents for follow-up visit. The patient reports episodes of chest pain that is difficult to describe but it is not exertional. She had an echocardiogram done in 2019 consistent with normal LV systolic function. She had a recent cardiac cath in August 2020 consistent non-obstructive CAD and patent LAD stent, therefore chest pain symptoms likely atypical and not due to obstructive CAD at this time. ECG reviewed and consistent with normal sinus rhythm, no acute ischemic ST/T wave abnormalities. BP normotensive. If patient needs to hold aspirin and plavix prior to glaucoma drain implant she may hold for 5 days and resume once safe per opthamologist. Agree with checking echocardiogram and follow-up with Dr. Roa in 3 months. Patient informed to call office if chest pain becomes exertional or she develops dyspnea.

## 2024-03-15 ENCOUNTER — AMBULATORY SURGERY (OUTPATIENT)
Dept: URBAN - METROPOLITAN AREA SURGERY 2 | Facility: SURGERY | Age: 74
Setting detail: OPHTHALMOLOGY
End: 2024-03-15
Payer: MEDICARE

## 2024-03-15 DIAGNOSIS — H40.1122: ICD-10-CM

## 2024-03-15 PROCEDURE — 65855 TRABECULOPLASTY LASER SURG: CPT | Mod: LT | Performed by: OPHTHALMOLOGY

## 2024-03-15 ASSESSMENT — REFRACTION_MANIFEST
OD_AXIS: 020
OD_ADD: +2.00
OD_VA2: 20/20
OD_CYLINDER: -0.75
OS_CYLINDER: -0.75
OD_AXIS: 125
OD_ADD: +2.50
OS_SPHERE: -0.25
OS_SPHERE: -0.25
OS_AXIS: 055
OD_VA1: 20/150
OS_VA1: 20/20
OS_CYLINDER: -0.50
OS_ADD: +2.00
OS_AXIS: 023
OD_SPHERE: -0.50
OS_VA1: 20/20
OS_VA2: 20/20
OD_CYLINDER: -0.75
OD_SPHERE: -0.25
OS_ADD: +2.50
OD_VA1: 20/20

## 2024-03-15 ASSESSMENT — REFRACTION_CURRENTRX
OS_VPRISM_DIRECTION: SV
OD_AXIS: 140
OD_SPHERE: +2.00
OS_CYLINDER: -0.50
OS_OVR_VA: 20/
OD_VPRISM_DIRECTION: SV
OS_SPHERE: +2.25
OD_OVR_VA: 20/
OD_CYLINDER: -0.50
OS_AXIS: 031

## 2024-03-15 ASSESSMENT — SPHEQUIV_DERIVED
OD_SPHEQUIV: -0.875
OS_SPHEQUIV: -0.625
OS_SPHEQUIV: -0.5
OD_SPHEQUIV: -0.625

## 2024-04-10 ENCOUNTER — APPOINTMENT (OUTPATIENT)
Dept: NEUROLOGY | Facility: CLINIC | Age: 74
End: 2024-04-10

## 2024-04-19 ENCOUNTER — OFFICE (OUTPATIENT)
Dept: URBAN - METROPOLITAN AREA CLINIC 94 | Facility: CLINIC | Age: 74
Setting detail: OPHTHALMOLOGY
End: 2024-04-19
Payer: MEDICARE

## 2024-04-19 DIAGNOSIS — H40.1122: ICD-10-CM

## 2024-04-19 DIAGNOSIS — H40.1113: ICD-10-CM

## 2024-04-19 PROCEDURE — 99213 OFFICE O/P EST LOW 20 MIN: CPT | Performed by: OPHTHALMOLOGY

## 2024-04-29 ENCOUNTER — APPOINTMENT (OUTPATIENT)
Dept: CARDIOLOGY | Facility: CLINIC | Age: 74
End: 2024-04-29
Payer: MEDICARE

## 2024-04-29 ENCOUNTER — NON-APPOINTMENT (OUTPATIENT)
Age: 74
End: 2024-04-29

## 2024-04-29 VITALS
BODY MASS INDEX: 39.44 KG/M2 | WEIGHT: 231 LBS | HEIGHT: 64 IN | RESPIRATION RATE: 16 BRPM | SYSTOLIC BLOOD PRESSURE: 122 MMHG | DIASTOLIC BLOOD PRESSURE: 68 MMHG | HEART RATE: 64 BPM

## 2024-04-29 PROCEDURE — 93000 ELECTROCARDIOGRAM COMPLETE: CPT

## 2024-04-29 PROCEDURE — 99214 OFFICE O/P EST MOD 30 MIN: CPT

## 2024-04-29 PROCEDURE — G2211 COMPLEX E/M VISIT ADD ON: CPT

## 2024-04-29 NOTE — HISTORY OF PRESENT ILLNESS
[FreeTextEntry1] : Previously diagnosed with Stage IV renal disease and has associated anemia for which she's now taking Procrit as well as supplemental Ferrous Sulfate.  Continues to follow Nephrology office (Dr. Hewitt) who report patient is currently stable on most recent medication regimen.    Last transthoracic Echocardiogram (1/22/2024):  The LV cavity size is normal with only mild LVH.  The LV systolic and diastolic function is normal with LVEF of 60 to 65%.  There are no regional wall motion abnormalities.  The left and right atria are normal in size and structure. Mild MR. There is no evidence for pericardial effusion.  Most recent Carotid Doppler (04/10/2023): There is no significant atherosclerosis on the left and only mild on the right without stenosis The left and right vertebral arteries demonstrate antegrade flow;

## 2024-04-29 NOTE — PHYSICAL EXAM
[No Acute Distress] : no acute distress [Obese] : obese [Normal Conjunctiva] : normal conjunctiva [Normal Venous Pressure] : normal venous pressure [Carotid Bruit] : carotid bruit [Normal S1, S2] : normal S1, S2 [No Rub] : no rub [No Gallop] : no gallop [Murmur] : murmur [Clear Lung Fields] : clear lung fields [Good Air Entry] : good air entry [No Respiratory Distress] : no respiratory distress  [Soft] : abdomen soft [Non Tender] : non-tender [No Masses/organomegaly] : no masses/organomegaly [Normal Gait] : normal gait [No Edema] : no edema [No Cyanosis] : no cyanosis [No Clubbing] : no clubbing [No Varicosities] : no varicosities [No Rash] : no rash [No Skin Lesions] : no skin lesions [Moves all extremities] : moves all extremities [No Focal Deficits] : no focal deficits [Normal Speech] : normal speech [Alert and Oriented] : alert and oriented [Normal memory] : normal memory [de-identified] : heard on left [de-identified] : Grade I/VI systolic murmur

## 2024-04-29 NOTE — ASSESSMENT
[FreeTextEntry1] : EKG shows normal sinus rhythm at a rate of 64 with borderline nonspecific T wave changes.  Otherwise no acute changes;  In summary, this 73-year-old woman has a history of coronary artery disease and remote history for PCI/stent.  She also has a history of now stage IV renal insufficiency and followed by nephrology.  Patient has had some prior atypical chest discomfort and was seen in the emergency department in late October 2023 but released after enzymes were negative and testing was unremarkable.  Earlier last year she had similar atypical chest pain which led to cardiac catheterization which demonstrated subcritical coronary artery disease and medical therapy was recommended.;  She again has been experiencing periodic anterior midsternal chest discomfort and frequently lower abdominal and mid abdominal pains and cramps as well.  Most of her symptoms occur at rest and when she is lying down and not particularly exertional related;  She is currently going for a GI workup including abdominal CT scan in the near future and possibly further GI workup as well.  Possible chest discomfort could be GI related as well as it does not appear to demonstrate any new cardiac findings and her last cardiac catheterization was just about 1 year ago showing only subcritical lesions;   Plan:  Patient recommended to follow-up to office within 2 to 3 months or sooner if GI workup is unrevealing as to a cause of her abdominal pain and possibly contributing to her chest pains as well;  Continue current medical regimen;  Follow-up to our office within 2 to 3 months;  Patient to report any change in symptoms between now and next visit;  Regular checkups and laboratory blood test with primary care encouraged;

## 2024-04-29 NOTE — REASON FOR VISIT
[FreeTextEntry1] : JUANY AGUILAR is being seen for arrhythmia/ECG abnormalities, structural heart and valve disease, hyperlipidemia, hypertension and coronary artery disease.  There has also been a history for atypical chest pain syndrome.   The patient is a very pleasant 73-year-old  woman with long-standing history of obesity, ischemic heart disease and prior PCI/stent (2017);  She came to our office last November after previously being seen in [facility] emergency department October 29, 2023 for recurrent somewhat atypical chest pain syndrome. Patient underwent cardiac enzymes, EKG, and checkup and was seen by cardiology (Dr. Hernandez)-but was ultimately discharged home and recommended for cardiac follow-up.  She was empirically given sublingual nitroglycerin tablets of which she had to use 1 or 2 times in the past or so but this did not seem to ameliorate her discomfort and took about 20 minutes to 1/2-hour; Symptoms were somewhat atypical and that it seemed to go from the left parasternal area to near the right breast area before it subsided.  She did not recall any strain or chest injury at home.  Although earlier in the month she did have a bout of "food poisoning" and GI distress for which she was also seen in the hospital emergency department;   (Of note is that the patient did undergo left heart catheterization earlier this year on (2/13/2023) for some rather atypical chest pain/discomfort she had been experiencing over the a few months.    LHC (done by Dr. Valle) showed a patent mid-LAD stent with only some additional mild disease. Proximal LAD 40%, Proximal RCA 10-20%, mid-RCA 20%.  It was reported that this study was unchanged from previous LHC in August 2020;)  Patient presents back today for general cardiac checkup.  Again she states she has been having a combination of lower abdominal pains and discomfort and sometimes intermittent mid chest discomfort that comes and goes.  Frequently it occurs just at rest and made last a "while"; it is not particularly exertional related;    She does state that she is scheduled to undergo an abdominal CAT scan in the very near future and then probably further GI evaluation which may include endoscopy;  Presently, she denies any significant chest discomfort, shortness of breath, or palpitations

## 2024-05-07 NOTE — PATIENT PROFILE ADULT. - PAIN LOCATION, PROFILE
Intubation    Date/Time: 5/7/2024 7:29 AM    Performed by: Narendra Plascencia MD  Authorized by: Narendra Plascencia MD    Intubation:     Induction:  Intravenous    Intubated:  Postinduction    Mask Ventilation:  Easy mask    Attempts:  2    Attempted By:  Resident anesthesiologist    Method of Intubation:  Video laryngoscopy    Blade:  Zapata 3    Laryngeal View Grade: Grade III - only epiglottis visible      Attempted By (2nd Attempt):  Staff anesthesiologist    Method of Intubation (2nd Attempt):  Video laryngoscopy    Blade (2nd Attempt):  Zapata 3    Laryngeal View Grade (2nd Attempt): Grade IIb - only the arytenoids and epiglottis seen      Difficult Airway Encountered?: Yes      Future Airway Recommendations:  See bottom for notes; bougie required. Consider asleep fiberoptic    Complications:  Dental trauma (possible chip to front L incisor)    Airway Device:  Oral endotracheal tube    Airway Device Size:  6.5    Style/Cuff Inflation:  Cuffed    Tube secured:  22    Secured at:  The lips    Placement Verified By:  Capnometry    Complicating Factors:  Anterior larynx, small mouth, poor neck/head extension, oropharyngeal edema or fat and large prominent central incisors (redundant tissue blocking view; bougie required)    Findings Post-Intubation:  BS equal bilateral  Notes:      New upper row dental implants, front incisors more prominent than previous making intubation more difficult than prior attempts. Bougie intubation ultimately successful after multiple attempts with laryngeal manipulation. Necessitated sizing down and lubing ETT to pass arytenoids. Minor damage to dental prosthesis. Easy mask, consider asleep fiberoptic.        foot

## 2024-05-08 ENCOUNTER — RX RENEWAL (OUTPATIENT)
Age: 74
End: 2024-05-08

## 2024-05-10 ENCOUNTER — APPOINTMENT (OUTPATIENT)
Dept: NEPHROLOGY | Facility: CLINIC | Age: 74
End: 2024-05-10
Payer: MEDICARE

## 2024-05-10 VITALS
WEIGHT: 232 LBS | SYSTOLIC BLOOD PRESSURE: 130 MMHG | BODY MASS INDEX: 39.61 KG/M2 | HEART RATE: 73 BPM | HEIGHT: 64 IN | DIASTOLIC BLOOD PRESSURE: 76 MMHG | OXYGEN SATURATION: 97 %

## 2024-05-10 PROCEDURE — 99214 OFFICE O/P EST MOD 30 MIN: CPT

## 2024-05-14 LAB
25(OH)D3 SERPL-MCNC: 55.9 NG/ML
ALBUMIN SERPL ELPH-MCNC: 3.9 G/DL
ALP BLD-CCNC: 143 U/L
ALT SERPL-CCNC: 10 U/L
ANION GAP SERPL CALC-SCNC: 12 MMOL/L
APPEARANCE: CLEAR
AST SERPL-CCNC: 15 U/L
BACTERIA: NEGATIVE /HPF
BASOPHILS # BLD AUTO: 0.03 K/UL
BASOPHILS NFR BLD AUTO: 0.6 %
BILIRUB SERPL-MCNC: 0.3 MG/DL
BILIRUBIN URINE: NEGATIVE
BLOOD URINE: NEGATIVE
BUN SERPL-MCNC: 23 MG/DL
CALCIUM SERPL-MCNC: 8.8 MG/DL
CALCIUM SERPL-MCNC: 8.8 MG/DL
CAST: 0 /LPF
CHLORIDE SERPL-SCNC: 109 MMOL/L
CO2 SERPL-SCNC: 20 MMOL/L
COLOR: YELLOW
CREAT SERPL-MCNC: 1.85 MG/DL
CREAT SPEC-SCNC: 86 MG/DL
EGFR: 29 ML/MIN/1.73M2
EOSINOPHIL # BLD AUTO: 0.34 K/UL
EOSINOPHIL NFR BLD AUTO: 6.6 %
EPITHELIAL CELLS: 4 /HPF
ESTIMATED AVERAGE GLUCOSE: 174 MG/DL
GLUCOSE QUALITATIVE U: NEGATIVE MG/DL
GLUCOSE SERPL-MCNC: 111 MG/DL
HBA1C MFR BLD HPLC: 7.7 %
HCT VFR BLD CALC: 37.3 %
HGB BLD-MCNC: 11.5 G/DL
IMM GRANULOCYTES NFR BLD AUTO: 0.2 %
KETONES URINE: NEGATIVE MG/DL
LEUKOCYTE ESTERASE URINE: NEGATIVE
LYMPHOCYTES # BLD AUTO: 1.37 K/UL
LYMPHOCYTES NFR BLD AUTO: 26.7 %
MAN DIFF?: NORMAL
MCHC RBC-ENTMCNC: 27.2 PG
MCHC RBC-ENTMCNC: 30.8 GM/DL
MCV RBC AUTO: 88.2 FL
MICROALBUMIN 24H UR DL<=1MG/L-MCNC: 12.1 MG/DL
MICROALBUMIN/CREAT 24H UR-RTO: 140 MG/G
MICROSCOPIC-UA: NORMAL
MONOCYTES # BLD AUTO: 0.41 K/UL
MONOCYTES NFR BLD AUTO: 8 %
NEUTROPHILS # BLD AUTO: 2.98 K/UL
NEUTROPHILS NFR BLD AUTO: 57.9 %
NITRITE URINE: NEGATIVE
PARATHYROID HORMONE INTACT: 174 PG/ML
PH URINE: 7.5
PHOSPHATE SERPL-MCNC: 3 MG/DL
PLATELET # BLD AUTO: 174 K/UL
POTASSIUM SERPL-SCNC: 5.1 MMOL/L
PROT SERPL-MCNC: 6.8 G/DL
PROTEIN URINE: 30 MG/DL
RBC # BLD: 4.23 M/UL
RBC # FLD: 15.3 %
RED BLOOD CELLS URINE: 1 /HPF
SODIUM SERPL-SCNC: 141 MMOL/L
SPECIFIC GRAVITY URINE: 1.01
URATE SERPL-MCNC: 7.5 MG/DL
UROBILINOGEN URINE: 0.2 MG/DL
WBC # FLD AUTO: 5.14 K/UL
WHITE BLOOD CELLS URINE: 0 /HPF

## 2024-05-18 ENCOUNTER — EMERGENCY (EMERGENCY)
Facility: HOSPITAL | Age: 74
LOS: 1 days | Discharge: HOME | End: 2024-05-18
Attending: EMERGENCY MEDICINE
Payer: MEDICARE

## 2024-05-18 VITALS
WEIGHT: 235.89 LBS | DIASTOLIC BLOOD PRESSURE: 79 MMHG | HEART RATE: 102 BPM | SYSTOLIC BLOOD PRESSURE: 173 MMHG | OXYGEN SATURATION: 98 % | RESPIRATION RATE: 20 BRPM | TEMPERATURE: 98 F | HEIGHT: 64 IN

## 2024-05-18 DIAGNOSIS — Z95.5 PRESENCE OF CORONARY ANGIOPLASTY IMPLANT AND GRAFT: Chronic | ICD-10-CM

## 2024-05-18 DIAGNOSIS — H26.40 UNSPECIFIED SECONDARY CATARACT: Chronic | ICD-10-CM

## 2024-05-18 PROCEDURE — 99284 EMERGENCY DEPT VISIT MOD MDM: CPT

## 2024-05-18 PROCEDURE — 70486 CT MAXILLOFACIAL W/O DYE: CPT | Mod: MC

## 2024-05-18 PROCEDURE — 99284 EMERGENCY DEPT VISIT MOD MDM: CPT | Mod: 25

## 2024-05-18 PROCEDURE — 99053 MED SERV 10PM-8AM 24 HR FAC: CPT

## 2024-05-18 PROCEDURE — 70490 CT SOFT TISSUE NECK W/O DYE: CPT | Mod: MC

## 2024-05-18 NOTE — ED PROVIDER NOTE - ATTENDING APP SHARED VISIT CONTRIBUTION OF CARE
72 y/o F presented for left neck/face pain x 1 day, no airway compromise. CT unremarkable, continue tylenol for pain.

## 2024-05-18 NOTE — ED PROVIDER NOTE - PATIENT PORTAL LINK FT
You can access the FollowMyHealth Patient Portal offered by [facility] by registering at the following website: [email or web address]. By joining [employer]’s FollowMyHealth portal, you will also be able to view your health information using other applications (apps) compatible with our system.

## 2024-05-18 NOTE — ED PROVIDER NOTE - OBJECTIVE STATEMENT
72 y/o F with hx of diabetes and stage 4 renal disease c/o pain in left side of neck, radiating up to left side of the face x 1 day.  Denies fever or difficulty swallowing.  Pt took tylenol for pain with no relief.

## 2024-05-19 PROCEDURE — 70486 CT MAXILLOFACIAL W/O DYE: CPT | Mod: 26,MC

## 2024-05-19 PROCEDURE — 70490 CT SOFT TISSUE NECK W/O DYE: CPT | Mod: 26,MC

## 2024-05-19 NOTE — PHYSICAL EXAM
[General Appearance - Alert] : alert [General Appearance - In No Acute Distress] : in no acute distress [FreeTextEntry1] : Obese [] : no respiratory distress [Respiration, Rhythm And Depth] : normal respiratory rhythm and effort [Exaggerated Use Of Accessory Muscles For Inspiration] : no accessory muscle use [Auscultation Breath Sounds / Voice Sounds] : lungs were clear to auscultation bilaterally [Heart Rate And Rhythm] : heart rate was normal and rhythm regular [Heart Sounds] : normal S1 and S2 [Edema] : there was no peripheral edema [Bowel Sounds] : normal bowel sounds [Skin Turgor] : normal skin turgor [Oriented To Time, Place, And Person] : oriented to person, place, and time

## 2024-05-19 NOTE — ASSESSMENT
[FreeTextEntry1] : 73-year-old female with PMH of morbid obesity, DM, HTN, BONNY, fatty liver, CKD 3b/4 (baseline creatinine ~1.7-2.0mg/dL), GERD and hiatal hernia presents for CKD follow up.   CKD Stage 4 -Notable for upward trend in creatinine since 2021 01/2021 - Cr 1.49mg/dL  12/2022 - Cr 1.61mg/dL  06/2023 - Cr 1.93mg/dL  08/2023 - Cr 2.1mg/dL; UA 30 protein, > 1000 glucose; UACR 175mg/g   09/2023 - Cr 2.2mg/dL -CT a/p without IV contrast (2022) - no hydronephrosis -Not on ACEi/ARB due to hyperkalemia   -Currently on Jardiance  -Will check renal panel + urine studies  BP -Manual BP acceptable -Continue current antihypertensive regimen  Metabolic Acidosis -Will check labs  Anemia  -Reported constipation while on ferrous sulfate  -Will check labs  Mineral Bone Disease in setting of CKD  -Will check labs  Hyperkalemia, resolved  -Continue chlorthalidone 25mg daily  -Continue renal diet   To discuss results of labs over the phone; next office visit in ~ 3months; sooner if needed

## 2024-05-21 ENCOUNTER — NON-APPOINTMENT (OUTPATIENT)
Age: 74
End: 2024-05-21

## 2024-05-21 ENCOUNTER — APPOINTMENT (OUTPATIENT)
Dept: CARDIOLOGY | Facility: CLINIC | Age: 74
End: 2024-05-21
Payer: MEDICARE

## 2024-05-21 VITALS
BODY MASS INDEX: 39.27 KG/M2 | SYSTOLIC BLOOD PRESSURE: 134 MMHG | DIASTOLIC BLOOD PRESSURE: 76 MMHG | RESPIRATION RATE: 16 BRPM | HEART RATE: 74 BPM | HEIGHT: 64 IN | WEIGHT: 230 LBS

## 2024-05-21 DIAGNOSIS — R94.31 ABNORMAL ELECTROCARDIOGRAM [ECG] [EKG]: ICD-10-CM

## 2024-05-21 DIAGNOSIS — D63.1 CHRONIC KIDNEY DISEASE, STAGE 4 (SEVERE): ICD-10-CM

## 2024-05-21 DIAGNOSIS — Z86.73 PERSONAL HISTORY OF TRANSIENT ISCHEMIC ATTACK (TIA), AND CEREBRAL INFARCTION W/OUT RESIDUAL DEFICITS: ICD-10-CM

## 2024-05-21 DIAGNOSIS — Z01.818 ENCOUNTER FOR OTHER PREPROCEDURAL EXAMINATION: ICD-10-CM

## 2024-05-21 DIAGNOSIS — N18.4 CHRONIC KIDNEY DISEASE, STAGE 4 (SEVERE): ICD-10-CM

## 2024-05-21 DIAGNOSIS — I25.10 ATHEROSCLEROTIC HEART DISEASE OF NATIVE CORONARY ARTERY W/OUT ANGINA PECTORIS: ICD-10-CM

## 2024-05-21 DIAGNOSIS — R07.9 CHEST PAIN, UNSPECIFIED: ICD-10-CM

## 2024-05-21 DIAGNOSIS — E11.29 TYPE 2 DIABETES MELLITUS WITH OTHER DIABETIC KIDNEY COMPLICATION: ICD-10-CM

## 2024-05-21 DIAGNOSIS — I51.7 CARDIOMEGALY: ICD-10-CM

## 2024-05-21 DIAGNOSIS — I10 ESSENTIAL (PRIMARY) HYPERTENSION: ICD-10-CM

## 2024-05-21 PROCEDURE — 93000 ELECTROCARDIOGRAM COMPLETE: CPT

## 2024-05-21 PROCEDURE — 99214 OFFICE O/P EST MOD 30 MIN: CPT

## 2024-05-21 NOTE — REASON FOR VISIT
[FreeTextEntry1] : JUANY AGUILAR is being seen for arrhythmia/ECG abnormalities, structural heart and valve disease, hyperlipidemia, hypertension and coronary artery disease.  There has also been a history for atypical chest pain syndrome.   The patient is a very pleasant 73-year-old  woman with long-standing history of obesity, ischemic heart disease and prior PCI/stent (2017);  She came to our office last November after previously being seen in [facility] emergency department October 29, 2023 for recurrent somewhat atypical chest pain syndrome. Patient underwent cardiac enzymes, EKG, and checkup and was seen by cardiology (Dr. Hernandez)-but was ultimately discharged home and recommended for cardiac follow-up.  She was empirically given sublingual nitroglycerin tablets of which she had to use 1 or 2 times in the past or so but this did not seem to ameliorate her discomfort and took about 20 minutes to 1/2-hour; Symptoms were somewhat atypical and that it seemed to go from the left parasternal area to near the right breast area before it subsided.  She did not recall any strain or chest injury at home.  Although earlier in the month she did have a bout of "food poisoning" and GI distress for which she was also seen in the hospital emergency department;   (Of note is that the patient did undergo left heart catheterization earlier last year on (2/13/2023) for some rather atypical chest pain/discomfort she had been experiencing over the a few months.    LHC (done by Dr. Valle) showed a patent mid-LAD stent with only some additional mild disease. Proximal LAD 40%, Proximal RCA 10-20%, mid-RCA 20%.  It was reported that this study was unchanged from previous LHC in August 2020;)  Patient presents back today for general cardiac checkup.  Again she states she has been having a combination of lower abdominal pains and discomfort and sometimes intermittent mid chest/epigastric discomfort that comes and goes.  Frequently it occurs just at rest and made last a "while"; it is not particularly exertional related.  She's now requesting cardiac clearance for an upcoming colonoscopy procedure tentatively scheduled for June 19th with Dr. Schuler at their [facility].    Patient apparently just completed a round of antibiotics prescribed by GI for a presumed acute diverticulitis.  She may also need additional testing in the very near future and then probably further GI evaluation which may include endoscopy;  Presently, she denies any significant chest discomfort, shortness of breath, or palpitations

## 2024-05-21 NOTE — ASSESSMENT
[FreeTextEntry1] : EKG shows normal sinus rhythm at a rate of 78 with borderline nonspecific T wave changes.  Otherwise no acute changes;  In summary, this 73-year-old woman has a history of coronary artery disease and remote history for PCI/stent.  She also has a history of now stage IV renal insufficiency and followed by nephrology.  Patient has had some prior atypical chest discomfort and was seen in the emergency department in late October 2023 but released after enzymes were negative and testing was unremarkable.  Earlier last year she had similar atypical chest pain which led to cardiac catheterization which demonstrated subcritical coronary artery disease and medical therapy was recommended.;  She again has been experiencing periodic anterior midsternal chest/epigastric discomfort and frequently lower abdominal and mid abdominal pains and cramps as well.  Most of her symptoms occur at rest and when she is lying down and not particularly exertional related;  She is currently going for a GI workup including abdominal CT scan in the near future and possibly further GI workup as well.  Just completed a round of antibiotics prescribed by her GI Specialist for suspected acute diverticulitis. Possible chest discomfort could be GI related as well as it does not appear to demonstrate any new cardiac findings and her last cardiac catheterization was just about 1 year ago showing only subcritical lesions;  Patient now requesting cardiac clearance for an upcoming colonoscopy procedure tentatively scheduled for June 19th with Dr. Schuler at their outpatient [facility].     Plan:  Based upon Mrs. Albertina Frances's otherwise stable cardiac pattern, there's no absolute cardiac contraindication for her to undergo the proposed GI procedure.    She may hold both the aspirin and Plavix five to seven days prior to procedure and restart thereafter if you deem it safe.    Follow up with Dr. Roa in 4-5 months or PRN.   If I may be of additional assistance, please do not hesitate to call.

## 2024-06-11 RX ORDER — CHLORTHALIDONE 25 MG/1
25 TABLET ORAL
Qty: 90 | Refills: 1 | Status: ACTIVE | COMMUNITY
Start: 2023-09-01 | End: 1900-01-01

## 2024-07-11 ENCOUNTER — APPOINTMENT (OUTPATIENT)
Dept: CARDIOLOGY | Facility: CLINIC | Age: 74
End: 2024-07-11

## 2024-07-25 ENCOUNTER — OFFICE (OUTPATIENT)
Dept: URBAN - METROPOLITAN AREA CLINIC 94 | Facility: CLINIC | Age: 74
Setting detail: OPHTHALMOLOGY
End: 2024-07-25
Payer: MEDICARE

## 2024-07-25 DIAGNOSIS — H40.1113: ICD-10-CM

## 2024-07-25 PROCEDURE — 92020 GONIOSCOPY: CPT | Performed by: OPHTHALMOLOGY

## 2024-07-25 PROCEDURE — 92012 INTRM OPH EXAM EST PATIENT: CPT | Performed by: OPHTHALMOLOGY

## 2024-07-25 ASSESSMENT — CONFRONTATIONAL VISUAL FIELD TEST (CVF)
OD_FINDINGS: FULL
OS_FINDINGS: FULL

## 2024-08-13 ENCOUNTER — EMERGENCY (EMERGENCY)
Facility: HOSPITAL | Age: 74
LOS: 1 days | End: 2024-08-13
Attending: EMERGENCY MEDICINE
Payer: MEDICARE

## 2024-08-13 VITALS
WEIGHT: 231.04 LBS | HEIGHT: 65 IN | HEART RATE: 120 BPM | TEMPERATURE: 98 F | SYSTOLIC BLOOD PRESSURE: 174 MMHG | DIASTOLIC BLOOD PRESSURE: 90 MMHG | OXYGEN SATURATION: 98 % | RESPIRATION RATE: 18 BRPM

## 2024-08-13 VITALS
TEMPERATURE: 98 F | SYSTOLIC BLOOD PRESSURE: 159 MMHG | HEART RATE: 86 BPM | OXYGEN SATURATION: 100 % | DIASTOLIC BLOOD PRESSURE: 83 MMHG | RESPIRATION RATE: 20 BRPM

## 2024-08-13 DIAGNOSIS — Z95.5 PRESENCE OF CORONARY ANGIOPLASTY IMPLANT AND GRAFT: Chronic | ICD-10-CM

## 2024-08-13 DIAGNOSIS — H26.40 UNSPECIFIED SECONDARY CATARACT: Chronic | ICD-10-CM

## 2024-08-13 LAB
ALBUMIN SERPL ELPH-MCNC: 3.4 G/DL — SIGNIFICANT CHANGE UP (ref 3.3–5.2)
ALP SERPL-CCNC: 134 U/L — HIGH (ref 40–120)
ALT FLD-CCNC: 8 U/L — SIGNIFICANT CHANGE UP
ANION GAP SERPL CALC-SCNC: 11 MMOL/L — SIGNIFICANT CHANGE UP (ref 5–17)
AST SERPL-CCNC: 15 U/L — SIGNIFICANT CHANGE UP
BASOPHILS # BLD AUTO: 0.02 K/UL — SIGNIFICANT CHANGE UP (ref 0–0.2)
BASOPHILS NFR BLD AUTO: 0.4 % — SIGNIFICANT CHANGE UP (ref 0–2)
BILIRUB SERPL-MCNC: 0.4 MG/DL — SIGNIFICANT CHANGE UP (ref 0.4–2)
BUN SERPL-MCNC: 22.1 MG/DL — HIGH (ref 8–20)
CALCIUM SERPL-MCNC: 8.6 MG/DL — SIGNIFICANT CHANGE UP (ref 8.4–10.5)
CHLORIDE SERPL-SCNC: 109 MMOL/L — HIGH (ref 96–108)
CO2 SERPL-SCNC: 18 MMOL/L — LOW (ref 22–29)
CREAT SERPL-MCNC: 1.63 MG/DL — HIGH (ref 0.5–1.3)
EGFR: 33 ML/MIN/1.73M2 — LOW
EOSINOPHIL # BLD AUTO: 0.3 K/UL — SIGNIFICANT CHANGE UP (ref 0–0.5)
EOSINOPHIL NFR BLD AUTO: 6.3 % — HIGH (ref 0–6)
GLUCOSE SERPL-MCNC: 105 MG/DL — HIGH (ref 70–99)
HCT VFR BLD CALC: 35.2 % — SIGNIFICANT CHANGE UP (ref 34.5–45)
HGB BLD-MCNC: 10.9 G/DL — LOW (ref 11.5–15.5)
IMM GRANULOCYTES NFR BLD AUTO: 0.2 % — SIGNIFICANT CHANGE UP (ref 0–0.9)
LIDOCAIN IGE QN: 44 U/L — SIGNIFICANT CHANGE UP (ref 22–51)
LYMPHOCYTES # BLD AUTO: 1.24 K/UL — SIGNIFICANT CHANGE UP (ref 1–3.3)
LYMPHOCYTES # BLD AUTO: 25.9 % — SIGNIFICANT CHANGE UP (ref 13–44)
MCHC RBC-ENTMCNC: 27.3 PG — SIGNIFICANT CHANGE UP (ref 27–34)
MCHC RBC-ENTMCNC: 31 GM/DL — LOW (ref 32–36)
MCV RBC AUTO: 88.2 FL — SIGNIFICANT CHANGE UP (ref 80–100)
MONOCYTES # BLD AUTO: 0.33 K/UL — SIGNIFICANT CHANGE UP (ref 0–0.9)
MONOCYTES NFR BLD AUTO: 6.9 % — SIGNIFICANT CHANGE UP (ref 2–14)
NEUTROPHILS # BLD AUTO: 2.88 K/UL — SIGNIFICANT CHANGE UP (ref 1.8–7.4)
NEUTROPHILS NFR BLD AUTO: 60.3 % — SIGNIFICANT CHANGE UP (ref 43–77)
NT-PROBNP SERPL-SCNC: 295 PG/ML — SIGNIFICANT CHANGE UP (ref 0–300)
PLATELET # BLD AUTO: 167 K/UL — SIGNIFICANT CHANGE UP (ref 150–400)
POTASSIUM SERPL-MCNC: 5.6 MMOL/L — HIGH (ref 3.5–5.3)
POTASSIUM SERPL-SCNC: 5.6 MMOL/L — HIGH (ref 3.5–5.3)
PROT SERPL-MCNC: 6.7 G/DL — SIGNIFICANT CHANGE UP (ref 6.6–8.7)
RBC # BLD: 3.99 M/UL — SIGNIFICANT CHANGE UP (ref 3.8–5.2)
RBC # FLD: 15.4 % — HIGH (ref 10.3–14.5)
SODIUM SERPL-SCNC: 138 MMOL/L — SIGNIFICANT CHANGE UP (ref 135–145)
TROPONIN T, HIGH SENSITIVITY RESULT: 17 NG/L — SIGNIFICANT CHANGE UP (ref 0–51)
TROPONIN T, HIGH SENSITIVITY RESULT: 19 NG/L — SIGNIFICANT CHANGE UP (ref 0–51)
WBC # BLD: 4.78 K/UL — SIGNIFICANT CHANGE UP (ref 3.8–10.5)
WBC # FLD AUTO: 4.78 K/UL — SIGNIFICANT CHANGE UP (ref 3.8–10.5)

## 2024-08-13 PROCEDURE — 71250 CT THORAX DX C-: CPT | Mod: 26,MC

## 2024-08-13 PROCEDURE — 71250 CT THORAX DX C-: CPT | Mod: MC

## 2024-08-13 PROCEDURE — 71045 X-RAY EXAM CHEST 1 VIEW: CPT

## 2024-08-13 PROCEDURE — 99285 EMERGENCY DEPT VISIT HI MDM: CPT | Mod: 25

## 2024-08-13 PROCEDURE — 71045 X-RAY EXAM CHEST 1 VIEW: CPT | Mod: 26

## 2024-08-13 PROCEDURE — 85025 COMPLETE CBC W/AUTO DIFF WBC: CPT

## 2024-08-13 PROCEDURE — 99284 EMERGENCY DEPT VISIT MOD MDM: CPT

## 2024-08-13 PROCEDURE — 84484 ASSAY OF TROPONIN QUANT: CPT

## 2024-08-13 PROCEDURE — 36415 COLL VENOUS BLD VENIPUNCTURE: CPT

## 2024-08-13 PROCEDURE — 82962 GLUCOSE BLOOD TEST: CPT

## 2024-08-13 PROCEDURE — 96374 THER/PROPH/DIAG INJ IV PUSH: CPT

## 2024-08-13 PROCEDURE — 83690 ASSAY OF LIPASE: CPT

## 2024-08-13 PROCEDURE — 74176 CT ABD & PELVIS W/O CONTRAST: CPT | Mod: MC

## 2024-08-13 PROCEDURE — 96375 TX/PRO/DX INJ NEW DRUG ADDON: CPT

## 2024-08-13 PROCEDURE — 74176 CT ABD & PELVIS W/O CONTRAST: CPT | Mod: 26,MC

## 2024-08-13 PROCEDURE — 80053 COMPREHEN METABOLIC PANEL: CPT

## 2024-08-13 PROCEDURE — 93005 ELECTROCARDIOGRAM TRACING: CPT

## 2024-08-13 PROCEDURE — 83880 ASSAY OF NATRIURETIC PEPTIDE: CPT

## 2024-08-13 PROCEDURE — 93010 ELECTROCARDIOGRAM REPORT: CPT

## 2024-08-13 RX ORDER — ACETAMINOPHEN 500 MG
1000 TABLET ORAL ONCE
Refills: 0 | Status: COMPLETED | OUTPATIENT
Start: 2024-08-13 | End: 2024-08-13

## 2024-08-13 RX ORDER — DEXTROSE 4 G
50 TABLET,CHEWABLE ORAL ONCE
Refills: 0 | Status: COMPLETED | OUTPATIENT
Start: 2024-08-13 | End: 2024-08-13

## 2024-08-13 RX ADMIN — Medication 400 MILLIGRAM(S): at 11:10

## 2024-08-13 RX ADMIN — Medication 50 MILLILITER(S): at 14:33

## 2024-08-13 NOTE — ED ADULT TRIAGE NOTE - CHIEF COMPLAINT QUOTE
c/o chest pain and sob for last 2 days. pt also reports pain upon inspiration. denies cough or fevers c/o chest pain and sob for last 2 days. pt also reports pain upon inspiration. denies cough or fevers. pmh of stents

## 2024-08-13 NOTE — ED PROVIDER NOTE - NSFOLLOWUPINSTRUCTIONS_ED_ALL_ED_FT
tylenol  650mg by mouth every 6 houirs  follow up with doctor in 3 - 5 days  return to ed with worse symptoms

## 2024-08-13 NOTE — ED ADULT NURSE NOTE - CHIEF COMPLAINT QUOTE
c/o chest pain and sob for last 2 days. pt also reports pain upon inspiration. denies cough or fevers. pmh of stents

## 2024-08-13 NOTE — ED PROVIDER NOTE - OBJECTIVE STATEMENT
74-year-old female past medical history of hypertension diabetes GERD comes to the ED with low 1 day history of epigastric pain with radiation to her right upper chest.  Patient denies any nausea vomiting or diarrhea.  Patient denies any recent trauma.

## 2024-08-13 NOTE — ED PROVIDER NOTE - PATIENT PORTAL LINK FT
You can access the FollowMyHealth Patient Portal offered by [facility] by registering at the following website: [email or web address]. By joining [employer]’s FollowMyHealth portal, you will also be able to view your health information using other applications (apps) compatible with our system.

## 2024-08-13 NOTE — ED ADULT NURSE NOTE - NS ED NURSE LEVEL OF CONSCIOUSNESS ORIENTATION
Bed: 01  Expected date:   Expected time:   Means of arrival: [facility] Paramedic Service  Comments:  Medics-hypotension   Oriented - self; Oriented - place; Oriented - time

## 2024-08-13 NOTE — ED ADULT NURSE REASSESSMENT NOTE - NS ED NURSE REASSESS COMMENT FT1
pt reports as feeling of low blood sugar, accucheck 69 which is low for pt baseline. md made aware, order of d50 given, pt npo for ct scan.

## 2024-08-13 NOTE — ED ADULT NURSE NOTE - OBJECTIVE STATEMENT
Pt c/o cp, R flank pain, and abd pain starting 2 days ago. NSR on mon. h/x of stents. Denies fevers, chills, sob, n/v/d

## 2024-08-14 ENCOUNTER — APPOINTMENT (OUTPATIENT)
Dept: OBGYN | Facility: CLINIC | Age: 74
End: 2024-08-14

## 2024-08-15 NOTE — ED POST DISCHARGE NOTE - DETAILS
results d/w pt. advised pt f/u with PMD for rpt imaging and surveillance of this finding. pt has copy of reports and states she will f/u with her primary.

## 2024-09-16 ENCOUNTER — APPOINTMENT (OUTPATIENT)
Dept: CARDIOLOGY | Facility: CLINIC | Age: 74
End: 2024-09-16
Payer: MEDICARE

## 2024-09-16 ENCOUNTER — NON-APPOINTMENT (OUTPATIENT)
Age: 74
End: 2024-09-16

## 2024-09-16 VITALS
SYSTOLIC BLOOD PRESSURE: 144 MMHG | HEART RATE: 67 BPM | WEIGHT: 238 LBS | HEIGHT: 64 IN | DIASTOLIC BLOOD PRESSURE: 84 MMHG | RESPIRATION RATE: 16 BRPM | BODY MASS INDEX: 40.63 KG/M2

## 2024-09-16 DIAGNOSIS — R94.31 ABNORMAL ELECTROCARDIOGRAM [ECG] [EKG]: ICD-10-CM

## 2024-09-16 DIAGNOSIS — I25.10 ATHEROSCLEROTIC HEART DISEASE OF NATIVE CORONARY ARTERY W/OUT ANGINA PECTORIS: ICD-10-CM

## 2024-09-16 DIAGNOSIS — N18.4 CHRONIC KIDNEY DISEASE, STAGE 4 (SEVERE): ICD-10-CM

## 2024-09-16 DIAGNOSIS — K21.9 GASTRO-ESOPHAGEAL REFLUX DISEASE W/OUT ESOPHAGITIS: ICD-10-CM

## 2024-09-16 DIAGNOSIS — I73.9 PERIPHERAL VASCULAR DISEASE, UNSPECIFIED: ICD-10-CM

## 2024-09-16 DIAGNOSIS — Z95.5 PRESENCE OF CORONARY ANGIOPLASTY IMPLANT AND GRAFT: ICD-10-CM

## 2024-09-16 DIAGNOSIS — I51.7 CARDIOMEGALY: ICD-10-CM

## 2024-09-16 DIAGNOSIS — I10 ESSENTIAL (PRIMARY) HYPERTENSION: ICD-10-CM

## 2024-09-16 PROCEDURE — 93000 ELECTROCARDIOGRAM COMPLETE: CPT

## 2024-09-16 PROCEDURE — 99214 OFFICE O/P EST MOD 30 MIN: CPT

## 2024-09-16 NOTE — REASON FOR VISIT
[FreeTextEntry1] : JUANY AGUILAR is being seen for arrhythmia/ECG abnormalities, structural heart and valve disease, hyperlipidemia, hypertension and coronary artery disease.  There has also been a history for atypical chest pain syndrome.   The patient is a very pleasant 74-year-old  woman with long-standing history of obesity, ischemic heart disease and prior PCI/stent (2017);  She came to our office last November after previously being seen in [facility] emergency department October 29, 2023 for recurrent somewhat atypical chest pain syndrome. Patient underwent cardiac enzymes, EKG, and checkup and was seen by cardiology (Dr. Hernandez)-but was ultimately discharged home and recommended for cardiac follow-up.  She was empirically given sublingual nitroglycerin tablets of which she had to use 1 or 2 times in the past or so but this did not seem to ameliorate her discomfort and took about 20 minutes to 1/2-hour; Symptoms were somewhat atypical and that it seemed to go from the left parasternal area to near the right breast area before it subsided.  She did not recall any strain or chest injury at home.  Although earlier in the month she did have a bout of "food poisoning" and GI distress for which she was also seen in the hospital emergency department;   (Of note is that the patient did undergo left heart catheterization earlier last year on (2/13/2023) for some rather atypical chest pain/discomfort she had been experiencing over the a few months.    LHC (done by Dr. Valle) showed a patent mid-LAD stent with only some additional mild disease. Proximal LAD 40%, Proximal RCA 10-20%, mid-RCA 20%.  It was reported that this study was unchanged from previous LHC in August 2020;)  Patient presents back today for general cardiac checkup, and her Podiatrist (Dr. Wetzel) recommended she follow up with her Cardiologist regarding a recent B/L foot Xray showing marked arterial calcifications.  She was told that sometimes when a patient has significant arterial calcifications in the feet, there could also be some coronary calcifications as well.   However, patient's Podiatrist was unaware that she underwent a cardiac catheterization just last year showing a patent stent in the LAD with only some mild residual atherosclerotic disease without significant stenosis.    Presently, patient does report feeling some lower extremity discomfort while walking sometimes, but otherwise, she denies any significant chest discomfort, shortness of breath, peripheral edema, or palpitations

## 2024-09-16 NOTE — HISTORY OF PRESENT ILLNESS
[FreeTextEntry1] : Historically, she suffers from a combination of lower abdominal pains and discomfort and sometimes intermittent mid chest/epigastric discomfort that comes and goes.  Frequently it occurs just at rest and made last a "while"; it is not particularly exertional related.  Had a recent colonoscopy procedure last June with Dr. Schuler that was reportedly unremarkable.  She's had diverticulitis in the past which had required Abx.    Previously diagnosed with Stage IV renal disease and has associated anemia for which she's now taking Procrit as well as supplemental Ferrous Sulfate.  Continues to follow Nephrology office (Dr. Hewitt) who report patient is currently stable on most recent medication regimen.   Most recent labs from June 2023:  Total Cholesterol 133, LDL 54, HDL 55, Triglycerides 122.    Left Heart Catheterization (February 13, 2024):  Long area of moderate LAD disease (similar to previous imaging).  Patent LAD stent.  Medical management recommended. LM: no disease LAD: proximal 40% stenosis.  Mid patent stent.  Cx: mild atherosclerosis RCA: proximal 10-20% stenosis. Mid 20% stenosis.   Last transthoracic Echocardiogram (1/22/2024):  The LV cavity size is normal with only mild LVH.  The LV systolic and diastolic function is normal with LVEF of 60 to 65%.  There are no regional wall motion abnormalities.  The left and right atria are normal in size and structure. Mild MR. There is no evidence for pericardial effusion.  Most recent Carotid Doppler (04/10/2023): There is no significant atherosclerosis on the left and only mild on the right without stenosis The left and right vertebral arteries demonstrate antegrade flow;

## 2024-09-16 NOTE — REVIEW OF SYSTEMS
[Abdominal Pain] : abdominal pain [Negative] : Heme/Lymph [Leg Claudication] : intermittent leg claudication

## 2024-09-16 NOTE — ASSESSMENT
[FreeTextEntry1] : EKG shows normal sinus rhythm at a rate of 67 bpm with borderline nonspecific T wave changes, left atrial enlargement pattern.  Otherwise no acute changes;  In summary, this 74-year-old woman has a history of coronary artery disease and remote history for PCI/stent.  She also has a history of now stage IV renal insufficiency and followed by nephrology.  Patient has had some prior atypical chest discomfort and was seen in the emergency department in late October 2023 but released after enzymes were negative and testing was unremarkable.  Earlier last year she had similar atypical chest pain which led to cardiac catheterization which demonstrated subcritical coronary artery disease and medical therapy was recommended.;  She again has been experiencing periodic anterior midsternal chest/epigastric discomfort and frequently lower abdominal and mid abdominal pains and cramps as well.  Most of her symptoms occur at rest and when she is lying down and not particularly exertional related;  She is currently going for a GI workup including abdominal CT scan in the near future and possibly further GI workup as well.  Previously completed a round of antibiotics last year prescribed by her GI Specialist for suspected acute diverticulitis. Possible chest discomfort could be GI related as well as it does not appear to demonstrate any new cardiac findings and her last cardiac catheterization was just over 1 year ago showing only subcritical lesions;  Most recently saw her Podiatrist (Dr. Wetzel), who recommended she follow up with her Cardiologist regarding a recent B/L foot Xray showing marked arterial calcifications.  She was told that sometimes when a patient has significant arterial calcifications in the feet, there could also be some coronary calcifications as well.   However, patient's Podiatrist was unaware that she underwent a cardiac catheterization just last year showing a patent stent in the LAD with only some mild residual atherosclerotic disease without significant stenosis.    Presently, patient does report feeling some lower extremity discomfort while walking sometimes, but otherwise, she denies any significant chest discomfort, shortness of breath, peripheral edema, or palpitations   Plan:  Patient reassured that she was recently worked up for possible coronary artery disease including a LHC that was unremarkable demonstrating a patent stent in mid-LAD with only some mild residual disease.  Medical therapy was recommended.   Recommend she undergo an updated LE arterial doppler to further assess for any signs of significant arterial stenosis.    Continue with current cardiac medications including DAPT.    Diet and lifestyle modification discussed including low sodium, low fat and low carbohydrate weight reducing diet.  Patient is to implement a modest aerobic exercise regimen few days per week as tolerated.   Recommend patient report any untoward symptoms.   Follow up with Dr. Roa on November 5th or PRN.

## 2024-09-16 NOTE — PHYSICAL EXAM
[No Acute Distress] : no acute distress [Obese] : obese [Normal Conjunctiva] : normal conjunctiva [Normal Venous Pressure] : normal venous pressure [Carotid Bruit] : carotid bruit [Normal S1, S2] : normal S1, S2 [No Rub] : no rub [No Gallop] : no gallop [Murmur] : murmur [Clear Lung Fields] : clear lung fields [Good Air Entry] : good air entry [No Respiratory Distress] : no respiratory distress  [Soft] : abdomen soft [Non Tender] : non-tender [No Masses/organomegaly] : no masses/organomegaly [Normal Gait] : normal gait [No Cyanosis] : no cyanosis [No Clubbing] : no clubbing [No Varicosities] : no varicosities [No Rash] : no rash [No Skin Lesions] : no skin lesions [Moves all extremities] : moves all extremities [No Focal Deficits] : no focal deficits [Normal Speech] : normal speech [Alert and Oriented] : alert and oriented [Normal memory] : normal memory [de-identified] : heard on left [de-identified] : Grade I/VI systolic murmur [de-identified] : trace LLE pretibial edema. negative Homans sign B/L

## 2024-09-20 ENCOUNTER — APPOINTMENT (OUTPATIENT)
Dept: ENDOCRINOLOGY | Facility: CLINIC | Age: 74
End: 2024-09-20
Payer: MEDICARE

## 2024-09-20 VITALS
DIASTOLIC BLOOD PRESSURE: 74 MMHG | HEIGHT: 64 IN | SYSTOLIC BLOOD PRESSURE: 130 MMHG | HEART RATE: 68 BPM | BODY MASS INDEX: 40.63 KG/M2 | WEIGHT: 238 LBS | OXYGEN SATURATION: 99 %

## 2024-09-20 DIAGNOSIS — E11.29 TYPE 2 DIABETES MELLITUS WITH OTHER DIABETIC KIDNEY COMPLICATION: ICD-10-CM

## 2024-09-20 DIAGNOSIS — E78.5 HYPERLIPIDEMIA, UNSPECIFIED: ICD-10-CM

## 2024-09-20 DIAGNOSIS — N18.32 CHRONIC KIDNEY DISEASE, STAGE 3B: ICD-10-CM

## 2024-09-20 LAB
GLUCOSE BLDC GLUCOMTR-MCNC: 238
HBA1C MFR BLD HPLC: 6.9

## 2024-09-20 PROCEDURE — 83036 HEMOGLOBIN GLYCOSYLATED A1C: CPT | Mod: QW

## 2024-09-20 PROCEDURE — 82962 GLUCOSE BLOOD TEST: CPT

## 2024-09-20 PROCEDURE — 99205 OFFICE O/P NEW HI 60 MIN: CPT

## 2024-09-20 RX ORDER — INSULIN DEGLUDEC INJECTION 100 U/ML
100 INJECTION, SOLUTION SUBCUTANEOUS
Qty: 15 | Refills: 0 | Status: ACTIVE | COMMUNITY
Start: 2024-09-10

## 2024-09-20 NOTE — HISTORY OF PRESENT ILLNESS
[FreeTextEntry1] : Albertina is a 74 yr old female, who presents today for a consultation in regards type   2 diabetes.   Per patient , has been diabetic since 25 years Family history includes sister with diabetes   Has CKD , sees nephrologist. Has had anemia, on iron pills.  Currently taking tresiba 30 units daily Was on Jardiance but had UTI so stopped few months ago. FS in office 191 current severity: uncontrolled A1C  7.7% in 5/24 , 6.9% today in clinic. Has anemia so a1c not reliable,   Home Glucose Monitoring Frequency:  once a day BG Documentation:  brought log BG Readings -  checks only  Fasting most sugars in good range from 80s to 130s, a few 160s  but does not check other  times, diet is decent can be better.                                 Diabetic History ER Visits:  none Hospitalizations:   none Diet Therapy: watching his diet Diabetic Education:   no Exercise:   not very active Last eye exam: gets yearly eye exam, 8/24. has diabetic changes in right eye. Had cataract surgery last year.

## 2024-09-20 NOTE — REVIEW OF SYSTEMS
[Fatigue] : no fatigue [Decreased Appetite] : appetite not decreased [Recent Weight Gain (___ Lbs)] : no recent weight gain [Recent Weight Loss (___ Lbs)] : no recent weight loss [Visual Field Defect] : no visual field defect [Dysphagia] : no dysphagia [Chest Pain] : no chest pain [Palpitations] : no palpitations [Lower Ext Edema] : no lower extremity edema [Shortness Of Breath] : no shortness of breath [Cough] : no cough [Nausea] : no nausea [Constipation] : no constipation [Abdominal Pain] : no abdominal pain [Vomiting] : no vomiting [Diarrhea] : no diarrhea [Polyuria] : no polyuria [Joint Pain] : no joint pain [Myalgia] : no myalgia  [Acanthosis] : no acanthosis  [Dry Skin] : no dry skin [Headaches] : no headaches [Dizziness] : no dizziness [Tremors] : no tremors [Depression] : no depression [Insomnia] : no insomnia [Cold Intolerance] : no cold intolerance [Heat Intolerance] : no heat intolerance

## 2024-09-20 NOTE — ASSESSMENT
[FreeTextEntry1] :  Albertina is a 74 yr old female, who presents today for a consultation in regards type   2 diabetes.      Has CKD , sees nephrologist. Has had anemia, on iron pills.  Currently taking tresiba 30 units daily Was on Jardiance but had UTI so stopped few months ago. FS in office 191 current severity: uncontrolled A1C  7.7% in 5/24 , 6.9% today in clinic. Has anemia so a1c not reliable   Home Glucose Monitoring Frequency:  once a day BG Documentation:  brought log BG Readings -  checks only  Fasting most sugars in good range from 80s to 130s, a few 160s  but does not check other  times, diet is decent can be better.   Medication changes: A1c looks good but not reliable in her case, morning sugars in good range, recommended to check other times of the day  , will get fructosamine next visit then decide if she needs to be on mealtime insulin. till then continue tresiba 30 units daily.  To check sugars once a day  at different times. Diet and exercise reviewed. Hypoglycemia reviewed.  Eyes: no  active issues, has some  retinopathy Feet: no active issues, no neuropathy.  Diabetic foot care reviewed.  Lipids:  on statin/ anti-lipid treatment. Last LDL:  54 in 6/23 will recheck   Renal/ B/P : B/P wnl ,  not on ACE/ ARB. has proteinuria.  has CKD, seeing nephrology Weight:   obese . Balanced diet and exercise reviewed.   Diabetes counselling:  The patient was counseled on diabetes foot care, long term vascular complications of diabetes, carbohydrate consistent diet, importance of diet and exercise to improve glycemic control, achieve weight loss and improve cardiovascular health and action and use of short and long-acting insulin. Patient was referred to ophthalmology for retinopathy screening. ADA diet (30-50 gm carbohydrates with each meal, 15 grams with snacks. Balance with lean proteins and low fat diet.) Exercise daily per ability, work up to 30 minutes a day. Medication, risks and benefits reviewed. Glucose testing and insulin administration for glycemic management.   FOLLOWUP@ 3 months

## 2024-09-20 NOTE — PHYSICAL EXAM
Blood pressure has been elevated but still under parameters for hydralazine, patient is not symptomatic.   [Alert] : alert [Well Nourished] : well nourished [No Acute Distress] : no acute distress [Normal Sclera/Conjunctiva] : normal sclera/conjunctiva [No Neck Mass] : no neck mass was observed [No LAD] : no lymphadenopathy [Thyroid Not Enlarged] : the thyroid was not enlarged [No Respiratory Distress] : no respiratory distress [No Accessory Muscle Use] : no accessory muscle use [Clear to Auscultation] : lungs were clear to auscultation bilaterally [Normal S1, S2] : normal S1 and S2 [Normal Rate] : heart rate was normal [Regular Rhythm] : with a regular rhythm [Normal Bowel Sounds] : normal bowel sounds [Not Tender] : non-tender [Soft] : abdomen soft [No Tremors] : no tremors [Oriented x3] : oriented to person, place, and time

## 2024-10-21 ENCOUNTER — EMERGENCY (EMERGENCY)
Facility: HOSPITAL | Age: 74
LOS: 1 days | Discharge: HOME | End: 2024-10-21
Attending: EMERGENCY MEDICINE
Payer: MEDICARE

## 2024-10-21 VITALS
RESPIRATION RATE: 19 BRPM | OXYGEN SATURATION: 94 % | SYSTOLIC BLOOD PRESSURE: 153 MMHG | HEART RATE: 66 BPM | DIASTOLIC BLOOD PRESSURE: 69 MMHG | TEMPERATURE: 98 F

## 2024-10-21 VITALS
WEIGHT: 234.79 LBS | TEMPERATURE: 98 F | SYSTOLIC BLOOD PRESSURE: 170 MMHG | DIASTOLIC BLOOD PRESSURE: 83 MMHG | RESPIRATION RATE: 14 BRPM | HEART RATE: 84 BPM | OXYGEN SATURATION: 94 %

## 2024-10-21 DIAGNOSIS — Z95.5 PRESENCE OF CORONARY ANGIOPLASTY IMPLANT AND GRAFT: Chronic | ICD-10-CM

## 2024-10-21 DIAGNOSIS — H26.40 UNSPECIFIED SECONDARY CATARACT: Chronic | ICD-10-CM

## 2024-10-21 LAB
ALBUMIN SERPL ELPH-MCNC: 3.8 G/DL — SIGNIFICANT CHANGE UP (ref 3.3–5.2)
ALP SERPL-CCNC: 156 U/L — HIGH (ref 40–120)
ALT FLD-CCNC: 13 U/L — SIGNIFICANT CHANGE UP
ANION GAP SERPL CALC-SCNC: 11 MMOL/L — SIGNIFICANT CHANGE UP (ref 5–17)
ANION GAP SERPL CALC-SCNC: 9 MMOL/L — SIGNIFICANT CHANGE UP (ref 5–17)
AST SERPL-CCNC: 24 U/L — SIGNIFICANT CHANGE UP
BASOPHILS # BLD AUTO: 0.02 K/UL — SIGNIFICANT CHANGE UP (ref 0–0.2)
BASOPHILS NFR BLD AUTO: 0.4 % — SIGNIFICANT CHANGE UP (ref 0–2)
BILIRUB SERPL-MCNC: 0.2 MG/DL — LOW (ref 0.4–2)
BUN SERPL-MCNC: 27.6 MG/DL — HIGH (ref 8–20)
BUN SERPL-MCNC: 27.7 MG/DL — HIGH (ref 8–20)
CALCIUM SERPL-MCNC: 8.4 MG/DL — SIGNIFICANT CHANGE UP (ref 8.4–10.5)
CALCIUM SERPL-MCNC: 8.9 MG/DL — SIGNIFICANT CHANGE UP (ref 8.4–10.5)
CHLORIDE SERPL-SCNC: 107 MMOL/L — SIGNIFICANT CHANGE UP (ref 96–108)
CHLORIDE SERPL-SCNC: 110 MMOL/L — HIGH (ref 96–108)
CO2 SERPL-SCNC: 20 MMOL/L — LOW (ref 22–29)
CO2 SERPL-SCNC: 21 MMOL/L — LOW (ref 22–29)
CREAT SERPL-MCNC: 1.74 MG/DL — HIGH (ref 0.5–1.3)
CREAT SERPL-MCNC: 1.77 MG/DL — HIGH (ref 0.5–1.3)
EGFR: 30 ML/MIN/1.73M2 — LOW
EGFR: 31 ML/MIN/1.73M2 — LOW
EOSINOPHIL # BLD AUTO: 0.26 K/UL — SIGNIFICANT CHANGE UP (ref 0–0.5)
EOSINOPHIL NFR BLD AUTO: 5.6 % — SIGNIFICANT CHANGE UP (ref 0–6)
GLUCOSE SERPL-MCNC: 127 MG/DL — HIGH (ref 70–99)
GLUCOSE SERPL-MCNC: 137 MG/DL — HIGH (ref 70–99)
HCT VFR BLD CALC: 36.8 % — SIGNIFICANT CHANGE UP (ref 34.5–45)
HGB BLD-MCNC: 11.3 G/DL — LOW (ref 11.5–15.5)
IMM GRANULOCYTES NFR BLD AUTO: 0.2 % — SIGNIFICANT CHANGE UP (ref 0–0.9)
LYMPHOCYTES # BLD AUTO: 1.27 K/UL — SIGNIFICANT CHANGE UP (ref 1–3.3)
LYMPHOCYTES # BLD AUTO: 27.1 % — SIGNIFICANT CHANGE UP (ref 13–44)
MCHC RBC-ENTMCNC: 27.4 PG — SIGNIFICANT CHANGE UP (ref 27–34)
MCHC RBC-ENTMCNC: 30.7 GM/DL — LOW (ref 32–36)
MCV RBC AUTO: 89.3 FL — SIGNIFICANT CHANGE UP (ref 80–100)
MONOCYTES # BLD AUTO: 0.36 K/UL — SIGNIFICANT CHANGE UP (ref 0–0.9)
MONOCYTES NFR BLD AUTO: 7.7 % — SIGNIFICANT CHANGE UP (ref 2–14)
NEUTROPHILS # BLD AUTO: 2.76 K/UL — SIGNIFICANT CHANGE UP (ref 1.8–7.4)
NEUTROPHILS NFR BLD AUTO: 59 % — SIGNIFICANT CHANGE UP (ref 43–77)
PLATELET # BLD AUTO: 176 K/UL — SIGNIFICANT CHANGE UP (ref 150–400)
POTASSIUM SERPL-MCNC: 5.6 MMOL/L — HIGH (ref 3.5–5.3)
POTASSIUM SERPL-MCNC: 6.2 MMOL/L — CRITICAL HIGH (ref 3.5–5.3)
POTASSIUM SERPL-SCNC: 5.6 MMOL/L — HIGH (ref 3.5–5.3)
POTASSIUM SERPL-SCNC: 6.2 MMOL/L — CRITICAL HIGH (ref 3.5–5.3)
PROT SERPL-MCNC: 7 G/DL — SIGNIFICANT CHANGE UP (ref 6.6–8.7)
RBC # BLD: 4.12 M/UL — SIGNIFICANT CHANGE UP (ref 3.8–5.2)
RBC # FLD: 14 % — SIGNIFICANT CHANGE UP (ref 10.3–14.5)
SODIUM SERPL-SCNC: 138 MMOL/L — SIGNIFICANT CHANGE UP (ref 135–145)
SODIUM SERPL-SCNC: 140 MMOL/L — SIGNIFICANT CHANGE UP (ref 135–145)
TROPONIN T, HIGH SENSITIVITY RESULT: 17 NG/L — SIGNIFICANT CHANGE UP (ref 0–51)
TROPONIN T, HIGH SENSITIVITY RESULT: 17 NG/L — SIGNIFICANT CHANGE UP (ref 0–51)
WBC # BLD: 4.68 K/UL — SIGNIFICANT CHANGE UP (ref 3.8–10.5)
WBC # FLD AUTO: 4.68 K/UL — SIGNIFICANT CHANGE UP (ref 3.8–10.5)

## 2024-10-21 PROCEDURE — 93010 ELECTROCARDIOGRAM REPORT: CPT

## 2024-10-21 PROCEDURE — 96374 THER/PROPH/DIAG INJ IV PUSH: CPT

## 2024-10-21 PROCEDURE — 71045 X-RAY EXAM CHEST 1 VIEW: CPT

## 2024-10-21 PROCEDURE — 84484 ASSAY OF TROPONIN QUANT: CPT

## 2024-10-21 PROCEDURE — 96375 TX/PRO/DX INJ NEW DRUG ADDON: CPT

## 2024-10-21 PROCEDURE — 85025 COMPLETE CBC W/AUTO DIFF WBC: CPT

## 2024-10-21 PROCEDURE — 80053 COMPREHEN METABOLIC PANEL: CPT

## 2024-10-21 PROCEDURE — 99285 EMERGENCY DEPT VISIT HI MDM: CPT

## 2024-10-21 PROCEDURE — 80048 BASIC METABOLIC PNL TOTAL CA: CPT

## 2024-10-21 PROCEDURE — 36415 COLL VENOUS BLD VENIPUNCTURE: CPT

## 2024-10-21 PROCEDURE — 71045 X-RAY EXAM CHEST 1 VIEW: CPT | Mod: 26

## 2024-10-21 PROCEDURE — 93005 ELECTROCARDIOGRAM TRACING: CPT

## 2024-10-21 PROCEDURE — 99285 EMERGENCY DEPT VISIT HI MDM: CPT | Mod: 25

## 2024-10-21 RX ORDER — FAMOTIDINE 40 MG
20 TABLET ORAL ONCE
Refills: 0 | Status: COMPLETED | OUTPATIENT
Start: 2024-10-21 | End: 2024-10-21

## 2024-10-21 RX ORDER — ACETAMINOPHEN 325 MG
1000 TABLET ORAL ONCE
Refills: 0 | Status: COMPLETED | OUTPATIENT
Start: 2024-10-21 | End: 2024-10-21

## 2024-10-21 RX ADMIN — Medication 400 MILLIGRAM(S): at 20:17

## 2024-10-21 RX ADMIN — Medication 20 MILLIGRAM(S): at 20:17

## 2024-10-21 NOTE — ED PROVIDER NOTE - ATTENDING CONTRIBUTION TO CARE
I performed a face to face bedside interview with patient regarding history of present illness, review of symptoms and past medical history. I completed an independent physical exam.  I have discussed patient's plan of care with resident.   I agree with note as stated above including HISTORY OF PRESENT ILLNESS, HIV, PAST MEDICAL/SURGICAL/FAMILY/SOCIAL HISTORY, ALLERGIES AND HOME MEDICATIONS, REVIEW OF SYSTEMS, PHYSICAL EXAM, MEDICAL DECISION MAKING and any PROGRESS NOTES during the time I functioned as the attending physician for this patient unless otherwise noted. My brief assessment is as follows:   General: Awake, alert, lying in bed in NAD  HEENT: Normocephalic, atraumatic. No scleral icterus or conjunctival injection. EOMI. Moist mucous membranes. Oropharynx clear.   Neck:. Soft and supple.  Cardiac: RRR, Peripheral pulses 2+ and symmetric. No LE edema.  Resp: Lungs CTAB. No accessory muscle use  Abd: Soft, non-tender, non-distended. No guarding, rebound, or rigidity.  Back: Spine midline and non-tender.   Skin: No rashes, abrasions, or lacerations.  Neuro: AO x 4. Moves all extremities symmetrically. Motor strength and sensation grossly intact.  Psych: Appropriate mood and affect

## 2024-10-21 NOTE — ED PROVIDER NOTE - PATIENT PORTAL LINK FT
You can access the FollowMyHealth Patient Portal offered by [facility] by registering at the following website: [email or web address]. By joining [employer]’s FollowMyHealth portal, you will also be able to view your health information using other applications (apps) compatible with our system.

## 2024-10-21 NOTE — ED ADULT TRIAGE NOTE - INTERNATIONAL TRAVEL
Problem: Adult Inpatient Plan of Care  Goal: Patient-Specific Goal (Individualization)  Outcome: Ongoing (interventions implemented as appropriate)  0815-Labs , hx, and medications reviewed, labs were done Friday, waiting for MD to review and sign orders. Assessment completed. Discussed plan of care with patient. Patient in agreement. Chair reclined and warm blanket and snack offered.          No

## 2024-10-21 NOTE — ED PROVIDER NOTE - NSFOLLOWUPINSTRUCTIONS_ED_ALL_ED_FT
Chest Pain    WHAT YOU NEED TO KNOW:    Chest pain can be caused by a range of conditions, from not serious to life-threatening. Chest pain can be a symptom of a digestive problem, such as acid reflux or a stomach ulcer. An anxiety attack or a strong emotion, such as anger, can also cause chest pain. Infection, inflammation, or a fracture in the bones or cartilage in your chest can cause pain or discomfort. Sometimes chest pain or pressure is caused by poor blood flow to your heart (angina). Chest pain may also be caused by life-threatening conditions such as a heart attack or blood clot in your lungs.    DISCHARGE INSTRUCTIONS:    Call your local emergency number (911 in the [place]) or have someone call if:    You have any of the following signs of a heart attack:  Squeezing, pressure, or pain in your chest    You may also have any of the following:  Discomfort or pain in your back, neck, jaw, stomach, or arm    Shortness of breath    Nausea or vomiting    Lightheadedness or a sudden cold sweat    Return to the emergency department if:    You have chest discomfort that gets worse, even with medicine.    You cough or vomit blood.    Your bowel movements are black or bloody.    You cannot stop vomiting, or it hurts to swallow.  Call your doctor if:    You have questions or concerns about your condition or care.    Medicines:    Medicines may be given to treat the cause of your chest pain. Examples include pain medicine, anxiety medicine, or medicines to increase blood flow to your heart.    Do not take certain medicines without asking your healthcare provider first. These include NSAIDs, herbal or vitamin supplements, and hormones, such as estrogen or progestin.    Take your medicine as directed. Contact your healthcare provider if you think your medicine is not helping or if you have side effects. Tell your provider if you are allergic to any medicine. Keep a list of the medicines, vitamins, and herbs you take. Include the amounts, and when and why you take them. Bring the list or the pill bottles to follow-up visits. Carry your medicine list with you in case of an emergency.  Healthy living tips: If the cause of your chest pain is known, your healthcare provider will give you specific guidelines to follow. The following are general healthy guidelines:    Do not smoke. Nicotine and other chemicals in cigarettes and cigars can cause lung and heart damage. Ask your healthcare provider for information if you currently smoke and need help to quit. E-cigarettes or smokeless tobacco still contain nicotine. Talk to your healthcare provider before you use these products.    Choose a variety of healthy foods as often as possible. Include fresh, frozen, or canned fruits and vegetables. Also include low-fat dairy products, fish, chicken (without skin), and lean meats. Your healthcare provider or a dietitian can help you create meal plans. You may need to avoid certain foods or drinks if your pain is caused by a digestion problem.  Healthy Foods      Lower your sodium (salt) intake. Limit foods that are high in sodium, such as canned foods, salty snacks, and cold cuts. If you add salt when you cook food, do not add more at the table. Choose low-sodium canned foods as much as possible.        Drink plenty of water every day. Water helps your body to control your temperature and blood pressure. Ask your healthcare provider how much water you should drink every day.    Ask about activity. Your healthcare provider will tell you which activities to limit or avoid. Ask when you can drive, return to work, and have sex. Ask about the best exercise plan for you.    Maintain a healthy weight. Ask your healthcare provider what a healthy weight is for you. Ask him or her to help you create a safe weight loss plan if you are overweight.    Ask about vaccines you may need. Your healthcare provider can tell you which vaccines you need, and when to get them. The following vaccines help prevent diseases that can become serious for a person with a heart condition:  The influenza (flu) vaccine is given each year. Get a flu vaccine as soon as recommended, usually in September or October.    The pneumonia vaccine is usually given every 5 years. Your healthcare provider may recommend the pneumonia vaccine if you are 65 or older.    COVID-19 vaccines are given to adults as a shot. At least 1 dose of an updated vaccine is recommended for all adults. COVID-19 vaccines are updated throughout the year. Adults 65 or older need a second dose of updated vaccine at least 4 months after the first dose. Your healthcare provider can help you schedule all needed doses as updated vaccines become available.  Prevent Heart Disease   Follow up with your doctor within 72 hours, or as directed: You may need to return for more tests to find the cause of your chest pain. You may be referred to a specialist, such as a cardiologist or gastroenterologist. Write down your questions so you remember to ask them during your visits.

## 2024-10-21 NOTE — ED ADULT TRIAGE NOTE - NS ED NURSE BANDS TYPE
S: Arslan Mixon is a 50 year old male returns for bladder cancer surveillance.    he has history of bladder cancer Grade 1 non-invasive, Stage ta, s/p turbt on 4/ 18.     He received 0 courses of BCG therapy.    Patient is draped and prepped.  Flexible cystoscopy placed under direct vision.    Cysto:  The anterior urethra is normal   The prostatic urethra is normal.     The length is 1cm,  the coaptation is 1 cm.     In the bladder there is normal mucosa.    Assessment/Plan:  (Z85.51) Personal history of malignant neoplasm of bladder  (primary encounter diagnosis)  Comment: no evidence of cancer recurrence  Plan: CYSTOURETHROSCOPY (23432)        In 6 months               Name band;

## 2024-10-21 NOTE — ED PROVIDER NOTE - CLINICAL SUMMARY MEDICAL DECISION MAKING FREE TEXT BOX
H&P as stated. patient well appearing on arrival, vitals stable, nontoxic, normal respiratory effort, clear lungs. EKG is non ischemic and unchanged from previous. blood work unremarkable including CBC, BMP, troponin x2. CXR clear. symptoms improved with medications and time. low suspicion for ACS at this time. patient to follow up with cardiologist. return precautions given. patient agreeable with plan. stable for discharge.

## 2024-10-21 NOTE — ED ADULT NURSE NOTE - NSFALLUNIVINTERV_ED_ALL_ED
Bed/Stretcher in lowest position, wheels locked, appropriate side rails in place/Call bell, personal items and telephone in reach/Instruct patient to call for assistance before getting out of bed/chair/stretcher/Non-slip footwear applied when patient is off stretcher/[place] to call system/Physically safe environment - no spills, clutter or unnecessary equipment/Purposeful proactive rounding/Room/bathroom lighting operational, light cord in reach

## 2024-10-24 ENCOUNTER — APPOINTMENT (OUTPATIENT)
Dept: NEPHROLOGY | Facility: CLINIC | Age: 74
End: 2024-10-24
Payer: MEDICARE

## 2024-10-24 VITALS
WEIGHT: 233 LBS | DIASTOLIC BLOOD PRESSURE: 70 MMHG | BODY MASS INDEX: 39.78 KG/M2 | OXYGEN SATURATION: 93 % | SYSTOLIC BLOOD PRESSURE: 122 MMHG | HEIGHT: 64 IN | HEART RATE: 65 BPM

## 2024-10-24 PROCEDURE — 99213 OFFICE O/P EST LOW 20 MIN: CPT

## 2024-10-26 ENCOUNTER — LABORATORY RESULT (OUTPATIENT)
Age: 74
End: 2024-10-26

## 2024-10-29 ENCOUNTER — APPOINTMENT (OUTPATIENT)
Dept: CARDIOLOGY | Facility: CLINIC | Age: 74
End: 2024-10-29

## 2024-10-29 PROCEDURE — 93925 LOWER EXTREMITY STUDY: CPT

## 2024-11-05 ENCOUNTER — NON-APPOINTMENT (OUTPATIENT)
Age: 74
End: 2024-11-05

## 2024-11-05 ENCOUNTER — APPOINTMENT (OUTPATIENT)
Dept: CARDIOLOGY | Facility: CLINIC | Age: 74
End: 2024-11-05
Payer: MEDICARE

## 2024-11-05 VITALS
DIASTOLIC BLOOD PRESSURE: 62 MMHG | RESPIRATION RATE: 16 BRPM | BODY MASS INDEX: 39.78 KG/M2 | WEIGHT: 233 LBS | SYSTOLIC BLOOD PRESSURE: 118 MMHG | HEART RATE: 65 BPM | HEIGHT: 64 IN

## 2024-11-05 DIAGNOSIS — I25.10 ATHEROSCLEROTIC HEART DISEASE OF NATIVE CORONARY ARTERY W/OUT ANGINA PECTORIS: ICD-10-CM

## 2024-11-05 DIAGNOSIS — I51.7 CARDIOMEGALY: ICD-10-CM

## 2024-11-05 PROCEDURE — G2211 COMPLEX E/M VISIT ADD ON: CPT

## 2024-11-05 PROCEDURE — 93000 ELECTROCARDIOGRAM COMPLETE: CPT

## 2024-11-05 PROCEDURE — 99214 OFFICE O/P EST MOD 30 MIN: CPT

## 2024-11-18 NOTE — ED PROVIDER NOTE - STROKE TEAM ASSESSMENT
s/p 3-vessel bypass in 2013.  Not followed by a cardiologist.  Will monitor for S/Sx of angina/ACS. Continue to monitor on telemetry.    30-Oct-2022 18:05

## 2024-11-19 ENCOUNTER — NON-APPOINTMENT (OUTPATIENT)
Age: 74
End: 2024-11-19

## 2024-11-27 ENCOUNTER — APPOINTMENT (OUTPATIENT)
Dept: OBGYN | Facility: CLINIC | Age: 74
End: 2024-11-27
Payer: MEDICARE

## 2024-11-27 VITALS
DIASTOLIC BLOOD PRESSURE: 92 MMHG | SYSTOLIC BLOOD PRESSURE: 175 MMHG | HEIGHT: 64 IN | WEIGHT: 234 LBS | BODY MASS INDEX: 39.95 KG/M2 | HEART RATE: 101 BPM

## 2024-11-27 DIAGNOSIS — N95.2 POSTMENOPAUSAL ATROPHIC VAGINITIS: ICD-10-CM

## 2024-11-27 LAB
BILIRUB UR QL STRIP: NORMAL
GLUCOSE UR-MCNC: NORMAL
KETONES UR-MCNC: NORMAL
LEUKOCYTE ESTERASE UR QL STRIP: NORMAL
NITRITE UR QL STRIP: NORMAL
PROT UR STRIP-MCNC: NORMAL

## 2024-11-27 PROCEDURE — 81003 URINALYSIS AUTO W/O SCOPE: CPT | Mod: QW

## 2024-11-27 PROCEDURE — 99213 OFFICE O/P EST LOW 20 MIN: CPT

## 2024-12-02 ENCOUNTER — OFFICE (OUTPATIENT)
Dept: URBAN - METROPOLITAN AREA CLINIC 112 | Facility: CLINIC | Age: 74
Setting detail: OPHTHALMOLOGY
End: 2024-12-02
Payer: MEDICARE

## 2024-12-02 DIAGNOSIS — H40.1122: ICD-10-CM

## 2024-12-02 DIAGNOSIS — H40.1113: ICD-10-CM

## 2024-12-02 PROCEDURE — 92133 CPTRZD OPH DX IMG PST SGM ON: CPT | Performed by: OPHTHALMOLOGY

## 2024-12-02 PROCEDURE — 92014 COMPRE OPH EXAM EST PT 1/>: CPT | Performed by: OPHTHALMOLOGY

## 2024-12-02 ASSESSMENT — KERATOMETRY
METHOD_AUTO_MANUAL: AUTO
OS_AXISANGLE_DEGREES: 104
OD_K2POWER_DIOPTERS: 45.00
OS_K2POWER_DIOPTERS: 46.50
OS_K1POWER_DIOPTERS: 45.00
OD_K1POWER_DIOPTERS: 44.50
OD_AXISANGLE_DEGREES: 115

## 2024-12-02 ASSESSMENT — REFRACTION_CURRENTRX
OD_SPHERE: +2.00
OS_OVR_VA: 20/
OD_VPRISM_DIRECTION: SV
OD_CYLINDER: -0.50
OS_SPHERE: +2.25
OD_OVR_VA: 20/
OS_VPRISM_DIRECTION: SV
OD_AXIS: 140
OS_AXIS: 031
OS_CYLINDER: -0.50

## 2024-12-02 ASSESSMENT — SUPERFICIAL PUNCTATE KERATITIS (SPK)
OD_SPK: 1+
OS_SPK: 1+

## 2024-12-02 ASSESSMENT — REFRACTION_MANIFEST
OS_SPHERE: -0.25
OD_AXIS: 020
OD_CYLINDER: -0.75
OD_VA2: 20/20
OS_SPHERE: -0.25
OS_CYLINDER: -0.50
OS_VA1: 20/20
OD_VA1: 20/20
OD_ADD: +2.50
OD_CYLINDER: -0.75
OS_CYLINDER: -0.75
OS_ADD: +2.00
OS_AXIS: 023
OD_AXIS: 125
OS_AXIS: 055
OS_ADD: +2.50
OD_ADD: +2.00
OD_SPHERE: -0.25
OS_VA2: 20/20
OS_VA1: 20/20
OD_VA1: 20/150
OD_SPHERE: -0.50

## 2024-12-02 ASSESSMENT — REFRACTION_AUTOREFRACTION
OS_SPHERE: PLANO
OS_AXIS: 029
OS_CYLINDER: -1.00
OD_SPHERE: PLANO
OD_AXIS: 098
OD_CYLINDER: -0.50

## 2024-12-02 ASSESSMENT — VISUAL ACUITY
OS_BCVA: CF 2FT
OD_BCVA: 20/20

## 2024-12-02 ASSESSMENT — CONFRONTATIONAL VISUAL FIELD TEST (CVF)
OS_FINDINGS: FULL
OD_FINDINGS: FULL

## 2024-12-02 ASSESSMENT — PACHYMETRY
OD_CT_UM: 482
OD_CT_CORRECTION: 4
OS_CT_UM: 480
OS_CT_CORRECTION: 4

## 2024-12-02 ASSESSMENT — TONOMETRY: OD_IOP_MMHG: 12

## 2024-12-16 ENCOUNTER — EMERGENCY (EMERGENCY)
Facility: HOSPITAL | Age: 74
LOS: 1 days | Discharge: HOME | End: 2024-12-16
Attending: EMERGENCY MEDICINE
Payer: MEDICARE

## 2024-12-16 VITALS
OXYGEN SATURATION: 96 % | HEART RATE: 84 BPM | SYSTOLIC BLOOD PRESSURE: 137 MMHG | DIASTOLIC BLOOD PRESSURE: 83 MMHG | TEMPERATURE: 99 F | RESPIRATION RATE: 20 BRPM | WEIGHT: 233.91 LBS

## 2024-12-16 VITALS
OXYGEN SATURATION: 96 % | RESPIRATION RATE: 20 BRPM | DIASTOLIC BLOOD PRESSURE: 76 MMHG | SYSTOLIC BLOOD PRESSURE: 124 MMHG | TEMPERATURE: 98 F | HEART RATE: 79 BPM

## 2024-12-16 DIAGNOSIS — H26.40 UNSPECIFIED SECONDARY CATARACT: Chronic | ICD-10-CM

## 2024-12-16 DIAGNOSIS — Z95.5 PRESENCE OF CORONARY ANGIOPLASTY IMPLANT AND GRAFT: Chronic | ICD-10-CM

## 2024-12-16 LAB
ANION GAP SERPL CALC-SCNC: 12 MMOL/L — SIGNIFICANT CHANGE UP (ref 5–17)
BASOPHILS # BLD AUTO: 0.02 K/UL — SIGNIFICANT CHANGE UP (ref 0–0.2)
BASOPHILS NFR BLD AUTO: 0.5 % — SIGNIFICANT CHANGE UP (ref 0–2)
BUN SERPL-MCNC: 24 MG/DL — HIGH (ref 8–20)
CALCIUM SERPL-MCNC: 8.7 MG/DL — SIGNIFICANT CHANGE UP (ref 8.4–10.5)
CHLORIDE SERPL-SCNC: 111 MMOL/L — HIGH (ref 96–108)
CO2 SERPL-SCNC: 20 MMOL/L — LOW (ref 22–29)
CREAT SERPL-MCNC: 1.69 MG/DL — HIGH (ref 0.5–1.3)
EGFR: 32 ML/MIN/1.73M2 — LOW
EOSINOPHIL # BLD AUTO: 0.39 K/UL — SIGNIFICANT CHANGE UP (ref 0–0.5)
EOSINOPHIL NFR BLD AUTO: 9.8 % — HIGH (ref 0–6)
GLUCOSE SERPL-MCNC: 109 MG/DL — HIGH (ref 70–99)
HCT VFR BLD CALC: 36.3 % — SIGNIFICANT CHANGE UP (ref 34.5–45)
HGB BLD-MCNC: 11.3 G/DL — LOW (ref 11.5–15.5)
IMM GRANULOCYTES NFR BLD AUTO: 0 % — SIGNIFICANT CHANGE UP (ref 0–0.9)
LYMPHOCYTES # BLD AUTO: 1.23 K/UL — SIGNIFICANT CHANGE UP (ref 1–3.3)
LYMPHOCYTES # BLD AUTO: 30.8 % — SIGNIFICANT CHANGE UP (ref 13–44)
MCHC RBC-ENTMCNC: 27.6 PG — SIGNIFICANT CHANGE UP (ref 27–34)
MCHC RBC-ENTMCNC: 31.1 G/DL — LOW (ref 32–36)
MCV RBC AUTO: 88.5 FL — SIGNIFICANT CHANGE UP (ref 80–100)
MONOCYTES # BLD AUTO: 0.33 K/UL — SIGNIFICANT CHANGE UP (ref 0–0.9)
MONOCYTES NFR BLD AUTO: 8.3 % — SIGNIFICANT CHANGE UP (ref 2–14)
NEUTROPHILS # BLD AUTO: 2.03 K/UL — SIGNIFICANT CHANGE UP (ref 1.8–7.4)
NEUTROPHILS NFR BLD AUTO: 50.6 % — SIGNIFICANT CHANGE UP (ref 43–77)
PLATELET # BLD AUTO: 168 K/UL — SIGNIFICANT CHANGE UP (ref 150–400)
POTASSIUM SERPL-MCNC: 5.6 MMOL/L — HIGH (ref 3.5–5.3)
POTASSIUM SERPL-SCNC: 5.6 MMOL/L — HIGH (ref 3.5–5.3)
RBC # BLD: 4.1 M/UL — SIGNIFICANT CHANGE UP (ref 3.8–5.2)
RBC # FLD: 14.7 % — HIGH (ref 10.3–14.5)
SODIUM SERPL-SCNC: 142 MMOL/L — SIGNIFICANT CHANGE UP (ref 135–145)
TROPONIN T, HIGH SENSITIVITY RESULT: 16 NG/L — SIGNIFICANT CHANGE UP (ref 0–51)
WBC # BLD: 4 K/UL — SIGNIFICANT CHANGE UP (ref 3.8–10.5)
WBC # FLD AUTO: 4 K/UL — SIGNIFICANT CHANGE UP (ref 3.8–10.5)

## 2024-12-16 PROCEDURE — 93005 ELECTROCARDIOGRAM TRACING: CPT

## 2024-12-16 PROCEDURE — 71045 X-RAY EXAM CHEST 1 VIEW: CPT | Mod: 26

## 2024-12-16 PROCEDURE — 96374 THER/PROPH/DIAG INJ IV PUSH: CPT

## 2024-12-16 PROCEDURE — 96375 TX/PRO/DX INJ NEW DRUG ADDON: CPT

## 2024-12-16 PROCEDURE — 80053 COMPREHEN METABOLIC PANEL: CPT

## 2024-12-16 PROCEDURE — 82962 GLUCOSE BLOOD TEST: CPT

## 2024-12-16 PROCEDURE — 84484 ASSAY OF TROPONIN QUANT: CPT

## 2024-12-16 PROCEDURE — 36415 COLL VENOUS BLD VENIPUNCTURE: CPT

## 2024-12-16 PROCEDURE — 80048 BASIC METABOLIC PNL TOTAL CA: CPT

## 2024-12-16 PROCEDURE — 93010 ELECTROCARDIOGRAM REPORT: CPT

## 2024-12-16 PROCEDURE — 99285 EMERGENCY DEPT VISIT HI MDM: CPT | Mod: 25

## 2024-12-16 PROCEDURE — 99285 EMERGENCY DEPT VISIT HI MDM: CPT

## 2024-12-16 PROCEDURE — 71045 X-RAY EXAM CHEST 1 VIEW: CPT

## 2024-12-16 PROCEDURE — 85025 COMPLETE CBC W/AUTO DIFF WBC: CPT

## 2024-12-16 RX ORDER — INSULIN REG, HUM S-S BUFF 100/ML
5 VIAL (ML) INJECTION ONCE
Refills: 0 | Status: COMPLETED | OUTPATIENT
Start: 2024-12-16 | End: 2024-12-16

## 2024-12-16 RX ORDER — ACETAMINOPHEN 500MG 500 MG/1
650 TABLET, COATED ORAL ONCE
Refills: 0 | Status: COMPLETED | OUTPATIENT
Start: 2024-12-16 | End: 2024-12-16

## 2024-12-16 RX ADMIN — Medication 5 UNIT(S): at 17:09

## 2024-12-16 RX ADMIN — ACETAMINOPHEN 500MG 650 MILLIGRAM(S): 500 TABLET, COATED ORAL at 18:26

## 2024-12-16 RX ADMIN — Medication 50 MILLILITER(S): at 17:09

## 2024-12-16 NOTE — ED ADULT NURSE NOTE - NS ED NURSE LEVEL OF CONSCIOUSNESS AFFECT
Ming Delvalle  Female, 50year old, 1964  Gender Pronouns:   Not Documented  Phone:   [phone] (M)  Last Weight:   99.2 kg  PCP:   Rodrigo Ching MD  Allergies:   Dust,   Â   Pollen  Primary InsDoretha Berliner  MRN:   [id number]  Patient Portal:   Active  Last Logon:   01/15/20  Next Appt: With Laboratory  2020 at 1:00 PM  Last Appt With Me:   Hx:      RE: [facility] reschedule for bryan from    Received:  Today   Message Contents   [address]      Â       Pt. rescheduled to 4-3-20 [place]

## 2024-12-16 NOTE — ED PROVIDER NOTE - NS ED ROS FT
CONSTITUTIONAL: Denies fever, chills, fatigue  HEENT: Denies acute changes in vision and hearing  CARDIO: (+)Chest Pain, (-)SOB  PULM: Denies cough, wheezing, SOB  ABD: Denies abd pain, n/v/d/c  : Denies dysuria, urinary frequency  NEURO: Denies HA, numbness/tingling  EXTREMITIES: Denies LE swelling, calf pain

## 2024-12-16 NOTE — ED PROVIDER NOTE - ATTENDING CONTRIBUTION TO CARE
74 year old female PMHx HTN, DM c/o chest pain. PE unremarkable. labs and ECG results reviewed with patient. recent cardiac work up reviewed. patient feels comfortable with discharge and medical plan; PMD or clinic follow up recommended for reassessment. Patient is aware of signs/symptoms to return to the emergency department.

## 2024-12-16 NOTE — ED PROVIDER NOTE - OBJECTIVE STATEMENT
Patient called stating she tried to complete an E-Visit but it told he to contact her physician office. Sx: Eye lid is sore, started Saturday thought it was infected eye lash. Patient woke up this morning and it is swollen, black and blue outside her eye.
Pharmacy is now [facility] on [facility]
74y female w/ pmh of HTN, DM, CAD, coronary stent, CKD IV presenting with dull midsternal chest pain that began this morning. The patient states she woke up this AM and had a dull, throbbing, non-radiating mid-sternal chest pain 7/10 in intensity. It was constant and not reproducible on touch, states it felt 'deeper' in her chest. She also states that of late, she has been having headaches for the past 2-3 weeks and for the past month has had some increased fatigue.     Of note, patient was in ED with similar complain and ACS work up was negative. She was evaluated by her outpatient cardiologist who reviewed the following:    Left Heart Catheterization (February 13, 2024): Long area of moderate LAD disease (similar to previous imaging).   Patent LAD stent. Medical management recommended.  LM: no disease  LAD: proximal 40% stenosis. Mid patent stent.   Cx: mild atherosclerosis  RCA: proximal 10-20% stenosis. Mid 20% stenosis.   Last transthoracic Echocardiogram (1/22/2024): The LV cavity size is normal with only mild LVH. The LV systolic   and diastolic function is normal with LVEF of 60 to 65%. There are no regional wall motion abnormalities    Currently, the patient denies any fevers, chills, n/v/d, abd pain, sob, leg swelling.

## 2024-12-16 NOTE — ED PROVIDER NOTE - CLINICAL SUMMARY MEDICAL DECISION MAKING FREE TEXT BOX
75 yo F with PMH CAD, HTN, HLD, presenting for chest pain. Patients basic lab work and troponin show... CXR with unremarkable findings. EKG reviewed, unremarkable. ACS less likely given work up findings. Will advise patient to f/u with cardiologist and other specialists.  used 73 yo F with PMH CAD, HTN, HLD, presenting for chest pain. Patients basic lab work and troponin are unremarkable. CXR with unremarkable findings. EKG reviewed, unremarkable. ACS less likely given work up findings. Will advise patient to f/u with cardiologist and other specialists. 75 yo F with PMH CAD, HTN, HLD, presenting for chest pain. Patients basic lab work and troponin are unremarkable. Mild hyperkalemia, corrected with dextrose and insulin. CXR with unremarkable findings. EKG reviewed, unremarkable. ACS less likely given work up findings. Will advise patient to f/u with cardiologist and other specialists.

## 2024-12-16 NOTE — ED ADULT NURSE NOTE - OBJECTIVE STATEMENT
Pt A+Ox4. states "I had chest pain earlier but now the pain is gone". Pt describes pain as pressure, sitting on chest. Pt denies SOB, ABD pain, HA, N/V/D or dizziness. Respirations are equal and unlabored on room air, pt speaking complete coherent sentences. Pt connected to  and cardiac monitor. Pt left in position of comfort, wheels of stretcher locked and in the lowest position. Call bell within reach.

## 2024-12-16 NOTE — ED ADULT TRIAGE NOTE - NS ED NURSE DIRECT TO ROOM YN
Instructed patient/caregiver to follow-up with primary care physician./Verbal/written post procedure instructions were given to patient/caregiver./Instructed patient/caregiver regarding signs and symptoms of infection./Keep the cast/splint/dressing clean and dry.
No

## 2024-12-16 NOTE — ED PROVIDER NOTE - PATIENT PORTAL LINK FT
You can access the FollowMyHealth Patient Portal offered by [facility] by registering at the following website: [email or web address]. By joining [employer]’s FollowMyHealth portal, you will also be able to view your health information using other applications (apps) compatible with our system.

## 2024-12-16 NOTE — ED ADULT NURSE NOTE - NSFALLHARMRISKINTERV_ED_ALL_ED

## 2024-12-16 NOTE — ED ADULT NURSE REASSESSMENT NOTE - NS ED NURSE REASSESS COMMENT FT1
MD aware of pts BS, states to give medication per order. Pt A+Ox4, respirations equal and unlabored on room air. Pt provided with orange juice, crackers, and cookies. Pt connected to  and cardiac monitor. Pt left in position of comfort, wheels of stretcher locked and in the lowest position. Call bell within reach.

## 2025-01-08 ENCOUNTER — EMERGENCY (EMERGENCY)
Facility: HOSPITAL | Age: 75
LOS: 1 days | Discharge: HOME | End: 2025-01-08
Attending: EMERGENCY MEDICINE
Payer: MEDICARE

## 2025-01-08 VITALS
DIASTOLIC BLOOD PRESSURE: 61 MMHG | HEART RATE: 63 BPM | OXYGEN SATURATION: 97 % | RESPIRATION RATE: 20 BRPM | SYSTOLIC BLOOD PRESSURE: 123 MMHG | TEMPERATURE: 98 F | WEIGHT: 233.91 LBS

## 2025-01-08 VITALS
RESPIRATION RATE: 18 BRPM | SYSTOLIC BLOOD PRESSURE: 131 MMHG | TEMPERATURE: 98 F | OXYGEN SATURATION: 96 % | HEART RATE: 59 BPM | DIASTOLIC BLOOD PRESSURE: 79 MMHG

## 2025-01-08 DIAGNOSIS — Z95.5 PRESENCE OF CORONARY ANGIOPLASTY IMPLANT AND GRAFT: Chronic | ICD-10-CM

## 2025-01-08 DIAGNOSIS — H26.40 UNSPECIFIED SECONDARY CATARACT: Chronic | ICD-10-CM

## 2025-01-08 LAB
ALBUMIN SERPL ELPH-MCNC: 3.7 G/DL — SIGNIFICANT CHANGE UP (ref 3.3–5.2)
ALBUMIN SERPL ELPH-MCNC: 3.8 G/DL — SIGNIFICANT CHANGE UP (ref 3.3–5.2)
ALP SERPL-CCNC: 125 U/L — HIGH (ref 40–120)
ALP SERPL-CCNC: 133 U/L — HIGH (ref 40–120)
ALT FLD-CCNC: 11 U/L — SIGNIFICANT CHANGE UP
ALT FLD-CCNC: 9 U/L — SIGNIFICANT CHANGE UP
ANION GAP SERPL CALC-SCNC: 10 MMOL/L — SIGNIFICANT CHANGE UP (ref 5–17)
ANION GAP SERPL CALC-SCNC: 12 MMOL/L — SIGNIFICANT CHANGE UP (ref 5–17)
AST SERPL-CCNC: 18 U/L — SIGNIFICANT CHANGE UP
AST SERPL-CCNC: 27 U/L — SIGNIFICANT CHANGE UP
BASOPHILS # BLD AUTO: 0.02 K/UL — SIGNIFICANT CHANGE UP (ref 0–0.2)
BASOPHILS NFR BLD AUTO: 0.4 % — SIGNIFICANT CHANGE UP (ref 0–2)
BILIRUB SERPL-MCNC: 0.3 MG/DL — LOW (ref 0.4–2)
BILIRUB SERPL-MCNC: 0.3 MG/DL — LOW (ref 0.4–2)
BUN SERPL-MCNC: 23 MG/DL — HIGH (ref 8–20)
BUN SERPL-MCNC: 24.1 MG/DL — HIGH (ref 8–20)
CALCIUM SERPL-MCNC: 8.8 MG/DL — SIGNIFICANT CHANGE UP (ref 8.4–10.5)
CALCIUM SERPL-MCNC: 8.8 MG/DL — SIGNIFICANT CHANGE UP (ref 8.4–10.5)
CHLORIDE SERPL-SCNC: 111 MMOL/L — HIGH (ref 96–108)
CHLORIDE SERPL-SCNC: 113 MMOL/L — HIGH (ref 96–108)
CO2 SERPL-SCNC: 17 MMOL/L — LOW (ref 22–29)
CO2 SERPL-SCNC: 19 MMOL/L — LOW (ref 22–29)
CREAT SERPL-MCNC: 1.73 MG/DL — HIGH (ref 0.5–1.3)
CREAT SERPL-MCNC: 1.81 MG/DL — HIGH (ref 0.5–1.3)
EGFR: 29 ML/MIN/1.73M2 — LOW
EGFR: 31 ML/MIN/1.73M2 — LOW
EOSINOPHIL # BLD AUTO: 0.27 K/UL — SIGNIFICANT CHANGE UP (ref 0–0.5)
EOSINOPHIL NFR BLD AUTO: 5.1 % — SIGNIFICANT CHANGE UP (ref 0–6)
GLUCOSE SERPL-MCNC: 105 MG/DL — HIGH (ref 70–99)
GLUCOSE SERPL-MCNC: 79 MG/DL — SIGNIFICANT CHANGE UP (ref 70–99)
HCT VFR BLD CALC: 36.7 % — SIGNIFICANT CHANGE UP (ref 34.5–45)
HGB BLD-MCNC: 11.4 G/DL — LOW (ref 11.5–15.5)
IMM GRANULOCYTES NFR BLD AUTO: 0.4 % — SIGNIFICANT CHANGE UP (ref 0–0.9)
LIDOCAIN IGE QN: 44 U/L — SIGNIFICANT CHANGE UP (ref 22–51)
LYMPHOCYTES # BLD AUTO: 1.72 K/UL — SIGNIFICANT CHANGE UP (ref 1–3.3)
LYMPHOCYTES # BLD AUTO: 32.4 % — SIGNIFICANT CHANGE UP (ref 13–44)
MCHC RBC-ENTMCNC: 28.2 PG — SIGNIFICANT CHANGE UP (ref 27–34)
MCHC RBC-ENTMCNC: 31.1 G/DL — LOW (ref 32–36)
MCV RBC AUTO: 90.8 FL — SIGNIFICANT CHANGE UP (ref 80–100)
MONOCYTES # BLD AUTO: 0.41 K/UL — SIGNIFICANT CHANGE UP (ref 0–0.9)
MONOCYTES NFR BLD AUTO: 7.7 % — SIGNIFICANT CHANGE UP (ref 2–14)
NEUTROPHILS # BLD AUTO: 2.87 K/UL — SIGNIFICANT CHANGE UP (ref 1.8–7.4)
NEUTROPHILS NFR BLD AUTO: 54 % — SIGNIFICANT CHANGE UP (ref 43–77)
PLATELET # BLD AUTO: 174 K/UL — SIGNIFICANT CHANGE UP (ref 150–400)
POTASSIUM SERPL-MCNC: 5 MMOL/L — SIGNIFICANT CHANGE UP (ref 3.5–5.3)
POTASSIUM SERPL-MCNC: 6.9 MMOL/L — CRITICAL HIGH (ref 3.5–5.3)
POTASSIUM SERPL-SCNC: 5 MMOL/L — SIGNIFICANT CHANGE UP (ref 3.5–5.3)
POTASSIUM SERPL-SCNC: 6.9 MMOL/L — CRITICAL HIGH (ref 3.5–5.3)
PROT SERPL-MCNC: 7 G/DL — SIGNIFICANT CHANGE UP (ref 6.6–8.7)
PROT SERPL-MCNC: 7.2 G/DL — SIGNIFICANT CHANGE UP (ref 6.6–8.7)
RBC # BLD: 4.04 M/UL — SIGNIFICANT CHANGE UP (ref 3.8–5.2)
RBC # FLD: 14.9 % — HIGH (ref 10.3–14.5)
SODIUM SERPL-SCNC: 140 MMOL/L — SIGNIFICANT CHANGE UP (ref 135–145)
SODIUM SERPL-SCNC: 142 MMOL/L — SIGNIFICANT CHANGE UP (ref 135–145)
WBC # BLD: 5.31 K/UL — SIGNIFICANT CHANGE UP (ref 3.8–10.5)
WBC # FLD AUTO: 5.31 K/UL — SIGNIFICANT CHANGE UP (ref 3.8–10.5)

## 2025-01-08 PROCEDURE — 36415 COLL VENOUS BLD VENIPUNCTURE: CPT

## 2025-01-08 PROCEDURE — 74176 CT ABD & PELVIS W/O CONTRAST: CPT | Mod: MC

## 2025-01-08 PROCEDURE — 99285 EMERGENCY DEPT VISIT HI MDM: CPT

## 2025-01-08 PROCEDURE — 73030 X-RAY EXAM OF SHOULDER: CPT

## 2025-01-08 PROCEDURE — 80053 COMPREHEN METABOLIC PANEL: CPT

## 2025-01-08 PROCEDURE — 96374 THER/PROPH/DIAG INJ IV PUSH: CPT

## 2025-01-08 PROCEDURE — 83690 ASSAY OF LIPASE: CPT

## 2025-01-08 PROCEDURE — 73502 X-RAY EXAM HIP UNI 2-3 VIEWS: CPT

## 2025-01-08 PROCEDURE — 99285 EMERGENCY DEPT VISIT HI MDM: CPT | Mod: 25

## 2025-01-08 PROCEDURE — 73502 X-RAY EXAM HIP UNI 2-3 VIEWS: CPT | Mod: 26,RT

## 2025-01-08 PROCEDURE — 74176 CT ABD & PELVIS W/O CONTRAST: CPT | Mod: 26,MC

## 2025-01-08 PROCEDURE — 93010 ELECTROCARDIOGRAM REPORT: CPT

## 2025-01-08 PROCEDURE — 73030 X-RAY EXAM OF SHOULDER: CPT | Mod: 26,RT

## 2025-01-08 PROCEDURE — 85025 COMPLETE CBC W/AUTO DIFF WBC: CPT

## 2025-01-08 PROCEDURE — 93005 ELECTROCARDIOGRAM TRACING: CPT

## 2025-01-08 RX ORDER — ACETAMINOPHEN 80 MG/.8ML
1000 SOLUTION/ DROPS ORAL ONCE
Refills: 0 | Status: COMPLETED | OUTPATIENT
Start: 2025-01-08 | End: 2025-01-08

## 2025-01-08 RX ADMIN — ACETAMINOPHEN 400 MILLIGRAM(S): 80 SOLUTION/ DROPS ORAL at 15:34

## 2025-01-08 NOTE — ED PROVIDER NOTE - CARE PROVIDER_API CALL
Bronson Harman  [facility] [employer]  [address] [place] [address]  Phone: [phone]  Fax: [phone]  Follow Up Time:

## 2025-01-08 NOTE — ED PROVIDER NOTE - NSFOLLOWUPINSTRUCTIONS_ED_ALL_ED_FT
TAKE TYLENOL EVERY 6 HOURS FOR PAIN.    FOLLOW UP WITH ORTHOPEDIC IF SHOULDER PAIN PERSISTS.      Get help right away if:  You cannot stop vomiting.  Your pain is only in one part of the abdomen. Pain on the right side could be caused by appendicitis.  You have bloody or black poop (stool), or poop that looks like tar.  You have trouble breathing.  You have chest pain.

## 2025-01-08 NOTE — ED ADULT NURSE NOTE - OBJECTIVE STATEMENT
A&Ox3, s/p fall, state she was carrying her robe and she think she may have slipped on it. Uses walker at home usually. +right hip pain. Breathing is spontaneous even and unlabored. Denies head trauma, LOC, numbness/tingling. Bed is in lowest position and side rails up. Safety precautions maintained. 20g IV in right AC.

## 2025-01-08 NOTE — ED PROVIDER NOTE - CONDITION AT DISCHARGE:
Post Fall Notification Progress Note      Evaluation Post Fall (Assess Once per Fall)  Patient Fall, Type of Fall: Fall, Inpatient  Date of Fall: 07/29/18  Time of Fall: 0429  Fall Witnessed/Observed?: No  Was the Fall Assisted? /[by employee]?: Unassisted  Location of Patient Prior to Fall: Patient room  Prior to the Fall, What was the Patient Doing or Trying to Do?: Getting up without assistance  Patient Statement Related to Fall: I had to use the bathroom  Physiological Fall Condition: Confusion (known prior to the fall), Weakness, Gait instability  New Complaints Post Fall (See Assessment): None  Fall-related Injury Level   [What type of injury was sustained?]: No apparent injuries from fall  Additional Suspected Injury Interventions: None  Provider Notified: [facility]  Provider Notification Time: 0444  Family Notified: Voicemail left to return call   Family Notification Time: 0508      Description of fall event: Patient with VPM bed check zone 2.  Bed alarm went off, VPM called NA.  RN first to arrive. Patient found holding himself up between bed and sink (one arm on each bracing himself). Writer helped patient to standing upright and guided patient to sit on side of bed.  Patient stated \" The only thing that hurts is my pride\".  Physical assessment done on patient. No new findings.  Physician notified: Ariana  Patient  notified: Voicemail left for primary contact to return call         Satisfactory

## 2025-01-08 NOTE — ED PROVIDER NOTE - PROGRESS NOTE DETAILS
CMP revealed 6.9 K, moderately hemolyzed. pt place don cardiac monitor and EKG done (reviewed by Lauro Fox with no QRS changes or peaked T waves, NSR) while awaiting for repeat CMP. repeat CMP 5.0 K. CT abdomen unremarkable. xrays revealed no fractures or dislocations. pt able to ambulate and admits pain has improved. discussed all results with pt, will have pt f/u with ortho if shoulder pain continues. strict return precautions discussed.

## 2025-01-08 NOTE — ED ADULT NURSE NOTE - TEMPLATE
General
Pt A&OX3, presenting to the ER with c/o headache and double vision. Pt reports having COVID back on January 20th. On Saturday began having a a diffuse headache and double vision. Spoke with his primary care MD today that advised he come to the ER immediately.   Denies CP, SOB, fevers.  Taken for EKG

## 2025-01-08 NOTE — ED PROVIDER NOTE - OBJECTIVE STATEMENT
74-year-old female past medical history of CKD IV, HTN, DM, CAD, coronary stent (on Plavix and aspirin) presents s/p fall today.  Patient admits she was walking in her house and thinks she tripped on her rug and fell onto her right side.  Now complaining of right hip pain, shoulder pain, and right abdominal pain.  No chest pain, headaches, shortness of breath.  No meds taken at home.

## 2025-01-08 NOTE — ED PROVIDER NOTE - CLINICAL SUMMARY MEDICAL DECISION MAKING FREE TEXT BOX
74-year-old female past medical history of CKD IV, HTN, DM, CAD, coronary stent (on Plavix and aspirin) presents s/p fall today.  Patient admits she was walking in her house and thinks she tripped on her rug and fell onto her right side.  Now complaining of right hip pain, shoulder pain, and right abdominal pain.  No chest pain, headaches, shortness of breath.  No meds taken at home. Vitals normal. + TTP along right hip, proximal femur, right shoulder. abd soft, + TTP RLQ. no spinal TTP, neuro intact. plan for CBC, CMP, lipase,  CT abdomen to r/o injury, xray shoulder and xray hip. ofirmev for pain. reassess pending.

## 2025-01-08 NOTE — ED ADULT NURSE NOTE - NSFALLRISKINTERV_ED_ALL_ED

## 2025-01-08 NOTE — ED PROVIDER NOTE - ATTENDING APP SHARED VISIT CONTRIBUTION OF CARE
Joycelyn: I performed a face to face bedside interview with patient regarding history of present illness, review of symptoms and past medical history. I completed an independent physical exam.  I have discussed patient's plan of care with advanced care provider.   I agree with note as stated above including HISTORY OF PRESENT ILLNESS, HIV, PAST MEDICAL/SURGICAL/FAMILY/SOCIAL HISTORY, ALLERGIES AND HOME MEDICATIONS, REVIEW OF SYSTEMS, PHYSICAL EXAM, MEDICAL DECISION MAKING and any PROGRESS NOTES during the time I functioned as the attending physician for this patient  unless otherwise noted. My brief assessment is as follows: hx ckd4, htn, hld, dm present s/p fall onto right side. denies hitting head, no loc, no a/c. no cp/sob. c/o some pain to right shoulder, right hip and right abd around to back. no numbness/neuro symptoms. walks with walker and was able to after falling. no other complaints. non toxic, ncat, no midline ttp, ctab, rrr, abd soft, mild right sided ttp, no bruising, mild ttp to right shoulder and hip with full rom. neurovascularly intact. xrays, ct, pain control, reassess

## 2025-01-08 NOTE — ED PROVIDER NOTE - PATIENT PORTAL LINK FT
You can access the FollowMyHealth Patient Portal offered by [facility] by registering at the following website: [email or web address]. By joining [employer]’s FollowMyHealth portal, you will also be able to view your health information using other applications (apps) compatible with our system.

## 2025-01-08 NOTE — ED ADULT NURSE REASSESSMENT NOTE - NS ED NURSE REASSESS COMMENT FT1
Pt denies any kim pain, dizziness, SOB. Connected to cardiac monitor. Breathing is spontaneous even and unlabored. Bed is in lowest position and side rails up. Safety precautions maintained.

## 2025-01-08 NOTE — CHART NOTE - NSCHARTNOTEFT_GEN_A_CORE
[facility] set up [employer] confirmation number [id number] via [facility] 2  for next available time. Pickup is 10:15 ER kamari aaron, RN aware.

## 2025-01-08 NOTE — ED ADULT TRIAGE NOTE - CHIEF COMPLAINT QUOTE
S/P trip and fall. Pt landed on right side. C/OI right hip and shoulder pain. Was ambulatory after fall. Did not hitting head.

## 2025-01-22 ENCOUNTER — APPOINTMENT (OUTPATIENT)
Dept: PULMONOLOGY | Facility: CLINIC | Age: 75
End: 2025-01-22
Payer: MEDICARE

## 2025-01-22 VITALS
SYSTOLIC BLOOD PRESSURE: 124 MMHG | RESPIRATION RATE: 16 BRPM | WEIGHT: 234 LBS | DIASTOLIC BLOOD PRESSURE: 70 MMHG | BODY MASS INDEX: 39.95 KG/M2 | HEIGHT: 64 IN

## 2025-01-22 VITALS — OXYGEN SATURATION: 97 % | HEART RATE: 68 BPM

## 2025-01-22 DIAGNOSIS — G47.33 OBSTRUCTIVE SLEEP APNEA (ADULT) (PEDIATRIC): ICD-10-CM

## 2025-01-22 DIAGNOSIS — N18.4 CHRONIC KIDNEY DISEASE, STAGE 4 (SEVERE): ICD-10-CM

## 2025-01-22 DIAGNOSIS — E11.9 TYPE 2 DIABETES MELLITUS W/OUT COMPLICATIONS: ICD-10-CM

## 2025-01-22 DIAGNOSIS — R53.83 OTHER FATIGUE: ICD-10-CM

## 2025-01-22 DIAGNOSIS — R06.83 SNORING: ICD-10-CM

## 2025-01-22 DIAGNOSIS — R93.89 ABNORMAL FINDINGS ON DIAGNOSTIC IMAGING OF OTHER SPECIFIED BODY STRUCTURES: ICD-10-CM

## 2025-01-22 DIAGNOSIS — D63.1 CHRONIC KIDNEY DISEASE, STAGE 4 (SEVERE): ICD-10-CM

## 2025-01-22 DIAGNOSIS — R06.02 SHORTNESS OF BREATH: ICD-10-CM

## 2025-01-22 DIAGNOSIS — E66.01 MORBID (SEVERE) OBESITY DUE TO EXCESS CALORIES: ICD-10-CM

## 2025-01-22 PROCEDURE — G2211 COMPLEX E/M VISIT ADD ON: CPT

## 2025-01-22 PROCEDURE — 99204 OFFICE O/P NEW MOD 45 MIN: CPT

## 2025-01-22 RX ORDER — GABAPENTIN 100 MG
100 TABLET ORAL
Refills: 0 | Status: ACTIVE | COMMUNITY

## 2025-01-26 ENCOUNTER — EMERGENCY (EMERGENCY)
Facility: HOSPITAL | Age: 75
LOS: 1 days | Discharge: HOME | End: 2025-01-26
Attending: STUDENT IN AN ORGANIZED HEALTH CARE EDUCATION/TRAINING PROGRAM
Payer: MEDICARE

## 2025-01-26 VITALS
HEART RATE: 72 BPM | RESPIRATION RATE: 18 BRPM | TEMPERATURE: 98 F | DIASTOLIC BLOOD PRESSURE: 80 MMHG | SYSTOLIC BLOOD PRESSURE: 161 MMHG | OXYGEN SATURATION: 98 %

## 2025-01-26 VITALS
DIASTOLIC BLOOD PRESSURE: 73 MMHG | WEIGHT: 233.03 LBS | OXYGEN SATURATION: 96 % | HEART RATE: 77 BPM | HEIGHT: 64 IN | RESPIRATION RATE: 22 BRPM | TEMPERATURE: 99 F | SYSTOLIC BLOOD PRESSURE: 131 MMHG

## 2025-01-26 DIAGNOSIS — H26.40 UNSPECIFIED SECONDARY CATARACT: Chronic | ICD-10-CM

## 2025-01-26 DIAGNOSIS — Z95.5 PRESENCE OF CORONARY ANGIOPLASTY IMPLANT AND GRAFT: Chronic | ICD-10-CM

## 2025-01-26 LAB
ALBUMIN SERPL ELPH-MCNC: 3.5 G/DL — SIGNIFICANT CHANGE UP (ref 3.3–5.2)
ALP SERPL-CCNC: 135 U/L — HIGH (ref 40–120)
ALT FLD-CCNC: 13 U/L — SIGNIFICANT CHANGE UP
ANION GAP SERPL CALC-SCNC: 11 MMOL/L — SIGNIFICANT CHANGE UP (ref 5–17)
ANION GAP SERPL CALC-SCNC: 9 MMOL/L — SIGNIFICANT CHANGE UP (ref 5–17)
AST SERPL-CCNC: 25 U/L — SIGNIFICANT CHANGE UP
BASOPHILS # BLD AUTO: 0.01 K/UL — SIGNIFICANT CHANGE UP (ref 0–0.2)
BASOPHILS NFR BLD AUTO: 0.2 % — SIGNIFICANT CHANGE UP (ref 0–2)
BILIRUB SERPL-MCNC: 0.2 MG/DL — LOW (ref 0.4–2)
BUN SERPL-MCNC: 18.4 MG/DL — SIGNIFICANT CHANGE UP (ref 8–20)
BUN SERPL-MCNC: 19.5 MG/DL — SIGNIFICANT CHANGE UP (ref 8–20)
CALCIUM SERPL-MCNC: 8.4 MG/DL — SIGNIFICANT CHANGE UP (ref 8.4–10.5)
CALCIUM SERPL-MCNC: 8.6 MG/DL — SIGNIFICANT CHANGE UP (ref 8.4–10.5)
CHLORIDE SERPL-SCNC: 110 MMOL/L — HIGH (ref 96–108)
CHLORIDE SERPL-SCNC: 111 MMOL/L — HIGH (ref 96–108)
CO2 SERPL-SCNC: 20 MMOL/L — LOW (ref 22–29)
CO2 SERPL-SCNC: 21 MMOL/L — LOW (ref 22–29)
CREAT SERPL-MCNC: 1.78 MG/DL — HIGH (ref 0.5–1.3)
CREAT SERPL-MCNC: 1.84 MG/DL — HIGH (ref 0.5–1.3)
EGFR: 29 ML/MIN/1.73M2 — LOW
EGFR: 30 ML/MIN/1.73M2 — LOW
EOSINOPHIL # BLD AUTO: 0.23 K/UL — SIGNIFICANT CHANGE UP (ref 0–0.5)
EOSINOPHIL NFR BLD AUTO: 4.4 % — SIGNIFICANT CHANGE UP (ref 0–6)
FLUAV AG NPH QL: SIGNIFICANT CHANGE UP
FLUBV AG NPH QL: SIGNIFICANT CHANGE UP
GLUCOSE SERPL-MCNC: 116 MG/DL — HIGH (ref 70–99)
GLUCOSE SERPL-MCNC: 68 MG/DL — LOW (ref 70–99)
HCT VFR BLD CALC: 35 % — SIGNIFICANT CHANGE UP (ref 34.5–45)
HGB BLD-MCNC: 10.7 G/DL — LOW (ref 11.5–15.5)
IMM GRANULOCYTES NFR BLD AUTO: 0.2 % — SIGNIFICANT CHANGE UP (ref 0–0.9)
LYMPHOCYTES # BLD AUTO: 1.08 K/UL — SIGNIFICANT CHANGE UP (ref 1–3.3)
LYMPHOCYTES # BLD AUTO: 20.6 % — SIGNIFICANT CHANGE UP (ref 13–44)
MCHC RBC-ENTMCNC: 27.8 PG — SIGNIFICANT CHANGE UP (ref 27–34)
MCHC RBC-ENTMCNC: 30.6 G/DL — LOW (ref 32–36)
MCV RBC AUTO: 90.9 FL — SIGNIFICANT CHANGE UP (ref 80–100)
MONOCYTES # BLD AUTO: 0.63 K/UL — SIGNIFICANT CHANGE UP (ref 0–0.9)
MONOCYTES NFR BLD AUTO: 12 % — SIGNIFICANT CHANGE UP (ref 2–14)
NEUTROPHILS # BLD AUTO: 3.28 K/UL — SIGNIFICANT CHANGE UP (ref 1.8–7.4)
NEUTROPHILS NFR BLD AUTO: 62.6 % — SIGNIFICANT CHANGE UP (ref 43–77)
PLATELET # BLD AUTO: 173 K/UL — SIGNIFICANT CHANGE UP (ref 150–400)
POTASSIUM SERPL-MCNC: 4.8 MMOL/L — SIGNIFICANT CHANGE UP (ref 3.5–5.3)
POTASSIUM SERPL-MCNC: 6.2 MMOL/L — CRITICAL HIGH (ref 3.5–5.3)
POTASSIUM SERPL-SCNC: 4.8 MMOL/L — SIGNIFICANT CHANGE UP (ref 3.5–5.3)
POTASSIUM SERPL-SCNC: 6.2 MMOL/L — CRITICAL HIGH (ref 3.5–5.3)
PROT SERPL-MCNC: 6.8 G/DL — SIGNIFICANT CHANGE UP (ref 6.6–8.7)
RBC # BLD: 3.85 M/UL — SIGNIFICANT CHANGE UP (ref 3.8–5.2)
RBC # FLD: 14.8 % — HIGH (ref 10.3–14.5)
RSV RNA NPH QL NAA+NON-PROBE: SIGNIFICANT CHANGE UP
SARS-COV-2 RNA SPEC QL NAA+PROBE: DETECTED
SODIUM SERPL-SCNC: 140 MMOL/L — SIGNIFICANT CHANGE UP (ref 135–145)
SODIUM SERPL-SCNC: 142 MMOL/L — SIGNIFICANT CHANGE UP (ref 135–145)
WBC # BLD: 5.24 K/UL — SIGNIFICANT CHANGE UP (ref 3.8–10.5)
WBC # FLD AUTO: 5.24 K/UL — SIGNIFICANT CHANGE UP (ref 3.8–10.5)

## 2025-01-26 PROCEDURE — 85025 COMPLETE CBC W/AUTO DIFF WBC: CPT

## 2025-01-26 PROCEDURE — 99285 EMERGENCY DEPT VISIT HI MDM: CPT

## 2025-01-26 PROCEDURE — 99285 EMERGENCY DEPT VISIT HI MDM: CPT | Mod: 25

## 2025-01-26 PROCEDURE — 87637 SARSCOV2&INF A&B&RSV AMP PRB: CPT

## 2025-01-26 PROCEDURE — 36415 COLL VENOUS BLD VENIPUNCTURE: CPT

## 2025-01-26 PROCEDURE — 82962 GLUCOSE BLOOD TEST: CPT

## 2025-01-26 PROCEDURE — 71045 X-RAY EXAM CHEST 1 VIEW: CPT

## 2025-01-26 PROCEDURE — 96374 THER/PROPH/DIAG INJ IV PUSH: CPT

## 2025-01-26 PROCEDURE — 0241U: CPT

## 2025-01-26 PROCEDURE — 93010 ELECTROCARDIOGRAM REPORT: CPT

## 2025-01-26 PROCEDURE — 80048 BASIC METABOLIC PNL TOTAL CA: CPT

## 2025-01-26 PROCEDURE — 80053 COMPREHEN METABOLIC PANEL: CPT

## 2025-01-26 PROCEDURE — 71045 X-RAY EXAM CHEST 1 VIEW: CPT | Mod: 26

## 2025-01-26 PROCEDURE — 96376 TX/PRO/DX INJ SAME DRUG ADON: CPT

## 2025-01-26 PROCEDURE — 93005 ELECTROCARDIOGRAM TRACING: CPT

## 2025-01-26 RX ORDER — DM/PSEUDOEPHED/ACETAMINOPHEN 10-30-250
50 CAPSULE ORAL ONCE
Refills: 0 | Status: COMPLETED | OUTPATIENT
Start: 2025-01-26 | End: 2025-01-26

## 2025-01-26 RX ORDER — BACTERIOSTATIC SODIUM CHLORIDE 0.9 %
500 VIAL (ML) INJECTION ONCE
Refills: 0 | Status: COMPLETED | OUTPATIENT
Start: 2025-01-26 | End: 2025-01-26

## 2025-01-26 RX ORDER — SODIUM ZIRCONIUM CYCLOSILICATE 5 G/5G
10 POWDER, FOR SUSPENSION ORAL ONCE
Refills: 0 | Status: COMPLETED | OUTPATIENT
Start: 2025-01-26 | End: 2025-01-26

## 2025-01-26 RX ADMIN — Medication 50 MILLILITER(S): at 16:08

## 2025-01-26 RX ADMIN — Medication 500 MILLILITER(S): at 16:41

## 2025-01-26 RX ADMIN — Medication 5 UNIT(S): at 16:06

## 2025-01-26 RX ADMIN — SODIUM ZIRCONIUM CYCLOSILICATE 10 GRAM(S): 5 POWDER, FOR SUSPENSION ORAL at 16:08

## 2025-01-26 NOTE — ED ADULT NURSE NOTE - OBJECTIVE STATEMENT
pt was sent from  to the ED for c/o for SOB,  pt stated that tested positive for Covid at  office.  pt also stated that has a PMH of Stage 4 kidney disease.

## 2025-01-26 NOTE — ED PROVIDER NOTE - NSFOLLOWUPINSTRUCTIONS_ED_ALL_ED_FT
Please follow-up with your primary care doctor within seven days to discuss your visit here today. You had high potassium and received treatment with lokelma. See your doctor for repeat labs.     Please return to the emergency department for any new or concerning symptoms including but not limited to fever, chills, weakness, numbness, confusion, chest pain, difficulty breathing, abdominal pain, nausea, vomiting, diarrhea.   ===  Shortness of Breath, Adult  Shortness of breath is when a person has trouble breathing or when a person feels like she or he is having trouble breathing in enough air. Shortness of breath could be a sign of a medical problem.    Follow these instructions at home:  A sign showing that a person should not smoke.  Pollutants    Do not use any products that contain nicotine or tobacco. These products include cigarettes, chewing tobacco, and vaping devices, such as e-cigarettes. This also includes cigars and pipes. If you need help quitting, ask your health care provider.  Avoid things that can irritate your airways, including:  Smoke. This includes campfire smoke, forest fire smoke, and secondhand smoke from tobacco products. Do not smoke or allow others to smoke in your home.  Mold.  Dust.  Air pollution.  Chemical fumes.  Things that can give you an allergic reaction (allergens) if you have allergies. Common allergens include pollen from grasses or trees and animal dander.  Keep your living space clean and free of mold and dust.  General instructions    Pay attention to any changes in your symptoms.  Take over-the-counter and prescription medicines only as told by your health care provider. This includes oxygen therapy and inhaled medicines.  Rest as needed.  Return to your normal activities as told by your health care provider. Ask your health care provider what activities are safe for you.  Keep all follow-up visits. This is important.  Contact a health care provider if:  Your condition does not improve as soon as expected.  You have a hard time doing your normal activities, even after you rest.  You have new symptoms.  You cannot walk up stairs or exercise the way that you normally do.  Get help right away if:  Your shortness of breath gets worse.  You have shortness of breath when you are resting.  You feel light-headed or you faint.  You have a cough that is not controlled with medicines.  You cough up blood.  You have pain with breathing.  You have pain in your chest, arms, shoulders, or abdomen.  You have a fever.  These symptoms may be an emergency. Get help right away. Call 911.  Do not wait to see if the symptoms will go away.  Do not drive yourself to the hospital.  Summary  Shortness of breath is when a person has trouble breathing enough air. It can be a sign of a medical problem.  Avoid things that irritate your lungs, such as smoking, pollution, mold, and dust.  Pay attention to changes in your symptoms and contact your health care provider if you have a hard time completing daily activities because of shortness of breath.

## 2025-01-26 NOTE — ED PROVIDER NOTE - OBJECTIVE STATEMENT
73 y/o F with a PMHx CKD IV, HTN, DM, CAD s/p 1x coronary stent (on Plavix and aspirin) presents with shortness of breath. States on friday, increase fatigue, headache. States yesterday had sneezing, coughing, rhinorrhea, attributed to allergies orignal, stated had increase work of breathing and shortness of breath. States is feeling off balance, attributed to R eye issues, states gait has been feeling for a bit. Was evaluated at u/c for symptoms, found to be COVID positive, sent in for further evaluation. Endorses TY, headache, neck pain, loss of balance. Denies chest pain, nausea, vomiting, back pain, falls, syncope, recent travel. 73 y/o F with a PMHx CKD IV, HTN, DM, CAD s/p 1x coronary stent (on Plavix and aspirin) presents with shortness of breath. States on friday, increase fatigue, headache. States yesterday had sneezing, coughing, rhinorrhea, attributed to allergies initially, stated had increase work of breathing and shortness of breath. States is feeling off balance, attributed to R eye issues, states gait has been feeling for a bit. Was evaluated at u/c for symptoms, found to be COVID positive, sent in for further evaluation. Endorses TY, headache, neck pain, loss of balance. Denies chest pain, nausea, vomiting, back pain, falls, syncope, recent travel.

## 2025-01-26 NOTE — ED ADULT NURSE NOTE - NSFALLHARMRISKINTERV_ED_ALL_ED
Assistance OOB with selected safe patient handling equipment if applicable/Communicate risk of Fall with Harm to all staff, patient, and family/Monitor gait and stability/Provide patient with walking aids/Provide visual cue: red socks, yellow wristband, yellow gown, etc/Reinforce activity limits and safety measures with patient and family/Bed in lowest position, wheels locked, appropriate side rails in place/Call bell, personal items and telephone in reach/Instruct patient to call for assistance before getting out of bed/chair/stretcher/Non-slip footwear applied when patient is off stretcher/[place] to call system/Physically safe environment - no spills, clutter or unnecessary equipment/Purposeful Proactive Rounding/Room/bathroom lighting operational, light cord in reach

## 2025-01-26 NOTE — ED PROVIDER NOTE - NSICDXPASTMEDICALHX_GEN_ALL_CORE_FT
PAST MEDICAL HISTORY:  Anemia     Arthritis     CAD (coronary artery disease)     Chest pain     DJD (degenerative joint disease), lumbar     DM (diabetes mellitus)     Fatty liver     GERD (gastroesophageal reflux disease)     Hiatal hernia     HTN (hypertension)     BONNY (obstructive sleep apnea)     Stage 3 chronic kidney disease      unknown

## 2025-01-26 NOTE — ED PROVIDER NOTE - PATIENT PORTAL LINK FT
You can access the FollowMyHealth Patient Portal offered by [facility] by registering at the following website: [email or web address]. By joining [employer]’s FollowMyHealth portal, you will also be able to view your health information using other applications (apps) compatible with our system.

## 2025-01-26 NOTE — ED PROVIDER NOTE - ATTENDING CONTRIBUTION TO CARE
Pt is a 73 yo F here for cough, nasal congestion, fatigue, body aches. pt went to  and was diagnosed with covid and sent to the ED. no cp. no n/v.    physical - rrr. ctab. abd - soft, nt. no edema. no rash.    plan - labs and imaging reviewed.  initially elevated potassium but was hemolyzed. pt treated and improved. Pt with covid.  will d/c with return and fup instructions.

## 2025-01-26 NOTE — ED PROVIDER NOTE - CLINICAL SUMMARY MEDICAL DECISION MAKING FREE TEXT BOX
75 y/o F with a PMHx CKD IV, HTN, DM, CAD s/p 1x coronary stent (on Plavix and aspirin) presents with shortness of breath, ddx at [facility] with covid. Endorses TY, headache, neck pain, loss of balance. Denies chest pain, nausea, vomiting, back pain, falls, syncope, recent travel. Physical exam unremarkable. Labs with potassium at 6/3, treated with insulin/d50/lokelma. Repeat BMP normal. Discharge with return precautions and outpatient.

## 2025-01-26 NOTE — ED PROVIDER NOTE - ED STEMI HIDDEN
· Refill requested by: Pharmacy Fax  · Last office visit for this medication: 22  · Next office visit: 22  · Medication(s) Requested: Gabapentin  · Last refill: 2/15/22 (mail order pharmacy)  · Dosage: 100 mg   · Si cap bid  · Quantity requested: 60  Refer to paper form refill protocol  Can not refill without MD authorization     hide

## 2025-01-28 DIAGNOSIS — N18.4 CHRONIC KIDNEY DISEASE, STAGE 4 (SEVERE): ICD-10-CM

## 2025-01-28 RX ORDER — SODIUM ZIRCONIUM CYCLOSILICATE 5 G/5G
5 POWDER, FOR SUSPENSION ORAL
Qty: 30 | Refills: 3 | Status: ACTIVE | COMMUNITY
Start: 2025-01-28 | End: 1900-01-01

## 2025-02-05 ENCOUNTER — EMERGENCY (EMERGENCY)
Facility: HOSPITAL | Age: 75
LOS: 1 days | Discharge: HOME | End: 2025-02-05
Attending: STUDENT IN AN ORGANIZED HEALTH CARE EDUCATION/TRAINING PROGRAM
Payer: MEDICARE

## 2025-02-05 VITALS
TEMPERATURE: 99 F | OXYGEN SATURATION: 99 % | HEIGHT: 64 IN | SYSTOLIC BLOOD PRESSURE: 150 MMHG | RESPIRATION RATE: 16 BRPM | DIASTOLIC BLOOD PRESSURE: 52 MMHG | HEART RATE: 97 BPM | WEIGHT: 233.91 LBS

## 2025-02-05 DIAGNOSIS — Z95.5 PRESENCE OF CORONARY ANGIOPLASTY IMPLANT AND GRAFT: Chronic | ICD-10-CM

## 2025-02-05 DIAGNOSIS — H26.40 UNSPECIFIED SECONDARY CATARACT: Chronic | ICD-10-CM

## 2025-02-05 LAB
ALBUMIN SERPL ELPH-MCNC: 3.8 G/DL — SIGNIFICANT CHANGE UP (ref 3.3–5.2)
ALP SERPL-CCNC: 141 U/L — HIGH (ref 40–120)
ALT FLD-CCNC: 11 U/L — SIGNIFICANT CHANGE UP
ANION GAP SERPL CALC-SCNC: 11 MMOL/L — SIGNIFICANT CHANGE UP (ref 5–17)
AST SERPL-CCNC: 19 U/L — SIGNIFICANT CHANGE UP
BASOPHILS # BLD AUTO: 0.02 K/UL — SIGNIFICANT CHANGE UP (ref 0–0.2)
BASOPHILS NFR BLD AUTO: 0.4 % — SIGNIFICANT CHANGE UP (ref 0–2)
BILIRUB SERPL-MCNC: 0.3 MG/DL — LOW (ref 0.4–2)
BUN SERPL-MCNC: 21.9 MG/DL — HIGH (ref 8–20)
CALCIUM SERPL-MCNC: 8.8 MG/DL — SIGNIFICANT CHANGE UP (ref 8.4–10.5)
CHLORIDE SERPL-SCNC: 110 MMOL/L — HIGH (ref 96–108)
CO2 SERPL-SCNC: 21 MMOL/L — LOW (ref 22–29)
CREAT SERPL-MCNC: 1.83 MG/DL — HIGH (ref 0.5–1.3)
D DIMER BLD IA.RAPID-MCNC: 513 NG/ML DDU — HIGH
EGFR: 29 ML/MIN/1.73M2 — LOW
EOSINOPHIL # BLD AUTO: 0.27 K/UL — SIGNIFICANT CHANGE UP (ref 0–0.5)
EOSINOPHIL NFR BLD AUTO: 5.3 % — SIGNIFICANT CHANGE UP (ref 0–6)
GLUCOSE SERPL-MCNC: 71 MG/DL — SIGNIFICANT CHANGE UP (ref 70–99)
HCT VFR BLD CALC: 35.3 % — SIGNIFICANT CHANGE UP (ref 34.5–45)
HGB BLD-MCNC: 11 G/DL — LOW (ref 11.5–15.5)
IMM GRANULOCYTES NFR BLD AUTO: 0.2 % — SIGNIFICANT CHANGE UP (ref 0–0.9)
LYMPHOCYTES # BLD AUTO: 1.6 K/UL — SIGNIFICANT CHANGE UP (ref 1–3.3)
LYMPHOCYTES # BLD AUTO: 31.4 % — SIGNIFICANT CHANGE UP (ref 13–44)
MCHC RBC-ENTMCNC: 27.8 PG — SIGNIFICANT CHANGE UP (ref 27–34)
MCHC RBC-ENTMCNC: 31.2 G/DL — LOW (ref 32–36)
MCV RBC AUTO: 89.4 FL — SIGNIFICANT CHANGE UP (ref 80–100)
MONOCYTES # BLD AUTO: 0.43 K/UL — SIGNIFICANT CHANGE UP (ref 0–0.9)
MONOCYTES NFR BLD AUTO: 8.4 % — SIGNIFICANT CHANGE UP (ref 2–14)
NEUTROPHILS # BLD AUTO: 2.76 K/UL — SIGNIFICANT CHANGE UP (ref 1.8–7.4)
NEUTROPHILS NFR BLD AUTO: 54.3 % — SIGNIFICANT CHANGE UP (ref 43–77)
NT-PROBNP SERPL-SCNC: 193 PG/ML — SIGNIFICANT CHANGE UP (ref 0–300)
PLATELET # BLD AUTO: 193 K/UL — SIGNIFICANT CHANGE UP (ref 150–400)
POTASSIUM SERPL-MCNC: 5.3 MMOL/L — SIGNIFICANT CHANGE UP (ref 3.5–5.3)
POTASSIUM SERPL-SCNC: 5.3 MMOL/L — SIGNIFICANT CHANGE UP (ref 3.5–5.3)
PROT SERPL-MCNC: 7.2 G/DL — SIGNIFICANT CHANGE UP (ref 6.6–8.7)
RBC # BLD: 3.95 M/UL — SIGNIFICANT CHANGE UP (ref 3.8–5.2)
RBC # FLD: 14.5 % — SIGNIFICANT CHANGE UP (ref 10.3–14.5)
SODIUM SERPL-SCNC: 142 MMOL/L — SIGNIFICANT CHANGE UP (ref 135–145)
TROPONIN T, HIGH SENSITIVITY RESULT: 19 NG/L — SIGNIFICANT CHANGE UP (ref 0–51)
TROPONIN T, HIGH SENSITIVITY RESULT: 21 NG/L — SIGNIFICANT CHANGE UP (ref 0–51)
WBC # BLD: 5.09 K/UL — SIGNIFICANT CHANGE UP (ref 3.8–10.5)
WBC # FLD AUTO: 5.09 K/UL — SIGNIFICANT CHANGE UP (ref 3.8–10.5)

## 2025-02-05 PROCEDURE — 71045 X-RAY EXAM CHEST 1 VIEW: CPT | Mod: 26

## 2025-02-05 PROCEDURE — 93010 ELECTROCARDIOGRAM REPORT: CPT

## 2025-02-05 PROCEDURE — 99285 EMERGENCY DEPT VISIT HI MDM: CPT

## 2025-02-05 RX ORDER — BACTERIOSTATIC SODIUM CHLORIDE 0.9 %
500 VIAL (ML) INJECTION ONCE
Refills: 0 | Status: COMPLETED | OUTPATIENT
Start: 2025-02-05 | End: 2025-02-05

## 2025-02-05 RX ADMIN — Medication 500 MILLILITER(S): at 21:02

## 2025-02-05 NOTE — ED ADULT NURSE NOTE - OBJECTIVE STATEMENT
pt presenting for palpitations beginning this AM, denies cx pain, difficulty breathing, Took omeprazole this AM w/o relief.    PMH CKD stage 4, HTN,

## 2025-02-05 NOTE — ED PROVIDER NOTE - CLINICAL SUMMARY MEDICAL DECISION MAKING FREE TEXT BOX
4y Female hx HTN, HLD, CAD s/p stent 2018, CKD complaining of palpitations. Plan for labs, cxr, ekg. 4y Female hx HTN, HLD, CAD s/p stent 2018, CKD complaining of palpitations. Plan for labs, cxr, ekg. ekg nsr. 74y Female hx HTN, HLD, CAD s/p stent 2018, CKD complaining of palpitations. Plan for labs, cxr, ekg. ekg nsr.

## 2025-02-05 NOTE — ED PROVIDER NOTE - PROGRESS NOTE DETAILS
dinah: Given the significant and immediate threats to this patient based on initial presentation, the benefits of emergency contrast-enhanced CT imaging with diminished GFR greatly outweigh any risks associated with contrast-induced nephropathy. Basilia PEACOCK: Pt received in sign out, D-dimer positive, CTA chest ordered. Tolerated PO while in ED. Pt with resolved symptoms. Denies any chest pain, dizziness, SOB. Trop x 2 negative. EKG without acute ischemic changes. BUN/Cr within pt baseline. CTA chest negative. Pt has cardiologist and had Echo and stress test done a few months ago. Pt to be discharged with PCP follow up.

## 2025-02-05 NOTE — ED PROVIDER NOTE - NSFOLLOWUPINSTRUCTIONS_ED_ALL_ED_FT
1. Return to ED for worsening, progressive or any other concerning symptoms   2. Follow up with your primary care doctor in 2-3days  3. Follow up with your cardiologist as scheduled      Palpitations    A palpitation is the feeling that your heartbeat is irregular or is faster than normal. It may feel like your heart is fluttering or skipping a beat. They may be caused by many things, including smoking, caffeine, alcohol, stress, and certain medicines. Although most causes of palpitations are not serious, palpitations can be a sign of a serious medical problem. Avoid caffeine, alcohol, and tobacco products at home. Try to reduce stress and anxiety and make sure to get plenty of rest.     SEEK IMMEDIATE MEDICAL CARE IF YOU HAVE ANY OF THE FOLLOWING SYMPTOMS: chest pain, shortness of breath, severe headache, dizziness/lightheadedness, or fainting.

## 2025-02-05 NOTE — ED PROVIDER NOTE - PATIENT PORTAL LINK FT
You can access the FollowMyHealth Patient Portal offered by [facility] by registering at the following website: [email or web address]. By joining [employer]’s FollowMyHealth portal, you will also be able to view your health information using other applications (apps) compatible with our system.

## 2025-02-05 NOTE — ED ADULT NURSE REASSESSMENT NOTE - NS ED NURSE REASSESS COMMENT FT1
Report received from MARGUERITE Patel. PT is alert and oriented, with a patent and self maintained airway, with non labored breathing.

## 2025-02-05 NOTE — ED ADULT NURSE NOTE - NSFALLUNIVINTERV_ED_ALL_ED
Bed/Stretcher in lowest position, wheels locked, appropriate side rails in place/Call bell, personal items and telephone in reach/Instruct patient to call for assistance before getting out of bed/chair/stretcher/Non-slip footwear applied when patient is off stretcher/[place] to call system/Physically safe environment - no spills, clutter or unnecessary equipment/Purposeful proactive rounding/Room/bathroom lighting operational, light cord in reach

## 2025-02-05 NOTE — ED PROVIDER NOTE - OBJECTIVE STATEMENT
74y Female hx HTN, HLD, CAD s/p stent 2018, CKD complaining of palpitations. Patient states she has been having intermittent palpitations today associated with dyspnea at rest and with exertion. Patient states she was started on lokelma yesterday for hyperkalemia by Dr. Marinelli. Denies fever, chills, N/V/D, abdominal pain, chest pain, dizziness, lightheadedness. Denies trauma or injury. States she recently dx with covid a week ago, has a chronic cough but no fevers.

## 2025-02-05 NOTE — ED PROVIDER NOTE - ATTENDING CONTRIBUTION TO CARE
Pt is a 75 yo F here for sob and palpitations. pt state sthat she did start lokelma yesterday and does not know if that caused it.  pt denies any cp. no n/v. n ofever/chills.    physical - rrr. ctab. abd - soft, nt. no edema. no rash.    plan - labs reviewed. dimer mildly positive. will check CTA. delta trop neg.  if cta neg plan to dc

## 2025-02-05 NOTE — ED PROVIDER NOTE - PHYSICAL EXAMINATION
VITAL SIGNS: I have reviewed nursing notes and confirm.  CONSTITUTIONAL:  appears anxious, not using accessory muscles to breathe.   SKIN: Skin exam is warm and dry, no acute rash.  HEAD: Normocephalic; atraumatic.  EYES: PERRL, EOM intact; conjunctiva and sclera clear.  ENT: No nasal discharge; airway clear. Throat clear.  NECK: Supple; non tender.    CARD: tachycardic and regular rhythm.  RESP: No wheezes,  no rales or rhonchi.   ABD:  soft; non-distended; non-tender;   EXT: Normal ROM. No clubbing, cyanosis or edema.  NEURO: Alert, oriented. Grossly unremarkable. No focal deficits.  moves all extremities.   PSYCH: Cooperative, appropriate.

## 2025-02-06 VITALS
RESPIRATION RATE: 18 BRPM | DIASTOLIC BLOOD PRESSURE: 76 MMHG | HEART RATE: 88 BPM | OXYGEN SATURATION: 98 % | SYSTOLIC BLOOD PRESSURE: 152 MMHG

## 2025-02-06 PROCEDURE — 93005 ELECTROCARDIOGRAM TRACING: CPT

## 2025-02-06 PROCEDURE — 71275 CT ANGIOGRAPHY CHEST: CPT | Mod: 26

## 2025-02-06 PROCEDURE — 82962 GLUCOSE BLOOD TEST: CPT

## 2025-02-06 PROCEDURE — 84484 ASSAY OF TROPONIN QUANT: CPT

## 2025-02-06 PROCEDURE — 36415 COLL VENOUS BLD VENIPUNCTURE: CPT

## 2025-02-06 PROCEDURE — 85025 COMPLETE CBC W/AUTO DIFF WBC: CPT

## 2025-02-06 PROCEDURE — 99285 EMERGENCY DEPT VISIT HI MDM: CPT | Mod: 25

## 2025-02-06 PROCEDURE — 80053 COMPREHEN METABOLIC PANEL: CPT

## 2025-02-06 PROCEDURE — 71275 CT ANGIOGRAPHY CHEST: CPT | Mod: MC

## 2025-02-06 PROCEDURE — 83880 ASSAY OF NATRIURETIC PEPTIDE: CPT

## 2025-02-06 PROCEDURE — 85379 FIBRIN DEGRADATION QUANT: CPT

## 2025-02-06 PROCEDURE — 71045 X-RAY EXAM CHEST 1 VIEW: CPT

## 2025-02-07 ENCOUNTER — APPOINTMENT (OUTPATIENT)
Dept: OBGYN | Facility: CLINIC | Age: 75
End: 2025-02-07

## 2025-02-17 ENCOUNTER — OFFICE (OUTPATIENT)
Dept: URBAN - METROPOLITAN AREA CLINIC 112 | Facility: CLINIC | Age: 75
Setting detail: OPHTHALMOLOGY
End: 2025-02-17
Payer: MEDICARE

## 2025-02-17 DIAGNOSIS — H40.1122: ICD-10-CM

## 2025-02-17 DIAGNOSIS — H40.1113: ICD-10-CM

## 2025-02-17 PROCEDURE — 92250 FUNDUS PHOTOGRAPHY W/I&R: CPT | Performed by: OPHTHALMOLOGY

## 2025-02-17 PROCEDURE — 92014 COMPRE OPH EXAM EST PT 1/>: CPT | Performed by: OPHTHALMOLOGY

## 2025-02-17 ASSESSMENT — REFRACTION_CURRENTRX
OD_CYLINDER: -0.50
OS_CYLINDER: -0.50
OD_OVR_VA: 20/
OD_SPHERE: +2.00
OS_VPRISM_DIRECTION: SV
OS_SPHERE: +2.25
OD_VPRISM_DIRECTION: SV
OS_AXIS: 031
OD_AXIS: 140
OS_OVR_VA: 20/

## 2025-02-17 ASSESSMENT — REFRACTION_MANIFEST
OS_CYLINDER: -0.75
OD_AXIS: 020
OD_VA1: 20/20
OS_VA1: 20/20
OD_ADD: +2.00
OD_VA2: 20/20
OS_SPHERE: -0.25
OD_CYLINDER: -0.75
OD_VA1: 20/150
OS_AXIS: 023
OD_AXIS: 125
OS_SPHERE: -0.25
OD_SPHERE: -0.50
OS_AXIS: 055
OD_CYLINDER: -0.75
OS_CYLINDER: -0.50
OS_VA2: 20/20
OS_VA1: 20/20
OS_ADD: +2.50
OD_ADD: +2.50
OD_SPHERE: -0.25
OS_ADD: +2.00

## 2025-02-17 ASSESSMENT — REFRACTION_AUTOREFRACTION
OS_CYLINDER: -1.00
OS_AXIS: 029
OD_SPHERE: PLANO
OD_CYLINDER: -0.50
OS_SPHERE: PLANO
OD_AXIS: 098

## 2025-02-17 ASSESSMENT — CONFRONTATIONAL VISUAL FIELD TEST (CVF)
OS_FINDINGS: FULL
OD_FINDINGS: FULL

## 2025-02-17 ASSESSMENT — VISUAL ACUITY
OD_BCVA: 20/20
OS_BCVA: 20/400

## 2025-02-17 ASSESSMENT — KERATOMETRY
OD_K2POWER_DIOPTERS: 45.00
OD_K1POWER_DIOPTERS: 44.50
OS_K2POWER_DIOPTERS: 46.50
METHOD_AUTO_MANUAL: AUTO
OS_AXISANGLE_DEGREES: 104
OD_AXISANGLE_DEGREES: 115
OS_K1POWER_DIOPTERS: 45.00

## 2025-02-17 ASSESSMENT — SUPERFICIAL PUNCTATE KERATITIS (SPK)
OD_SPK: 1+
OS_SPK: 1+

## 2025-02-17 ASSESSMENT — PACHYMETRY
OD_CT_UM: 482
OD_CT_CORRECTION: 4
OS_CT_UM: 480
OS_CT_CORRECTION: 4

## 2025-02-17 ASSESSMENT — TONOMETRY
OS_IOP_MMHG: 12
OD_IOP_MMHG: 12

## 2025-02-18 ENCOUNTER — OUTPATIENT (OUTPATIENT)
Dept: OUTPATIENT SERVICES | Facility: HOSPITAL | Age: 75
LOS: 1 days | End: 2025-02-18
Payer: MEDICARE

## 2025-02-18 DIAGNOSIS — G47.33 OBSTRUCTIVE SLEEP APNEA (ADULT) (PEDIATRIC): ICD-10-CM

## 2025-02-18 DIAGNOSIS — H26.40 UNSPECIFIED SECONDARY CATARACT: Chronic | ICD-10-CM

## 2025-02-18 DIAGNOSIS — Z95.5 PRESENCE OF CORONARY ANGIOPLASTY IMPLANT AND GRAFT: Chronic | ICD-10-CM

## 2025-02-18 PROCEDURE — 95810 POLYSOM 6/> YRS 4/> PARAM: CPT

## 2025-02-26 ENCOUNTER — APPOINTMENT (OUTPATIENT)
Dept: PULMONOLOGY | Facility: CLINIC | Age: 75
End: 2025-02-26
Payer: MEDICARE

## 2025-02-26 VITALS
HEIGHT: 64 IN | WEIGHT: 243 LBS | HEART RATE: 68 BPM | OXYGEN SATURATION: 97 % | SYSTOLIC BLOOD PRESSURE: 120 MMHG | BODY MASS INDEX: 41.48 KG/M2 | DIASTOLIC BLOOD PRESSURE: 70 MMHG | RESPIRATION RATE: 16 BRPM

## 2025-02-26 DIAGNOSIS — G47.33 OBSTRUCTIVE SLEEP APNEA (ADULT) (PEDIATRIC): ICD-10-CM

## 2025-02-26 DIAGNOSIS — R06.83 SNORING: ICD-10-CM

## 2025-02-26 DIAGNOSIS — R93.89 ABNORMAL FINDINGS ON DIAGNOSTIC IMAGING OF OTHER SPECIFIED BODY STRUCTURES: ICD-10-CM

## 2025-02-26 DIAGNOSIS — R06.02 SHORTNESS OF BREATH: ICD-10-CM

## 2025-02-26 DIAGNOSIS — R53.83 OTHER FATIGUE: ICD-10-CM

## 2025-02-26 DIAGNOSIS — E66.01 MORBID (SEVERE) OBESITY DUE TO EXCESS CALORIES: ICD-10-CM

## 2025-02-26 DIAGNOSIS — N18.32 CHRONIC KIDNEY DISEASE, STAGE 3B: ICD-10-CM

## 2025-02-26 DIAGNOSIS — R91.1 SOLITARY PULMONARY NODULE: ICD-10-CM

## 2025-02-26 PROCEDURE — 99214 OFFICE O/P EST MOD 30 MIN: CPT

## 2025-02-26 PROCEDURE — G2211 COMPLEX E/M VISIT ADD ON: CPT

## 2025-03-24 NOTE — DISCHARGE NOTE PROVIDER - CARE PROVIDERS DIRECT ADDRESSES
Wally is a 48 year old male.    Chief Complaint & HPI     The patient presents for a routine eye examination.    No significant changes in visual acuity. Current eyeglasses are 4 years old. Over the past few months, he needs to extend reading material slightly further away with and without glasses.    ROS    Negative for: Constitutional, Gastrointestinal, Neurological, Skin, Genitourinary, Musculoskeletal, HENT, Endocrine, Cardiovascular, Eyes, Respiratory, Psychiatric, Allergic/Imm, Heme/Lymph  Last edited by Cathy Worley on 3/24/2025  4:08 PM.        Hemoglobin A1C (%)   Date Value   07/06/2024 5.0     Past medical history, past surgical history, social history, family history, current medications, allergies have been personally reviewed. See eye exam section of the EHR for this visit's findings.    Assessment & Plan  Hyperopia of both eyes with astigmatism and presbyopia  Spectacle Rx provided.    Return in about 1 year (around 3/24/2026) for comprehensive eye exam with dilation, sooner as needed.    Betsy Regalado, OD   [email or web address]

## 2025-03-28 ENCOUNTER — APPOINTMENT (OUTPATIENT)
Dept: ENDOCRINOLOGY | Facility: CLINIC | Age: 75
End: 2025-03-28
Payer: MEDICARE

## 2025-03-28 VITALS
SYSTOLIC BLOOD PRESSURE: 120 MMHG | BODY MASS INDEX: 39.61 KG/M2 | DIASTOLIC BLOOD PRESSURE: 68 MMHG | HEIGHT: 64 IN | OXYGEN SATURATION: 98 % | WEIGHT: 232 LBS | HEART RATE: 62 BPM

## 2025-03-28 DIAGNOSIS — I10 ESSENTIAL (PRIMARY) HYPERTENSION: ICD-10-CM

## 2025-03-28 DIAGNOSIS — E11.29 TYPE 2 DIABETES MELLITUS WITH OTHER DIABETIC KIDNEY COMPLICATION: ICD-10-CM

## 2025-03-28 DIAGNOSIS — E78.5 HYPERLIPIDEMIA, UNSPECIFIED: ICD-10-CM

## 2025-03-28 DIAGNOSIS — E11.9 TYPE 2 DIABETES MELLITUS W/OUT COMPLICATIONS: ICD-10-CM

## 2025-03-28 LAB — GLUCOSE BLDC GLUCOMTR-MCNC: 164

## 2025-03-28 PROCEDURE — 82962 GLUCOSE BLOOD TEST: CPT

## 2025-03-28 PROCEDURE — 99215 OFFICE O/P EST HI 40 MIN: CPT

## 2025-03-28 RX ORDER — BLOOD-GLUCOSE SENSOR
EACH MISCELLANEOUS
Qty: 6 | Refills: 1 | Status: ACTIVE | COMMUNITY
Start: 2025-03-28 | End: 1900-01-01

## 2025-03-28 RX ORDER — SEMAGLUTIDE 0.68 MG/ML
2 INJECTION, SOLUTION SUBCUTANEOUS
Qty: 3 | Refills: 1 | Status: ACTIVE | COMMUNITY
Start: 2025-03-28 | End: 1900-01-01

## 2025-04-02 ENCOUNTER — APPOINTMENT (OUTPATIENT)
Dept: PULMONOLOGY | Facility: CLINIC | Age: 75
End: 2025-04-02

## 2025-04-07 ENCOUNTER — NON-APPOINTMENT (OUTPATIENT)
Age: 75
End: 2025-04-07

## 2025-04-07 ENCOUNTER — APPOINTMENT (OUTPATIENT)
Dept: CARDIOLOGY | Facility: CLINIC | Age: 75
End: 2025-04-07

## 2025-04-07 VITALS
HEART RATE: 58 BPM | DIASTOLIC BLOOD PRESSURE: 76 MMHG | BODY MASS INDEX: 40.29 KG/M2 | RESPIRATION RATE: 16 BRPM | WEIGHT: 236 LBS | HEIGHT: 64 IN | OXYGEN SATURATION: 98 % | SYSTOLIC BLOOD PRESSURE: 154 MMHG

## 2025-04-07 DIAGNOSIS — R09.89 OTHER SPECIFIED SYMPTOMS AND SIGNS INVOLVING THE CIRCULATORY AND RESPIRATORY SYSTEMS: ICD-10-CM

## 2025-04-07 DIAGNOSIS — I10 ESSENTIAL (PRIMARY) HYPERTENSION: ICD-10-CM

## 2025-04-07 DIAGNOSIS — I51.7 CARDIOMEGALY: ICD-10-CM

## 2025-04-07 DIAGNOSIS — E78.5 HYPERLIPIDEMIA, UNSPECIFIED: ICD-10-CM

## 2025-04-07 PROCEDURE — 99214 OFFICE O/P EST MOD 30 MIN: CPT | Mod: 25

## 2025-04-07 PROCEDURE — 93000 ELECTROCARDIOGRAM COMPLETE: CPT

## 2025-04-07 RX ORDER — ROSUVASTATIN CALCIUM 5 MG/1
5 TABLET, FILM COATED ORAL
Qty: 90 | Refills: 3 | Status: ACTIVE | COMMUNITY
Start: 2025-04-07 | End: 1900-01-01

## 2025-04-26 NOTE — ED PROVIDER NOTE - WET READ LAUNCH FT
Chief Complaint    Urologic complaint    Subjective          Colton Chiang presents to [facility] UROLOGY  History of Present Illness      69-year-old  gentleman       Prostatic adenocarcinoma    status post XRT in 2008 for Roxanne 8   ED      10/25 right hip replacement still in PT using walker having a hard time getting off the walker        Voiding ok.  Slow stream.  No change  Not bothersome     Nocturia 3  No prostate meds.         on Lasix       PVR    8/23    023    No cardiopulmonary history. He smokes 1.5 packs daily. Baby aspirin daily         Previous          Patient did do Lupron before when he did his original XRT    History of CHF been well controlled for several years    Patient has had some lower extremity edema on the right, he has been ruled out for DVT      Has ED, it is worrisome.    Patient does have some hip pain.  He is getting hip MRI in the month      Patient has had no gross hematuria, dysuria or recurrent urinary tract infections. No history of kidney or bladder stone.     never had any urologic surgery.        10/22 CT abdomen/pelvis with - fractures of lateral right ninth 10th and 11th ribs.  Cholelithiasis          Prostate cancer history    4/25     4.3      3/25     5.6    9/24     3.6    5/24     3.2      1/24 discussed referral to tertiary center for discussion of further treatment or salvage prostatectomy.  Patient has declined risk and benefits understood  1/24     2.8       12/27/2023 PSMA - focal area of uptake right prostatic base concerning for malignancy recurrence.  Cervical, supraclavicular and thoracic adenopathy mild radiotracer uptake.  Unchanged since 10/22.  Close follow-up recommended.  Mild increased uptake left humeral head.  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  Mild uptake T10 vertebral body.  Indeterminate.  Attention to follow-up.  Extensive groundglass and pulmonary opacification throughout the bilateral lungs.   Similar to 10/22.  Pulmonary referral recommended.  12/23 discussed tertiary referral for possibility of salvage prostectomy.  Patient not interested, was not interested in second opinion.    10/23    3.17     6/23      2.2  5/22      1.8  12/21    1.9  9/20     1.2  2/17    0.51  8/16    0.38  2/16    0.46  7/15    0.35  1/15   0.41  7/14    0.24  1/12    0.30  7/10    0.5  7/09     0.7  12/08   0.2  2008 XRT prostate  10/07 prostate biopsyprostatic adenocarcinoma, 5+3    9/07 17.2          Past History:  Medical History: has a past medical history of Allergies, Arthritis, Broken bones, CHF (congestive heart failure) (1990), Chronic back pain, Deafness, bilateral, Depression, HBP (high blood pressure), Heart disease, High cholesterol, History of radiation therapy, Hypothyroid (02/10/2017), Prostate cancer (02/05/2016), Ringing in ear, bilateral, Stroke, and Thyroid disorder.   Surgical History: has a past surgical history that includes Colonoscopy; Prostate biopsy; Hip Trochanteric Nailing (Right, 10/30/2023); Skin biopsy (Right); and Hip Bipolar (Right, 01/2025).   Family History:          Objective     Vital Signs:   There were no vitals taken for this visit.             Assessment and Plan    Diagnoses and all orders for this visit:    1. Prostate cancer (Primary)        PSA  stable /lower.     We discussed this today.    Not interested in tertiary referral/salvage therapy.  We discussed this in detail again today.  He understands without salvage therapy will be on ADT at some point in the future which is not curative but can place this in remission for several years.  Patient voiced understanding we also discussed side effects of ADT again        Follow-up in 4 mth with  PSA                 There are no Wet Read(s) to document. There is 1 Wet Read(s) to document.

## 2025-04-29 ENCOUNTER — APPOINTMENT (OUTPATIENT)
Dept: NEPHROLOGY | Facility: CLINIC | Age: 75
End: 2025-04-29
Payer: MEDICARE

## 2025-04-29 VITALS
WEIGHT: 234 LBS | DIASTOLIC BLOOD PRESSURE: 78 MMHG | OXYGEN SATURATION: 96 % | BODY MASS INDEX: 40.17 KG/M2 | HEART RATE: 67 BPM | SYSTOLIC BLOOD PRESSURE: 142 MMHG

## 2025-04-29 DIAGNOSIS — E87.5 HYPERKALEMIA: ICD-10-CM

## 2025-04-29 DIAGNOSIS — N18.4 HYPERTENSIVE CHRONIC KIDNEY DISEASE WITH STAGE 1 THROUGH STAGE 4 CHRONIC KIDNEY DISEASE, OR UNSPECIFIED CHRONIC KIDNEY DISEASE: ICD-10-CM

## 2025-04-29 DIAGNOSIS — I12.9 HYPERTENSIVE CHRONIC KIDNEY DISEASE WITH STAGE 1 THROUGH STAGE 4 CHRONIC KIDNEY DISEASE, OR UNSPECIFIED CHRONIC KIDNEY DISEASE: ICD-10-CM

## 2025-04-29 DIAGNOSIS — I10 ESSENTIAL (PRIMARY) HYPERTENSION: ICD-10-CM

## 2025-04-29 DIAGNOSIS — E87.20 ACIDOSIS, UNSPECIFIED: ICD-10-CM

## 2025-04-29 PROCEDURE — 99214 OFFICE O/P EST MOD 30 MIN: CPT

## 2025-04-30 ENCOUNTER — APPOINTMENT (OUTPATIENT)
Dept: NEPHROLOGY | Facility: CLINIC | Age: 75
End: 2025-04-30

## 2025-05-01 ENCOUNTER — NON-APPOINTMENT (OUTPATIENT)
Age: 75
End: 2025-05-01

## 2025-05-07 ENCOUNTER — APPOINTMENT (OUTPATIENT)
Dept: PULMONOLOGY | Facility: CLINIC | Age: 75
End: 2025-05-07

## 2025-05-09 NOTE — ED ADULT NURSE NOTE - SKIN INTEGRITY
42 y/o F PMHx HTN presented to ER with 5 days of vomiting and diarrhea and melena with associated generalized abdominal pain and distention.  Admitted for management of hypertensive emergency, bowel ischemia and sepsis in setting of enteritis. Evaluated by surgery no intervention    Heme consulted for normocytic anemia and thrombocytopenia    # Leucocytosis, neutrophilic predominance  # Normocytic anemia  # Thrombocytopenia  # Bowel ischemia  # Renal failure  - No prior labs for baseline  - Normal coags  - Elevated , retic corrected 3, haptoglobin 36 (no hemolysis)  - Plasmic score 5, intermediate probability of TTP  - Peripheral smear reviewed 5.7.25, noted schistocytosis 5-6/HPF and polychromasia  - Coomb's negative  - Low B12 and folate    Plan:  Overall picture concerning for TTP, completed 3 days of solumedrol (5/7 to 5/9), she is s/p Plex x 2  (5.8.25 and 5.9.25), CBC reviewed today, plan for Plex on 5.10.25  Daily LDH  Follow up on [id number], C3, C4  Check renal doppler     visible skin intact

## 2025-05-12 ENCOUNTER — INPATIENT (INPATIENT)
Facility: HOSPITAL | Age: 75
LOS: 2 days | Discharge: HOME | DRG: 313 | End: 2025-05-15
Attending: FAMILY MEDICINE | Admitting: STUDENT IN AN ORGANIZED HEALTH CARE EDUCATION/TRAINING PROGRAM
Payer: MEDICARE

## 2025-05-12 VITALS
SYSTOLIC BLOOD PRESSURE: 146 MMHG | TEMPERATURE: 98 F | RESPIRATION RATE: 18 BRPM | HEART RATE: 94 BPM | DIASTOLIC BLOOD PRESSURE: 83 MMHG | OXYGEN SATURATION: 96 %

## 2025-05-12 DIAGNOSIS — Z95.5 PRESENCE OF CORONARY ANGIOPLASTY IMPLANT AND GRAFT: Chronic | ICD-10-CM

## 2025-05-12 DIAGNOSIS — H26.40 UNSPECIFIED SECONDARY CATARACT: Chronic | ICD-10-CM

## 2025-05-12 LAB
ALBUMIN SERPL ELPH-MCNC: 3.7 G/DL — SIGNIFICANT CHANGE UP (ref 3.3–5.2)
ALP SERPL-CCNC: 142 U/L — HIGH (ref 40–120)
ALT FLD-CCNC: 14 U/L — SIGNIFICANT CHANGE UP
ANION GAP SERPL CALC-SCNC: 16 MMOL/L — SIGNIFICANT CHANGE UP (ref 5–17)
APTT BLD: 27 SEC — SIGNIFICANT CHANGE UP (ref 26.1–36.8)
AST SERPL-CCNC: 23 U/L — SIGNIFICANT CHANGE UP
BASOPHILS # BLD AUTO: 0.02 K/UL — SIGNIFICANT CHANGE UP (ref 0–0.2)
BASOPHILS NFR BLD AUTO: 0.4 % — SIGNIFICANT CHANGE UP (ref 0–2)
BILIRUB SERPL-MCNC: 0.2 MG/DL — LOW (ref 0.4–2)
BUN SERPL-MCNC: 24.4 MG/DL — HIGH (ref 8–20)
CALCIUM SERPL-MCNC: 8.4 MG/DL — SIGNIFICANT CHANGE UP (ref 8.4–10.5)
CHLORIDE SERPL-SCNC: 109 MMOL/L — HIGH (ref 96–108)
CO2 SERPL-SCNC: 17 MMOL/L — LOW (ref 22–29)
CREAT SERPL-MCNC: 1.86 MG/DL — HIGH (ref 0.5–1.3)
EGFR: 28 ML/MIN/1.73M2 — LOW
EGFR: 28 ML/MIN/1.73M2 — LOW
EOSINOPHIL # BLD AUTO: 0.31 K/UL — SIGNIFICANT CHANGE UP (ref 0–0.5)
EOSINOPHIL NFR BLD AUTO: 5.6 % — SIGNIFICANT CHANGE UP (ref 0–6)
FLUAV AG NPH QL: SIGNIFICANT CHANGE UP
FLUBV AG NPH QL: SIGNIFICANT CHANGE UP
GLUCOSE SERPL-MCNC: 145 MG/DL — HIGH (ref 70–99)
HCT VFR BLD CALC: 36.5 % — SIGNIFICANT CHANGE UP (ref 34.5–45)
HGB BLD-MCNC: 11.1 G/DL — LOW (ref 11.5–15.5)
IMM GRANULOCYTES # BLD AUTO: 0.03 K/UL — SIGNIFICANT CHANGE UP (ref 0–0.07)
IMM GRANULOCYTES NFR BLD AUTO: 0.5 % — SIGNIFICANT CHANGE UP (ref 0–0.9)
INR BLD: 0.87 RATIO — SIGNIFICANT CHANGE UP (ref 0.85–1.16)
LYMPHOCYTES # BLD AUTO: 1.67 K/UL — SIGNIFICANT CHANGE UP (ref 1–3.3)
LYMPHOCYTES NFR BLD AUTO: 30.3 % — SIGNIFICANT CHANGE UP (ref 13–44)
MCHC RBC-ENTMCNC: 27.6 PG — SIGNIFICANT CHANGE UP (ref 27–34)
MCHC RBC-ENTMCNC: 30.4 G/DL — LOW (ref 32–36)
MCV RBC AUTO: 90.8 FL — SIGNIFICANT CHANGE UP (ref 80–100)
MONOCYTES # BLD AUTO: 0.29 K/UL — SIGNIFICANT CHANGE UP (ref 0–0.9)
MONOCYTES NFR BLD AUTO: 5.3 % — SIGNIFICANT CHANGE UP (ref 2–14)
NEUTROPHILS # BLD AUTO: 3.19 K/UL — SIGNIFICANT CHANGE UP (ref 1.8–7.4)
NEUTROPHILS NFR BLD AUTO: 57.9 % — SIGNIFICANT CHANGE UP (ref 43–77)
NRBC # BLD AUTO: 0 K/UL — SIGNIFICANT CHANGE UP (ref 0–0)
NRBC # FLD: 0 K/UL — SIGNIFICANT CHANGE UP (ref 0–0)
NRBC BLD AUTO-RTO: 0 /100 WBCS — SIGNIFICANT CHANGE UP (ref 0–0)
NT-PROBNP SERPL-SCNC: 115 PG/ML — SIGNIFICANT CHANGE UP (ref 0–300)
PLATELET # BLD AUTO: 159 K/UL — SIGNIFICANT CHANGE UP (ref 150–400)
PMV BLD: 12 FL — SIGNIFICANT CHANGE UP (ref 7–13)
POTASSIUM SERPL-MCNC: 5.3 MMOL/L — SIGNIFICANT CHANGE UP (ref 3.5–5.3)
POTASSIUM SERPL-SCNC: 5.3 MMOL/L — SIGNIFICANT CHANGE UP (ref 3.5–5.3)
PROT SERPL-MCNC: 6.8 G/DL — SIGNIFICANT CHANGE UP (ref 6.6–8.7)
PROTHROM AB SERPL-ACNC: 9.8 SEC — LOW (ref 9.9–13.4)
RBC # BLD: 4.02 M/UL — SIGNIFICANT CHANGE UP (ref 3.8–5.2)
RBC # FLD: 14 % — SIGNIFICANT CHANGE UP (ref 10.3–14.5)
RSV RNA NPH QL NAA+NON-PROBE: SIGNIFICANT CHANGE UP
SARS-COV-2 RNA SPEC QL NAA+PROBE: SIGNIFICANT CHANGE UP
SODIUM SERPL-SCNC: 142 MMOL/L — SIGNIFICANT CHANGE UP (ref 135–145)
SOURCE RESPIRATORY: SIGNIFICANT CHANGE UP
TROPONIN T, HIGH SENSITIVITY RESULT: 19 NG/L — SIGNIFICANT CHANGE UP (ref 0–51)
WBC # BLD: 5.51 K/UL — SIGNIFICANT CHANGE UP (ref 3.8–10.5)
WBC # FLD AUTO: 5.51 K/UL — SIGNIFICANT CHANGE UP (ref 3.8–10.5)

## 2025-05-12 PROCEDURE — 71045 X-RAY EXAM CHEST 1 VIEW: CPT | Mod: 26

## 2025-05-12 PROCEDURE — 99285 EMERGENCY DEPT VISIT HI MDM: CPT

## 2025-05-12 PROCEDURE — 93010 ELECTROCARDIOGRAM REPORT: CPT

## 2025-05-12 RX ORDER — ACETAMINOPHEN 500 MG/5ML
650 LIQUID (ML) ORAL ONCE
Refills: 0 | Status: COMPLETED | OUTPATIENT
Start: 2025-05-12 | End: 2025-05-12

## 2025-05-12 RX ADMIN — Medication 650 MILLIGRAM(S): at 23:47

## 2025-05-12 NOTE — ED ADULT TRIAGE NOTE - GLASGOW COMA SCALE: BEST MOTOR RESPONSE, MLM
(M6) obeys commands I have reviewed and confirmed nurses' notes for patient's medications, allergies, medical history, and surgical history.

## 2025-05-12 NOTE — ED PROVIDER NOTE - CARE PLAN
1 Principal Discharge DX:	Chest pain  Secondary Diagnosis:	Elevated troponin   Principal Discharge DX:	Chest pain

## 2025-05-12 NOTE — ED PROVIDER NOTE - ATTENDING CONTRIBUTION TO CARE
74y female w/ pmh of CKD IV, HTN, DM, CAD s/p 1x coronary stent presenting with chest pain across the front of her chest x1 day. pain came on while walking and is worse with exertion and improved with rest. she has associated SOB and generalized weakness. she has had a productive cough for the past month. denies any fever, abd pain, N/V/D, urinary symptoms, black or bloody stool. she has been compliant with her medications.    CONSTITUTIONAL: In no apparent distress.  HEENMT: Airway patent,  normal appearing mouth, nose, neck supple with full range of motion  CARDIAC: Regular rate and rhythm, Heart sounds S1 S2 present  RESPIRATORY: No respiratory distress. No stridor, Lungs sounds clear with good aeration bilaterally.   GASTROINTESTINAL: Abdomen soft, non-tender and non-distended, no rebound, no guarding and no masses.   MUSCULOSKELETAL: Spine appears normal, movement of extremities grossly intact.  NEUROLOGICAL: Alert and interactive, no focal deficits, tone is normal, moving all extremities well,  SKIN: No cyanosis, no pallor, no jaundice, no rash      IYuliana, personally saw the patient with the resident, and completed the key components of the history and physical exam. I then discussed the management plan with the resident.

## 2025-05-12 NOTE — ED PROVIDER NOTE - OBJECTIVE STATEMENT
74y female w/ pmh of CKD IV, HTN, DM, CAD s/p 1x coronary stent presenting with chest pain across the front of her chest x1 day. pain came on while walking and is worse with exertion and improved with rest. she has associated SOB and generalized weakness. she has had a productive cough for the past month. denies any fever, abd pain, N/V/D, urinary symptoms, black or bloody stool. she has been compliant with her medications.

## 2025-05-12 NOTE — ED ADULT TRIAGE NOTE - CHIEF COMPLAINT QUOTE
Pt BIBA stated that she has been experiencing chest pain and SOB x2 days.  PT stated that she has also been experiencing a cough, denies fevers.  Hx of 2 stent placements.

## 2025-05-12 NOTE — ED PROVIDER NOTE - CLINICAL SUMMARY MEDICAL DECISION MAKING FREE TEXT BOX
H&P as stated. patient here for persistent chest pain. patient well appearing, vitals stable, normal respiratory effort. ekg unremarkable. cxr clear. troponin 19 -> 19. patient with persistent pain on reevaluation. will admit to tele given history and risk factors for cardiology evaluation.

## 2025-05-13 ENCOUNTER — RESULT REVIEW (OUTPATIENT)
Age: 75
End: 2025-05-13

## 2025-05-13 DIAGNOSIS — R07.9 CHEST PAIN, UNSPECIFIED: ICD-10-CM

## 2025-05-13 PROBLEM — G47.33 OBSTRUCTIVE SLEEP APNEA (ADULT) (PEDIATRIC): Chronic | Status: INACTIVE | Noted: 2017-05-25 | Resolved: 2025-05-13

## 2025-05-13 PROBLEM — K76.0 FATTY (CHANGE OF) LIVER, NOT ELSEWHERE CLASSIFIED: Chronic | Status: INACTIVE | Noted: 2017-05-25 | Resolved: 2025-05-13

## 2025-05-13 LAB
A1C WITH ESTIMATED AVERAGE GLUCOSE RESULT: 6.5 % — HIGH (ref 4–5.6)
ANION GAP SERPL CALC-SCNC: 14 MMOL/L — SIGNIFICANT CHANGE UP (ref 5–17)
BUN SERPL-MCNC: 26.5 MG/DL — HIGH (ref 8–20)
CALCIUM SERPL-MCNC: 9 MG/DL — SIGNIFICANT CHANGE UP (ref 8.4–10.5)
CHLORIDE SERPL-SCNC: 108 MMOL/L — SIGNIFICANT CHANGE UP (ref 96–108)
CHOLEST SERPL-MCNC: 223 MG/DL — HIGH
CK MB CFR SERPL CALC: 3.8 NG/ML — SIGNIFICANT CHANGE UP (ref 0–6.7)
CK SERPL-CCNC: 172 U/L — HIGH (ref 25–170)
CO2 SERPL-SCNC: 20 MMOL/L — LOW (ref 22–29)
CREAT SERPL-MCNC: 1.77 MG/DL — HIGH (ref 0.5–1.3)
EGFR: 30 ML/MIN/1.73M2 — LOW
EGFR: 30 ML/MIN/1.73M2 — LOW
ESTIMATED AVERAGE GLUCOSE: 140 MG/DL — HIGH (ref 68–114)
GLUCOSE BLDC GLUCOMTR-MCNC: 101 MG/DL — HIGH (ref 70–99)
GLUCOSE BLDC GLUCOMTR-MCNC: 105 MG/DL — HIGH (ref 70–99)
GLUCOSE BLDC GLUCOMTR-MCNC: 91 MG/DL — SIGNIFICANT CHANGE UP (ref 70–99)
GLUCOSE SERPL-MCNC: 117 MG/DL — HIGH (ref 70–99)
HDLC SERPL-MCNC: 62 MG/DL — SIGNIFICANT CHANGE UP
LDLC SERPL-MCNC: 140 MG/DL — HIGH
LIPID PNL WITH DIRECT LDL SERPL: 140 MG/DL — HIGH
NONHDLC SERPL-MCNC: 161 MG/DL — HIGH
POTASSIUM SERPL-MCNC: 4.8 MMOL/L — SIGNIFICANT CHANGE UP (ref 3.5–5.3)
POTASSIUM SERPL-SCNC: 4.8 MMOL/L — SIGNIFICANT CHANGE UP (ref 3.5–5.3)
SODIUM SERPL-SCNC: 142 MMOL/L — SIGNIFICANT CHANGE UP (ref 135–145)
TRIGL SERPL-MCNC: 117 MG/DL — SIGNIFICANT CHANGE UP
TROPONIN T, HIGH SENSITIVITY RESULT: 18 NG/L — SIGNIFICANT CHANGE UP (ref 0–51)
TROPONIN T, HIGH SENSITIVITY RESULT: 19 NG/L — SIGNIFICANT CHANGE UP (ref 0–51)

## 2025-05-13 PROCEDURE — 93010 ELECTROCARDIOGRAM REPORT: CPT

## 2025-05-13 PROCEDURE — 93306 TTE W/DOPPLER COMPLETE: CPT | Mod: 26

## 2025-05-13 PROCEDURE — 99223 1ST HOSP IP/OBS HIGH 75: CPT

## 2025-05-13 PROCEDURE — 99497 ADVNCD CARE PLAN 30 MIN: CPT | Mod: 25

## 2025-05-13 RX ORDER — ACETAMINOPHEN 500 MG/5ML
650 LIQUID (ML) ORAL EVERY 6 HOURS
Refills: 0 | Status: DISCONTINUED | OUTPATIENT
Start: 2025-05-13 | End: 2025-05-15

## 2025-05-13 RX ORDER — FERROUS SULFATE 137(45) MG
1 TABLET, EXTENDED RELEASE ORAL
Refills: 0 | DISCHARGE

## 2025-05-13 RX ORDER — GABAPENTIN 400 MG/1
1 CAPSULE ORAL
Refills: 0 | DISCHARGE

## 2025-05-13 RX ORDER — ASPIRIN 325 MG
81 TABLET ORAL DAILY
Refills: 0 | Status: DISCONTINUED | OUTPATIENT
Start: 2025-05-13 | End: 2025-05-15

## 2025-05-13 RX ORDER — DEXTROSE 50 % IN WATER 50 %
25 SYRINGE (ML) INTRAVENOUS ONCE
Refills: 0 | Status: DISCONTINUED | OUTPATIENT
Start: 2025-05-13 | End: 2025-05-15

## 2025-05-13 RX ORDER — BRIMONIDINE TARTRATE 1.5 MG/ML
1 SOLUTION/ DROPS OPHTHALMIC EVERY 12 HOURS
Refills: 0 | Status: DISCONTINUED | OUTPATIENT
Start: 2025-05-13 | End: 2025-05-15

## 2025-05-13 RX ORDER — INSULIN LISPRO 100 U/ML
INJECTION, SOLUTION INTRAVENOUS; SUBCUTANEOUS EVERY 6 HOURS
Refills: 0 | Status: DISCONTINUED | OUTPATIENT
Start: 2025-05-13 | End: 2025-05-15

## 2025-05-13 RX ORDER — HYDROMORPHONE/SOD CHLOR,ISO/PF 2 MG/10 ML
0.2 SYRINGE (ML) INJECTION EVERY 6 HOURS
Refills: 0 | Status: DISCONTINUED | OUTPATIENT
Start: 2025-05-13 | End: 2025-05-15

## 2025-05-13 RX ORDER — LATANOPROST PF 0.05 MG/ML
1 SOLUTION/ DROPS OPHTHALMIC AT BEDTIME
Refills: 0 | Status: DISCONTINUED | OUTPATIENT
Start: 2025-05-13 | End: 2025-05-15

## 2025-05-13 RX ORDER — SODIUM CHLORIDE 9 G/1000ML
1000 INJECTION, SOLUTION INTRAVENOUS
Refills: 0 | Status: DISCONTINUED | OUTPATIENT
Start: 2025-05-13 | End: 2025-05-15

## 2025-05-13 RX ORDER — CLOPIDOGREL BISULFATE 75 MG/1
75 TABLET, FILM COATED ORAL DAILY
Refills: 0 | Status: DISCONTINUED | OUTPATIENT
Start: 2025-05-13 | End: 2025-05-15

## 2025-05-13 RX ORDER — ONDANSETRON HCL/PF 4 MG/2 ML
4 VIAL (ML) INJECTION EVERY 6 HOURS
Refills: 0 | Status: DISCONTINUED | OUTPATIENT
Start: 2025-05-13 | End: 2025-05-15

## 2025-05-13 RX ORDER — DEXTROSE 50 % IN WATER 50 %
15 SYRINGE (ML) INTRAVENOUS ONCE
Refills: 0 | Status: DISCONTINUED | OUTPATIENT
Start: 2025-05-13 | End: 2025-05-15

## 2025-05-13 RX ORDER — BRIMONIDINE TARTRATE, TIMOLOL MALEATE 2; 5 MG/ML; MG/ML
1 SOLUTION/ DROPS TOPICAL
Refills: 0 | DISCHARGE

## 2025-05-13 RX ORDER — FERROUS SULFATE 137(45) MG
325 TABLET, EXTENDED RELEASE ORAL DAILY
Refills: 0 | Status: DISCONTINUED | OUTPATIENT
Start: 2025-05-13 | End: 2025-05-15

## 2025-05-13 RX ORDER — EZETIMIBE 10 MG/1
10 TABLET ORAL DAILY
Refills: 0 | Status: DISCONTINUED | OUTPATIENT
Start: 2025-05-13 | End: 2025-05-15

## 2025-05-13 RX ORDER — HEPARIN SODIUM 1000 [USP'U]/ML
5000 INJECTION INTRAVENOUS; SUBCUTANEOUS EVERY 12 HOURS
Refills: 0 | Status: DISCONTINUED | OUTPATIENT
Start: 2025-05-13 | End: 2025-05-15

## 2025-05-13 RX ORDER — METOPROLOL SUCCINATE 50 MG/1
100 TABLET, EXTENDED RELEASE ORAL DAILY
Refills: 0 | Status: DISCONTINUED | OUTPATIENT
Start: 2025-05-13 | End: 2025-05-13

## 2025-05-13 RX ORDER — DEXTROSE 50 % IN WATER 50 %
12.5 SYRINGE (ML) INTRAVENOUS ONCE
Refills: 0 | Status: DISCONTINUED | OUTPATIENT
Start: 2025-05-13 | End: 2025-05-15

## 2025-05-13 RX ORDER — GABAPENTIN 400 MG/1
100 CAPSULE ORAL
Refills: 0 | Status: DISCONTINUED | OUTPATIENT
Start: 2025-05-13 | End: 2025-05-15

## 2025-05-13 RX ORDER — MELATONIN 5 MG
3 TABLET ORAL AT BEDTIME
Refills: 0 | Status: DISCONTINUED | OUTPATIENT
Start: 2025-05-13 | End: 2025-05-15

## 2025-05-13 RX ORDER — DORZOLAMIDE 20 MG/ML
1 SOLUTION/ DROPS OPHTHALMIC THREE TIMES A DAY
Refills: 0 | Status: DISCONTINUED | OUTPATIENT
Start: 2025-05-13 | End: 2025-05-15

## 2025-05-13 RX ORDER — GLUCAGON 3 MG/1
1 POWDER NASAL ONCE
Refills: 0 | Status: DISCONTINUED | OUTPATIENT
Start: 2025-05-13 | End: 2025-05-15

## 2025-05-13 RX ADMIN — Medication 325 MILLIGRAM(S): at 12:56

## 2025-05-13 RX ADMIN — HEPARIN SODIUM 5000 UNIT(S): 1000 INJECTION INTRAVENOUS; SUBCUTANEOUS at 18:36

## 2025-05-13 RX ADMIN — DORZOLAMIDE 1 DROP(S): 20 SOLUTION/ DROPS OPHTHALMIC at 14:33

## 2025-05-13 RX ADMIN — Medication 25 MILLIGRAM(S): at 18:36

## 2025-05-13 RX ADMIN — Medication 650 MILLIGRAM(S): at 03:17

## 2025-05-13 RX ADMIN — METOPROLOL SUCCINATE 100 MILLIGRAM(S): 50 TABLET, EXTENDED RELEASE ORAL at 06:26

## 2025-05-13 RX ADMIN — EZETIMIBE 10 MILLIGRAM(S): 10 TABLET ORAL at 12:56

## 2025-05-13 RX ADMIN — HEPARIN SODIUM 5000 UNIT(S): 1000 INJECTION INTRAVENOUS; SUBCUTANEOUS at 06:25

## 2025-05-13 RX ADMIN — GABAPENTIN 100 MILLIGRAM(S): 400 CAPSULE ORAL at 06:26

## 2025-05-13 RX ADMIN — CLOPIDOGREL BISULFATE 75 MILLIGRAM(S): 75 TABLET, FILM COATED ORAL at 12:56

## 2025-05-13 RX ADMIN — Medication 25 MILLIGRAM(S): at 06:26

## 2025-05-13 RX ADMIN — Medication 40 MILLIGRAM(S): at 06:27

## 2025-05-13 RX ADMIN — LATANOPROST PF 1 DROP(S): 0.05 SOLUTION/ DROPS OPHTHALMIC at 21:41

## 2025-05-13 RX ADMIN — DORZOLAMIDE 1 DROP(S): 20 SOLUTION/ DROPS OPHTHALMIC at 06:26

## 2025-05-13 RX ADMIN — Medication 81 MILLIGRAM(S): at 12:56

## 2025-05-13 RX ADMIN — DORZOLAMIDE 1 DROP(S): 20 SOLUTION/ DROPS OPHTHALMIC at 21:41

## 2025-05-13 RX ADMIN — BRIMONIDINE TARTRATE 1 DROP(S): 1.5 SOLUTION/ DROPS OPHTHALMIC at 19:19

## 2025-05-13 NOTE — CONSULT NOTE ADULT - ASSESSMENT
74-year-old  woman with long-standing history of obesity, ischemic heart disease and prior PCI/stent (2017);  CKD, presenting with few days of chest pain.  Pt reports intermittent episodes of left sided chest pain associated with left sided neck and shoulder pain.  The episodes are mostly precipitated by exertion but also sometimes occur at rest and can last up to an hour at a time.  74-year-old  woman with long-standing history of obesity, ischemic heart disease and prior PCI/stent (2017);  CKD, presenting with few days of chest pain.  Pt reports intermittent episodes of left sided chest pain associated with left sided neck and shoulder pain.  The episodes are mostly precipitated by exertion but also sometimes occur at rest and can last up to an hour at a time.       Patient with atypical chest discomfort, intermediate to high risk of ASCVD  will proceed with NST, echocardiogram   hold BB tomorrow   NPO after midnight   continue DAPT + statin   will continue to follow.

## 2025-05-13 NOTE — PATIENT PROFILE ADULT - DO YOU LACK THE NECESSARY SUPPORT TO HELP YOU COPE WITH LIFE CHALLENGES?
"Goal Outcome Evaluation:      Plan of Care Reviewed With: patient    Overall Patient Progress: improvingOverall Patient Progress: improving  Outcome Evaluation: X- result no acute  disease. pt is confused, but cooperative.LS CTA. NO SOB. Dressing CDI. wound vac in place. pt was up to bedside commode, no dizziness, no light headiness, tolerated sitting .pt voided, PVR-16ml. had (M) BM.No N/V.consumed 75 % of dinner time food.IV CDI, SL.    Problem: Adult Inpatient Plan of Care  Goal: Plan of Care Review  Description: The Plan of Care Review/Shift note should be completed every shift.  The Outcome Evaluation is a brief statement about your assessment that the patient is improving, declining, or no change.  This information will be displayed automatically on your shift  note.  Outcome: Progressing  Flowsheets (Taken 9/18/2024 2138)  Outcome Evaluation: X- result no acute  disease. pt is confused, but cooperative.LS CTA. NO SOB. Dressin CDI. wound vac in place. pt was up to bedside commode, no dizziness, no light headiness, tolerated sitting .pt voided, PVR-16ml. had (M) BM.No N/V.consumed 75 % of dinner time food.  Plan of Care Reviewed With: patient  Overall Patient Progress: improving  Goal: Patient-Specific Goal (Individualized)  Description: You can add care plan individualizations to a care plan. Examples of Individualization might be:  \"Parent requests to be called daily at 9am for status\", \"I have a hard time hearing out of my right ear\", or \"Do not touch me to wake me up as it startles  me\".  Outcome: Progressing  Goal: Absence of Hospital-Acquired Illness or Injury  Outcome: Progressing  Intervention: Identify and Manage Fall Risk  Recent Flowsheet Documentation  Taken 9/18/2024 1600 by Janelle Wood RN  Safety Promotion/Fall Prevention: activity supervised  Intervention: Prevent and Manage VTE (Venous Thromboembolism) Risk  Recent Flowsheet Documentation  Taken 9/18/2024 1600 by Janelle Wood, RN  VTE " Prevention/Management: SCDs off (sequential compression devices)  Intervention: Prevent Infection  Recent Flowsheet Documentation  Taken 9/18/2024 1600 by Janelle Wood RN  Infection Prevention: rest/sleep promoted  Goal: Optimal Comfort and Wellbeing  Outcome: Progressing  Goal: Readiness for Transition of Care  Outcome: Progressing     Problem: Infection  Goal: Absence of Infection Signs and Symptoms  Outcome: Progressing     Problem: Fall Injury Risk  Goal: Absence of Fall and Fall-Related Injury  Outcome: Progressing  Intervention: Identify and Manage Contributors  Recent Flowsheet Documentation  Taken 9/18/2024 1600 by Janelle Wood RN  Medication Review/Management: medications reviewed  Intervention: Promote Injury-Free Environment  Recent Flowsheet Documentation  Taken 9/18/2024 1600 by Janelle Wood RN  Safety Promotion/Fall Prevention: activity supervised     Problem: Skin Injury Risk Increased  Goal: Skin Health and Integrity  Outcome: Progressing  Intervention: Plan: Nurse Driven Intervention: Moisture Management  Recent Flowsheet Documentation  Taken 9/18/2024 2000 by Janelle Wood RN  Moisture Interventions:   Encourage regular toileting   Incontinence pad  Bathing/Skin Care: other (see comments)  Intervention: Plan: Nurse Driven Intervention: Friction and Shear  Recent Flowsheet Documentation  Taken 9/18/2024 1600 by Janelle Wood RN  Friction/Shear Interventions: HOB 30 degrees or less     Problem: Comorbidity Management  Goal: Blood Pressure in Desired Range  Outcome: Progressing  Intervention: Maintain Blood Pressure Management  Recent Flowsheet Documentation  Taken 9/18/2024 1600 by Janelle Wood RN  Medication Review/Management: medications reviewed      no

## 2025-05-13 NOTE — PATIENT PROFILE ADULT - FALL HARM RISK - UNIVERSAL INTERVENTIONS
Bed in lowest position, wheels locked, appropriate side rails in place/Call bell, personal items and telephone in reach/Instruct patient to call for assistance before getting out of bed or chair/Non-slip footwear when patient is out of bed/[place] to call system/Physically safe environment - no spills, clutter or unnecessary equipment/Purposeful Proactive Rounding/Room/bathroom lighting operational, light cord in reach

## 2025-05-13 NOTE — CHART NOTE - NSCHARTNOTEFT_GEN_A_CORE
Pt was a 73yo F admitted to [facility] for chest pain travel to left arm.   Pt was seen at examined at bedside. Left chest pain travel to bilateral shoulder and down to left arm and left upper quadrant pain  Trop neg x3 noted  Crt noted  Pt NPO pending cardiology eval   Start on D5NS  f/u Bmp and cardiology eval ([facility] called) Pt was a 75yo F admitted to [facility] for chest pain travel to left arm.   Pt was seen at examined at bedside. Left chest pain travel to bilateral shoulder and down to left arm and left upper quadrant pain  Trop neg x3 noted  Crt noted  Pt NPO pending cardiology eval   Start on D5NS  f/u Bmp and cardiology eval ([facility] called)      Addendum  case discussed with cardiology   pending echo   ok to eat  DC IV fluid   Nuclear stress test tomorrow Pt was a 75yo F admitted to [facility] for chest pain travel to left arm.   Pt was seen at examined at bedside. Left chest pain travel to bilateral shoulder and down to left arm and left upper quadrant pain  Trop neg x3 noted  Crt noted  Pt NPO pending cardiology eval   Start on D5NS  f/u Bmp and cardiology eval ([facility] called)        Addendum  case discussed with cardiology   pending echo   ok to eat  DC IV fluid   Nuclear stress test tomorrow  hold Toprol 100mg in the am tomorrow

## 2025-05-13 NOTE — H&P ADULT - NSHPPHYSICALEXAM_GEN_ALL_CORE
Vital Signs Last 24 Hrs  T(C): 36.8 (13 May 2025 06:26), Max: 36.8 (12 May 2025 19:59)  T(F): 98.2 (13 May 2025 06:26), Max: 98.2 (12 May 2025 19:59)  HR: 78 (13 May 2025 06:26) (78 - 94)  BP: 155/84 (13 May 2025 06:26) (146/83 - 155/84)  BP(mean): 103 (12 May 2025 23:16) (103 - 103)  RR: 17 (13 May 2025 06:26) (17 - 22)  SpO2: 94% (13 May 2025 06:26) (94% - 98%)    Parameters below as of 13 May 2025 06:26  Patient On (Oxygen Delivery Method): room air    GENERAL:  Well-appearing, not in acute distress  EYES:  Clear conjunctivae, extraocular movement intact  ENT: Moist mucous membranes  RESP:  Non-labored breathing pattern, lungs clear to ausculation in anterior fields  CV: Regular rate and rhythm, no murmurs appreciated, trace bilateral leg edmea  GI: Soft, non-tender, non-distended  NEURO: Awake, alert, conversant, upper and lower extremity strength 5/5, light touch sensation grossly intact  PSYCH: Calm, cooperative  SKIN: No rash or lesions, warm and dry

## 2025-05-13 NOTE — H&P ADULT - CONVERSATION DETAILS
Pt states that she would want resuscitative measures w/ CPR, mechanical ventilation in event of cardiac arrest.  Pt is full code.

## 2025-05-13 NOTE — H&P ADULT - NSHPLABSRESULTS_GEN_ALL_CORE
05-12    142  |  109[H]  |  24.4[H]  ----------------------------<  145[H]  5.3   |  17.0[L]  |  1.86[H]    Ca    8.4      12 May 2025 22:21    TPro  6.8  /  Alb  3.7  /  TBili  0.2[L]  /  DBili  x   /  AST  23  /  ALT  14  /  AlkPhos  142[H]  05-12                            11.1   5.51  )-----------( 159      ( 12 May 2025 22:21 )             36.5      EKG: NSR, HR 93, QTc 462, no acute ST-T changes noted

## 2025-05-13 NOTE — CHART NOTE - NSCHARTNOTEFT_GEN_A_CORE
[facility] called for consult  - This patient is a [facility] patient.   - Primary team made aware.   - [facility] will not follow.

## 2025-05-13 NOTE — H&P ADULT - NSICDXPASTMEDICALHX_GEN_ALL_CORE_FT
PAST MEDICAL HISTORY:  Anemia     Arthritis     CAD (coronary artery disease)     DJD (degenerative joint disease), lumbar     DM (diabetes mellitus)     GERD (gastroesophageal reflux disease)     Hiatal hernia     HTN (hypertension)     Stage 3 chronic kidney disease

## 2025-05-13 NOTE — H&P ADULT - HISTORY OF PRESENT ILLNESS
74yoF hx CAD s/p PCI (last stent 2018), HTN, DM, CKD presenting with few days of chest pain.  Pt reports intermittent episodes of left sided chest pain associated with left sided neck and shoulder pain.  The episodes are mostly precipitated by exertion but also sometimes occur at rest and can last up to an hour at a time.  Pt denies any recent stress testing but believes she had a TTE within the past year that may have had an abnormality.

## 2025-05-13 NOTE — H&P ADULT - ASSESSMENT
74yoF hx CAD s/p PCI (last stent 2018), HTN, DM, CKD presenting with few days of intermittent left sided chest pain mostly precipitated by exertion, but also occurring at rest    Chest pain in setting of CAD  -Concern for anginal like chest pain  -EKG w/out ischemic changes, serial troponin negative, will trend  -Resume ASA, plavix  -Intolerance to statins, will start ezetimibe   -Obtain TTE  -Admit to telemetry   -NPO except meds until cards eval in event pt goes for procedure (i.e. NST vs. LHC)  -If cardiology opts for LHC, may need nephrology input  -Pain control w/ Tylenol, lose dose IV dilaudid given CKD   -[facility] consulted for AM    Hx DM with neuropathy  -Home regimen- Tresiba 30 units QHS  -Since NPO and has hx of prior hypoglycemia, hold basal insulin  -FS q6hr w/ ISS    Hx HTN  -Resume hydralazine, metoprolol    Hx CKD  -Cr around baseline, will monitor     Hx glaucoma  -Resume eye drops     Prophylactic measure   -Heparin 5000 units    Dispo: medically active, pending TTE, cards eval.  Estimated LOS; 1-2 days, likely d/c to home.  74yoF hx CAD s/p PCI (last stent 2018), HTN, DM, CKD presenting with few days of intermittent left sided chest pain mostly precipitated by exertion, but also occurring at rest    Chest pain in setting of CAD  -Concern for anginal like chest pain  -EKG w/out ischemic changes, serial troponin negative, will trend  -Resume ASA, plavix  -Intolerance to statins, will start ezetimibe   -Obtain TTE  -Admit to telemetry   -NPO except meds until cards eval in event pt goes for procedure (i.e. NST vs. LHC)  -If cardiology opts for LHC, may need nephrology input  -Pain control w/ Tylenol, lose dose IV dilaudid given CKD   -[facility] consulted for AM    Hx DM with neuropathy  -Home regimen- Tresiba 30 units QHS  -Since NPO and has hx of prior hypoglycemia, hold basal insulin  -FS q6hr w/ ISS    Hx HTN  -Resume hydralazine, metoprolol    Hx CKD  -Cr around baseline, will monitor     Hx glaucoma  -Resume eye drops     Prophylactic measure   -Heparin 5000 units    Dispo: medically active, pending TTE, cards eval.  Estimated LOS; 1-2 days, likely d/c to home.

## 2025-05-13 NOTE — CONSULT NOTE ADULT - SUBJECTIVE AND OBJECTIVE BOX
CHIEF COMPLAINT:    HPI: 74-year-old  woman with long-standing history of obesity, ischemic heart disease and prior PCI/stent (2017);  CKD, presenting with few days of chest pain.  Pt reports intermittent episodes of left sided chest pain associated with left sided neck and shoulder pain.  The episodes are mostly precipitated by exertion but also sometimes occur at rest and can last up to an hour at a time.   ?    PAST MEDICAL & SURGICAL HISTORY:  HTN (hypertension)      DM (diabetes mellitus)      Hiatal hernia      GERD (gastroesophageal reflux disease)      DJD (degenerative joint disease), lumbar      Arthritis      Anemia      CAD (coronary artery disease)      Stage 3 chronic kidney disease      After cataract, bilateral      S/P coronary artery stent placement          Allergies    adhesives (Urticaria)  tramadol (Other; Urticaria)    Intolerances    statins (Muscle Pain)      MEDICATIONS  (STANDING):  aspirin enteric coated 81 milliGRAM(s) Oral daily  brimonidine 0.2% Ophthalmic Solution 1 Drop(s) Both EYES every 12 hours  clopidogrel Tablet 75 milliGRAM(s) Oral daily  dextrose 5%. 1000 milliLiter(s) (100 mL/Hr) IV Continuous <Continuous>  dextrose 5%. 1000 milliLiter(s) (50 mL/Hr) IV Continuous <Continuous>  dextrose 50% Injectable 25 Gram(s) IV Push once  dextrose 50% Injectable 12.5 Gram(s) IV Push once  dextrose 50% Injectable 25 Gram(s) IV Push once  dorzolamide 2% Ophthalmic Solution 1 Drop(s) Both EYES three times a day  ezetimibe 10 milliGRAM(s) Oral daily  ferrous    sulfate 325 milliGRAM(s) Oral daily  gabapentin 100 milliGRAM(s) Oral two times a day  glucagon  Injectable 1 milliGRAM(s) IntraMuscular once  heparin   Injectable 5000 Unit(s) SubCutaneous every 12 hours  hydrALAZINE 25 milliGRAM(s) Oral two times a day  insulin lispro (ADMELOG) corrective regimen sliding scale   SubCutaneous every 6 hours  latanoprost 0.005% Ophthalmic Solution 1 Drop(s) Both EYES at bedtime  metoprolol succinate  milliGRAM(s) Oral daily  pantoprazole    Tablet 40 milliGRAM(s) Oral before breakfast    MEDICATIONS  (PRN):  acetaminophen     Tablet .. 650 milliGRAM(s) Oral every 6 hours PRN Temp greater or equal to 38C (100.4F), Mild Pain (1 - 3), Moderate Pain (4 - 6)  dextrose Oral Gel 15 Gram(s) Oral once PRN Blood Glucose LESS THAN 70 milliGRAM(s)/deciliter  HYDROmorphone  Injectable 0.2 milliGRAM(s) IV Push every 6 hours PRN Severe Pain (7 - 10)  melatonin 3 milliGRAM(s) Oral at bedtime PRN Insomnia  ondansetron Injectable 4 milliGRAM(s) IV Push every 6 hours PRN Nausea and/or Vomiting      FAMILY HISTORY:  Family history of diabetes mellitus (DM) (Sibling)    Family history of hypertension (Sibling)        ***No family history of premature coronary artery disease or sudden cardiac death    SOCIAL HISTORY:  Smoking-  Alcohol-  Illicit Drug use-    REVIEW OF SYSTEMS:  Constitutional: [ ] fever, [ ]weight loss,  [ ]fatigue  Eyes: [ ] visual changes  Respiratory: [ ]shortness of breath;  [ ] cough, [ ]wheezing, [ ]chills, [ ]hemoptysis  Cardiovascular: [X ] chest pain, [ ]palpitations, [ ]dizziness,  [ ]leg swelling [ ]syncope  Gastrointestinal: [ ] abdominal pain, [ ]nausea, [ ]vomiting,  [ ]diarrhea   Genitourinary: [ ] dysuria, [ ] hematuria  Neurologic: [ ] headaches [ ] tremors  [ ] weakness [ ] lightheadedness  Skin: [ ] itching, [ ]burning, [ ] rashes  Endocrine: [ ] heat or cold intolerance  Musculoskeletal: [ ] joint pain or swelling; [ ] muscle, back, or extremity pain  Psychiatric: [ ] depression, [ ]anxiety, [ ]mood swings, or [ ]difficulty sleeping  Hematologic: [ ] easy bruising, [ ] bleeding gums     [ x] All others negative	  [ ] Unable to obtain    Vital Signs Last 24 Hrs  T(C): 36.6 (13 May 2025 10:53), Max: 36.8 (12 May 2025 19:59)  T(F): 97.8 (13 May 2025 10:53), Max: 98.2 (12 May 2025 19:59)  HR: 61 (13 May 2025 10:53) (61 - 94)  BP: 159/88 (13 May 2025 10:53) (146/83 - 159/88)  BP(mean): 103 (12 May 2025 23:16) (103 - 103)  RR: 18 (13 May 2025 10:53) (17 - 22)  SpO2: 97% (13 May 2025 10:53) (94% - 98%)    Parameters below as of 13 May 2025 10:53  Patient On (Oxygen Delivery Method): room air      I&O's Summary      PHYSICAL EXAM:  General: No acute distress  HEENT: EOMI  Neck:  No JVD  Lungs: Clear to auscultation bilaterally; No rales or wheezing  Heart: Regular rate and rhythm; No murmurs, rubs, or gallops  Abdomen: soft, non tender, non distended   Extremities: warm, no edema   Nervous system:  Alert & Oriented X3  Psychiatric: Normal affect  Skin: No rashes or lesions    LABS:  05-13    142  |  108  |  26.5[H]  ----------------------------<  117[H]  4.8   |  20.0[L]  |  1.77[H]    Ca    9.0      13 May 2025 07:00    TPro  6.8  /  Alb  3.7  /  TBili  0.2[L]  /  DBili  x   /  AST  23  /  ALT  14  /  AlkPhos  142[H]  05-12    Creatinine Trend: 1.77<--, 1.86<--                        11.1   5.51  )-----------( 159      ( 12 May 2025 22:21 )             36.5     PT/INR - ( 12 May 2025 22:21 )   PT: 9.8 sec;   INR: 0.87 ratio         PTT - ( 12 May 2025 22:21 )  PTT:27.0 sec    Lipid Panel: Cholesterol, Serum 223  Direct LDL --  HDL Cholesterol, Serum 62  Triglycerides, Serum 117    Cardiac Enzymes: CARDIAC MARKERS ( 13 May 2025 07:00 )  x     / x     / x     / x     / 3.8 ng/mL            RADIOLOGY:        TELEMETRY:    Left Heart Catheterization (February 13, 2024): Long area of moderate LAD disease (similar to previous imaging). Patent LAD stent. Medical management recommended.  LM: no disease  LAD: proximal 40% stenosis. Mid patent stent.  Cx: mild atherosclerosis  RCA: proximal 10-20% stenosis. Mid 20% stenosis.  ?  Last transthoracic Echocardiogram (1/22/2024): The LV cavity size is normal with only mild LVH. The LV systolic and diastolic function is normal with LVEF of 60 to 65%. There are no regional wall motion abnormalities. The left and right atria are normal in size and structure. Mild MR. There is no evidence for pericardial effusion.  ?  Most recent Carotid Doppler (04/10/2023): There is no significant atherosclerosis on the left and only mild on the right without stenosis The left and right vertebral arteries demonstrate antegrade flow; CHIEF COMPLAINT:    HPI: 74-year-old  woman with long-standing history of obesity, ischemic heart disease and prior PCI/stent (2017);  CKD, presenting with few days of chest pain.  Pt reports intermittent episodes of left sided chest pain associated with left sided neck and shoulder pain.  The episodes are mostly precipitated by exertion but also sometimes occur at rest and can last up to an hour at a time.   ?    PAST MEDICAL & SURGICAL HISTORY:  HTN (hypertension)      DM (diabetes mellitus)      Hiatal hernia      GERD (gastroesophageal reflux disease)      DJD (degenerative joint disease), lumbar      Arthritis      Anemia      CAD (coronary artery disease)      Stage 3 chronic kidney disease      After cataract, bilateral      S/P coronary artery stent placement          Allergies    adhesives (Urticaria)  tramadol (Other; Urticaria)    Intolerances    statins (Muscle Pain)      MEDICATIONS  (STANDING):  aspirin enteric coated 81 milliGRAM(s) Oral daily  brimonidine 0.2% Ophthalmic Solution 1 Drop(s) Both EYES every 12 hours  clopidogrel Tablet 75 milliGRAM(s) Oral daily  dextrose 5%. 1000 milliLiter(s) (100 mL/Hr) IV Continuous <Continuous>  dextrose 5%. 1000 milliLiter(s) (50 mL/Hr) IV Continuous <Continuous>  dextrose 50% Injectable 25 Gram(s) IV Push once  dextrose 50% Injectable 12.5 Gram(s) IV Push once  dextrose 50% Injectable 25 Gram(s) IV Push once  dorzolamide 2% Ophthalmic Solution 1 Drop(s) Both EYES three times a day  ezetimibe 10 milliGRAM(s) Oral daily  ferrous    sulfate 325 milliGRAM(s) Oral daily  gabapentin 100 milliGRAM(s) Oral two times a day  glucagon  Injectable 1 milliGRAM(s) IntraMuscular once  heparin   Injectable 5000 Unit(s) SubCutaneous every 12 hours  hydrALAZINE 25 milliGRAM(s) Oral two times a day  insulin lispro (ADMELOG) corrective regimen sliding scale   SubCutaneous every 6 hours  latanoprost 0.005% Ophthalmic Solution 1 Drop(s) Both EYES at bedtime  metoprolol succinate  milliGRAM(s) Oral daily  pantoprazole    Tablet 40 milliGRAM(s) Oral before breakfast    MEDICATIONS  (PRN):  acetaminophen     Tablet .. 650 milliGRAM(s) Oral every 6 hours PRN Temp greater or equal to 38C (100.4F), Mild Pain (1 - 3), Moderate Pain (4 - 6)  dextrose Oral Gel 15 Gram(s) Oral once PRN Blood Glucose LESS THAN 70 milliGRAM(s)/deciliter  HYDROmorphone  Injectable 0.2 milliGRAM(s) IV Push every 6 hours PRN Severe Pain (7 - 10)  melatonin 3 milliGRAM(s) Oral at bedtime PRN Insomnia  ondansetron Injectable 4 milliGRAM(s) IV Push every 6 hours PRN Nausea and/or Vomiting      FAMILY HISTORY:  Family history of diabetes mellitus (DM) (Sibling)    Family history of hypertension (Sibling)        ***No family history of premature coronary artery disease or sudden cardiac death    SOCIAL HISTORY:  Smoking-  Alcohol-  Illicit Drug use-    REVIEW OF SYSTEMS:  Constitutional: [ ] fever, [ ]weight loss,  [ ]fatigue  Eyes: [ ] visual changes  Respiratory: [ ]shortness of breath;  [ ] cough, [ ]wheezing, [ ]chills, [ ]hemoptysis  Cardiovascular: [X ] chest pain, [ ]palpitations, [ ]dizziness,  [ ]leg swelling [ ]syncope  Gastrointestinal: [ ] abdominal pain, [ ]nausea, [ ]vomiting,  [ ]diarrhea   Genitourinary: [ ] dysuria, [ ] hematuria  Neurologic: [ ] headaches [ ] tremors  [ ] weakness [ ] lightheadedness  Skin: [ ] itching, [ ]burning, [ ] rashes  Endocrine: [ ] heat or cold intolerance  Musculoskeletal: [ ] joint pain or swelling; [ ] muscle, back, or extremity pain  Psychiatric: [ ] depression, [ ]anxiety, [ ]mood swings, or [ ]difficulty sleeping  Hematologic: [ ] easy bruising, [ ] bleeding gums     [ x] All others negative	  [ ] Unable to obtain    Vital Signs Last 24 Hrs  T(C): 36.6 (13 May 2025 10:53), Max: 36.8 (12 May 2025 19:59)  T(F): 97.8 (13 May 2025 10:53), Max: 98.2 (12 May 2025 19:59)  HR: 61 (13 May 2025 10:53) (61 - 94)  BP: 159/88 (13 May 2025 10:53) (146/83 - 159/88)  BP(mean): 103 (12 May 2025 23:16) (103 - 103)  RR: 18 (13 May 2025 10:53) (17 - 22)  SpO2: 97% (13 May 2025 10:53) (94% - 98%)    Parameters below as of 13 May 2025 10:53  Patient On (Oxygen Delivery Method): room air      I&O's Summary      PHYSICAL EXAM:  General: No acute distress  HEENT: EOMI  Neck:  No JVD  Lungs: Clear to auscultation bilaterally; No rales or wheezing  Heart: Regular rate and rhythm; No murmurs, rubs, or gallops  Abdomen: soft, non tender, non distended   Extremities: warm, no edema   Nervous system:  Alert & Oriented X3  Psychiatric: Normal affect  Skin: No rashes or lesions    LABS:  05-13    142  |  108  |  26.5[H]  ----------------------------<  117[H]  4.8   |  20.0[L]  |  1.77[H]    Ca    9.0      13 May 2025 07:00    TPro  6.8  /  Alb  3.7  /  TBili  0.2[L]  /  DBili  x   /  AST  23  /  ALT  14  /  AlkPhos  142[H]  05-12    Creatinine Trend: 1.77<--, 1.86<--                        11.1   5.51  )-----------( 159      ( 12 May 2025 22:21 )             36.5     PT/INR - ( 12 May 2025 22:21 )   PT: 9.8 sec;   INR: 0.87 ratio         PTT - ( 12 May 2025 22:21 )  PTT:27.0 sec    Lipid Panel: Cholesterol, Serum 223  Direct LDL --  HDL Cholesterol, Serum 62  Triglycerides, Serum 117    Cardiac Enzymes: CARDIAC MARKERS ( 13 May 2025 07:00 )  x     / x     / x     / x     / 3.8 ng/mL            RADIOLOGY:    < from: 12 Lead ECG (05.12.25 @ 20:01) >  Normal sinus rhythm  Possible Left atrial enlargement      < from: 12 Lead ECG (05.13.25 @ 09:04) >   Normal sinus rhythm  T wave abnormality, consider lateral ischemia        TELEMETRY: SR    Left Heart Catheterization (February 13, 2024): Long area of moderate LAD disease (similar to previous imaging). Patent LAD stent. Medical management recommended.  LM: no disease  LAD: proximal 40% stenosis. Mid patent stent.  Cx: mild atherosclerosis  RCA: proximal 10-20% stenosis. Mid 20% stenosis.  ?  Last transthoracic Echocardiogram (1/22/2024): The LV cavity size is normal with only mild LVH. The LV systolic and diastolic function is normal with LVEF of 60 to 65%. There are no regional wall motion abnormalities. The left and right atria are normal in size and structure. Mild MR. There is no evidence for pericardial effusion.  ?  Most recent Carotid Doppler (04/10/2023): There is no significant atherosclerosis on the left and only mild on the right without stenosis The left and right vertebral arteries demonstrate antegrade flow;

## 2025-05-13 NOTE — ED ADULT NURSE REASSESSMENT NOTE - NS ED NURSE REASSESS COMMENT FT1
Pt is resting on stretcher. Denies any complaints at this time. The admitted MD came and spoke with pt at bedside. Safety is maintained. NAD.

## 2025-05-14 ENCOUNTER — RESULT REVIEW (OUTPATIENT)
Age: 75
End: 2025-05-14

## 2025-05-14 LAB
ANION GAP SERPL CALC-SCNC: 11 MMOL/L — SIGNIFICANT CHANGE UP (ref 5–17)
ANION GAP SERPL CALC-SCNC: 14 MMOL/L — SIGNIFICANT CHANGE UP (ref 5–17)
BUN SERPL-MCNC: 27.6 MG/DL — HIGH (ref 8–20)
BUN SERPL-MCNC: 29.3 MG/DL — HIGH (ref 8–20)
CALCIUM SERPL-MCNC: 8.9 MG/DL — SIGNIFICANT CHANGE UP (ref 8.4–10.5)
CALCIUM SERPL-MCNC: 8.9 MG/DL — SIGNIFICANT CHANGE UP (ref 8.4–10.5)
CHLORIDE SERPL-SCNC: 108 MMOL/L — SIGNIFICANT CHANGE UP (ref 96–108)
CHLORIDE SERPL-SCNC: 109 MMOL/L — HIGH (ref 96–108)
CO2 SERPL-SCNC: 18 MMOL/L — LOW (ref 22–29)
CO2 SERPL-SCNC: 19 MMOL/L — LOW (ref 22–29)
CREAT SERPL-MCNC: 1.91 MG/DL — HIGH (ref 0.5–1.3)
CREAT SERPL-MCNC: 1.99 MG/DL — HIGH (ref 0.5–1.3)
EGFR: 26 ML/MIN/1.73M2 — LOW
EGFR: 26 ML/MIN/1.73M2 — LOW
EGFR: 28 ML/MIN/1.73M2 — LOW
EGFR: 28 ML/MIN/1.73M2 — LOW
GLUCOSE BLDC GLUCOMTR-MCNC: 123 MG/DL — HIGH (ref 70–99)
GLUCOSE BLDC GLUCOMTR-MCNC: 125 MG/DL — HIGH (ref 70–99)
GLUCOSE BLDC GLUCOMTR-MCNC: 158 MG/DL — HIGH (ref 70–99)
GLUCOSE BLDC GLUCOMTR-MCNC: 97 MG/DL — SIGNIFICANT CHANGE UP (ref 70–99)
GLUCOSE SERPL-MCNC: 103 MG/DL — HIGH (ref 70–99)
GLUCOSE SERPL-MCNC: 132 MG/DL — HIGH (ref 70–99)
HCT VFR BLD CALC: 37.7 % — SIGNIFICANT CHANGE UP (ref 34.5–45)
HGB BLD-MCNC: 11.3 G/DL — LOW (ref 11.5–15.5)
MCHC RBC-ENTMCNC: 27.4 PG — SIGNIFICANT CHANGE UP (ref 27–34)
MCHC RBC-ENTMCNC: 30 G/DL — LOW (ref 32–36)
MCV RBC AUTO: 91.5 FL — SIGNIFICANT CHANGE UP (ref 80–100)
NRBC # BLD AUTO: 0 K/UL — SIGNIFICANT CHANGE UP (ref 0–0)
NRBC # FLD: 0 K/UL — SIGNIFICANT CHANGE UP (ref 0–0)
NRBC BLD AUTO-RTO: 0 /100 WBCS — SIGNIFICANT CHANGE UP (ref 0–0)
PLATELET # BLD AUTO: 144 K/UL — LOW (ref 150–400)
PMV BLD: 11.3 FL — SIGNIFICANT CHANGE UP (ref 7–13)
POTASSIUM SERPL-MCNC: 5.1 MMOL/L — SIGNIFICANT CHANGE UP (ref 3.5–5.3)
POTASSIUM SERPL-MCNC: 6 MMOL/L — HIGH (ref 3.5–5.3)
POTASSIUM SERPL-SCNC: 5.1 MMOL/L — SIGNIFICANT CHANGE UP (ref 3.5–5.3)
POTASSIUM SERPL-SCNC: 6 MMOL/L — HIGH (ref 3.5–5.3)
RBC # BLD: 4.12 M/UL — SIGNIFICANT CHANGE UP (ref 3.8–5.2)
RBC # FLD: 14.1 % — SIGNIFICANT CHANGE UP (ref 10.3–14.5)
SODIUM SERPL-SCNC: 139 MMOL/L — SIGNIFICANT CHANGE UP (ref 135–145)
SODIUM SERPL-SCNC: 140 MMOL/L — SIGNIFICANT CHANGE UP (ref 135–145)
WBC # BLD: 4.33 K/UL — SIGNIFICANT CHANGE UP (ref 3.8–10.5)
WBC # FLD AUTO: 4.33 K/UL — SIGNIFICANT CHANGE UP (ref 3.8–10.5)

## 2025-05-14 PROCEDURE — 99231 SBSQ HOSP IP/OBS SF/LOW 25: CPT | Mod: 25

## 2025-05-14 PROCEDURE — 93018 CV STRESS TEST I&R ONLY: CPT

## 2025-05-14 PROCEDURE — 99232 SBSQ HOSP IP/OBS MODERATE 35: CPT

## 2025-05-14 PROCEDURE — 93016 CV STRESS TEST SUPVJ ONLY: CPT

## 2025-05-14 PROCEDURE — 99223 1ST HOSP IP/OBS HIGH 75: CPT

## 2025-05-14 PROCEDURE — 78452 HT MUSCLE IMAGE SPECT MULT: CPT | Mod: 26

## 2025-05-14 RX ORDER — SODIUM ZIRCONIUM CYCLOSILICATE 5 G/5G
10 POWDER, FOR SUSPENSION ORAL ONCE
Refills: 0 | Status: COMPLETED | OUTPATIENT
Start: 2025-05-14 | End: 2025-05-14

## 2025-05-14 RX ORDER — SODIUM BICARBONATE 1 MEQ/ML
1300 SYRINGE (ML) INTRAVENOUS
Refills: 0 | Status: DISCONTINUED | OUTPATIENT
Start: 2025-05-14 | End: 2025-05-15

## 2025-05-14 RX ADMIN — BRIMONIDINE TARTRATE 1 DROP(S): 1.5 SOLUTION/ DROPS OPHTHALMIC at 17:44

## 2025-05-14 RX ADMIN — HEPARIN SODIUM 5000 UNIT(S): 1000 INJECTION INTRAVENOUS; SUBCUTANEOUS at 06:12

## 2025-05-14 RX ADMIN — INSULIN LISPRO 1: 100 INJECTION, SOLUTION INTRAVENOUS; SUBCUTANEOUS at 00:41

## 2025-05-14 RX ADMIN — Medication 40 MILLIGRAM(S): at 06:12

## 2025-05-14 RX ADMIN — GABAPENTIN 100 MILLIGRAM(S): 400 CAPSULE ORAL at 06:13

## 2025-05-14 RX ADMIN — Medication 325 MILLIGRAM(S): at 10:42

## 2025-05-14 RX ADMIN — Medication 650 MILLIGRAM(S): at 06:15

## 2025-05-14 RX ADMIN — LATANOPROST PF 1 DROP(S): 0.05 SOLUTION/ DROPS OPHTHALMIC at 21:21

## 2025-05-14 RX ADMIN — CLOPIDOGREL BISULFATE 75 MILLIGRAM(S): 75 TABLET, FILM COATED ORAL at 10:42

## 2025-05-14 RX ADMIN — HEPARIN SODIUM 5000 UNIT(S): 1000 INJECTION INTRAVENOUS; SUBCUTANEOUS at 17:39

## 2025-05-14 RX ADMIN — Medication 81 MILLIGRAM(S): at 10:42

## 2025-05-14 RX ADMIN — SODIUM ZIRCONIUM CYCLOSILICATE 10 GRAM(S): 5 POWDER, FOR SUSPENSION ORAL at 18:09

## 2025-05-14 RX ADMIN — SODIUM ZIRCONIUM CYCLOSILICATE 10 GRAM(S): 5 POWDER, FOR SUSPENSION ORAL at 10:42

## 2025-05-14 RX ADMIN — EZETIMIBE 10 MILLIGRAM(S): 10 TABLET ORAL at 10:42

## 2025-05-14 RX ADMIN — DORZOLAMIDE 1 DROP(S): 20 SOLUTION/ DROPS OPHTHALMIC at 14:19

## 2025-05-14 RX ADMIN — GABAPENTIN 100 MILLIGRAM(S): 400 CAPSULE ORAL at 21:21

## 2025-05-14 RX ADMIN — Medication 25 MILLIGRAM(S): at 17:39

## 2025-05-14 RX ADMIN — DORZOLAMIDE 1 DROP(S): 20 SOLUTION/ DROPS OPHTHALMIC at 21:21

## 2025-05-14 RX ADMIN — Medication 25 MILLIGRAM(S): at 06:12

## 2025-05-14 NOTE — CONSULT NOTE ADULT - ASSESSMENT
73 yo F with history of obesity, HTN, DM2, CKD stage 3b following with Dr. Mullen came to ED complaining of chest pain. She had NST done. On her labs she was found to have sCr 1.99 and K of 6.0. Patient is currently urinating.   She is following with Dr Mullen in the office for diabetic kidney disease, and is currently taking lokelma and sodium bicarbonate although she is not compliant with the dosing.   Nephrology was consulted for CKD and hyperK management.     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 Mullen in 2 weeks after discharge  Daily labs   Discussed with primary team

## 2025-05-14 NOTE — CONSULT NOTE ADULT - SUBJECTIVE AND OBJECTIVE BOX
[facility] DIVISION OF KIDNEY DISEASES AND HYPERTENSION -- INITIAL CONSULT NOTE  --------------------------------------------------------------------------------  HPI:  75 yo F with history of obesity, HTN, DM2, CKD stage 3b following with Dr. Mullen came to ED complaining of chest pain. She had NST done. On her labs she was found to have sCr 1.99 and K of 6.0. Patient is currently urinating.   She is following with Dr Mullen in the office for diabetic kidney disease, and is currently taking lokelma and sodium bicarbonate although she is not compliant with the dosing.   Nephrology was consulted for CKD and hyperK management.     PAST HISTORY  --------------------------------------------------------------------------------  PAST MEDICAL & SURGICAL HISTORY:  HTN (hypertension)      DM (diabetes mellitus)      Hiatal hernia      GERD (gastroesophageal reflux disease)      DJD (degenerative joint disease), lumbar      Arthritis      Anemia      CAD (coronary artery disease)      Stage 3 chronic kidney disease      After cataract, bilateral      S/P coronary artery stent placement        FAMILY HISTORY:  Family history of diabetes mellitus (DM) (Sibling)    Family history of hypertension (Sibling)      PAST SOCIAL HISTORY:    ALLERGIES & MEDICATIONS  --------------------------------------------------------------------------------  Allergies    adhesives (Urticaria)  tramadol (Other; Urticaria)    Intolerances    statins (Muscle Pain)    Standing Inpatient Medications  aspirin enteric coated 81 milliGRAM(s) Oral daily  brimonidine 0.2% Ophthalmic Solution 1 Drop(s) Both EYES every 12 hours  clopidogrel Tablet 75 milliGRAM(s) Oral daily  dextrose 5%. 1000 milliLiter(s) IV Continuous <Continuous>  dextrose 5%. 1000 milliLiter(s) IV Continuous <Continuous>  dextrose 50% Injectable 25 Gram(s) IV Push once  dextrose 50% Injectable 12.5 Gram(s) IV Push once  dextrose 50% Injectable 25 Gram(s) IV Push once  dorzolamide 2% Ophthalmic Solution 1 Drop(s) Both EYES three times a day  ezetimibe 10 milliGRAM(s) Oral daily  ferrous    sulfate 325 milliGRAM(s) Oral daily  gabapentin 100 milliGRAM(s) Oral two times a day  glucagon  Injectable 1 milliGRAM(s) IntraMuscular once  heparin   Injectable 5000 Unit(s) SubCutaneous every 12 hours  hydrALAZINE 25 milliGRAM(s) Oral two times a day  insulin lispro (ADMELOG) corrective regimen sliding scale   SubCutaneous every 6 hours  latanoprost 0.005% Ophthalmic Solution 1 Drop(s) Both EYES at bedtime  pantoprazole    Tablet 40 milliGRAM(s) Oral before breakfast  sodium chloride 0.9%. 1000 milliLiter(s) IV Continuous <Continuous>  sodium zirconium cyclosilicate 10 Gram(s) Oral once    PRN Inpatient Medications  acetaminophen     Tablet .. 650 milliGRAM(s) Oral every 6 hours PRN  dextrose Oral Gel 15 Gram(s) Oral once PRN  HYDROmorphone  Injectable 0.2 milliGRAM(s) IV Push every 6 hours PRN  melatonin 3 milliGRAM(s) Oral at bedtime PRN  ondansetron Injectable 4 milliGRAM(s) IV Push every 6 hours PRN      REVIEW OF SYSTEMS  --------------------------------------------------------------------------------  Gen: No weight changes, fatigue, fevers/chills, weakness  Skin: No rashes  Head/Eyes/Ears/Mouth: No headache; Normal hearing; Normal vision w/o blurriness; No sinus pain/discomfort, sore throat  Respiratory: No dyspnea, cough, wheezing, hemoptysis  CV: No chest pain, PND, orthopnea  GI: No abdominal pain, diarrhea, constipation, nausea, vomiting, melena, hematochezia  : No increased frequency, dysuria, hematuria, nocturia  MSK: No joint pain/swelling; no back pain; no edema  Neuro: No dizziness/lightheadedness, weakness, seizures, numbness, tingling  Heme: No easy bruising or bleeding      All other systems were reviewed and are negative, except as noted.    VITALS/PHYSICAL EXAM  --------------------------------------------------------------------------------  T(C): 36.4 (05-14-25 @ 15:51), Max: 36.7 (05-14-25 @ 04:36)  HR: 77 (05-14-25 @ 15:51) (58 - 77)  BP: 157/83 (05-14-25 @ 15:51) (130/76 - 164/73)  RR: 18 (05-14-25 @ 15:51) (18 - 19)  SpO2: 95% (05-14-25 @ 15:51) (93% - 98%)  Wt(kg): --        Physical Exam:  Gen: NAD, well-appearing  HEENT: PERRL, supple neck, clear oropharynx  Pulm: CTA B/L  CV: RRR, S1S2; no peripheral edema  Abd: +BS, soft, nontender/nondistended  Neuro: A and Ox3  MSK no deformities    LABS/STUDIES  --------------------------------------------------------------------------------              11.3   4.33  >-----------<  144      [05-14-25 @ 04:00]              37.7     139  |  109  |  29.3  ----------------------------<  132      [05-14-25 @ 04:00]  6.0   |  19.0  |  1.99        Ca     8.9     [05-14-25 @ 04:00]    TPro  6.8  /  Alb  3.7  /  TBili  0.2  /  DBili  x   /  AST  23  /  ALT  14  /  AlkPhos  142  [05-12-25 @ 22:21]    PT/INR: PT 9.8  , INR 0.87       [05-12-25 @ 22:21]  PTT: 27.0       [05-12-25 @ 22:21]      Creatinine Trend:  SCr 1.99 [05-14 @ 04:00]  SCr 1.77 [05-13 @ 07:00]  SCr 1.86 [05-12 @ 22:21]    Urinalysis - [05-14-25 @ 04:00]      Color  / Appearance  / SG  / pH       Gluc 132 / Ketone   / Bili  / Urobili        Blood  / Protein  / Leuk Est  / Nitrite       RBC  / WBC  / Hyaline  / Gran  / Sq Epi  / Non Sq Epi  / Bacteria       Lipid: chol 223, , HDL 62, LDL --      [05-13-25 @ 07:00]      JUVENTINO: titer Negative, pattern --      [11-01-22 @ 11:00]  ANCA: cANCA Negative, pANCA Negative, atypical ANCA Negative      [11-01-22 @ 11:00]  Syphilis Screen (Treponema Pallidum Ab) Negative      [11-01-22 @ 11:00]

## 2025-05-15 ENCOUNTER — TRANSCRIPTION ENCOUNTER (OUTPATIENT)
Age: 75
End: 2025-05-15

## 2025-05-15 VITALS
TEMPERATURE: 98 F | RESPIRATION RATE: 18 BRPM | HEART RATE: 89 BPM | SYSTOLIC BLOOD PRESSURE: 145 MMHG | OXYGEN SATURATION: 97 % | DIASTOLIC BLOOD PRESSURE: 81 MMHG

## 2025-05-15 LAB
ANION GAP SERPL CALC-SCNC: 12 MMOL/L — SIGNIFICANT CHANGE UP (ref 5–17)
BUN SERPL-MCNC: 32.7 MG/DL — HIGH (ref 8–20)
CALCIUM SERPL-MCNC: 8.6 MG/DL — SIGNIFICANT CHANGE UP (ref 8.4–10.5)
CHLORIDE SERPL-SCNC: 109 MMOL/L — HIGH (ref 96–108)
CO2 SERPL-SCNC: 19 MMOL/L — LOW (ref 22–29)
CREAT SERPL-MCNC: 1.94 MG/DL — HIGH (ref 0.5–1.3)
EGFR: 27 ML/MIN/1.73M2 — LOW
EGFR: 27 ML/MIN/1.73M2 — LOW
GLUCOSE BLDC GLUCOMTR-MCNC: 105 MG/DL — HIGH (ref 70–99)
GLUCOSE BLDC GLUCOMTR-MCNC: 125 MG/DL — HIGH (ref 70–99)
GLUCOSE BLDC GLUCOMTR-MCNC: 140 MG/DL — HIGH (ref 70–99)
GLUCOSE SERPL-MCNC: 143 MG/DL — HIGH (ref 70–99)
HCT VFR BLD CALC: 37.5 % — SIGNIFICANT CHANGE UP (ref 34.5–45)
HGB BLD-MCNC: 11.4 G/DL — LOW (ref 11.5–15.5)
MCHC RBC-ENTMCNC: 27.4 PG — SIGNIFICANT CHANGE UP (ref 27–34)
MCHC RBC-ENTMCNC: 30.4 G/DL — LOW (ref 32–36)
MCV RBC AUTO: 90.1 FL — SIGNIFICANT CHANGE UP (ref 80–100)
NRBC # BLD AUTO: 0 K/UL — SIGNIFICANT CHANGE UP (ref 0–0)
NRBC # FLD: 0 K/UL — SIGNIFICANT CHANGE UP (ref 0–0)
NRBC BLD AUTO-RTO: 0 /100 WBCS — SIGNIFICANT CHANGE UP (ref 0–0)
PLATELET # BLD AUTO: 179 K/UL — SIGNIFICANT CHANGE UP (ref 150–400)
PMV BLD: 12 FL — SIGNIFICANT CHANGE UP (ref 7–13)
POTASSIUM SERPL-MCNC: 4.9 MMOL/L — SIGNIFICANT CHANGE UP (ref 3.5–5.3)
POTASSIUM SERPL-SCNC: 4.9 MMOL/L — SIGNIFICANT CHANGE UP (ref 3.5–5.3)
RBC # BLD: 4.16 M/UL — SIGNIFICANT CHANGE UP (ref 3.8–5.2)
RBC # FLD: 13.8 % — SIGNIFICANT CHANGE UP (ref 10.3–14.5)
SODIUM SERPL-SCNC: 140 MMOL/L — SIGNIFICANT CHANGE UP (ref 135–145)
WBC # BLD: 6.1 K/UL — SIGNIFICANT CHANGE UP (ref 3.8–10.5)
WBC # FLD AUTO: 6.1 K/UL — SIGNIFICANT CHANGE UP (ref 3.8–10.5)

## 2025-05-15 PROCEDURE — 93017 CV STRESS TEST TRACING ONLY: CPT

## 2025-05-15 PROCEDURE — 99239 HOSP IP/OBS DSCHRG MGMT >30: CPT

## 2025-05-15 PROCEDURE — 80048 BASIC METABOLIC PNL TOTAL CA: CPT

## 2025-05-15 PROCEDURE — 83880 ASSAY OF NATRIURETIC PEPTIDE: CPT

## 2025-05-15 PROCEDURE — 82553 CREATINE MB FRACTION: CPT

## 2025-05-15 PROCEDURE — 80053 COMPREHEN METABOLIC PANEL: CPT

## 2025-05-15 PROCEDURE — A9500: CPT

## 2025-05-15 PROCEDURE — 85610 PROTHROMBIN TIME: CPT

## 2025-05-15 PROCEDURE — 80061 LIPID PANEL: CPT

## 2025-05-15 PROCEDURE — 36415 COLL VENOUS BLD VENIPUNCTURE: CPT

## 2025-05-15 PROCEDURE — 78452 HT MUSCLE IMAGE SPECT MULT: CPT

## 2025-05-15 PROCEDURE — 99285 EMERGENCY DEPT VISIT HI MDM: CPT

## 2025-05-15 PROCEDURE — 84484 ASSAY OF TROPONIN QUANT: CPT

## 2025-05-15 PROCEDURE — 83036 HEMOGLOBIN GLYCOSYLATED A1C: CPT

## 2025-05-15 PROCEDURE — 85027 COMPLETE CBC AUTOMATED: CPT

## 2025-05-15 PROCEDURE — 71045 X-RAY EXAM CHEST 1 VIEW: CPT

## 2025-05-15 PROCEDURE — 85730 THROMBOPLASTIN TIME PARTIAL: CPT

## 2025-05-15 PROCEDURE — 85025 COMPLETE CBC W/AUTO DIFF WBC: CPT

## 2025-05-15 PROCEDURE — C8929: CPT

## 2025-05-15 PROCEDURE — 99232 SBSQ HOSP IP/OBS MODERATE 35: CPT

## 2025-05-15 PROCEDURE — 82550 ASSAY OF CK (CPK): CPT

## 2025-05-15 PROCEDURE — 82962 GLUCOSE BLOOD TEST: CPT

## 2025-05-15 PROCEDURE — 93005 ELECTROCARDIOGRAM TRACING: CPT

## 2025-05-15 PROCEDURE — 87637 SARSCOV2&INF A&B&RSV AMP PRB: CPT

## 2025-05-15 RX ORDER — SODIUM ZIRCONIUM CYCLOSILICATE 5 G/5G
5 POWDER, FOR SUSPENSION ORAL
Refills: 0 | Status: DISCONTINUED | OUTPATIENT
Start: 2025-05-15 | End: 2025-05-15

## 2025-05-15 RX ORDER — CHLORTHALIDONE 25 MG/1
1 TABLET ORAL
Qty: 30 | Refills: 0
Start: 2025-05-15 | End: 2025-06-13

## 2025-05-15 RX ORDER — SODIUM ZIRCONIUM CYCLOSILICATE 5 G/5G
1 POWDER, FOR SUSPENSION ORAL
Qty: 0 | Refills: 0 | DISCHARGE
Start: 2025-05-15

## 2025-05-15 RX ORDER — CHLORTHALIDONE 25 MG/1
25 TABLET ORAL DAILY
Refills: 0 | Status: DISCONTINUED | OUTPATIENT
Start: 2025-05-15 | End: 2025-05-15

## 2025-05-15 RX ORDER — EZETIMIBE 10 MG/1
1 TABLET ORAL
Qty: 30 | Refills: 0
Start: 2025-05-15 | End: 2025-06-13

## 2025-05-15 RX ORDER — SODIUM BICARBONATE 1 MEQ/ML
2 SYRINGE (ML) INTRAVENOUS
Qty: 120 | Refills: 0
Start: 2025-05-15 | End: 2025-06-13

## 2025-05-15 RX ADMIN — BRIMONIDINE TARTRATE 1 DROP(S): 1.5 SOLUTION/ DROPS OPHTHALMIC at 05:15

## 2025-05-15 RX ADMIN — CLOPIDOGREL BISULFATE 75 MILLIGRAM(S): 75 TABLET, FILM COATED ORAL at 13:05

## 2025-05-15 RX ADMIN — CHLORTHALIDONE 25 MILLIGRAM(S): 25 TABLET ORAL at 13:04

## 2025-05-15 RX ADMIN — EZETIMIBE 10 MILLIGRAM(S): 10 TABLET ORAL at 13:04

## 2025-05-15 RX ADMIN — DORZOLAMIDE 1 DROP(S): 20 SOLUTION/ DROPS OPHTHALMIC at 05:16

## 2025-05-15 RX ADMIN — Medication 40 MILLIGRAM(S): at 05:16

## 2025-05-15 RX ADMIN — Medication 325 MILLIGRAM(S): at 13:05

## 2025-05-15 RX ADMIN — HEPARIN SODIUM 5000 UNIT(S): 1000 INJECTION INTRAVENOUS; SUBCUTANEOUS at 05:17

## 2025-05-15 RX ADMIN — Medication 81 MILLIGRAM(S): at 13:04

## 2025-05-15 RX ADMIN — Medication 25 MILLIGRAM(S): at 05:17

## 2025-05-15 RX ADMIN — Medication 1300 MILLIGRAM(S): at 05:16

## 2025-05-15 RX ADMIN — GABAPENTIN 100 MILLIGRAM(S): 400 CAPSULE ORAL at 05:17

## 2025-05-15 RX ADMIN — DORZOLAMIDE 1 DROP(S): 20 SOLUTION/ DROPS OPHTHALMIC at 13:04

## 2025-05-15 NOTE — DISCHARGE NOTE NURSING/CASE MANAGEMENT/SOCIAL WORK - NSDCPEFALRISK_GEN_ALL_CORE
For information on Fall & Injury Prevention, visit: [email or web address] OR  [email or web address] OR  https://www.cdc.gov/steadi/patient.html

## 2025-05-15 NOTE — DISCHARGE NOTE NURSING/CASE MANAGEMENT/SOCIAL WORK - NSFLUVACAGEDISCH_IMM_ALL_CORE
Wednesday August 10, 2022    [facility] ALANJESUS ALBERTO Nurse (PN) contacted [address], verified receipt of prescription and cost of Baclofen and ibuprofen  [employer] [employer] voucher  Faxed to to [facility] pharmacy for amount of $19 25  Called client and left message for a call back 
Adult

## 2025-05-15 NOTE — DISCHARGE NOTE NURSING/CASE MANAGEMENT/SOCIAL WORK - PATIENT PORTAL LINK FT
You can access the FollowMyHealth Patient Portal offered by [facility] by registering at the following website: [email or web address]. By joining [facility]’s FollowMyHealth portal, you will also be able to view your health information using other applications (apps) compatible with our system.

## 2025-05-15 NOTE — DISCHARGE NOTE PROVIDER - NSDCFUSCHEDAPPT_GEN_ALL_CORE_FT
Roland Nava  [facility]  NEPHRO [address]  Scheduled Appointment: 06/11/2025    Dagmar Marquez  [facility]  ENDOCRIN [address]  Scheduled Appointment: 08/01/2025    [facility]  CARDIOLOGY [address]  Scheduled Appointment: 08/04/2025

## 2025-05-15 NOTE — DISCHARGE NOTE PROVIDER - NSDCMRMEDTOKEN_GEN_ALL_CORE_FT
aspirin 81 mg oral tablet: 1 tab(s) orally once a day  chlorthalidone 25 mg oral tablet: 1 tab(s) orally once a day  clopidogrel 75 mg oral tablet: 1 tab(s) orally once a day  Combigan 0.2%-0.5% ophthalmic solution: 1 drop(s) in each affected eye 2 times a day  dorzolamide 2% ophthalmic solution: 1 drop(s) in each eye 3 times a day  ezetimibe 10 mg oral tablet: 1 tab(s) orally once a day  ferrous sulfate 325 mg (65 mg elemental iron) oral delayed release tablet: 1 tab(s) orally once a day  gabapentin 100 mg oral capsule: 1 cap(s) orally 2 times a day  hydrALAZINE 25 mg oral tablet: 1 tab(s) orally 2 times a day  Lumigan 0.01% ophthalmic solution: 1 drop(s) in each eye once a day (in the evening)  metoprolol succinate 100 mg oral tablet, extended release: 1 tab(s) orally once a day  omeprazole 40 mg oral delayed release capsule: 1 cap(s) orally once a day  sodium bicarbonate 650 mg oral tablet: 2 tab(s) orally 2 times a day  sodium zirconium cyclosilicate: 1 tab(s) orally 3 times a week Take 3 times a week 5mg  Tresiba: 30 unit(s) subcutaneous once a day (at bedtime)

## 2025-05-15 NOTE — DISCHARGE NOTE PROVIDER - CARE PROVIDER_API CALL
Roland Nava  Nephrology  [address] [place] [address]  Phone: [phone]  Fax: [phone]  Follow Up Time: 2 weeks    Ever Burns  Cardiology  [address] [place] [address]  Phone: [phone]  Fax: [phone]  Follow Up Time: 2 weeks

## 2025-05-15 NOTE — DISCHARGE NOTE PROVIDER - CARE PROVIDERS DIRECT ADDRESSES
[email or web address]

## 2025-05-15 NOTE — DISCHARGE NOTE PROVIDER - NSDCCPCAREPLAN_GEN_ALL_CORE_FT
PRINCIPAL DISCHARGE DIAGNOSIS  Diagnosis: Chest pain  Assessment and Plan of Treatment: Nuclear stress test normal   Follow up with cardiology   Restart Lokelma 5mg three times a week   increase Sodium bicarb two tablet twice a day   Start on Chlothalidone 25mg daily

## 2025-05-15 NOTE — DISCHARGE NOTE NURSING/CASE MANAGEMENT/SOCIAL WORK - FINANCIAL ASSISTANCE
[facility] provides services at a reduced cost to those who are determined to be eligible through [facility]’s financial assistance program. Information regarding [facility]’s financial assistance program can be found by going to [email or web address] or by calling [phone].

## 2025-05-15 NOTE — DISCHARGE NOTE PROVIDER - PROVIDER TOKENS
PROVIDER:[TOKEN:[[id number]],FOLLOWUP:[2 weeks]],PROVIDER:[TOKEN:[[id number]],FOLLOWUP:[2 weeks]]

## 2025-05-15 NOTE — PROGRESS NOTE ADULT - ASSESSMENT
73 yo F with history of obesity, HTN, DM2, CKD stage 3b following with Dr. Mullen came to ED complaining of chest pain. She had NST done. On her labs she was found to have sCr 1.99 and K of 6.0. Patient is currently urinating.   She is following with Dr Mullen in the office for diabetic kidney disease, and is currently taking lokelma and sodium bicarbonate although she is not compliant with the dosing.   Nephrology was consulted for CKD and hyperK management.     CKD stage 3b, renal function appears to be at baseline   Etiology of CKD most likely due to hypertensive nephrosclerosis/diabetic kidney disease  Metabolic acidosis due to CKD on sodium bicarbonate tablets   HyperK due to RTA4 in the setting of diabetes on lokelma as outpatient, resolved  HTN BP is uncontrolled   Chest pain, Cardiology is following     Plan   Continue lokelma 5 g tiw   Cotninue sodium bicarbonate tablets 1300 mg bid   Start on chlorthalidone 25 mg daily starting from tomorrow   Dose medications for eGFR<45  Follow up with Dr. Mullen in 2 weeks after discharge  Patient can be discharged from Nephrology perspective   Discussed with primary team
74yoF hx CAD s/p PCI (last stent 2018), HTN, DM, CKD presenting with few days of intermittent left sided chest pain mostly precipitated by exertion, but also occurring at rest    *Chest pain w hx of CAD  EKG w/out ischemic changes, serial troponin negative   cont ASA, plavix  Intolerance to statins, will start ezetimibe   Echo noted   s/p nuclear stress test, no ischemia noted  Cardiology consult noted  Cleared to DC on home meds   [facility] consulted for AM    *Hyperkalemia w hx of CKD  K 6.0   s/p Lokelma 10 x 2 today   pending repeat K this pm   Nephrology consult placed     *Hx DM with neuropathy  Home regimen- Tresiba 30 units QHS  FS q6hr w/ ISS    *Hx HTN   hydralazine, metoprolol    *Hx CKD  Cr around baseline, will monitor     *Hx glaucoma  eye drops     Prophylactic measure   Heparin 5000 units    pending nephrology clearance for dc

## 2025-05-15 NOTE — DISCHARGE NOTE PROVIDER - HOSPITAL COURSE
74yoF hx CAD s/p PCI (last stent 2018), HTN, DM, CKD presenting with few days of intermittent left sided chest pain mostly precipitated by exertion, but also occurring at rest    *Chest pain w hx of CAD  EKG w/out ischemic changes, serial troponin negative   cont ASA, plavix  Intolerance to statins, will start ezetimibe   Echo noted   s/p nuclear stress test, no ischemia noted  Cardiology consult noted  Cleared to DC on home meds   [facility] consulted for AM    *Hyperkalemia w hx of CKD  K 6.0   s/p Lokelma 10 x 2   repeat K normal   Nephrology consult noted  restart Lokelma 5mg TIW  started on Chlothiladone 25mg qd     *Hx DM with neuropathy  Home regimen- Tresiba 30 units QHS  FS q6hr w/ ISS    *Hx HTN   hydralazine, metoprolol    *Hx CKD  Cr around baseline, will monitor     *Hx glaucoma  eye drops      Vital Signs Last 24 Hrs  T(C): 36.4 (15 May 2025 08:10), Max: 36.8 (14 May 2025 22:20)  T(F): 97.6 (15 May 2025 08:10), Max: 98.2 (14 May 2025 22:20)  HR: 86 (15 May 2025 08:10) (72 - 100)  BP: 138/80 (15 May 2025 08:10) (134/78 - 157/83)  BP(mean): --  RR: 18 (15 May 2025 08:10) (18 - 18)  SpO2: 96% (15 May 2025 08:10) (93% - 96%)    Parameters below as of 15 May 2025 08:10  Patient On (Oxygen Delivery Method): room air      PHYSICAL EXAM:    GENERAL: NAD, well-groomed, well-developed  HEAD:  NC/AT  EYES: EOMI, PERRLA, no scleral icterus  HEENT: Moist mucous membranes  NECK: Supple, No JVD  CNS:  Alert & Oriented X3, Motor Strength 5/5 B/L upper and lower extremities; DTRs 2+ intact   LUNG: Clear to auscultation bilaterally; No rales, rhonchi, wheezing, or rubs  HEART: RRR; No murmurs, rubs, or gallops  ABDOMEN: +BS, ST/ND/NT  GENITOURINARY- Voiding, Bladder not distended  EXTREMITIES:  2+ Peripheral Pulses, No clubbing, cyanosis, or edema  MUSCULOSKELTAL- Joints normal ROM, no Muscle or joint tenderness

## 2025-05-15 NOTE — PROGRESS NOTE ADULT - SUBJECTIVE AND OBJECTIVE BOX
JUANY AGUILAR  [id number]      Chief Complaint:  CP    Interval History:  Patient without further CP, still with shoulder pain b/l.  Denies SOB, palps.    Tele:  No events      acetaminophen     Tablet .. 650 milliGRAM(s) Oral every 6 hours PRN  aspirin enteric coated 81 milliGRAM(s) Oral daily  brimonidine 0.2% Ophthalmic Solution 1 Drop(s) Both EYES every 12 hours  clopidogrel Tablet 75 milliGRAM(s) Oral daily  dextrose 5%. 1000 milliLiter(s) IV Continuous <Continuous>  dextrose 5%. 1000 milliLiter(s) IV Continuous <Continuous>  dextrose 50% Injectable 25 Gram(s) IV Push once  dextrose 50% Injectable 12.5 Gram(s) IV Push once  dextrose 50% Injectable 25 Gram(s) IV Push once  dextrose Oral Gel 15 Gram(s) Oral once PRN  dorzolamide 2% Ophthalmic Solution 1 Drop(s) Both EYES three times a day  ezetimibe 10 milliGRAM(s) Oral daily  ferrous    sulfate 325 milliGRAM(s) Oral daily  gabapentin 100 milliGRAM(s) Oral two times a day  glucagon  Injectable 1 milliGRAM(s) IntraMuscular once  heparin   Injectable 5000 Unit(s) SubCutaneous every 12 hours  hydrALAZINE 25 milliGRAM(s) Oral two times a day  HYDROmorphone  Injectable 0.2 milliGRAM(s) IV Push every 6 hours PRN  insulin lispro (ADMELOG) corrective regimen sliding scale   SubCutaneous every 6 hours  latanoprost 0.005% Ophthalmic Solution 1 Drop(s) Both EYES at bedtime  melatonin 3 milliGRAM(s) Oral at bedtime PRN  ondansetron Injectable 4 milliGRAM(s) IV Push every 6 hours PRN  pantoprazole    Tablet 40 milliGRAM(s) Oral before breakfast  sodium chloride 0.9%. 1000 milliLiter(s) IV Continuous <Continuous>  sodium zirconium cyclosilicate 10 Gram(s) Oral once  sodium zirconium cyclosilicate 10 Gram(s) Oral once          Physical Exam:  T(C): 36.7 (05-14-25 @ 04:36), Max: 36.7 (05-14-25 @ 04:36)  HR: 58 (05-14-25 @ 04:36) (58 - 66)  BP: 159/78 (05-14-25 @ 04:36) (130/76 - 164/73)  RR: 18 (05-14-25 @ 04:36) (18 - 19)  SpO2: 96% (05-14-25 @ 04:36) (96% - 98%)  General: Comfortable in NAD  Neck: No JVD  CVS: nl s1s2, no s3  Pulm: CTA b/l  Abd: soft, non-tender  Ext: No c/c/e  Neuro A&O x3  Psych: Normal affect      Labs:   14 May 2025 04:00    139    |  109    |  29.3   ----------------------------<  132    6.0     |  19.0   |  1.99     Ca    8.9        14 May 2025 04:00    TPro  6.8    /  Alb  3.7    /  TBili  0.2    /  DBili  x      /  AST  23     /  ALT  14     /  AlkPhos  142    12 May 2025 22:21                          11.3   4.33  )-----------( 144      ( 14 May 2025 04:00 )             37.7     PT/INR - ( 12 May 2025 22:21 )   PT: 9.8 sec;   INR: 0.87 ratio         PTT - ( 12 May 2025 22:21 )  PTT:27.0 sec  CARDIAC MARKERS ( 13 May 2025 07:00 )  x     / x     / x     / x     / 3.8 ng/mL      Left Heart Catheterization (February 13, 2024): Long area of moderate LAD disease (similar to previous imaging). Patent LAD stent. Medical management recommended.  LM: no disease  LAD: proximal 40% stenosis. Mid patent stent.  Cx: mild atherosclerosis  RCA: proximal 10-20% stenosis. Mid 20% stenosis.  ?  Last transthoracic Echocardiogram (1/22/2024): The LV cavity size is normal with only mild LVH. The LV systolic and diastolic function is normal with LVEF of 60 to 65%. There are no regional wall motion abnormalities. The left and right atria are normal in size and structure. Mild MR. There is no evidence for pericardial effusion.  ?  Most recent Carotid Doppler (04/10/2023): There is no significant atherosclerosis on the left and only mild on the right without stenosis The left and right vertebral arteries demonstrate antegrade flow;      Echo:   1. Left ventricular cavity is normal in size. Left ventricular systolic function is normal with an ejection fraction visually estimated at 60 to 65 %.   2. There is normal LV mass and concentric remodeling.   3. The left ventricular diastolic function is indeterminate.   4. Normal right ventricular cavity size and normal right ventricular systolic function. Tricuspid annular plane systolic excursion (TAPSE) is 2.2 cm (normal >=1.7 cm). Tricuspid annular tissue Doppler S' is 15.8 cm/s (normal >10 cm/s).   5. Left atrium is moderately dilated.   6. Trileaflet aortic valve with normal systolic excursion. There is focal calcification of the aortic valve leaflets. Fibrocalcific aortic valve sclerosis without stenosis.   7. There is mild posterior calcification of the mitral valve annulu    Nuclear Stress Test:   1. Normal myocardial perfusion scan, with no evidence of infarction or inducible ischemia.   2. The left ventricleis normal in function and normal in size. The post stress left ventricular EF is 72 %.   3. Stress electrocardiogram: No ischemic ST segment changes.   4. Arrhythmias: No arrhythmia associated with stress.   5. The patient underwent stress testing using pharmacological IV regadenoson (bolus injection, 400mcg over 10 to 20 seconds followed by 5ml flush) protocol.      _ The total procedure time was 4 minutes . The test was stopped due to completion of protocol.      _ The peak heart rate was 95 bpm: 65 % of the patient's predicted maximal heart rate. The heart rate response was normal pharmocologic heart rate response.      _ There was a normal pharmocologic blood pressure response with a maximum blood pressure of 169/62 mmHg.   6. Baseline electrocardiogram: sinus bradycardia with no sigificant ST abnomalities.        Assessment:  74-year-old  woman with long-standing history of obesity, ischemic heart disease and prior PCI/stent (2017);  CKD, presenting with few days of chest pain.  Pt reports intermittent episodes of left sided chest pain associated with left sided neck and shoulder pain.  The episodes are mostly precipitated by exertion but also sometimes occur at rest and can last up to an hour at a time.   -Echo with normal EF and no significant VHD.  No RWMA.  -Stress test with no ischemia or infarct with some artifact.  LVEF normal.  -No further CP.  Likely MSK given shoulder pain +/- GI.    Plan:  1. CV stable for d/c.  2. Continue current CV meds at current doses as PTA.  3. No additional CV testing.  4. Renal f/u.    D/w Dr. River.  Will sign off.  Please call with questions.  Thanks!        
[facility] DIVISION OF KIDNEY DISEASES AND HYPERTENSION -- PROGRESS NOTE    24 hour events/subjective:  Seen today   Renal function is stable   HyperK resolved     MEDICATIONS    Standing Inpatient Medications  aspirin enteric coated 81 milliGRAM(s) Oral daily  brimonidine 0.2% Ophthalmic Solution 1 Drop(s) Both EYES every 12 hours  chlorthalidone 25 milliGRAM(s) Oral daily  clopidogrel Tablet 75 milliGRAM(s) Oral daily  dextrose 5%. 1000 milliLiter(s) IV Continuous <Continuous>  dextrose 5%. 1000 milliLiter(s) IV Continuous <Continuous>  dextrose 50% Injectable 25 Gram(s) IV Push once  dextrose 50% Injectable 12.5 Gram(s) IV Push once  dextrose 50% Injectable 25 Gram(s) IV Push once  dorzolamide 2% Ophthalmic Solution 1 Drop(s) Both EYES three times a day  ezetimibe 10 milliGRAM(s) Oral daily  ferrous    sulfate 325 milliGRAM(s) Oral daily  gabapentin 100 milliGRAM(s) Oral two times a day  glucagon  Injectable 1 milliGRAM(s) IntraMuscular once  heparin   Injectable 5000 Unit(s) SubCutaneous every 12 hours  hydrALAZINE 25 milliGRAM(s) Oral two times a day  insulin lispro (ADMELOG) corrective regimen sliding scale   SubCutaneous every 6 hours  latanoprost 0.005% Ophthalmic Solution 1 Drop(s) Both EYES at bedtime  pantoprazole    Tablet 40 milliGRAM(s) Oral before breakfast  sodium bicarbonate 1300 milliGRAM(s) Oral two times a day  sodium zirconium cyclosilicate 5 Gram(s) Oral <User Schedule>    PRN Inpatient Medications  acetaminophen     Tablet .. 650 milliGRAM(s) Oral every 6 hours PRN  dextrose Oral Gel 15 Gram(s) Oral once PRN  HYDROmorphone  Injectable 0.2 milliGRAM(s) IV Push every 6 hours PRN  melatonin 3 milliGRAM(s) Oral at bedtime PRN  ondansetron Injectable 4 milliGRAM(s) IV Push every 6 hours PRN      VITALS/PHYSICAL EXAM  --------------------------------------------------------------------------------  T(C): 36.4 (05-15-25 @ 08:10), Max: 36.8 (05-14-25 @ 22:20)  HR: 86 (05-15-25 @ 08:10) (72 - 100)  BP: 138/80 (05-15-25 @ 08:10) (134/78 - 157/83)  RR: 18 (05-15-25 @ 08:10) (18 - 18)  SpO2: 96% (05-15-25 @ 08:10) (95% - 96%)  Wt(kg): --  Height (cm): 160 (05-14-25 @ 22:20)  Weight (kg): 111.6 (05-14-25 @ 22:20)  BMI (kg/m2): 43.6 (05-14-25 @ 22:20)  BSA (m2): 2.11 (05-14-25 @ 22:20)      05-14-25 @ 07:01  -  05-15-25 @ 07:00  --------------------------------------------------------  IN: 480 mL / OUT: 500 mL / NET: -20 mL      Physical Exam:  Gen: NAD, well-appearing  HEENT: normal  Pulm: CTA B/L  CV: normal S1S2; no rub, no edema  Abd: soft, non-tender  MSK no deformities   Neuro: Alert and oriented x3     LABS/STUDIES  --------------------------------------------------------------------------------  140  |  109  |  32.7  ----------------------------<  143      [05-15-25 @ 06:11]  4.9   |  19.0  |  1.94        Ca     8.6     [05-15-25 @ 06:11]            Creatinine Trend:  SCr 1.94 [05-15 @ 06:11]  SCr 1.91 [05-14 @ 17:00]  SCr 1.99 [05-14 @ 04:00]  SCr 1.77 [05-13 @ 07:00]  SCr 1.86 [05-12 @ 22:21]  
HPI  Pt is a 75yo F admitted to [facility] for chest pain.   Pt was seen and examined at bedside. S/p nuclear stress test. Noted to have hyperkalemia today.     Vital Signs Last 24 Hrs  T(C): 36.7 (14 May 2025 11:27), Max: 36.7 (14 May 2025 04:36)  T(F): 98.1 (14 May 2025 11:27), Max: 98.1 (14 May 2025 11:27)  HR: 74 (14 May 2025 11:27) (58 - 74)  BP: 151/74 (14 May 2025 11:27) (130/76 - 164/73)  BP(mean): --  RR: 18 (14 May 2025 11:27) (18 - 19)  SpO2: 93% (14 May 2025 11:27) (93% - 98%)    Parameters below as of 14 May 2025 11:27  Patient On (Oxygen Delivery Method): room air        I&O's Summary      CAPILLARY BLOOD GLUCOSE      POCT Blood Glucose.: 123 mg/dL (14 May 2025 12:44)  POCT Blood Glucose.: 125 mg/dL (14 May 2025 06:07)  POCT Blood Glucose.: 158 mg/dL (14 May 2025 00:39)  POCT Blood Glucose.: 101 mg/dL (13 May 2025 18:16)      PHYSICAL EXAM:    Constitutional: NAD,   HEENT: PERR, EOMI,    Neck: Soft and supple,   Respiratory: Breath sounds are clear bilaterally,    Cardiovascular: S1 and S2   Gastrointestinal: Bowel Sounds present, soft, nontender,   Extremities: No peripheral edema  Vascular: 2+ peripheral pulses  Neurological: A/O x 3, no focal deficits  Musculoskeletal: 5/5 strength b/l upper and lower extremities  Skin: No rashes    MEDICATIONS:  MEDICATIONS  (STANDING):  aspirin enteric coated 81 milliGRAM(s) Oral daily  brimonidine 0.2% Ophthalmic Solution 1 Drop(s) Both EYES every 12 hours  clopidogrel Tablet 75 milliGRAM(s) Oral daily  dextrose 5%. 1000 milliLiter(s) (100 mL/Hr) IV Continuous <Continuous>  dextrose 5%. 1000 milliLiter(s) (50 mL/Hr) IV Continuous <Continuous>  dextrose 50% Injectable 25 Gram(s) IV Push once  dextrose 50% Injectable 12.5 Gram(s) IV Push once  dextrose 50% Injectable 25 Gram(s) IV Push once  dorzolamide 2% Ophthalmic Solution 1 Drop(s) Both EYES three times a day  ezetimibe 10 milliGRAM(s) Oral daily  ferrous    sulfate 325 milliGRAM(s) Oral daily  gabapentin 100 milliGRAM(s) Oral two times a day  glucagon  Injectable 1 milliGRAM(s) IntraMuscular once  heparin   Injectable 5000 Unit(s) SubCutaneous every 12 hours  hydrALAZINE 25 milliGRAM(s) Oral two times a day  insulin lispro (ADMELOG) corrective regimen sliding scale   SubCutaneous every 6 hours  latanoprost 0.005% Ophthalmic Solution 1 Drop(s) Both EYES at bedtime  pantoprazole    Tablet 40 milliGRAM(s) Oral before breakfast  sodium chloride 0.9%. 1000 milliLiter(s) (75 mL/Hr) IV Continuous <Continuous>  sodium zirconium cyclosilicate 10 Gram(s) Oral once      LABS: All Labs Reviewed:                        11.3   4.33  )-----------( 144      ( 14 May 2025 04:00 )             37.7     05-14    139  |  109[H]  |  29.3[H]  ----------------------------<  132[H]  6.0[H]   |  19.0[L]  |  1.99[H]    Ca    8.9      14 May 2025 04:00    TPro  6.8  /  Alb  3.7  /  TBili  0.2[L]  /  DBili  x   /  AST  23  /  ALT  14  /  AlkPhos  142[H]  05-12    PT/INR - ( 12 May 2025 22:21 )   PT: 9.8 sec;   INR: 0.87 ratio         PTT - ( 12 May 2025 22:21 )  PTT:27.0 sec  CARDIAC MARKERS ( 13 May 2025 07:00 )  x     / x     / x     / x     / 3.8 ng/mL      Blood Culture:     RADIOLOGY/EKG:    DVT PPX:    ADVANCED DIRECTIVE:    DISPOSITION:

## 2025-05-19 ENCOUNTER — NON-APPOINTMENT (OUTPATIENT)
Age: 75
End: 2025-05-19

## 2025-05-23 ENCOUNTER — APPOINTMENT (OUTPATIENT)
Dept: CARDIOLOGY | Facility: CLINIC | Age: 75
End: 2025-05-23

## 2025-06-13 NOTE — H&P PST ADULT - BREASTS
[Time Spent: ___ minutes] : I have spent [unfilled] minutes of time on the encounter which excludes teaching and separately reported services. No masses; no nipple discharge

## 2025-06-16 ENCOUNTER — OFFICE (OUTPATIENT)
Dept: URBAN - METROPOLITAN AREA CLINIC 112 | Facility: CLINIC | Age: 75
Setting detail: OPHTHALMOLOGY
End: 2025-06-16
Payer: MEDICARE

## 2025-06-16 ENCOUNTER — RX ONLY (RX ONLY)
Age: 75
End: 2025-06-16

## 2025-06-16 DIAGNOSIS — H40.1122: ICD-10-CM

## 2025-06-16 DIAGNOSIS — H35.033: ICD-10-CM

## 2025-06-16 DIAGNOSIS — H40.1113: ICD-10-CM

## 2025-06-16 DIAGNOSIS — H04.123: ICD-10-CM

## 2025-06-16 PROCEDURE — 92083 EXTENDED VISUAL FIELD XM: CPT | Performed by: OPHTHALMOLOGY

## 2025-06-16 PROCEDURE — 92014 COMPRE OPH EXAM EST PT 1/>: CPT | Performed by: OPHTHALMOLOGY

## 2025-06-16 ASSESSMENT — VISUAL ACUITY
OS_BCVA: 20/HM
OD_BCVA: 20/20-1

## 2025-06-16 ASSESSMENT — REFRACTION_MANIFEST
OS_CYLINDER: -0.50
OS_SPHERE: -0.25
OD_AXIS: 125
OS_ADD: +2.50
OD_CYLINDER: -0.75
OS_SPHERE: -0.25
OD_ADD: +2.00
OD_VA1: 20/150
OS_ADD: +2.00
OD_SPHERE: -0.50
OS_AXIS: 023
OS_AXIS: 055
OS_VA1: 20/20
OD_ADD: +2.50
OD_SPHERE: -0.25
OS_VA1: 20/20
OD_AXIS: 020
OS_VA2: 20/20
OD_VA1: 20/20
OS_CYLINDER: -0.75
OD_VA2: 20/20
OD_CYLINDER: -0.75

## 2025-06-16 ASSESSMENT — PACHYMETRY
OS_CT_UM: 480
OD_CT_CORRECTION: 4
OS_CT_CORRECTION: 4
OD_CT_UM: 482

## 2025-06-16 ASSESSMENT — TONOMETRY
OD_IOP_MMHG: 10
OS_IOP_MMHG: 14

## 2025-06-16 ASSESSMENT — KERATOMETRY
OS_AXISANGLE_DEGREES: 156
OS_K2POWER_DIOPTERS: 45.50
OS_K1POWER_DIOPTERS: 43.75
OD_K2POWER_DIOPTERS: 44.50
OD_K1POWER_DIOPTERS: 43.50
OD_AXISANGLE_DEGREES: 130
METHOD_AUTO_MANUAL: AUTO

## 2025-06-16 ASSESSMENT — REFRACTION_AUTOREFRACTION
OS_CYLINDER: -1.75
OS_AXIS: 019
OD_SPHERE: +0.50
OD_AXIS: 078
OS_SPHERE: -0.50
OD_CYLINDER: -1.00

## 2025-06-16 ASSESSMENT — REFRACTION_CURRENTRX
OD_SPHERE: +2.00
OD_AXIS: 140
OS_OVR_VA: 20/
OD_CYLINDER: -0.50
OD_OVR_VA: 20/
OS_CYLINDER: -0.50
OD_VPRISM_DIRECTION: SV
OS_VPRISM_DIRECTION: SV
OS_SPHERE: +2.25
OS_AXIS: 031

## 2025-06-16 ASSESSMENT — SUPERFICIAL PUNCTATE KERATITIS (SPK)
OS_SPK: 1+
OD_SPK: 1+

## 2025-06-16 ASSESSMENT — CONFRONTATIONAL VISUAL FIELD TEST (CVF)
OS_FINDINGS: FULL
OD_FINDINGS: FULL

## 2025-07-03 ENCOUNTER — EMERGENCY (EMERGENCY)
Facility: HOSPITAL | Age: 75
LOS: 1 days | End: 2025-07-03
Attending: EMERGENCY MEDICINE
Payer: MEDICARE

## 2025-07-03 VITALS
DIASTOLIC BLOOD PRESSURE: 74 MMHG | RESPIRATION RATE: 18 BRPM | HEART RATE: 62 BPM | HEIGHT: 63 IN | OXYGEN SATURATION: 96 % | SYSTOLIC BLOOD PRESSURE: 122 MMHG | TEMPERATURE: 98 F

## 2025-07-03 VITALS
DIASTOLIC BLOOD PRESSURE: 84 MMHG | HEART RATE: 62 BPM | TEMPERATURE: 97 F | OXYGEN SATURATION: 95 % | RESPIRATION RATE: 16 BRPM | SYSTOLIC BLOOD PRESSURE: 155 MMHG

## 2025-07-03 DIAGNOSIS — Z95.5 PRESENCE OF CORONARY ANGIOPLASTY IMPLANT AND GRAFT: Chronic | ICD-10-CM

## 2025-07-03 DIAGNOSIS — H26.40 UNSPECIFIED SECONDARY CATARACT: Chronic | ICD-10-CM

## 2025-07-03 LAB
ALBUMIN SERPL ELPH-MCNC: 3.5 G/DL — SIGNIFICANT CHANGE UP (ref 3.3–5.2)
ALP SERPL-CCNC: 137 U/L — HIGH (ref 40–120)
ALT FLD-CCNC: 11 U/L — SIGNIFICANT CHANGE UP
ANION GAP SERPL CALC-SCNC: 10 MMOL/L — SIGNIFICANT CHANGE UP (ref 5–17)
AST SERPL-CCNC: 17 U/L — SIGNIFICANT CHANGE UP
BASOPHILS # BLD AUTO: 0.02 K/UL — SIGNIFICANT CHANGE UP (ref 0–0.2)
BASOPHILS NFR BLD AUTO: 0.3 % — SIGNIFICANT CHANGE UP (ref 0–2)
BILIRUB SERPL-MCNC: 0.2 MG/DL — LOW (ref 0.4–2)
BUN SERPL-MCNC: 30.6 MG/DL — HIGH (ref 8–20)
CALCIUM SERPL-MCNC: 8.3 MG/DL — LOW (ref 8.4–10.5)
CHLORIDE SERPL-SCNC: 108 MMOL/L — SIGNIFICANT CHANGE UP (ref 96–108)
CO2 SERPL-SCNC: 22 MMOL/L — SIGNIFICANT CHANGE UP (ref 22–29)
CREAT SERPL-MCNC: 2.22 MG/DL — HIGH (ref 0.5–1.3)
EGFR: 23 ML/MIN/1.73M2 — LOW
EGFR: 23 ML/MIN/1.73M2 — LOW
EOSINOPHIL # BLD AUTO: 0.3 K/UL — SIGNIFICANT CHANGE UP (ref 0–0.5)
EOSINOPHIL NFR BLD AUTO: 5.1 % — SIGNIFICANT CHANGE UP (ref 0–6)
GLUCOSE SERPL-MCNC: 86 MG/DL — SIGNIFICANT CHANGE UP (ref 70–99)
HCT VFR BLD CALC: 33.5 % — LOW (ref 34.5–45)
HGB BLD-MCNC: 10.1 G/DL — LOW (ref 11.5–15.5)
IMM GRANULOCYTES # BLD AUTO: 0.03 K/UL — SIGNIFICANT CHANGE UP (ref 0–0.07)
IMM GRANULOCYTES NFR BLD AUTO: 0.5 % — SIGNIFICANT CHANGE UP (ref 0–0.9)
LYMPHOCYTES # BLD AUTO: 1.2 K/UL — SIGNIFICANT CHANGE UP (ref 1–3.3)
LYMPHOCYTES NFR BLD AUTO: 20.5 % — SIGNIFICANT CHANGE UP (ref 13–44)
MAGNESIUM SERPL-MCNC: 2.1 MG/DL — SIGNIFICANT CHANGE UP (ref 1.6–2.6)
MCHC RBC-ENTMCNC: 27.4 PG — SIGNIFICANT CHANGE UP (ref 27–34)
MCHC RBC-ENTMCNC: 30.1 G/DL — LOW (ref 32–36)
MCV RBC AUTO: 90.8 FL — SIGNIFICANT CHANGE UP (ref 80–100)
MONOCYTES # BLD AUTO: 0.46 K/UL — SIGNIFICANT CHANGE UP (ref 0–0.9)
MONOCYTES NFR BLD AUTO: 7.9 % — SIGNIFICANT CHANGE UP (ref 2–14)
NEUTROPHILS # BLD AUTO: 3.84 K/UL — SIGNIFICANT CHANGE UP (ref 1.8–7.4)
NEUTROPHILS NFR BLD AUTO: 65.7 % — SIGNIFICANT CHANGE UP (ref 43–77)
NRBC # BLD AUTO: 0 K/UL — SIGNIFICANT CHANGE UP (ref 0–0)
NRBC # FLD: 0 K/UL — SIGNIFICANT CHANGE UP (ref 0–0)
NRBC BLD AUTO-RTO: 0 /100 WBCS — SIGNIFICANT CHANGE UP (ref 0–0)
NT-PROBNP SERPL-SCNC: 428 PG/ML — HIGH (ref 0–300)
PLATELET # BLD AUTO: 170 K/UL — SIGNIFICANT CHANGE UP (ref 150–400)
PMV BLD: 11.5 FL — SIGNIFICANT CHANGE UP (ref 7–13)
POTASSIUM SERPL-MCNC: 4.9 MMOL/L — SIGNIFICANT CHANGE UP (ref 3.5–5.3)
POTASSIUM SERPL-SCNC: 4.9 MMOL/L — SIGNIFICANT CHANGE UP (ref 3.5–5.3)
PROT SERPL-MCNC: 6.6 G/DL — SIGNIFICANT CHANGE UP (ref 6.6–8.7)
RAPID RVP RESULT: SIGNIFICANT CHANGE UP
RBC # BLD: 3.69 M/UL — LOW (ref 3.8–5.2)
RBC # FLD: 13.8 % — SIGNIFICANT CHANGE UP (ref 10.3–14.5)
SARS-COV-2 RNA SPEC QL NAA+PROBE: SIGNIFICANT CHANGE UP
SODIUM SERPL-SCNC: 139 MMOL/L — SIGNIFICANT CHANGE UP (ref 135–145)
TROPONIN T, HIGH SENSITIVITY RESULT: 17 NG/L — SIGNIFICANT CHANGE UP (ref 0–51)
TROPONIN T, HIGH SENSITIVITY RESULT: 18 NG/L — SIGNIFICANT CHANGE UP (ref 0–51)
TROPONIN T, HIGH SENSITIVITY RESULT: 19 NG/L — SIGNIFICANT CHANGE UP (ref 0–51)
WBC # BLD: 5.85 K/UL — SIGNIFICANT CHANGE UP (ref 3.8–10.5)
WBC # FLD AUTO: 5.85 K/UL — SIGNIFICANT CHANGE UP (ref 3.8–10.5)

## 2025-07-03 PROCEDURE — 36415 COLL VENOUS BLD VENIPUNCTURE: CPT

## 2025-07-03 PROCEDURE — 99285 EMERGENCY DEPT VISIT HI MDM: CPT | Mod: 25

## 2025-07-03 PROCEDURE — 93005 ELECTROCARDIOGRAM TRACING: CPT

## 2025-07-03 PROCEDURE — 80053 COMPREHEN METABOLIC PANEL: CPT

## 2025-07-03 PROCEDURE — 93010 ELECTROCARDIOGRAM REPORT: CPT

## 2025-07-03 PROCEDURE — 83880 ASSAY OF NATRIURETIC PEPTIDE: CPT

## 2025-07-03 PROCEDURE — 83735 ASSAY OF MAGNESIUM: CPT

## 2025-07-03 PROCEDURE — 0225U NFCT DS DNA&RNA 21 SARSCOV2: CPT

## 2025-07-03 PROCEDURE — 99285 EMERGENCY DEPT VISIT HI MDM: CPT

## 2025-07-03 PROCEDURE — 85025 COMPLETE CBC W/AUTO DIFF WBC: CPT

## 2025-07-03 PROCEDURE — 71045 X-RAY EXAM CHEST 1 VIEW: CPT | Mod: 26

## 2025-07-03 PROCEDURE — 84484 ASSAY OF TROPONIN QUANT: CPT

## 2025-07-03 PROCEDURE — 99053 MED SERV 10PM-8AM 24 HR FAC: CPT

## 2025-07-03 PROCEDURE — 99284 EMERGENCY DEPT VISIT MOD MDM: CPT

## 2025-07-03 PROCEDURE — 71045 X-RAY EXAM CHEST 1 VIEW: CPT

## 2025-07-03 RX ORDER — ACETAMINOPHEN 500 MG/5ML
975 LIQUID (ML) ORAL ONCE
Refills: 0 | Status: COMPLETED | OUTPATIENT
Start: 2025-07-03 | End: 2025-07-03

## 2025-07-03 RX ADMIN — Medication 975 MILLIGRAM(S): at 10:46

## 2025-07-03 NOTE — ED ADULT TRIAGE NOTE - CHIEF COMPLAINT QUOTE
pt c/o chest pain that began this AM. pt states pain feels similar to when she had a stent placed in 2018. pt additionally endorsing cough and congestion x couple days.

## 2025-07-03 NOTE — CONSULT NOTE ADULT - SUBJECTIVE AND OBJECTIVE BOX
JUANY AGUILAR  [id number]    HPI:  75-year-old female with a past medical history of CAD status post remote stenting, hypertension, dyslipidemia, DM, obesity, chronic kidney disease who presents to the ER with complaints of upper respiratory tract infection symptoms and chest pain.  She reports she has been having chest pain off and on for the last several months.  It will occur suddenly and sharp for a few seconds and then go away on its own.  There are no clear aggravating or relieving factors and generally happens at rest.  She has been evaluated for this including echocardiogram and stress testing in our office in May which was all unremarkable.  Chest pains have not changed at all.  Over the last 2 days she has been having symptoms of coughing, sore throat and body aches.  She then decided to come to the ER.  She is not having any chest pain at this time.  Patient denies shortness of breath, palpitations, orthopnea, presyncope, syncope.    ALLERGIES:  tramadol (Other; Urticaria)  adhesives (Urticaria)  statins (Muscle Pain)      PAST MEDICAL & SURGICAL HISTORY:  Hiatal hernia  GERD (gastroesophageal reflux disease)  DJD (degenerative joint disease), lumbar  Arthritis  Anemia  After cataract, bilateral  Otherwise, as noted above    MEDICATIONS (HOME):  ? Aspirin 81 MG TABS; TAKE 1 TABLET DAILY  ?? Clopidogrel Bisulfate 75 MG Oral Tablet; TAKE 1 TABLET DAILY  ?? Ferrous Sulfate 325 (65 Fe) MG Oral Tablet; TAKE 1 TABLET BY MOUTH 3 TIMES DAILY  WITH FOOD  ?? Folic Acid TABS; TAKE 1 TABLET DAILY AS DIRECTED  ?? FreeStyle Triston 3 Plus Sensor; CHANGE SENSOR EVERY 15 DAYS  ?? Gabapentin 100 MG TABS  ?? hydrALAZINE HCl - 25 MG Oral Tablet; TAKE 1 TABLET BY MOUTH TWICE A DAY  ?? Lokelma 5 GM Oral Packet; take 5 g three times/week  ?? Metoprolol Tartrate 50 MG Oral Tablet; TAKE 1 TABLET DAILY  ?? Omeprazole 40 MG Oral Capsule Delayed Release; TAKE 1 CAPSULE Daily  ?? Ozempic (0.25 or 0.5 MG/DOSE) 2 MG/3ML Subcutaneous Solution Pen-injector; Inject  0.5 mg once a weeek  ?? Rosuvastatin Calcium 5 MG Oral Tablet; TAKE 1 TABLET BY MOUTH EVERY DAY  ?? Sodium Bicarbonate 650 MG Oral Tablet; TAKE 2 TABLETS three times a day  ?? Tresiba 100 UNIT/ML Subcutaneous Solution  ?? Tresiba FlexTouch 100 UNIT/ML Subcutaneous Solution Pen-injector  ?? Vitamin D2 TABS; 50,000 units once a week    SOCIAL HISTORY:  Patient denies alcohol, tobacco, drug use    FAMILY HISTORY:  Family history of diabetes mellitus (DM) (Sibling)  Family history of hypertension (Sibling)        ROS:  Patient denies cough, and other than noted above full ROS is unremarkable      PHYSICAL EXAM:  Vital Signs Last 24 Hrs  T(C): 36.8 (03 Jul 2025 07:13), Max: 36.8 (03 Jul 2025 07:13)  T(F): 98.2 (03 Jul 2025 07:13), Max: 98.2 (03 Jul 2025 07:13)  HR: 62 (03 Jul 2025 07:13) (62 - 62)  BP: 122/74 (03 Jul 2025 07:13) (122/74 - 122/74)  RR: 18 (03 Jul 2025 07:13) (18 - 18)  SpO2: 96% (03 Jul 2025 07:13) (96% - 96%)  General: Patient comfortable in NAD  HEENT: NCAT, mmm, EOMI  Neck: no JVD, no carotid bruits  CVS: nl s1, split s2, no s3, ?s4, no murmur or rubs, RRR  Chest: CTA b/l  Abdomen: soft, nt/nd  Extremities: No c/c/e  Neuro: A&O x3  Psych: Normal affect      ECG:  Sinus rhythm with anterolateral T–wave inversions.    LABS:                        10.1   5.85  )-----------( 170      ( 03 Jul 2025 08:00 )             33.5     07-03    139  |  108  |  30.6[H]  ----------------------------<  86  4.9   |  22.0  |  2.22[H]    Ca    8.3[L]      03 Jul 2025 08:00  Mg     2.1     07-03    TPro  6.6  /  Alb  3.5  /  TBili  0.2[L]  /  DBili  x   /  AST  17  /  ALT  11  /  AlkPhos  137[H]  07-03      Echo (5/2025):  1. Left ventricular cavity is normal in size. Left ventricular systolic function is normal with an ejection fraction visually estimated at 60 to 65 %.   2. There is normal LV mass and concentric remodeling.   3. The left ventricular diastolic function is indeterminate.   4. Normal right ventricular cavity size and normal right ventricular systolic function. Tricuspid annular plane systolic excursion (TAPSE) is 2.2 cm (normal >=1.7 cm). Tricuspid annular tissue Doppler S' is 15.8 cm/s (normal >10 cm/s).   5. Left atrium is moderately dilated.   6. Trileaflet aortic valve with normal systolic excursion. There is focal calcification of the aortic valve leaflets. Fibrocalcific aortic valve sclerosis without stenosis.   7. There is mild posterior calcification of the mitral valve annulus.      Nuclear Stress Test (5/2025):   1. Normal myocardial perfusion scan, with no evidence of infarction or inducible ischemia.   2. The left ventricle is normal in function and normal in size. The post stress left ventricular EF is 72 %.   3. Stress electrocardiogram: No ischemic ST segment changes.   4. Arrhythmias: No arrhythmia associated with stress.      RADIOLOGY:  CXR:  Clear      Assessment:  75-year-old female with a past medical history of CAD status post remote stenting, hypertension, dyslipidemia, DM, obesity, chronic kidney disease who presents to the ER with complaints of upper respiratory tract infection symptoms and chest pain.  She reports she has been having chest pain off and on for the last several months. Chest pain is very brief and atypical. She has been evaluated for this including echocardiogram and stress testing in our office in May which was all unremarkable.  Chest pains have not changed at all.  She does have symptoms of possible URI.  EKG appears unchanged.  Troponin was negative.  There does not appear to be any acute cardiac issue.    Plan:  1. CV stable for d/c.  2. No additional cardiac testing at this time.  3.  Continue current cardiac meds in doses as noted above as prior to arrival.  4.  URI per ER.  5.  Follow-up with Dr. Roa as planned.      D/w Dr. Bishop.  Thanks!

## 2025-07-03 NOTE — ED PROVIDER NOTE - OBJECTIVE STATEMENT
76 y/o F with PMH of CAD s/p PCI (last stent 2018), HTN, DM, CKD Presents to the ED c/o left-sided chest pain.  Patient states the chest pain started this morning around 6 AM and describes it as sharp in quality.  Patient states the pain is nonradiating and that lasted approximately 15 minutes before resolving.  Patient states this chest pain feels similar to the chest pain she had when she needed a stent back in 2018.  Patient is also endorsing cough and congestion for the past 5 days but denies any recent sick contacts.  Patient is endorsing no other symptoms at this time. Patient denies any recent travel. Patient denies SOB, fever, chills, nausea, vomiting, diarrhea, constipation, headache, weakness, dizziness, dysuria, hematuria.

## 2025-07-03 NOTE — ED PROVIDER NOTE - PATIENT PORTAL LINK FT
You can access the FollowMyHealth Patient Portal offered by [facility] by registering at the following website: [email or web address]. By joining [employer]’s FollowMyHealth portal, you will also be able to view your health information using other applications (apps) compatible with our system.

## 2025-07-03 NOTE — ED ADULT NURSE NOTE - OBJECTIVE STATEMENT
Assumed care of pt at 0804. Pt A&Ox4 c/o chest pain, history of cardiac stent, on Plavix, on cardiac monitor in NSR, pt resting comfortably showing no signs of respiratory distress or pain, pt is calm and cooperative.

## 2025-07-03 NOTE — ED PROVIDER NOTE - CLINICAL SUMMARY MEDICAL DECISION MAKING FREE TEXT BOX
76 y/o F with PMH of CAD s/p PCI (last stent 2018), HTN, DM, CKD Presents to the ED c/o left-sided chest pain. Pending EKG, chest x-ray, labs and RVP.  Initial Trop 19, repeat Trop 18.  EKG unchanged from previous. CXR shows no infiltrates.  Patient's chest pain recurred while in the ED; Tylenol for pain control.  Cardiology consulted for evaluation. 74 y/o F with PMH of CAD s/p PCI (last stent 2018), HTN, DM, CKD Presents to the ED c/o left-sided chest pain. Pending EKG, chest x-ray, labs and RVP.  Initial Trop 19, repeat Trop 18.  EKG unchanged from previous. CXR shows no infiltrates.  Patient's chest pain recurred while in the ED; Tylenol for pain control.  Cardiology consulted for evaluation; No acute intervention and patient can follow-up outpatient with cardiology.  Discussed imaging and lab results with patient at bedside.  Patient will be discharged and recommended follow-up outpatient as needed.  Return precautions discussed with patient that prompt return to the ED.  Patient is amenable to plan.

## 2025-07-03 NOTE — ED PROVIDER NOTE - PHYSICAL EXAMINATION
General: Awake, alert, lying in bed in no acute distress.  HEENT: Normocephalic, atraumatic. No scleral icterus or conjunctival injection. EOMI.  Neck:. Soft and supple.  Cardiac: RRR, S1/S2 present  Resp: Lungs CTAB. Symmetric chest expansion with inspiration. No accessory muscle use  Abd: Soft, non-tender, non-distended. No guarding, rebound, or rigidity.  Skin: No rashes, abrasions, or lacerations.  Extremities: No LE edema b/l.  Neuro: AO x 4. Moves all extremities symmetrically. Motor strength and sensation grossly intact.  Psych: Appropriate mood and affect

## 2025-07-03 NOTE — ED ADULT NURSE NOTE - NSFALLHARMRISKINTERV_ED_ALL_ED

## 2025-07-03 NOTE — ED ADULT NURSE NOTE - DOES PATIENT HAVE ADVANCE DIRECTIVE
[facility] Alert Outreach Telephone Call Attempt     Patient is being outreached by the Care Transitions Program for a clinical alert from the [facility] monitoring program.     Call Attempt Date: 6/6/2025    Call Attempt: First Attempt    Case closed - unable to reach patient.  Will re-open case if patient calls back.    CIP[facility] calls #2-5 removed from this call series as patient has been enrolled with a 30 day CT RN.    
[facility] Outreach Call:    Name: ROMÁN MAGDALENO    [facility] IP Care Transitions (Call 1 (02D PD))     Question 1:  General Status  Do you have any new or worsening symptoms since leaving the hospital? If yes please press 1, if no press 2, if you're not sure please press 3, or if you're feeling better press 4.  New Symptoms    Call Status:  Call 1  Voicemail Left 1 (06/06/2025 11:16 AM CDT)  Inbound Call   Voicemail Left by patient  Call 2  Answered (06/06/2025 12:05 PM CDT)    Clinical Concerns - Issues List:  Pain  Comments:  patient states that he is in so much pain and he just got off of the phone with his PCP.  He is upset because his PCP will not prescribe him pain medications.  Talked about pain management.  No other questions/concerns/needs at this time.  Patient appreciative for the follow up call and support.       Clinical Concerns - Actions List:  Disease Management Education   Advised to f/u with PCP and Specialty MD  
No

## 2025-07-03 NOTE — ED PROVIDER NOTE - ATTENDING CONTRIBUTION TO CARE
74 y/o F with PMH of CAD s/p PCI (last stent 2018), HTN, DM, CKD Presents to the ED c/o left-sided chest pain.  Patient states the chest pain started this morning around 6 AM and describes it as sharp in quality.  Patient states the pain is nonradiating and that lasted approximately 15 minutes before resolving. ekg no acute ischemia also with uri symptoms. Seen by cardiology had recent negative wor up feeling better stable for dc

## 2025-07-09 ENCOUNTER — APPOINTMENT (OUTPATIENT)
Dept: NEPHROLOGY | Facility: CLINIC | Age: 75
End: 2025-07-09

## 2025-07-09 VITALS
OXYGEN SATURATION: 96 % | SYSTOLIC BLOOD PRESSURE: 144 MMHG | DIASTOLIC BLOOD PRESSURE: 82 MMHG | WEIGHT: 237 LBS | BODY MASS INDEX: 40.68 KG/M2 | HEART RATE: 92 BPM

## 2025-07-09 DIAGNOSIS — Z88.8 ALLERGY STATUS TO OTHER DRUGS, MEDICAMENTS AND BIOLOGICAL SUBSTANCES: ICD-10-CM

## 2025-07-09 DIAGNOSIS — E78.5 HYPERLIPIDEMIA, UNSPECIFIED: ICD-10-CM

## 2025-07-09 DIAGNOSIS — R07.89 OTHER CHEST PAIN: ICD-10-CM

## 2025-07-09 DIAGNOSIS — I25.10 ATHEROSCLEROTIC HEART DISEASE OF NATIVE CORONARY ARTERY WITHOUT ANGINA PECTORIS: ICD-10-CM

## 2025-07-09 DIAGNOSIS — E66.9 OBESITY, UNSPECIFIED: ICD-10-CM

## 2025-07-09 DIAGNOSIS — E11.22 TYPE 2 DIABETES MELLITUS WITH DIABETIC CHRONIC KIDNEY DISEASE: ICD-10-CM

## 2025-07-09 DIAGNOSIS — Z91.048 OTHER NONMEDICINAL SUBSTANCE ALLERGY STATUS: ICD-10-CM

## 2025-07-09 DIAGNOSIS — I12.9 HYPERTENSIVE CHRONIC KIDNEY DISEASE WITH STAGE 1 THROUGH STAGE 4 CHRONIC KIDNEY DISEASE, OR UNSPECIFIED CHRONIC KIDNEY DISEASE: ICD-10-CM

## 2025-07-09 PROCEDURE — 99214 OFFICE O/P EST MOD 30 MIN: CPT

## 2025-07-14 NOTE — ED PROVIDER NOTE - CPE EDP GASTRO NORM
present with spouse and granddaughter at bedside. Completed DEANDRE with patient's spouse. No  home has been selected at this time.     Loida Plata M.Div.     [place] [facility]  [phone]   - - -

## 2025-07-23 ENCOUNTER — EMERGENCY (EMERGENCY)
Facility: HOSPITAL | Age: 75
LOS: 1 days | End: 2025-07-23
Attending: EMERGENCY MEDICINE
Payer: MEDICARE

## 2025-07-23 VITALS
DIASTOLIC BLOOD PRESSURE: 75 MMHG | TEMPERATURE: 97 F | OXYGEN SATURATION: 97 % | SYSTOLIC BLOOD PRESSURE: 129 MMHG | RESPIRATION RATE: 20 BRPM | HEART RATE: 69 BPM

## 2025-07-23 VITALS
TEMPERATURE: 98 F | OXYGEN SATURATION: 98 % | HEART RATE: 61 BPM | HEIGHT: 63 IN | RESPIRATION RATE: 19 BRPM | DIASTOLIC BLOOD PRESSURE: 84 MMHG | SYSTOLIC BLOOD PRESSURE: 159 MMHG

## 2025-07-23 DIAGNOSIS — H26.40 UNSPECIFIED SECONDARY CATARACT: Chronic | ICD-10-CM

## 2025-07-23 DIAGNOSIS — Z95.5 PRESENCE OF CORONARY ANGIOPLASTY IMPLANT AND GRAFT: Chronic | ICD-10-CM

## 2025-07-23 LAB
ALBUMIN SERPL ELPH-MCNC: 3.6 G/DL — SIGNIFICANT CHANGE UP (ref 3.3–5.2)
ALP SERPL-CCNC: 132 U/L — HIGH (ref 40–120)
ALT FLD-CCNC: 13 U/L — SIGNIFICANT CHANGE UP
ANION GAP SERPL CALC-SCNC: 11 MMOL/L — SIGNIFICANT CHANGE UP (ref 5–17)
ANION GAP SERPL CALC-SCNC: 11 MMOL/L — SIGNIFICANT CHANGE UP (ref 5–17)
AST SERPL-CCNC: 17 U/L — SIGNIFICANT CHANGE UP
BASOPHILS # BLD AUTO: 0.01 K/UL — SIGNIFICANT CHANGE UP (ref 0–0.2)
BASOPHILS NFR BLD AUTO: 0.1 % — SIGNIFICANT CHANGE UP (ref 0–2)
BILIRUB SERPL-MCNC: 0.3 MG/DL — LOW (ref 0.4–2)
BUN SERPL-MCNC: 33.3 MG/DL — HIGH (ref 8–20)
BUN SERPL-MCNC: 37.4 MG/DL — HIGH (ref 8–20)
CALCIUM SERPL-MCNC: 7.5 MG/DL — LOW (ref 8.4–10.5)
CALCIUM SERPL-MCNC: 8.7 MG/DL — SIGNIFICANT CHANGE UP (ref 8.4–10.5)
CHLORIDE SERPL-SCNC: 107 MMOL/L — SIGNIFICANT CHANGE UP (ref 96–108)
CHLORIDE SERPL-SCNC: 111 MMOL/L — HIGH (ref 96–108)
CO2 SERPL-SCNC: 18 MMOL/L — LOW (ref 22–29)
CO2 SERPL-SCNC: 22 MMOL/L — SIGNIFICANT CHANGE UP (ref 22–29)
CREAT SERPL-MCNC: 1.74 MG/DL — HIGH (ref 0.5–1.3)
CREAT SERPL-MCNC: 2.16 MG/DL — HIGH (ref 0.5–1.3)
EGFR: 24 ML/MIN/1.73M2 — LOW
EGFR: 24 ML/MIN/1.73M2 — LOW
EGFR: 31 ML/MIN/1.73M2 — LOW
EGFR: 31 ML/MIN/1.73M2 — LOW
EOSINOPHIL # BLD AUTO: 0.05 K/UL — SIGNIFICANT CHANGE UP (ref 0–0.5)
EOSINOPHIL NFR BLD AUTO: 0.5 % — SIGNIFICANT CHANGE UP (ref 0–6)
GLUCOSE SERPL-MCNC: 61 MG/DL — LOW (ref 70–99)
GLUCOSE SERPL-MCNC: 71 MG/DL — SIGNIFICANT CHANGE UP (ref 70–99)
HCT VFR BLD CALC: 36.4 % — SIGNIFICANT CHANGE UP (ref 34.5–45)
HGB BLD-MCNC: 11.4 G/DL — LOW (ref 11.5–15.5)
IMM GRANULOCYTES # BLD AUTO: 0.04 K/UL — SIGNIFICANT CHANGE UP (ref 0–0.07)
IMM GRANULOCYTES NFR BLD AUTO: 0.4 % — SIGNIFICANT CHANGE UP (ref 0–0.9)
LYMPHOCYTES # BLD AUTO: 2.91 K/UL — SIGNIFICANT CHANGE UP (ref 1–3.3)
LYMPHOCYTES NFR BLD AUTO: 31.9 % — SIGNIFICANT CHANGE UP (ref 13–44)
MAGNESIUM SERPL-MCNC: 2.3 MG/DL — SIGNIFICANT CHANGE UP (ref 1.6–2.6)
MCHC RBC-ENTMCNC: 27.5 PG — SIGNIFICANT CHANGE UP (ref 27–34)
MCHC RBC-ENTMCNC: 31.3 G/DL — LOW (ref 32–36)
MCV RBC AUTO: 87.7 FL — SIGNIFICANT CHANGE UP (ref 80–100)
MONOCYTES # BLD AUTO: 0.51 K/UL — SIGNIFICANT CHANGE UP (ref 0–0.9)
MONOCYTES NFR BLD AUTO: 5.6 % — SIGNIFICANT CHANGE UP (ref 2–14)
NEUTROPHILS # BLD AUTO: 5.6 K/UL — SIGNIFICANT CHANGE UP (ref 1.8–7.4)
NEUTROPHILS NFR BLD AUTO: 61.5 % — SIGNIFICANT CHANGE UP (ref 43–77)
NRBC # BLD AUTO: 0 K/UL — SIGNIFICANT CHANGE UP (ref 0–0)
NRBC # FLD: 0 K/UL — SIGNIFICANT CHANGE UP (ref 0–0)
NRBC BLD AUTO-RTO: 0 /100 WBCS — SIGNIFICANT CHANGE UP (ref 0–0)
NT-PROBNP SERPL-SCNC: 113 PG/ML — SIGNIFICANT CHANGE UP (ref 0–300)
PLATELET # BLD AUTO: 226 K/UL — SIGNIFICANT CHANGE UP (ref 150–400)
PMV BLD: 11.5 FL — SIGNIFICANT CHANGE UP (ref 7–13)
POTASSIUM SERPL-MCNC: 4.5 MMOL/L — SIGNIFICANT CHANGE UP (ref 3.5–5.3)
POTASSIUM SERPL-MCNC: 5.7 MMOL/L — HIGH (ref 3.5–5.3)
POTASSIUM SERPL-SCNC: 4.5 MMOL/L — SIGNIFICANT CHANGE UP (ref 3.5–5.3)
POTASSIUM SERPL-SCNC: 5.7 MMOL/L — HIGH (ref 3.5–5.3)
PROT SERPL-MCNC: 7.2 G/DL — SIGNIFICANT CHANGE UP (ref 6.6–8.7)
RBC # BLD: 4.15 M/UL — SIGNIFICANT CHANGE UP (ref 3.8–5.2)
RBC # FLD: 13.9 % — SIGNIFICANT CHANGE UP (ref 10.3–14.5)
SODIUM SERPL-SCNC: 139 MMOL/L — SIGNIFICANT CHANGE UP (ref 135–145)
SODIUM SERPL-SCNC: 140 MMOL/L — SIGNIFICANT CHANGE UP (ref 135–145)
TROPONIN T, HIGH SENSITIVITY RESULT: 17 NG/L — SIGNIFICANT CHANGE UP (ref 0–51)
TROPONIN T, HIGH SENSITIVITY RESULT: 20 NG/L — SIGNIFICANT CHANGE UP (ref 0–51)
WBC # BLD: 9.12 K/UL — SIGNIFICANT CHANGE UP (ref 3.8–10.5)
WBC # FLD AUTO: 9.12 K/UL — SIGNIFICANT CHANGE UP (ref 3.8–10.5)

## 2025-07-23 PROCEDURE — 85025 COMPLETE CBC W/AUTO DIFF WBC: CPT

## 2025-07-23 PROCEDURE — 71045 X-RAY EXAM CHEST 1 VIEW: CPT

## 2025-07-23 PROCEDURE — 93010 ELECTROCARDIOGRAM REPORT: CPT

## 2025-07-23 PROCEDURE — 93005 ELECTROCARDIOGRAM TRACING: CPT

## 2025-07-23 PROCEDURE — 71045 X-RAY EXAM CHEST 1 VIEW: CPT | Mod: 26

## 2025-07-23 PROCEDURE — 99285 EMERGENCY DEPT VISIT HI MDM: CPT | Mod: 25

## 2025-07-23 PROCEDURE — 99053 MED SERV 10PM-8AM 24 HR FAC: CPT

## 2025-07-23 PROCEDURE — 80053 COMPREHEN METABOLIC PANEL: CPT

## 2025-07-23 PROCEDURE — 36415 COLL VENOUS BLD VENIPUNCTURE: CPT

## 2025-07-23 PROCEDURE — 84484 ASSAY OF TROPONIN QUANT: CPT

## 2025-07-23 PROCEDURE — 96374 THER/PROPH/DIAG INJ IV PUSH: CPT

## 2025-07-23 PROCEDURE — 99285 EMERGENCY DEPT VISIT HI MDM: CPT

## 2025-07-23 PROCEDURE — 80048 BASIC METABOLIC PNL TOTAL CA: CPT

## 2025-07-23 PROCEDURE — 96375 TX/PRO/DX INJ NEW DRUG ADDON: CPT

## 2025-07-23 PROCEDURE — 83735 ASSAY OF MAGNESIUM: CPT

## 2025-07-23 PROCEDURE — 83880 ASSAY OF NATRIURETIC PEPTIDE: CPT

## 2025-07-23 PROCEDURE — 82962 GLUCOSE BLOOD TEST: CPT

## 2025-07-23 RX ORDER — METOCLOPRAMIDE HCL 10 MG
10 TABLET ORAL ONCE
Refills: 0 | Status: COMPLETED | OUTPATIENT
Start: 2025-07-23 | End: 2025-07-23

## 2025-07-23 RX ORDER — MAGNESIUM, ALUMINUM HYDROXIDE 200-200 MG
30 TABLET,CHEWABLE ORAL ONCE
Refills: 0 | Status: COMPLETED | OUTPATIENT
Start: 2025-07-23 | End: 2025-07-23

## 2025-07-23 RX ADMIN — Medication 30 MILLILITER(S): at 10:17

## 2025-07-23 RX ADMIN — Medication 10 MILLIGRAM(S): at 10:17

## 2025-07-23 RX ADMIN — Medication 20 MILLIGRAM(S): at 10:17

## 2025-07-23 NOTE — ED PROVIDER NOTE - PROGRESS NOTE DETAILS
CBC reviewed, notable for:  Hgb 11.4, consistent with prior (range between 10-11s) Dr. Jorge Montoya  patient found to be hypoglycemic, will feed and recheck.

## 2025-07-23 NOTE — ED PROVIDER NOTE - PHYSICAL EXAMINATION
Gen: well appearing, no acute distress, appears stated age  Head: normocephalic, atraumatic  EENT: EOMI, moist mucous membranes, clear conjunctiva, no scleral icterus, no discharge  Lung: clear to auscultation bilaterally, speaking in full sentences and breathing comfortably on room air  CV: regular rate and rhythm, normal S1 / S2  Abd: soft, non-tender, non-distended,  no rebound or guarding  MSK: no edema or visible deformities, full range of motion in all 4 extremities, no asymmetry in calf circumferences  Neuro: awake, alert, CN 2-12 grossly intact  Skin: no obvious rash or jaundice  Psych: normal affect, normal speech     Chest pain returned for ~20 seconds after deep palpation of abdomen, no abnormal activity on telemetry during this episode

## 2025-07-23 NOTE — ED ADULT NURSE REASSESSMENT NOTE - NS ED NURSE REASSESS COMMENT FT1
Break coverage : Pt received in the stretcher resting comfortably , no distress noted, no chest pain reported . breathing easy and unlabored. Awaiting further testing

## 2025-07-23 NOTE — ED ADULT TRIAGE NOTE - PATIENT'S PREFERRED PRONOUN
Her/She Posterior Auricular Interpolation Flap Text: A decision was made to reconstruct the defect utilizing an interpolation axial flap and a staged reconstruction.  A telfa template was made of the defect.  This telfa template was then used to outline the posterior auricular interpolation flap.  The donor area for the pedicle flap was then injected with anesthesia.  The flap was excised through the skin and subcutaneous tissue down to the layer of the underlying musculature.  The pedicle flap was carefully excised within this deep plane to maintain its blood supply.  The edges of the donor site were undermined.   The donor site was closed in a primary fashion.  The pedicle was then rotated into position and sutured.  Once the tube was sutured into place, adequate blood supply was confirmed with blanching and refill.  The pedicle was then wrapped with xeroform gauze and dressed appropriately with a telfa and gauze bandage to ensure continued blood supply and protect the attached pedicle.

## 2025-07-23 NOTE — ED PROVIDER NOTE - NS ED ROS FT
Gen: No fever, chills, or fatigue  Neuro: No headache, syncope, dizziness, or numbness  ENT: No congestion or rhinorrhea  Resp: No cough or difficulty breathing  Cardiovascular: 9/10 chest pain lasting seconds this morning, no palpitations  Gastrointestinal: No nausea, vomiting, diarrhea, bloody stool, or abdominal pain  :  No incontinence, dysuria, hematuria, urgency, or frequency  MSK: No joint or muscle pain  Skin: No rashes    Remainder negative, except as per the HPI

## 2025-07-23 NOTE — ED PROVIDER NOTE - CLINICAL SUMMARY MEDICAL DECISION MAKING FREE TEXT BOX
75F, PMH CAD s/p stents (2018), HTN, DM, CKD, arthritis, pw chest pain ~2am when she got up to use bathroom, suddenly felt 9/10 mid-sternal chest pain, radiates to epigastric region, unable to characterize quality, resolved without intervention. Has experienced these episodes every few months, occur at rest, unsure if pleuritic. Says another episode lasting seconds after arriving to ED. No diaphoresis, SOB, lightheadedness, LOC, palpitations, cough, lower extremity swelling or pain, nausea, vomiting, congestion, rhinorrhea; no travel or sick contacts. Unsure if these episodes are precipitated by activity.    Exam unremarkable  CBC, CMP, Mg, pro-BNP, troponin  CXR  EKG 75F, PMH CAD s/p stents (2018), HTN, DM, CKD, arthritis, pw chest pain ~2am when she got up to use bathroom, suddenly felt 9/10 mid-sternal chest pain, radiates to epigastric region, unable to characterize quality, resolved without intervention. Has experienced these episodes every few months, occur at rest, unsure if pleuritic. Says another episode lasting seconds after arriving to ED. No diaphoresis, SOB, lightheadedness, LOC, palpitations, cough, lower extremity swelling or pain, nausea, vomiting, congestion, rhinorrhea; no travel or sick contacts. Unsure if these episodes are precipitated by activity.    Exam unremarkable  CBC, CMP, Mg, pro-BNP, troponin  CXR  EKG    [place]: pt signed out by dr. lomax notes feels much better now tolerating po no more pain. 2 trops neg cr at baseline on kelma has nephrology follow up also [facility] gi doctor; Fs improved sp eating; stable 91 and 84 after another 1.5-2 hrs feels better would like to go home will dc; cardiology saw pt on 7/3 was cleared for dc notes may echo and stress neg; pt was signed out to me pending repeat fs

## 2025-07-23 NOTE — PROVIDER CONTACT NOTE (HYPOGLYCEMIA EVENT) - NS PROVIDER CONTACT BACKGROUND-HYPO
Age: 75y    Gender: Female    POCT Blood Glucose:  60 mg/dL (07-23-25 @ 09:40)  61 mg/dL (07-23-25 @ 09:24)      eMAR:   MD Carlin notified of blood sugar, 40z juice given

## 2025-07-23 NOTE — ED ADULT NURSE REASSESSMENT NOTE - NS ED NURSE REASSESS COMMENT FT1
Patient c/o epigastric pain intermittently, blood sugar 61mg/dl MD Jorge LARKIN notified at bedside for reevaluation. 4oz juice given.

## 2025-07-23 NOTE — ED ADULT TRIAGE NOTE - CHIEF COMPLAINT QUOTE
Pt brought in by EMS for CP that started when pt woke up to go to the bathroom around 0200AM today. Pt reports pain has now subsided. Pt also reports she experienced that same CP last week while ambulating. Pt has no other complaints at this time. Pt had a stent placement in 2018. EKG to be obtained.

## 2025-07-23 NOTE — ED PROVIDER NOTE - OBJECTIVE STATEMENT
75F, PMH CAD s/p stents (2018), daily aspirin HTN, DM, CKD, arthritis, pw chest pain this morning. Pt reports waking up to go to bathroom ~2am when she suddenly felt 9/10 mid-sternal chest pain, radiates to epigastric region, unable to characterize quality, resolved without intervention. Has experienced these episodes every few months, occur at rest, unsure if pleuritic. Says another episode lasting seconds after arriving to ED. No diaphoresis, SOB, lightheadedness, LOC, palpitations, cough, lower extremity swelling or pain, nausea, vomiting, congestion, rhinorrhea; no travel or sick contacts. Unsure if these episodes are precipitated by activity.

## 2025-07-23 NOTE — ED PROVIDER NOTE - PATIENT PORTAL LINK FT
You can access the FollowMyHealth Patient Portal offered by [facility] by registering at the following website: [email or web address]. By joining [employer]’s FollowMyHealth portal, you will also be able to view your health information using other applications (apps) compatible with our system.

## 2025-07-23 NOTE — ED ADULT NURSE REASSESSMENT NOTE - NS ED NURSE REASSESS COMMENT FT1
Received patient from MARGUERITE Lowery at 0720, patient A&Ox4 resting in bed, continues to be on CM in NSR, repeat troponin sent, NAD noted. safety measures in place.

## 2025-07-23 NOTE — ED PROVIDER NOTE - ATTENDING CONTRIBUTION TO CARE
Dr. Jorge Montoya  I have personally seen the patient with the Resident. I agree with their assessment and plan unless otherwise noted. See below:    Patient is a 75-year-old female with history of CAD status post stents presents emergency department for intermittent epigastric/substernal chest pain starting this morning.  Patient states it has been intermittent today.  Has had this before in the past, has been to the emergency department for this for in the past is unclear of the cause.  Denies pain is pleuritic.  Is not worse with exertion or better with rest.  Denies nausea, vomiting, diaphoresis, diarrhea, constipation.  Patient recently restarted her Ozempic approximately a week ago, however has been having the symptoms since before she restarted it.    General: well appearing, no acute distress, overweight  Head: normocephalic, atraumatic.   Cardiac: heart RRR, no murmurs, rubs, gallops, pulses 2+ x4. chest has no TTP. no LE edema or calf TTP  Pulm: lungs CTA  GI: abdomen soft, nontender, negative rebound, not distended. negative murphys.  Skin: warm, dry, no rash, laceration, abrasion, contusion      will obtain labs, medicate and reeval.

## 2025-07-23 NOTE — ED PROVIDER NOTE - NSFOLLOWUPINSTRUCTIONS_ED_ALL_ED_FT
return to the ED if symptoms worsen; fever/chills nausea/vomiting, chest pain, shortness of breath follow up with your Cardiologist and Gastroenterologist within 1 week.

## 2025-07-23 NOTE — ED ADULT TRIAGE NOTE - GLASGOW COMA SCALE: EYE OPENING, MLM
[No Acute Distress] : no acute distress [Normal Oropharynx] : normal oropharynx [Normal Appearance] : normal appearance [No Neck Mass] : no neck mass [Normal Rate/Rhythm] : normal rate/rhythm [Normal S1, S2] : normal s1, s2 [No Murmurs] : no murmurs [No Resp Distress] : no resp distress [Clear to Auscultation Bilaterally] : clear to auscultation bilaterally [No Abnormalities] : no abnormalities [Benign] : benign [Normal Gait] : normal gait (E4) spontaneous [No Clubbing] : no clubbing [No Cyanosis] : no cyanosis [No Edema] : no edema [FROM] : FROM [Normal Color/ Pigmentation] : normal color/ pigmentation [No Focal Deficits] : no focal deficits [Oriented x3] : oriented x3 [Normal Affect] : normal affect

## 2025-07-23 NOTE — ED ADULT NURSE REASSESSMENT NOTE - NS ED NURSE REASSESS COMMENT FT1
Patient A&Ox4 resting in bed, NAD noted, continues to be on CM in NSR, repeat FS 91mg/dl after food and juice, vitals stable, safety measures in place.

## 2025-07-23 NOTE — ED ADULT NURSE NOTE - OBJECTIVE STATEMENT
pt. is aaox4. states she developed chest pain while sleeping, says its a burning sensation that comes and goes. no acute resp distress noted. iv access obtained, meds administered. safety maintained.

## 2025-07-24 NOTE — ED PROVIDER NOTE - IV ALTEPLASE EXCL REL HIDDEN
Patient called in inquiring about immunizations discussed with patient how to view via [employer] patient understood and states she will call back if she has any questions   show

## 2025-08-06 ENCOUNTER — OFFICE (OUTPATIENT)
Dept: URBAN - METROPOLITAN AREA CLINIC 112 | Facility: CLINIC | Age: 75
Setting detail: OPHTHALMOLOGY
End: 2025-08-06
Payer: COMMERCIAL

## 2025-08-06 DIAGNOSIS — Z01.00: ICD-10-CM

## 2025-08-06 PROCEDURE — 92014 COMPRE OPH EXAM EST PT 1/>: CPT | Performed by: OPTOMETRIST

## 2025-08-06 ASSESSMENT — REFRACTION_CURRENTRX
OS_AXIS: 045
OS_OVR_VA: 20/
OS_CYLINDER: -0.50
OD_OVR_VA: 20/
OD_OVR_VA: 20/
OS_AXIS: 031
OS_OVR_VA: 20/
OD_ADD: +2.50
OS_SPHERE: -0.25
OD_SPHERE: -0.50
OS_SPHERE: +2.25
OS_CYLINDER: -0.50
OD_VPRISM_DIRECTION: PROGS
OS_ADD: +2.50
OD_SPHERE: +2.00
OD_VPRISM_DIRECTION: SV
OD_AXIS: 140
OD_CYLINDER: -0.50
OS_VPRISM_DIRECTION: PROGS
OS_VPRISM_DIRECTION: SV

## 2025-08-06 ASSESSMENT — REFRACTION_MANIFEST
OD_ADD: +2.50
OD_AXIS: 135
OD_ADD: +2.00
OD_SPHERE: PLANO
OD_SPHERE: -0.50
OS_VA1: 20/20
OS_CYLINDER: -1.00
OS_ADD: +2.50
OD_CYLINDER: -1.25
OS_SPHERE: PLANO
OS_VA1: 20/20
OD_CYLINDER: -0.75
OD_VA1: HM 2FT
OD_ADD: +2.50
OD_VA1: 20/150
OS_SPHERE: -0.25
OS_AXIS: 040
OS_ADD: +2.50
OS_SPHERE: -0.25
OD_SPHERE: -0.25
OS_AXIS: 023
OS_ADD: +2.00
OD_AXIS: 125
OS_CYLINDER: -0.50
OD_VA2: 20/20
OS_CYLINDER: -0.75
OS_VA2: 20/20
OD_CYLINDER: -0.75
OD_VA1: 20/20
OS_VA1: 20/20
OD_AXIS: 020
OS_AXIS: 055

## 2025-08-06 ASSESSMENT — REFRACTION_AUTOREFRACTION
OS_SPHERE: +0.25
OD_SPHERE: +0.25
OD_AXIS: 135
OS_AXIS: 041
OD_CYLINDER: -1.25
OS_CYLINDER: -1.00

## 2025-08-06 ASSESSMENT — KERATOMETRY
OS_K2POWER_DIOPTERS: 45.50
OD_K2POWER_DIOPTERS: 44.50
METHOD_AUTO_MANUAL: AUTO
OD_AXISANGLE_DEGREES: 130
OS_AXISANGLE_DEGREES: 156
OD_K1POWER_DIOPTERS: 43.50
OS_K1POWER_DIOPTERS: 43.75

## 2025-08-06 ASSESSMENT — PACHYMETRY
OS_CT_UM: 480
OD_CT_CORRECTION: 4
OD_CT_UM: 482
OS_CT_CORRECTION: 4

## 2025-08-06 ASSESSMENT — TONOMETRY: OD_IOP_MMHG: 11

## 2025-08-06 ASSESSMENT — VISUAL ACUITY
OD_BCVA: 20/20-1
OS_BCVA: 20/HM

## 2025-08-06 ASSESSMENT — SUPERFICIAL PUNCTATE KERATITIS (SPK)
OS_SPK: 1+
OD_SPK: 1+

## 2025-08-06 ASSESSMENT — CONFRONTATIONAL VISUAL FIELD TEST (CVF)
OS_FINDINGS: FULL
OD_FINDINGS: FULL

## 2025-08-11 ENCOUNTER — APPOINTMENT (OUTPATIENT)
Dept: CARDIOLOGY | Facility: CLINIC | Age: 75
End: 2025-08-11
Payer: MEDICARE

## 2025-08-11 VITALS
BODY MASS INDEX: 38.93 KG/M2 | SYSTOLIC BLOOD PRESSURE: 110 MMHG | HEIGHT: 64 IN | HEART RATE: 77 BPM | WEIGHT: 228 LBS | DIASTOLIC BLOOD PRESSURE: 62 MMHG

## 2025-08-11 DIAGNOSIS — I51.7 CARDIOMEGALY: ICD-10-CM

## 2025-08-11 DIAGNOSIS — I25.10 ATHEROSCLEROTIC HEART DISEASE OF NATIVE CORONARY ARTERY W/OUT ANGINA PECTORIS: ICD-10-CM

## 2025-08-11 DIAGNOSIS — I10 ESSENTIAL (PRIMARY) HYPERTENSION: ICD-10-CM

## 2025-08-11 PROCEDURE — 93000 ELECTROCARDIOGRAM COMPLETE: CPT

## 2025-08-11 PROCEDURE — 99214 OFFICE O/P EST MOD 30 MIN: CPT | Mod: 25

## 2025-08-27 ENCOUNTER — APPOINTMENT (OUTPATIENT)
Dept: ENDOCRINOLOGY | Facility: CLINIC | Age: 75
End: 2025-08-27
Payer: MEDICARE

## 2025-08-27 VITALS
HEART RATE: 78 BPM | BODY MASS INDEX: 39.09 KG/M2 | OXYGEN SATURATION: 96 % | HEIGHT: 64 IN | DIASTOLIC BLOOD PRESSURE: 70 MMHG | WEIGHT: 229 LBS | SYSTOLIC BLOOD PRESSURE: 122 MMHG

## 2025-08-27 DIAGNOSIS — E78.5 HYPERLIPIDEMIA, UNSPECIFIED: ICD-10-CM

## 2025-08-27 DIAGNOSIS — E66.01 MORBID (SEVERE) OBESITY DUE TO EXCESS CALORIES: ICD-10-CM

## 2025-08-27 DIAGNOSIS — E11.29 TYPE 2 DIABETES MELLITUS WITH OTHER DIABETIC KIDNEY COMPLICATION: ICD-10-CM

## 2025-08-27 LAB — GLUCOSE BLDC GLUCOMTR-MCNC: 134

## 2025-08-27 PROCEDURE — 82962 GLUCOSE BLOOD TEST: CPT

## 2025-08-27 PROCEDURE — 99215 OFFICE O/P EST HI 40 MIN: CPT

## (undated) DEVICE — OMNIPAQUE 180MG/ML 10ML 10/BX